# Patient Record
Sex: FEMALE | Race: WHITE | NOT HISPANIC OR LATINO
[De-identification: names, ages, dates, MRNs, and addresses within clinical notes are randomized per-mention and may not be internally consistent; named-entity substitution may affect disease eponyms.]

---

## 2017-10-19 ENCOUNTER — APPOINTMENT (OUTPATIENT)
Dept: FAMILY MEDICINE | Facility: CLINIC | Age: 82
End: 2017-10-19
Payer: MEDICARE

## 2017-10-19 VITALS
WEIGHT: 140 LBS | DIASTOLIC BLOOD PRESSURE: 60 MMHG | HEART RATE: 55 BPM | SYSTOLIC BLOOD PRESSURE: 120 MMHG | HEIGHT: 59 IN | BODY MASS INDEX: 28.22 KG/M2 | RESPIRATION RATE: 16 BRPM

## 2017-10-19 DIAGNOSIS — Z80.9 FAMILY HISTORY OF MALIGNANT NEOPLASM, UNSPECIFIED: ICD-10-CM

## 2017-10-19 DIAGNOSIS — E78.6 LIPOPROTEIN DEFICIENCY: ICD-10-CM

## 2017-10-19 DIAGNOSIS — D50.9 IRON DEFICIENCY ANEMIA, UNSPECIFIED: ICD-10-CM

## 2017-10-19 DIAGNOSIS — I25.10 ATHEROSCLEROTIC HEART DISEASE OF NATIVE CORONARY ARTERY W/OUT ANGINA PECTORIS: ICD-10-CM

## 2017-10-19 DIAGNOSIS — Z82.61 FAMILY HISTORY OF ARTHRITIS: ICD-10-CM

## 2017-10-19 DIAGNOSIS — Z00.00 ENCOUNTER FOR GENERAL ADULT MEDICAL EXAMINATION W/OUT ABNORMAL FINDINGS: ICD-10-CM

## 2017-10-19 DIAGNOSIS — Z87.2 PERSONAL HISTORY OF DISEASES OF THE SKIN AND SUBCUTANEOUS TISSUE: ICD-10-CM

## 2017-10-19 DIAGNOSIS — I10 ESSENTIAL (PRIMARY) HYPERTENSION: ICD-10-CM

## 2017-10-19 PROCEDURE — G0008: CPT

## 2017-10-19 PROCEDURE — 90686 IIV4 VACC NO PRSV 0.5 ML IM: CPT

## 2017-10-19 PROCEDURE — G0439: CPT

## 2017-10-19 PROCEDURE — 93000 ELECTROCARDIOGRAM COMPLETE: CPT

## 2017-10-19 PROCEDURE — 36415 COLL VENOUS BLD VENIPUNCTURE: CPT

## 2017-10-20 LAB
HBA1C MFR BLD HPLC: 5.5
LDLC SERPL CALC-MCNC: 93

## 2017-10-28 ENCOUNTER — RX RENEWAL (OUTPATIENT)
Age: 82
End: 2017-10-28

## 2017-11-02 ENCOUNTER — APPOINTMENT (OUTPATIENT)
Dept: ORTHOPEDIC SURGERY | Facility: CLINIC | Age: 82
End: 2017-11-02

## 2019-08-20 ENCOUNTER — INPATIENT (INPATIENT)
Facility: HOSPITAL | Age: 84
LOS: 2 days | Discharge: NURSING, REHAB OR LONG-TERM CARE | DRG: 281 | End: 2019-08-23
Attending: FAMILY MEDICINE | Admitting: INTERNAL MEDICINE
Payer: MEDICARE

## 2019-08-20 ENCOUNTER — TRANSCRIPTION ENCOUNTER (OUTPATIENT)
Age: 84
End: 2019-08-20

## 2019-08-20 VITALS
WEIGHT: 139.99 LBS | DIASTOLIC BLOOD PRESSURE: 71 MMHG | HEIGHT: 65 IN | RESPIRATION RATE: 20 BRPM | TEMPERATURE: 98 F | SYSTOLIC BLOOD PRESSURE: 129 MMHG | HEART RATE: 76 BPM | OXYGEN SATURATION: 97 %

## 2019-08-20 DIAGNOSIS — K26.9 DUODENAL ULCER, UNSPECIFIED AS ACUTE OR CHRONIC, WITHOUT HEMORRHAGE OR PERFORATION: ICD-10-CM

## 2019-08-20 DIAGNOSIS — I10 ESSENTIAL (PRIMARY) HYPERTENSION: ICD-10-CM

## 2019-08-20 DIAGNOSIS — R55 SYNCOPE AND COLLAPSE: ICD-10-CM

## 2019-08-20 DIAGNOSIS — I21.4 NON-ST ELEVATION (NSTEMI) MYOCARDIAL INFARCTION: ICD-10-CM

## 2019-08-20 LAB
ALBUMIN SERPL ELPH-MCNC: 3.7 G/DL — SIGNIFICANT CHANGE UP (ref 3.3–5.2)
ALP SERPL-CCNC: 51 U/L — SIGNIFICANT CHANGE UP (ref 40–120)
ALT FLD-CCNC: 14 U/L — SIGNIFICANT CHANGE UP
ANION GAP SERPL CALC-SCNC: 12 MMOL/L — SIGNIFICANT CHANGE UP (ref 5–17)
AST SERPL-CCNC: 38 U/L — HIGH
BASOPHILS # BLD AUTO: 0.04 K/UL — SIGNIFICANT CHANGE UP (ref 0–0.2)
BASOPHILS NFR BLD AUTO: 0.5 % — SIGNIFICANT CHANGE UP (ref 0–2)
BILIRUB SERPL-MCNC: 0.4 MG/DL — SIGNIFICANT CHANGE UP (ref 0.4–2)
BUN SERPL-MCNC: 28 MG/DL — HIGH (ref 8–20)
CALCIUM SERPL-MCNC: 9.6 MG/DL — SIGNIFICANT CHANGE UP (ref 8.6–10.2)
CHLORIDE SERPL-SCNC: 105 MMOL/L — SIGNIFICANT CHANGE UP (ref 98–107)
CK MB CFR SERPL CALC: 38 NG/ML — HIGH (ref 0–6.7)
CK SERPL-CCNC: 267 U/L — HIGH (ref 25–170)
CK SERPL-CCNC: 360 U/L — HIGH (ref 25–170)
CO2 SERPL-SCNC: 24 MMOL/L — SIGNIFICANT CHANGE UP (ref 22–29)
CREAT SERPL-MCNC: 1 MG/DL — SIGNIFICANT CHANGE UP (ref 0.5–1.3)
EOSINOPHIL # BLD AUTO: 0.09 K/UL — SIGNIFICANT CHANGE UP (ref 0–0.5)
EOSINOPHIL NFR BLD AUTO: 1.2 % — SIGNIFICANT CHANGE UP (ref 0–6)
GLUCOSE SERPL-MCNC: 141 MG/DL — HIGH (ref 70–115)
HCT VFR BLD CALC: 37.7 % — SIGNIFICANT CHANGE UP (ref 34.5–45)
HGB BLD-MCNC: 12.1 G/DL — SIGNIFICANT CHANGE UP (ref 11.5–15.5)
IMM GRANULOCYTES NFR BLD AUTO: 0.7 % — SIGNIFICANT CHANGE UP (ref 0–1.5)
LYMPHOCYTES # BLD AUTO: 1.09 K/UL — SIGNIFICANT CHANGE UP (ref 1–3.3)
LYMPHOCYTES # BLD AUTO: 14.5 % — SIGNIFICANT CHANGE UP (ref 13–44)
MCHC RBC-ENTMCNC: 28.8 PG — SIGNIFICANT CHANGE UP (ref 27–34)
MCHC RBC-ENTMCNC: 32.1 GM/DL — SIGNIFICANT CHANGE UP (ref 32–36)
MCV RBC AUTO: 89.8 FL — SIGNIFICANT CHANGE UP (ref 80–100)
MONOCYTES # BLD AUTO: 0.49 K/UL — SIGNIFICANT CHANGE UP (ref 0–0.9)
MONOCYTES NFR BLD AUTO: 6.5 % — SIGNIFICANT CHANGE UP (ref 2–14)
NEUTROPHILS # BLD AUTO: 5.78 K/UL — SIGNIFICANT CHANGE UP (ref 1.8–7.4)
NEUTROPHILS NFR BLD AUTO: 76.6 % — SIGNIFICANT CHANGE UP (ref 43–77)
PLATELET # BLD AUTO: 228 K/UL — SIGNIFICANT CHANGE UP (ref 150–400)
POTASSIUM SERPL-MCNC: 4.5 MMOL/L — SIGNIFICANT CHANGE UP (ref 3.5–5.3)
POTASSIUM SERPL-SCNC: 4.5 MMOL/L — SIGNIFICANT CHANGE UP (ref 3.5–5.3)
PROT SERPL-MCNC: 6.8 G/DL — SIGNIFICANT CHANGE UP (ref 6.6–8.7)
RBC # BLD: 4.2 M/UL — SIGNIFICANT CHANGE UP (ref 3.8–5.2)
RBC # FLD: 14.3 % — SIGNIFICANT CHANGE UP (ref 10.3–14.5)
SODIUM SERPL-SCNC: 141 MMOL/L — SIGNIFICANT CHANGE UP (ref 135–145)
TROPONIN T SERPL-MCNC: 0.37 NG/ML — HIGH (ref 0–0.06)
TROPONIN T SERPL-MCNC: 0.53 NG/ML — HIGH (ref 0–0.06)
WBC # BLD: 7.54 K/UL — SIGNIFICANT CHANGE UP (ref 3.8–10.5)
WBC # FLD AUTO: 7.54 K/UL — SIGNIFICANT CHANGE UP (ref 3.8–10.5)

## 2019-08-20 PROCEDURE — 73502 X-RAY EXAM HIP UNI 2-3 VIEWS: CPT | Mod: 26,LT

## 2019-08-20 PROCEDURE — 93010 ELECTROCARDIOGRAM REPORT: CPT

## 2019-08-20 PROCEDURE — 99223 1ST HOSP IP/OBS HIGH 75: CPT

## 2019-08-20 PROCEDURE — 73562 X-RAY EXAM OF KNEE 3: CPT | Mod: 26,LT

## 2019-08-20 PROCEDURE — 99285 EMERGENCY DEPT VISIT HI MDM: CPT

## 2019-08-20 PROCEDURE — 71045 X-RAY EXAM CHEST 1 VIEW: CPT | Mod: 26

## 2019-08-20 PROCEDURE — 70450 CT HEAD/BRAIN W/O DYE: CPT | Mod: 26

## 2019-08-20 RX ORDER — ATORVASTATIN CALCIUM 80 MG/1
40 TABLET, FILM COATED ORAL AT BEDTIME
Refills: 0 | Status: DISCONTINUED | OUTPATIENT
Start: 2019-08-21 | End: 2019-08-23

## 2019-08-20 RX ORDER — METOPROLOL TARTRATE 50 MG
50 TABLET ORAL
Refills: 0 | Status: DISCONTINUED | OUTPATIENT
Start: 2019-08-20 | End: 2019-08-23

## 2019-08-20 RX ORDER — FAMOTIDINE 10 MG/ML
20 INJECTION INTRAVENOUS DAILY
Refills: 0 | Status: DISCONTINUED | OUTPATIENT
Start: 2019-08-20 | End: 2019-08-23

## 2019-08-20 RX ORDER — ENOXAPARIN SODIUM 100 MG/ML
60 INJECTION SUBCUTANEOUS ONCE
Refills: 0 | Status: COMPLETED | OUTPATIENT
Start: 2019-08-20 | End: 2019-08-20

## 2019-08-20 RX ORDER — LISINOPRIL 2.5 MG/1
2.5 TABLET ORAL DAILY
Refills: 0 | Status: DISCONTINUED | OUTPATIENT
Start: 2019-08-20 | End: 2019-08-23

## 2019-08-20 RX ORDER — ASPIRIN/CALCIUM CARB/MAGNESIUM 324 MG
162 TABLET ORAL ONCE
Refills: 0 | Status: COMPLETED | OUTPATIENT
Start: 2019-08-20 | End: 2019-08-20

## 2019-08-20 RX ORDER — ENOXAPARIN SODIUM 100 MG/ML
60 INJECTION SUBCUTANEOUS EVERY 12 HOURS
Refills: 0 | Status: DISCONTINUED | OUTPATIENT
Start: 2019-08-20 | End: 2019-08-23

## 2019-08-20 RX ORDER — ASPIRIN/CALCIUM CARB/MAGNESIUM 324 MG
81 TABLET ORAL DAILY
Refills: 0 | Status: DISCONTINUED | OUTPATIENT
Start: 2019-08-21 | End: 2019-08-23

## 2019-08-20 RX ORDER — ATORVASTATIN CALCIUM 80 MG/1
40 TABLET, FILM COATED ORAL ONCE
Refills: 0 | Status: COMPLETED | OUTPATIENT
Start: 2019-08-20 | End: 2019-08-20

## 2019-08-20 RX ADMIN — ATORVASTATIN CALCIUM 40 MILLIGRAM(S): 80 TABLET, FILM COATED ORAL at 23:30

## 2019-08-20 RX ADMIN — ENOXAPARIN SODIUM 60 MILLIGRAM(S): 100 INJECTION SUBCUTANEOUS at 19:48

## 2019-08-20 NOTE — CONSULT NOTE ADULT - SUBJECTIVE AND OBJECTIVE BOX
Formerly [facility], [facility]                                   [address] [place] [address]                                                      PHONE: [phone]                                                         FAX: [phone]  [email or web address]  ---------------------------------------------------------------------------------------------------------------------------------    Reason for Consult: NSTEMI    HPI:  EDOUARD MALAVE is an 89y Female with Hx of CAD s/p MI HTN found by family after she experienced an episode of brief AMS versus LOC while at home , daughter reports founding her in the floor she denies any CP or SOB fully awake at present had been seen at [facility] in the past by Dr Rain . Found to have mildly elevated trops at 0.37    PAST MEDICAL & SURGICAL HISTORY:  MI (myocardial infarction): @ age 89  HTN (hypertension)  No significant past surgical history      No Known Allergies      MEDICATIONS  (STANDING):    MEDICATIONS  (PRN):      Social History:  Cigarettes:                    Alchohol:                 Illicit Drug Abuse:      REVIEW OF SYSTEMS:    Constitutional: No fever, weight loss or fatigue  Eyes: No eye pain, visual disturbances, or discharge  ENMT:  No difficulty hearing, tinnitus, vertigo; No sinus or throat pain  Neck: No pain or stiffness  Respiratory: No cough, wheezing, chills or hemoptysis  Cardiovascular: No chest pain, palpitations, shortness of breath, dizziness or leg swelling  Gastrointestinal: No abdominal or epigastric pain. No nausea, vomiting or hematemesis; No diarrhea or constipation. No melena or hematochezia.  Genitourinary: No dysuria, frequency, hematuria or incontinence  Rectal: No pain, hemorrhoids or incontinence  Neurological: No headaches, memory loss, loss of strength, numbness or tremors  Skin: No itching, burning, rashes or lesions   Lymph Nodes: No enlarged glands  Endocrine: No heat or cold intolerance; No hair loss  Musculoskeletal: No joint pain or swelling; No muscle, back or extremity pain  Psychiatric: No depression, anxiety, mood swings or difficulty sleeping  Heme/Lymph: No easy bruising or bleeding gums  Allergy and Immunologic: No hives or eczema    Vital Signs Last 24 Hrs  T(C): 36.7 (20 Aug 2019 13:16), Max: 36.7 (20 Aug 2019 13:16)  T(F): 98 (20 Aug 2019 13:16), Max: 98 (20 Aug 2019 13:16)  HR: 68 (20 Aug 2019 14:05) (68 - 76)  BP: 102/55 (20 Aug 2019 14:05) (102/55 - 129/71)  BP(mean): --  RR: 18 (20 Aug 2019 14:05) (18 - 20)  SpO2: 98% (20 Aug 2019 14:05) (97% - 98%)  ICU Vital Signs Last 24 Hrs  EDOUARD MALAVE  I&O's Detail    I&O's Summary    Drug Dosing Weight  EDOUARD MALAVE      PHYSICAL EXAM:  General: Appears well developed, well nourished alert and cooperative.  HEENT: Head; normocephalic, atraumatic.  Eyes: Pupils reactive, cornea wnl.  Neck: Supple, no nodes adenopathy, no NVD or carotid bruit or thyromegaly.  CARDIOVASCULAR: Normal S1 and S2, No murmur, rub, gallop or lift.   LUNGS: No rales, rhonchi or wheeze. Normal breath sounds bilaterally.  ABDOMEN: Soft, nontender without mass or organomegaly. bowel sounds normoactive.  EXTREMITIES: No clubbing, cyanosis or edema. Distal pulses wnl.   SKIN: warm and dry with normal turgor.  NEURO: Alert/oriented x 3/normal motor exam. No pathologic reflexes.    PSYCH: normal affect.        LABS:                        12.1   7.54  )-----------( 228      ( 20 Aug 2019 14:25 )             37.7     08-20    141  |  105  |  28.0<H>  ----------------------------<  141<H>  4.5   |  24.0  |  1.00    Ca    9.6      20 Aug 2019 14:25    TPro  6.8  /  Alb  3.7  /  TBili  0.4  /  DBili  x   /  AST  38<H>  /  ALT  14  /  AlkPhos  51  08-20    EDOUARD MALAVE  CARDIAC MARKERS ( 20 Aug 2019 14:25 )  x     / 0.37 ng/mL / 267 U/L / x     / 30.9 ng/mL            RADIOLOGY & ADDITIONAL STUDIES:    INTERPRETATION OF TELEMETRY (personally reviewed):    ECG: 	NSR NSTTT changes inferolaterally    ECHO:P    ACTIVE PROBLEMS:  HEALTH ISSUES - PROBLEM Dx:          Assessment and Plan:    In summary,  EDOUARD MALAEV is an 89y Female with Hx of CAD s/p MI HTN found by family after she experienced an episode of brief AMS versus LOC while at home , daughter reports founding her in the floor she denies any CP or SOB fully awake at present had been seen at [facility] in the past by Dr Rain . Found to have mildly elevated trops at 0.37    Telemetry monitoring  Serial cardiac markers and EKgs  2DEchocardiogram- CT of the brain  IV HYDRATION  Continue present cardiac therapy    ELIZA MERIDA MD, FACC, SYLVIA

## 2019-08-20 NOTE — ED ADULT NURSE NOTE - OBJECTIVE STATEMENT
Pt kamlesh s/p fall at home in bathroom.  Pt states she was in the bathroom when she fell but she doesn't know Pt biba s/p fall at home in bathroom.  Pt A&OX3, states she was in the bathroom when she fell but she doesn't remember exactly what happened.  Pt denies hitting head. I attempted to ambulate pt but she was unable to bear any weight on LLE, pt c/o severe pain to left knee.  Small abrasion noted to lateral side of left knee, no obvious deformity.  Pt undressed, no other signs of injury noted.  Family arrived shortly after pt arrived and states they heard a thump in upstairs bathroom and found pt unresponsive behind bathroom door.  Pt was unresponsive aprox. 2 mins.  CM put in place, NSR noted, pt slightly hypotensive.  Will continue to monitor.

## 2019-08-20 NOTE — H&P ADULT - PROBLEM SELECTOR PLAN 3
had gib 2/2 ulcer 6 years ago. given PPI intolerance will start on famotidine.  continue to monitor cbc

## 2019-08-20 NOTE — H&P ADULT - NSHPSOCIALHISTORY_GEN_ALL_CORE
smoked 1 pack every 2-3 days, quit 53 years ago, no alcohol or illicit drug use    lives with daughter

## 2019-08-20 NOTE — H&P ADULT - ASSESSMENT
89F hx HTN, L hip fx s/p ORIF, MI (medically managed), Moderate MR and TR, GIB 2/2 duodenal ulcer (6 years ago), Hard of hearing presents after syncope at home found to have NSTEMI.

## 2019-08-20 NOTE — ED ADULT NURSE NOTE - NSIMPLEMENTINTERV_GEN_ALL_ED
Implemented All Universal Safety Interventions:  [place] to call system. Call bell, personal items and telephone within reach. Instruct patient to call for assistance. Room bathroom lighting operational. Non-slip footwear when patient is off stretcher. Physically safe environment: no spills, clutter or unnecessary equipment. Stretcher in lowest position, wheels locked, appropriate side rails in place.

## 2019-08-20 NOTE — ED PROVIDER NOTE - CLINICAL SUMMARY MEDICAL DECISION MAKING FREE TEXT BOX
89 year old woman s/p syncope now responsive and oriented x 3 with no neurological deficits.  EKG non-ischemic however patient with elevated troponin.  Aspirin and Lovenox given.  Cardiology paged and patient admitted.

## 2019-08-20 NOTE — ED ADULT NURSE REASSESSMENT NOTE - NS ED NURSE REASSESS COMMENT FT1
Report received from day RN Pt A&Ox4 c/o at this time. Family at bedside. Pt resting comfortably, VSS, no signs of distress at this time, denies chest pain or sob. also denies dizziness or feeling lightheaded CM in place, awaiting bed, safety maintained, call bell in reach. will continue to monitor. Report received from day RN Pt A&Ox4 at this time. Family at bedside. Pt resting comfortably, VSS, no signs of distress at this time, denies chest pain or sob. also denies dizziness or feeling lightheaded CM in place, awaiting bed, safety maintained, call bell in reach. will continue to monitor.

## 2019-08-20 NOTE — H&P ADULT - PROBLEM SELECTOR PLAN 2
No eKG findings, but elevated troponin to .37 with elevated CK and CKMB  will continue patient on aspirin, therapeutic lovenox  restart home beta blocker  start statin  continue to trend cardiac enzymes and serial ekg's  cards consult appreciated  admit to telemetry

## 2019-08-20 NOTE — H&P ADULT - PROBLEM SELECTOR PLAN 1
unclear events surrounding syncope. unlikely seizure, not hypotensive  potentially vasovagal or 2/2 to MI.   will check orthostatics  pt hydrated  fall precautions  PT

## 2019-08-20 NOTE — H&P ADULT - NSHPPHYSICALEXAM_GEN_ALL_CORE
Vital Signs Last 24 Hrs  T(C): 36.6 (20 Aug 2019 18:30), Max: 36.7 (20 Aug 2019 13:16)  T(F): 97.9 (20 Aug 2019 18:30), Max: 98 (20 Aug 2019 13:16)  HR: 66 (20 Aug 2019 18:30) (66 - 76)  BP: 139/87 (20 Aug 2019 18:30) (102/55 - 139/87)  BP(mean): --  RR: 16 (20 Aug 2019 18:30) (16 - 20)  SpO2: 97% (20 Aug 2019 18:30) (97% - 98%)    GENERAL: NAD, appears stated age  HEENT:  Atraumatic, Normocephalic, MMM, no oropharyngeal lesions  EYES: L eye scarred and blind. R eye Darryl  NECK: Supple, extra neck skin limits exam  CHEST/LUNG: crackles at left lung base  HEART: Regular rate and rhythm; Systolic murmurs  ABDOMEN: Soft, Nontender, Nondistended; Bowel sounds present  EXTREMITIES:  2+ Peripheral Pulses, L knee swollen and tender  PSYCH: AAOx3, normal affect  NEUROLOGY: moves all extremities spontaneously. no sensory deficits

## 2019-08-20 NOTE — ED ADULT TRIAGE NOTE - CHIEF COMPLAINT QUOTE
Patient brought in by ambulance A/Ox2, fell in bathroom, denies hitting head, - bloodthinners, -LOC.

## 2019-08-20 NOTE — ED ADULT NURSE NOTE - CHPI ED NUR SYMPTOMS NEG
no chills/no fever/no nausea/no back pain/no chest pain/no diaphoresis/no shortness of breath/no vomiting/no congestion/no dizziness

## 2019-08-20 NOTE — H&P ADULT - NSHPLABSRESULTS_GEN_ALL_CORE
12.1   7.54  )-----------( 228      ( 20 Aug 2019 14:25 )             37.7     08-20    141  |  105  |  28.0<H>  ----------------------------<  141<H>  4.5   |  24.0  |  1.00    Ca    9.6      20 Aug 2019 14:25    TPro  6.8  /  Alb  3.7  /  TBili  0.4  /  DBili  x   /  AST  38<H>  /  ALT  14  /  AlkPhos  51  08-20            Lactate Trend      CARDIAC MARKERS ( 20 Aug 2019 14:25 )  x     / 0.37 ng/mL / 267 U/L / x     / 30.9 ng/mL        CAPILLARY BLOOD GLUCOSE        RADIOLOGY, EKG & ADDITIONAL TESTS: Reviewed.     EKG- NSR with short CA and PAC's no st elevations or twi    CXR- possible small left pleural effusion. otherwise clear    < from: CT Head No Cont (08.20.19 @ 17:48) >    IMPRESSION:   1.  No acute intracranial hemorrhage or mass effect.   2.  Ill-defined soft tissue in the inferior aspect of the left orbit and   in the left preseptal region. This could be compatible with chronic   sequela from provided history of left eye infection. Clinical correlation   is recommended.    < end of copied text >    < from: Xray Knee 3 Views, Left (08.20.19 @ 16:21) >      IMPRESSION: No acute finding. Arterial calcification, advanced knee   degeneration, and lower end of an intramedullary manav noted.    < end of copied text >

## 2019-08-20 NOTE — ED PROVIDER NOTE - MUSCULOSKELETAL, MLM
Spine appears normal, range of motion is not limited, no muscle or joint tenderness.  Unable to bear weight on the left leg.

## 2019-08-20 NOTE — H&P ADULT - HISTORY OF PRESENT ILLNESS
89F hx HTN, L hip fx s/p ORIF, MI (medically managed), Moderate MR and TR, GIB 2/2 duodenal ulcer (6 years ago), Hard of hearing presents after syncope at home. According to daughter who lives with patient they heard her fall in the bathroom. When they found her moments later she was slumped over in bathroom "snoring". Didn't appear to hit head. She had LOC for about a minute and then came to. She did not appear confused afterwards. EMS arrived and vitals were stable. Patient had no complaints earlier in the day. She doesn't remember the events leading up to syncope. She states she currently has no complaints. Denies chest pain, sob, abdominal pain, fever, chill, dysuria.     In ED, pt was VSS. Trop found to be 0.37 with elevated CK and CKMB. Pt given Lovenox 60 mg and aspirin.

## 2019-08-21 ENCOUNTER — TRANSCRIPTION ENCOUNTER (OUTPATIENT)
Age: 84
End: 2019-08-21

## 2019-08-21 DIAGNOSIS — E78.5 HYPERLIPIDEMIA, UNSPECIFIED: ICD-10-CM

## 2019-08-21 LAB
ANION GAP SERPL CALC-SCNC: 12 MMOL/L — SIGNIFICANT CHANGE UP (ref 5–17)
BUN SERPL-MCNC: 29 MG/DL — HIGH (ref 8–20)
CALCIUM SERPL-MCNC: 9.5 MG/DL — SIGNIFICANT CHANGE UP (ref 8.6–10.2)
CHLORIDE SERPL-SCNC: 105 MMOL/L — SIGNIFICANT CHANGE UP (ref 98–107)
CK MB CFR SERPL CALC: 31 NG/ML — HIGH (ref 0–6.7)
CK SERPL-CCNC: 393 U/L — HIGH (ref 25–170)
CO2 SERPL-SCNC: 26 MMOL/L — SIGNIFICANT CHANGE UP (ref 22–29)
CREAT SERPL-MCNC: 0.94 MG/DL — SIGNIFICANT CHANGE UP (ref 0.5–1.3)
GLUCOSE SERPL-MCNC: 102 MG/DL — SIGNIFICANT CHANGE UP (ref 70–115)
HCT VFR BLD CALC: 35.5 % — SIGNIFICANT CHANGE UP (ref 34.5–45)
HGB BLD-MCNC: 11.5 G/DL — SIGNIFICANT CHANGE UP (ref 11.5–15.5)
MAGNESIUM SERPL-MCNC: 1.7 MG/DL — SIGNIFICANT CHANGE UP (ref 1.6–2.6)
MCHC RBC-ENTMCNC: 29.1 PG — SIGNIFICANT CHANGE UP (ref 27–34)
MCHC RBC-ENTMCNC: 32.4 GM/DL — SIGNIFICANT CHANGE UP (ref 32–36)
MCV RBC AUTO: 89.9 FL — SIGNIFICANT CHANGE UP (ref 80–100)
PHOSPHATE SERPL-MCNC: 3.4 MG/DL — SIGNIFICANT CHANGE UP (ref 2.4–4.7)
PLATELET # BLD AUTO: 221 K/UL — SIGNIFICANT CHANGE UP (ref 150–400)
POTASSIUM SERPL-MCNC: 4.1 MMOL/L — SIGNIFICANT CHANGE UP (ref 3.5–5.3)
POTASSIUM SERPL-SCNC: 4.1 MMOL/L — SIGNIFICANT CHANGE UP (ref 3.5–5.3)
RBC # BLD: 3.95 M/UL — SIGNIFICANT CHANGE UP (ref 3.8–5.2)
RBC # FLD: 14.3 % — SIGNIFICANT CHANGE UP (ref 10.3–14.5)
SODIUM SERPL-SCNC: 143 MMOL/L — SIGNIFICANT CHANGE UP (ref 135–145)
TROPONIN T SERPL-MCNC: 0.7 NG/ML — HIGH (ref 0–0.06)
TROPONIN T SERPL-MCNC: 0.9 NG/ML — HIGH (ref 0–0.06)
TSH SERPL-MCNC: 0.78 UIU/ML — SIGNIFICANT CHANGE UP (ref 0.27–4.2)
WBC # BLD: 6 K/UL — SIGNIFICANT CHANGE UP (ref 3.8–10.5)
WBC # FLD AUTO: 6 K/UL — SIGNIFICANT CHANGE UP (ref 3.8–10.5)

## 2019-08-21 PROCEDURE — 93010 ELECTROCARDIOGRAM REPORT: CPT

## 2019-08-21 RX ADMIN — ENOXAPARIN SODIUM 60 MILLIGRAM(S): 100 INJECTION SUBCUTANEOUS at 17:11

## 2019-08-21 RX ADMIN — ATORVASTATIN CALCIUM 40 MILLIGRAM(S): 80 TABLET, FILM COATED ORAL at 22:55

## 2019-08-21 RX ADMIN — Medication 81 MILLIGRAM(S): at 11:10

## 2019-08-21 RX ADMIN — Medication 50 MILLIGRAM(S): at 17:10

## 2019-08-21 RX ADMIN — Medication 50 MILLIGRAM(S): at 06:16

## 2019-08-21 RX ADMIN — FAMOTIDINE 20 MILLIGRAM(S): 10 INJECTION INTRAVENOUS at 11:10

## 2019-08-21 RX ADMIN — LISINOPRIL 2.5 MILLIGRAM(S): 2.5 TABLET ORAL at 06:16

## 2019-08-21 RX ADMIN — ENOXAPARIN SODIUM 60 MILLIGRAM(S): 100 INJECTION SUBCUTANEOUS at 22:55

## 2019-08-21 NOTE — DISCHARGE NOTE PROVIDER - CARE PROVIDER_API CALL
Jayesh Weston)  Cardiovascular Disease; Internal Medicine  [address] [place] [address]  Phone: [phone]  Fax: [phone]  Follow Up Time:     Samantha Tom)  Family Medicine  [address]  [place] [address]  Phone: [phone]  Fax: [phone]  Follow Up Time:

## 2019-08-21 NOTE — PROGRESS NOTE ADULT - SUBJECTIVE AND OBJECTIVE BOX
Patient is a 89y old  Female who presents with a chief complaint of syncope (20 Aug 2019 21:45)    CARDIOVASCULAR:  lisinopril 2.5 milliGRAM(s) Oral daily  metoprolol tartrate 50 milliGRAM(s) Oral two times a day    HEMATOLOGIC:  aspirin enteric coated 81 milliGRAM(s) Oral daily  enoxaparin Injectable 60 milliGRAM(s) SubCutaneous every 12 hours    GATROINTESTINAL:  famotidine    Tablet 20 milliGRAM(s) Oral daily      ENDO/METABOLIC:  atorvastatin 40 milliGRAM(s) Oral at bedtime      Allergies    proton pump inhibitors (Diarrhea)          Vital Signs Last 24 Hrs  T(C): 36.4 (21 Aug 2019 06:14), Max: 36.7 (20 Aug 2019 13:16)  T(F): 97.5 (21 Aug 2019 06:14), Max: 98 (20 Aug 2019 13:16)  HR: 72 (21 Aug 2019 06:14) (64 - 76)  BP: 170/69 (21 Aug 2019 06:14) (102/55 - 170/69)  BP(mean): --  RR: 18 (21 Aug 2019 06:14) (16 - 20)  SpO2: 96% (21 Aug 2019 06:14) (96% - 98%)            REVIEW OF SYSTEMS:    CONSTITUTIONAL: No fever, weight loss, or fatigue  EYES: No eye pain, visual disturbances, or discharge  ENMT:  No difficulty hearing, tinnitus, vertigo; No sinus or throat pain  NECK: No pain or stiffness  RESPIRATORY: No cough, wheezing, chills or hemoptysis; No shortness of breath  CARDIOVASCULAR: No chest pain, palpitations, dizziness, or leg swelling  GASTROINTESTINAL: No abdominal or epigastric pain. No nausea, vomiting, or hematemesis; No diarrhea or constipation. No melena or hematochezia.  GENITOURINARY: No dysuria, frequency, hematuria, or incontinence  NEUROLOGICAL: No headaches, memory loss, loss of strength, numbness, or tremors  [place]  SKIN: No itching, burning, rashes, or lesions   LYMPH NODES: No enlarged glands  ENDOCRINE: No heat or cold intolerance; No hair loss  MUSCULOSKELETAL: No joint pain or swelling; No muscle, back, or extremity pain        PHYSICAL EXAM:    GENERAL: NAD, well-groomed, well-developed  HEAD:  Atraumatic, Normocephalic  EYES: EOMI, PERRLA, conjunctiva and sclera clear  NECK: Supple, No JVD, Normal thyroid  NERVOUS SYSTEM:  Alert & Oriented X3, Good concentration; Motor Strength 5/5 B/L upper and lower extremities; DTRs 2+ intact and symmetric  CHEST/LUNG: Clear to percussion bilaterally; No rales, rhonchi, wheezing, or rubs  HEART: Regular rate and rhythm; No murmurs, rubs, or gallops  ABDOMEN: Soft, Nontender, Nondistended; Bowel sounds present  EXTREMITIES:  2+ Peripheral Pulses, No clubbing, cyanosis, or edema  LYMPH: No lymphadenopathy noted        LABS:                        11.5   6.00  )-----------( 221      ( 21 Aug 2019 05:56 )             35.5     CBC Full  -  ( 21 Aug 2019 05:56 )  WBC Count : 6.00 K/uL  RBC Count : 3.95 M/uL  Hemoglobin : 11.5 g/dL  Hematocrit : 35.5 %  Platelet Count - Automated : 221 K/uL  Mean Cell Volume : 89.9 fl  Mean Cell Hemoglobin : 29.1 pg  Mean Cell Hemoglobin Concentration : 32.4 gm/dL  Auto Neutrophil # : x  Auto Lymphocyte # : x  Auto Monocyte # : x  Auto Eosinophil # : x  Auto Basophil # : x  Auto Neutrophil % : x  Auto Lymphocyte % : x  Auto Monocyte % : x  Auto Eosinophil % : x  Auto Basophil % : x    08-21    143  |  105  |  29.0<H>  ----------------------------<  102  4.1   |  26.0  |  0.94    Ca    9.5      21 Aug 2019 05:56  Phos  3.4     08-21  Mg     1.7     08-21    TPro  6.8  /  Alb  3.7  /  TBili  0.4  /  DBili  x   /  AST  38<H>  /  ALT  14  /  AlkPhos  51  08-20                Albumin, Serum: 3.7 g/dL (08-20 @ 14:25)    LIVER FUNCTIONS - ( 20 Aug 2019 14:25 )  Alb: 3.7 g/dL / Pro: 6.8 g/dL / ALK PHOS: 51 U/L / ALT: 14 U/L / AST: 38 U/L / GGT: x             RADIOLOGY & ADDITIONAL TESTS:

## 2019-08-21 NOTE — PROGRESS NOTE ADULT - SUBJECTIVE AND OBJECTIVE BOX
Patient with underlying dementia. Spoke to daughter by bedside and requesting 1:1 because "mom wonders around at night and I'm afraid she'll fall." Patient pulling on lines and restless.   Will place on CO for safety overnight and follow progress.

## 2019-08-21 NOTE — DISCHARGE NOTE PROVIDER - CARE PROVIDERS DIRECT ADDRESSES
,DirectAddress_Unknown,[email or web address]

## 2019-08-21 NOTE — PROGRESS NOTE ADULT - SUBJECTIVE AND OBJECTIVE BOX
[place] [facility]ARDI[facility]ULAR - [facility], THE [facility]ART [facility]                                   [address] [place] [address]                                                      PHONE: [phone]                                                         FAX: [phone]  [email or web address]  ---------------------------------------------------------------------------------------------------------------------------------    Overnight events/patient complaints: events noted pt unclear if fall or LOC, tele Sr with apcs, no eddie or tachy      proton pump inhibitors (Diarrhea)    MEDICATIONS  (STANDING):  aspirin enteric coated 81 milliGRAM(s) Oral daily  atorvastatin 40 milliGRAM(s) Oral at bedtime  enoxaparin Injectable 60 milliGRAM(s) SubCutaneous every 12 hours  famotidine    Tablet 20 milliGRAM(s) Oral daily  lisinopril 2.5 milliGRAM(s) Oral daily  metoprolol tartrate 50 milliGRAM(s) Oral two times a day    MEDICATIONS  (PRN):      Vital Signs Last 24 Hrs  T(C): 36.3 (21 Aug 2019 09:55), Max: 36.7 (20 Aug 2019 21:45)  T(F): 97.3 (21 Aug 2019 09:55), Max: 98 (20 Aug 2019 21:45)  HR: 63 (21 Aug 2019 09:55) (63 - 76)  BP: 113/65 (21 Aug 2019 09:55) (113/65 - 170/69)  BP(mean): --  RR: 17 (21 Aug 2019 09:55) (16 - 19)  SpO2: 96% (21 Aug 2019 06:14) (96% - 98%)  Daily Height in cm: 160.02 (20 Aug 2019 21:45)    Daily Weight in k.3 (21 Aug 2019 07:12)  ICU Vital Signs Last 24 Hrs  EDOUARD MALAVE  I&O's Detail    21 Aug 2019 07:  -  21 Aug 2019 15:28  --------------------------------------------------------  IN:    Oral Fluid: 240 mL  Total IN: 240 mL    OUT:    Voided: 300 mL  Total OUT: 300 mL    Total NET: -60 mL        I&O's Summary    21 Aug 2019 07:  -  21 Aug 2019 15:28  --------------------------------------------------------  IN: 240 mL / OUT: 300 mL / NET: -60 mL      Drug Dosing Weight  EDOUARD MALAVE      PHYSICAL EXAM:  General: Appears well developed, well nourished alert and cooperative.  HEENT: Head; normocephalic, atraumatic.  Eyes: Pupils reactive, cornea wnl.  Neck: Supple, no nodes adenopathy, no NVD or carotid bruit or thyromegaly.  CARDIOVASCULAR: Normal S1 and S2, No murmur, rub, gallop or lift.   LUNGS: No rales, rhonchi or wheeze. Normal breath sounds bilaterally.  ABDOMEN: Soft, nontender without mass or organomegaly. bowel sounds normoactive.  EXTREMITIES: No clubbing, cyanosis or edema. Distal pulses wnl.   SKIN: warm and dry with normal turgor.  NEURO: Alert/oriented x 3/normal motor exam. No pathologic reflexes.    PSYCH: normal affect.        LABS:                        11.5   6.00  )-----------( 221      ( 21 Aug 2019 05:56 )             35.5     08-    143  |  105  |  29.0<H>  ----------------------------<  102  4.1   |  26.0  |  0.94    Ca    9.5      21 Aug 2019 05:56  Phos  3.4       Mg     1.7         TPro  6.8  /  Alb  3.7  /  TBili  0.4  /  DBili  x   /  AST  38<H>  /  ALT  14  /  AlkPhos  51      EDOUARD MALAVE  CARDIAC MARKERS ( 21 Aug 2019 13:52 )  x     / 0.90 ng/mL / x     / x     / x      CARDIAC MARKERS ( 21 Aug 2019 05:56 )  x     / 0.70 ng/mL / 393 U/L / x     / 31.0 ng/mL  CARDIAC MARKERS ( 20 Aug 2019 21:56 )  x     / 0.53 ng/mL / 360 U/L / x     / 38.0 ng/mL  CARDIAC MARKERS ( 20 Aug 2019 14:25 )  x     / 0.37 ng/mL / 267 U/L / x     / 30.9 ng/mL            RADIOLOGY & ADDITIONAL STUDIES:    INTERPRETATION OF TELEMETRY (personally reviewed):    ECG: NSR  PRWP NSSTT abnl    ECHO:pending

## 2019-08-21 NOTE — PROGRESS NOTE ADULT - ASSESSMENT
Assessment  Fall vs syncope  Elevated trop no acute ECG changes asx  HTN  Remote ?MI previous echo c/w normal EF  dementia      Rec  cont BP control  cont beta blocker asa statin  await echo  if no change in EF likely dc home in am

## 2019-08-21 NOTE — DISCHARGE NOTE PROVIDER - HOSPITAL COURSE
89F hx HTN, L hip fx s/p ORIF, MI (medically managed), Moderate MR and TR, GIB 2/2 duodenal ulcer (6 years ago), Hard of hearing presents after syncope at home. According to daughter who lives with patient they heard her fall in the bathroom. When they found her moments later she was slumped over in bathroom "snoring". Didn't appear to hit head. She had LOC for about a minute and then came to. She did not appear confused afterwards. EMS arrived and vitals were stable. Patient had no complaints earlier in the day. She doesn't remember the events leading up to syncope. She states she currently has no complaints. Denies chest pain, sob, abdominal pain, fever, chill, dysuria.         In ED, pt was VSS. Trop found to be 0.37 with elevated CK and CKMB. Pt given Lovenox 60 mg and aspirin. 89F hx HTN, L hip fx s/p ORIF, MI (medically managed), Moderate MR and TR, GIB 2/2 duodenal ulcer (6 years ago), Hard of hearing presents after syncope at home. According to daughter who lives with patient they heard her fall in the bathroom. When they found her moments later she was slumped over in bathroom "snoring". Didn't appear to hit head. She had LOC for about a minute and then came to. She did not appear confused afterwards. EMS arrived and vitals were stable. Patient had no complaints earlier in the day. She doesn't remember the events leading up to syncope. She states she currently has no complaints. Denies chest pain, sob, abdominal pain, fever, chill, dysuria.         In ED, pt was VSS. Trop found to be 0.37 with elevated CK and CKMB. Pt given Lovenox 60 mg and aspirin.         ECHO on 8/22/19

## 2019-08-22 DIAGNOSIS — R55 SYNCOPE AND COLLAPSE: ICD-10-CM

## 2019-08-22 PROCEDURE — 93306 TTE W/DOPPLER COMPLETE: CPT | Mod: 26

## 2019-08-22 RX ORDER — ASPIRIN/CALCIUM CARB/MAGNESIUM 324 MG
1 TABLET ORAL
Qty: 0 | Refills: 0 | DISCHARGE
Start: 2019-08-22

## 2019-08-22 RX ORDER — HALOPERIDOL DECANOATE 100 MG/ML
0.5 INJECTION INTRAMUSCULAR ONCE
Refills: 0 | Status: COMPLETED | OUTPATIENT
Start: 2019-08-22 | End: 2019-08-22

## 2019-08-22 RX ORDER — ATORVASTATIN CALCIUM 80 MG/1
1 TABLET, FILM COATED ORAL
Qty: 0 | Refills: 0 | DISCHARGE
Start: 2019-08-22

## 2019-08-22 RX ADMIN — Medication 50 MILLIGRAM(S): at 06:40

## 2019-08-22 RX ADMIN — ATORVASTATIN CALCIUM 40 MILLIGRAM(S): 80 TABLET, FILM COATED ORAL at 22:22

## 2019-08-22 RX ADMIN — Medication 81 MILLIGRAM(S): at 12:42

## 2019-08-22 RX ADMIN — FAMOTIDINE 20 MILLIGRAM(S): 10 INJECTION INTRAVENOUS at 12:42

## 2019-08-22 RX ADMIN — ENOXAPARIN SODIUM 60 MILLIGRAM(S): 100 INJECTION SUBCUTANEOUS at 12:42

## 2019-08-22 RX ADMIN — LISINOPRIL 2.5 MILLIGRAM(S): 2.5 TABLET ORAL at 06:40

## 2019-08-22 RX ADMIN — HALOPERIDOL DECANOATE 0.5 MILLIGRAM(S): 100 INJECTION INTRAMUSCULAR at 03:14

## 2019-08-22 RX ADMIN — ENOXAPARIN SODIUM 60 MILLIGRAM(S): 100 INJECTION SUBCUTANEOUS at 22:22

## 2019-08-22 RX ADMIN — Medication 50 MILLIGRAM(S): at 17:25

## 2019-08-22 NOTE — PROGRESS NOTE ADULT - SUBJECTIVE AND OBJECTIVE BOX
Patient is a 89y old  Female who presents with a chief complaint of syncope (21 Aug 2019 21:50)      CARDIOVASCULAR:  lisinopril 2.5 milliGRAM(s) Oral daily  metoprolol tartrate 50 milliGRAM(s) Oral two times a day      HEMATOLOGIC:  aspirin enteric coated 81 milliGRAM(s) Oral daily  enoxaparin Injectable 60 milliGRAM(s) SubCutaneous every 12 hours    GATROINTESTINAL:  famotidine    Tablet 20 milliGRAM(s) Oral daily      ENDO/METABOLIC:  atorvastatin 40 milliGRAM(s) Oral at bedtime      Allergies    proton pump inhibitors (Diarrhea)          Vital Signs Last 24 Hrs  T(C): 36.4 (22 Aug 2019 06:39), Max: 36.8 (21 Aug 2019 22:53)  T(F): 97.5 (22 Aug 2019 06:39), Max: 98.3 (21 Aug 2019 22:53)  HR: 64 (22 Aug 2019 06:39) (63 - 67)  BP: 159/67 (22 Aug 2019 06:39) (105/80 - 159/67)  BP(mean): --  RR: 16 (22 Aug 2019 06:39) (16 - 18)  SpO2: 98% (22 Aug 2019 06:39) (95% - 98%)            REVIEW OF SYSTEMS:  pt sound asleep  non arousable         PHYSICAL EXAM:    GENERAL: NAD, well-groomed, well-developed  HEAD:  Atraumatic, Normocephalic  NECK: Supple, No JVD, Normal thyroid  NERVOUS SYSTEM:  alseep, non arousable   CHEST/LUNG: Clear to percussion bilaterally; No rales, rhonchi, wheezing, or rubs  HEART: Regular rate and rhythm; No murmurs, rubs, or gallops  ABDOMEN: Soft, Nontender, Nondistended; Bowel sounds present  EXTREMITIES:  2+ Peripheral Pulses, No clubbing, cyanosis, or edema  LYMPH: No lymphadenopathy noted        LABS:                        11.5   6.00  )-----------( 221      ( 21 Aug 2019 05:56 )             35.5     CBC Full  -  ( 21 Aug 2019 05:56 )  WBC Count : 6.00 K/uL  RBC Count : 3.95 M/uL  Hemoglobin : 11.5 g/dL  Hematocrit : 35.5 %  Platelet Count - Automated : 221 K/uL  Mean Cell Volume : 89.9 fl  Mean Cell Hemoglobin : 29.1 pg  Mean Cell Hemoglobin Concentration : 32.4 gm/dL  Auto Neutrophil # : x  Auto Lymphocyte # : x  Auto Monocyte # : x  Auto Eosinophil # : x  Auto Basophil # : x  Auto Neutrophil % : x  Auto Lymphocyte % : x  Auto Monocyte % : x  Auto Eosinophil % : x  Auto Basophil % : x    08-21    143  |  105  |  29.0<H>  ----------------------------<  102  4.1   |  26.0  |  0.94    Ca    9.5      21 Aug 2019 05:56  Phos  3.4     08-21  Mg     1.7     08-21    TPro  6.8  /  Alb  3.7  /  TBili  0.4  /  DBili  x   /  AST  38<H>  /  ALT  14  /  AlkPhos  51  08-20                  LIVER FUNCTIONS - ( 20 Aug 2019 14:25 )  Alb: 3.7 g/dL / Pro: 6.8 g/dL / ALK PHOS: 51 U/L / ALT: 14 U/L / AST: 38 U/L / GGT: x             RADIOLOGY & ADDITIONAL TESTS:

## 2019-08-22 NOTE — PHYSICAL THERAPY INITIAL EVALUATION ADULT - RANGE OF MOTION EXAMINATION, REHAB EVAL
Left UE ROM was WFL (within functional limits)/Right UE ROM was WFL (within functional limits)/Right LE ROM was WFL (within functional limits)/Left LE ROM was WFL (within functional limits)/except left knee limited by pain

## 2019-08-22 NOTE — PROGRESS NOTE ADULT - SUBJECTIVE AND OBJECTIVE BOX
[facility] - [facility], THE [facility]ART [facility]                                   [address] [place] [address]                                                      PHONE: [phone]                                                         FAX: [phone]  [email or web address]  ---------------------------------------------------------------------------------------------------------------------------------    Overnight events/patient complaints: events noted pt unclear if fall or LOC, tele Sr with apcs, no eddie or tachy    RECENT EVENTS    confused overnight on  observation  TTE pending  denies any CP or SOB      proton pump inhibitors (Diarrhea)    MEDICATIONS  (STANDING):  aspirin enteric coated 81 milliGRAM(s) Oral daily  atorvastatin 40 milliGRAM(s) Oral at bedtime  enoxaparin Injectable 60 milliGRAM(s) SubCutaneous every 12 hours  famotidine    Tablet 20 milliGRAM(s) Oral daily  lisinopril 2.5 milliGRAM(s) Oral daily  metoprolol tartrate 50 milliGRAM(s) Oral two times a day    MEDICATIONS  (PRN):      Vital Signs Last 24 Hrs  T(C): 36.3 (21 Aug 2019 09:55), Max: 36.7 (20 Aug 2019 21:45)  T(F): 97.3 (21 Aug 2019 09:55), Max: 98 (20 Aug 2019 21:45)  HR: 63 (21 Aug 2019 09:55) (63 - 76)  BP: 113/65 (21 Aug 2019 09:55) (113/65 - 170/69)  BP(mean): --  RR: 17 (21 Aug 2019 09:55) (16 - 19)  SpO2: 96% (21 Aug 2019 06:14) (96% - 98%)  Daily Height in cm: 160.02 (20 Aug 2019 21:45)    Daily Weight in k.3 (21 Aug 2019 07:12)  ICU Vital Signs Last 24 Hrs  EDOUARD MALAVE  I&O's Detail    21 Aug 2019 07:  -  21 Aug 2019 15:28  --------------------------------------------------------  IN:    Oral Fluid: 240 mL  Total IN: 240 mL    OUT:    Voided: 300 mL  Total OUT: 300 mL    Total NET: -60 mL        I&O's Summary    21 Aug 2019 07:  -  21 Aug 2019 15:28  --------------------------------------------------------  IN: 240 mL / OUT: 300 mL / NET: -60 mL      Drug Dosing Weight  EDOUARD MALAVE      PHYSICAL EXAM:  General: Appears well developed, well nourished alert and cooperative.  HEENT: Head; normocephalic, atraumatic.  Eyes: Pupils reactive, cornea wnl.  Neck: Supple, no nodes adenopathy, no NVD or carotid bruit or thyromegaly.  CARDIOVASCULAR: Normal S1 and S2, No murmur, rub, gallop or lift.   LUNGS: No rales, rhonchi or wheeze. Normal breath sounds bilaterally.  ABDOMEN: Soft, nontender without mass or organomegaly. bowel sounds normoactive.  EXTREMITIES: No clubbing, cyanosis or edema. Distal pulses wnl.   SKIN: warm and dry with normal turgor.  NEURO: Alert/oriented x 3/normal motor exam. No pathologic reflexes.    PSYCH: normal affect.        LABS:                        11.5   6.00  )-----------( 221      ( 21 Aug 2019 05:56 )             35.5         143  |  105  |  29.0<H>  ----------------------------<  102  4.1   |  26.0  |  0.94    Ca    9.5      21 Aug 2019 05:56  Phos  3.4     -  Mg     1.7     -    TPro  6.8  /  Alb  3.7  /  TBili  0.4  /  DBili  x   /  AST  38<H>  /  ALT  14  /  AlkPhos  51      EDOUARD MALAVE  CARDIAC MARKERS ( 21 Aug 2019 13:52 )  x     / 0.90 ng/mL / x     / x     / x      CARDIAC MARKERS ( 21 Aug 2019 05:56 )  x     / 0.70 ng/mL / 393 U/L / x     / 31.0 ng/mL  CARDIAC MARKERS ( 20 Aug 2019 21:56 )  x     / 0.53 ng/mL / 360 U/L / x     / 38.0 ng/mL  CARDIAC MARKERS ( 20 Aug 2019 14:25 )  x     / 0.37 ng/mL / 267 U/L / x     / 30.9 ng/mL            RADIOLOGY & ADDITIONAL STUDIES:    INTERPRETATION OF TELEMETRY (personally reviewed):    ECG: NSR  PRWP NSSTT abnl    ECHO: pending

## 2019-08-22 NOTE — PHYSICAL THERAPY INITIAL EVALUATION ADULT - DIAGNOSIS, PT EVAL
impaired functional mobility secondary to syncope, fall, and subsequent deconditioning from prolonged bedrest

## 2019-08-22 NOTE — PHYSICAL THERAPY INITIAL EVALUATION ADULT - LEVEL OF INDEPENDENCE: STAND/SIT, REHAB EVAL
[FreeTextEntry1] : Documented by Pete Sinclair acting as a scribe for Dr. Eleazar Clemons on 06/17/2019.\par \par All medical record entries made by the Scribe were at my, Dr. Eleazar Clemons's, direction and personally dictated by me on 06/17/2019. I have reviewed the chart and agree that the record accurately reflects my personal performance of the history, physical exam, assessment and plan. I have also personally directed, reviewed, and agree with the discharge instructions.  moderate assist (50% patients effort)

## 2019-08-22 NOTE — PHYSICAL THERAPY INITIAL EVALUATION ADULT - ADDITIONAL COMMENTS
Pt lives in a two story house with a stair lift inside and 4 small (2-4 inch per daughter) steps to entry from outside. Pt is poor historian, subjective assessment corroborated with daughter. She was mostly independent prior to admission, requiring help only with certain activities (bathing, cooking). Her daughter is her primary caregiver, though she also cares for her disabled . She ambulates with use of RW, but also has a cane and wheelchair.

## 2019-08-22 NOTE — PROGRESS NOTE ADULT - ASSESSMENT
Assessment  Fall vs syncope  Elevated trop no acute ECG changes asx  HTN  Remote ?MI previous echo c/w normal EF  dementia      Rec    Telemetry  cont BP control  cont beta blocker asa statin  TTE today  Discussed with family at bedside

## 2019-08-22 NOTE — PHYSICAL THERAPY INITIAL EVALUATION ADULT - GENERAL OBSERVATIONS, REHAB EVAL
Consult received, chart reviewed. Patient received supine in bed under 1:1 observation, NAD, +external catheter, +tele, +IV lock. Daughter present at bedside. Pt unable to open left eye.

## 2019-08-23 ENCOUNTER — TRANSCRIPTION ENCOUNTER (OUTPATIENT)
Age: 84
End: 2019-08-23

## 2019-08-23 VITALS
TEMPERATURE: 99 F | RESPIRATION RATE: 18 BRPM | OXYGEN SATURATION: 98 % | HEART RATE: 66 BPM | DIASTOLIC BLOOD PRESSURE: 68 MMHG | SYSTOLIC BLOOD PRESSURE: 100 MMHG

## 2019-08-23 RX ADMIN — ENOXAPARIN SODIUM 60 MILLIGRAM(S): 100 INJECTION SUBCUTANEOUS at 10:36

## 2019-08-23 RX ADMIN — FAMOTIDINE 20 MILLIGRAM(S): 10 INJECTION INTRAVENOUS at 10:36

## 2019-08-23 RX ADMIN — Medication 81 MILLIGRAM(S): at 10:36

## 2019-08-23 RX ADMIN — Medication 50 MILLIGRAM(S): at 06:10

## 2019-08-23 RX ADMIN — LISINOPRIL 2.5 MILLIGRAM(S): 2.5 TABLET ORAL at 06:10

## 2019-08-23 NOTE — PROGRESS NOTE ADULT - SUBJECTIVE AND OBJECTIVE BOX
Patient is a 89y old  Female who presents with a chief complaint of syncope (21 Aug 2019 21:50)      CARDIOVASCULAR:  lisinopril 2.5 milliGRAM(s) Oral daily  metoprolol tartrate 50 milliGRAM(s) Oral two times a day      HEMATOLOGIC:  aspirin enteric coated 81 milliGRAM(s) Oral daily  enoxaparin Injectable 60 milliGRAM(s) SubCutaneous every 12 hours    GATROINTESTINAL:  famotidine    Tablet 20 milliGRAM(s) Oral daily      ENDO/METABOLIC:  atorvastatin 40 milliGRAM(s) Oral at bedtime      Allergies    proton pump inhibitors (Diarrhea)          Vital Signs Last 24 Hrs  T(C): 36.7 (23 Aug 2019 06:05), Max: 36.8 (22 Aug 2019 15:18)  T(F): 98 (23 Aug 2019 06:05), Max: 98.3 (22 Aug 2019 15:18)  HR: 69 (23 Aug 2019 06:05) (64 - 69)  BP: 160/69 (23 Aug 2019 06:05) (133/58 - 160/69)  BP(mean): --  RR: 18 (23 Aug 2019 06:05) (17 - 18)  SpO2: 97% (23 Aug 2019 06:05) (96% - 97%)          REVIEW OF SYSTEMS:  pt sound asleep  non arousable         PHYSICAL EXAM:    GENERAL: NAD, well-groomed, well-developed  HEAD:  Atraumatic, Normocephalic  NECK: Supple, No JVD, Normal thyroid  NERVOUS SYSTEM:  alseep, non arousable   CHEST/LUNG: Clear to percussion bilaterally; No rales, rhonchi, wheezing, or rubs  HEART: Regular rate and rhythm; No murmurs, rubs, or gallops  ABDOMEN: Soft, Nontender, Nondistended; Bowel sounds present  EXTREMITIES:  2+ Peripheral Pulses, No clubbing, cyanosis, or edema  LYMPH: No lymphadenopathy noted        LABS:                        11.5   6.00  )-----------( 221      ( 21 Aug 2019 05:56 )             35.5     CBC Full  -  ( 21 Aug 2019 05:56 )  WBC Count : 6.00 K/uL  RBC Count : 3.95 M/uL  Hemoglobin : 11.5 g/dL  Hematocrit : 35.5 %  Platelet Count - Automated : 221 K/uL  Mean Cell Volume : 89.9 fl  Mean Cell Hemoglobin : 29.1 pg  Mean Cell Hemoglobin Concentration : 32.4 gm/dL  Auto Neutrophil # : x  Auto Lymphocyte # : x  Auto Monocyte # : x  Auto Eosinophil # : x  Auto Basophil # : x  Auto Neutrophil % : x  Auto Lymphocyte % : x  Auto Monocyte % : x  Auto Eosinophil % : x  Auto Basophil % : x    08-21    143  |  105  |  29.0<H>  ----------------------------<  102  4.1   |  26.0  |  0.94    Ca    9.5      21 Aug 2019 05:56  Phos  3.4     08-21  Mg     1.7     08-21    TPro  6.8  /  Alb  3.7  /  TBili  0.4  /  DBili  x   /  AST  38<H>  /  ALT  14  /  AlkPhos  51  08-20                  LIVER FUNCTIONS - ( 20 Aug 2019 14:25 )  Alb: 3.7 g/dL / Pro: 6.8 g/dL / ALK PHOS: 51 U/L / ALT: 14 U/L / AST: 38 U/L / GGT: x             RADIOLOGY & ADDITIONAL TESTS:

## 2019-08-23 NOTE — DIETITIAN INITIAL EVALUATION ADULT. - OTHER INFO
Pt with h/o HTN, L hip fx s/p ORIF, MI (medically managed), moderate MR and TR, GIB 2/2 duodenal ulcer.  Pt is s/p fall.  Attempted to speak with pt- poor historian.  Pt reports good po intake at meals, however nursing assistant reports po intake 25-50% at meals.  Unable to obtain nutrition hx as pt not answering appropriately.

## 2019-08-23 NOTE — DIETITIAN INITIAL EVALUATION ADULT. - ETIOLOGY
related to inability to consume sufficient protein energy intake with decreased appetite in setting of advanced age and confusion at times

## 2019-08-23 NOTE — CHART NOTE - NSCHARTNOTEFT_GEN_A_CORE
Upon Nutritional Assessment by the Registered Dietitian your patient was determined to meet criteria / has evidence of the following diagnosis/diagnoses:          [x ]  Moderate Protein Calorie Malnutrition          Findings as based on:  •  Comprehensive nutrition assessment and consultation  •  Calorie counts (nutrient intake analysis)  •  Food acceptance and intake status from observations by staff  •  Follow up  •  Patient education  •  Intervention secondary to interdisciplinary rounds  •   concerns      Treatment:    The following diet has been recommended:  RX: Ensure TID    PROVIDER Section:     By signing this assessment you are acknowledging and agree with the diagnosis/diagnoses assigned by the Registered Dietitian    Comments:

## 2019-08-23 NOTE — DISCHARGE NOTE NURSING/CASE MANAGEMENT/SOCIAL WORK - NSDCDPATPORTLINK_GEN_ALL_CORE
You can access the [facility] Portal, offered by [facility], by registering with the following website: [email or web address]

## 2019-08-23 NOTE — DIETITIAN INITIAL EVALUATION ADULT. - MALNUTRITION
NFPE performed- pt with moderate muscle wasting at temples, clavicle, shoulder, interosseous.  Moderate fat loss in orbital region. Moderate (chronic)

## 2019-08-23 NOTE — PROGRESS NOTE ADULT - SUBJECTIVE AND OBJECTIVE BOX
[facility] - [place], THE [facility]                                   [address] [place] [address]                                                      PHONE: [phone]                                                         FAX: [phone]  [email or web address]  ---------------------------------------------------------------------------------------------------------------------------------    Overnight events/patient complaints:  NAD feeling well today     proton pump inhibitors (Diarrhea)    MEDICATIONS  (STANDING):  aspirin enteric coated 81 milliGRAM(s) Oral daily  atorvastatin 40 milliGRAM(s) Oral at bedtime  enoxaparin Injectable 60 milliGRAM(s) SubCutaneous every 12 hours  famotidine    Tablet 20 milliGRAM(s) Oral daily  lisinopril 2.5 milliGRAM(s) Oral daily  metoprolol tartrate 50 milliGRAM(s) Oral two times a day    MEDICATIONS  (PRN):      Vital Signs Last 24 Hrs  T(C): 36.7 (23 Aug 2019 06:05), Max: 36.8 (22 Aug 2019 15:18)  T(F): 98 (23 Aug 2019 06:05), Max: 98.3 (22 Aug 2019 15:18)  HR: 69 (23 Aug 2019 06:05) (64 - 69)  BP: 160/69 (23 Aug 2019 06:05) (133/58 - 160/69)  BP(mean): --  RR: 18 (23 Aug 2019 06:05) (17 - 18)  SpO2: 97% (23 Aug 2019 06:05) (96% - 97%)  ICU Vital Signs Last 24 Hrs  EDOUARD MALAVE  I&O's Detail    22 Aug 2019 07:01  -  23 Aug 2019 07:00  --------------------------------------------------------  IN:    Oral Fluid: 420 mL  Total IN: 420 mL    OUT:    Voided: 1310 mL  Total OUT: 1310 mL    Total NET: -890 mL        I&O's Summary    22 Aug 2019 07:01  -  23 Aug 2019 07:00  --------------------------------------------------------  IN: 420 mL / OUT: 1310 mL / NET: -890 mL      Drug Dosing Weight  EDOUARD MALAVE      PHYSICAL EXAM:  General: Appears well developed, well nourished alert and cooperative.  HEENT: Head; normocephalic, atraumatic.  Eyes: Pupils reactive, cornea wnl.  Neck: Supple, no nodes adenopathy, no NVD or carotid bruit or thyromegaly.  CARDIOVASCULAR: Normal S1 and S2, 2/6 murmur, rub, gallop or lift.   LUNGS: No rales, rhonchi or wheeze. Normal breath sounds bilaterally.  ABDOMEN: Soft, nontender without mass or organomegaly. bowel sounds normoactive.  EXTREMITIES: No clubbing, cyanosis or edema. Distal pulses wnl.   SKIN: warm and dry with normal turgor.  NEURO: Alert/oriented x 1  PSYCH: normal affect.        LABS:          EDOUARD MALAVE  CARDIAC MARKERS ( 21 Aug 2019 13:52 )  x     / 0.90 ng/mL / x     / x     / x                RADIOLOGY & ADDITIONAL STUDIES:    INTERPRETATION OF TELEMETRY (personally reviewed): no events         ECHO:  Summary:   1. Left ventricular ejection fraction, by visual estimation, is 65 to   70%.   2. Normal global left ventricular systolic function.   3. Elevated left atrial and left ventricular end-diastolic pressures.   4. Spectral Doppler shows pseudonormal pattern of left ventricular   myocardial filling (Grade II diastolic dysfunction).   5. There is mild concentric left ventricular hypertrophy.   6. Estimated pulmonary artery systolic pressure is 40.9 mmHg assuming a   right atrial pressure of 3 mmHg, which is consistent with mild pulmonary   hypertension.   7. Mitral valve mean gradient is 3.0 mmHg consistent with mild mitral   stenosis.   8. Peak transaortic gradient equals 11.1 mmHg, mean transaortic gradient   equals 5.2 mmHg,the calculated aortic valve area equals 1.31 cm² by the   continuity equation consistent with moderate aortic stenosis.          ASSESSMENT AND PLAN:  In summary, EDOUARD MALAVE is an 89y Female with past medical history significant for HTN LVH AS dementia presents with fall ? syncope poor historian minimal trop leak not C/W with ACS likely demand; TTE normal EF moderate AS; TELE stable     continue current optimal medial therapy     conservative cardiovascular therapy     poor prognosis

## 2019-09-29 PROCEDURE — 73502 X-RAY EXAM HIP UNI 2-3 VIEWS: CPT

## 2019-09-29 PROCEDURE — 82553 CREATINE MB FRACTION: CPT

## 2019-09-29 PROCEDURE — 84443 ASSAY THYROID STIM HORMONE: CPT

## 2019-09-29 PROCEDURE — 99285 EMERGENCY DEPT VISIT HI MDM: CPT | Mod: 25

## 2019-09-29 PROCEDURE — 70450 CT HEAD/BRAIN W/O DYE: CPT

## 2019-09-29 PROCEDURE — 82550 ASSAY OF CK (CPK): CPT

## 2019-09-29 PROCEDURE — 85027 COMPLETE CBC AUTOMATED: CPT

## 2019-09-29 PROCEDURE — 84100 ASSAY OF PHOSPHORUS: CPT

## 2019-09-29 PROCEDURE — 80053 COMPREHEN METABOLIC PANEL: CPT

## 2019-09-29 PROCEDURE — 36415 COLL VENOUS BLD VENIPUNCTURE: CPT

## 2019-09-29 PROCEDURE — 80048 BASIC METABOLIC PNL TOTAL CA: CPT

## 2019-09-29 PROCEDURE — 73562 X-RAY EXAM OF KNEE 3: CPT

## 2019-09-29 PROCEDURE — 97163 PT EVAL HIGH COMPLEX 45 MIN: CPT

## 2019-09-29 PROCEDURE — 93005 ELECTROCARDIOGRAM TRACING: CPT

## 2019-09-29 PROCEDURE — 71045 X-RAY EXAM CHEST 1 VIEW: CPT

## 2019-09-29 PROCEDURE — 84484 ASSAY OF TROPONIN QUANT: CPT

## 2019-09-29 PROCEDURE — 83735 ASSAY OF MAGNESIUM: CPT

## 2019-09-29 PROCEDURE — 93306 TTE W/DOPPLER COMPLETE: CPT

## 2020-01-01 ENCOUNTER — EMERGENCY (EMERGENCY)
Facility: HOSPITAL | Age: 85
LOS: 1 days | Discharge: HOME | End: 2020-01-01
Attending: EMERGENCY MEDICINE
Payer: MEDICARE

## 2020-01-01 VITALS
RESPIRATION RATE: 16 BRPM | HEIGHT: 59 IN | OXYGEN SATURATION: 98 % | TEMPERATURE: 98 F | WEIGHT: 113.1 LBS | SYSTOLIC BLOOD PRESSURE: 118 MMHG | DIASTOLIC BLOOD PRESSURE: 66 MMHG | HEART RATE: 70 BPM

## 2020-01-01 PROCEDURE — 70450 CT HEAD/BRAIN W/O DYE: CPT | Mod: 26

## 2020-01-01 PROCEDURE — 90471 IMMUNIZATION ADMIN: CPT

## 2020-01-01 PROCEDURE — 71045 X-RAY EXAM CHEST 1 VIEW: CPT | Mod: 26

## 2020-01-01 PROCEDURE — 73522 X-RAY EXAM HIPS BI 3-4 VIEWS: CPT | Mod: 26

## 2020-01-01 PROCEDURE — 12002 RPR S/N/AX/GEN/TRNK2.6-7.5CM: CPT

## 2020-01-01 PROCEDURE — 99284 EMERGENCY DEPT VISIT MOD MDM: CPT | Mod: 25

## 2020-01-01 PROCEDURE — 70450 CT HEAD/BRAIN W/O DYE: CPT

## 2020-01-01 PROCEDURE — 73522 X-RAY EXAM HIPS BI 3-4 VIEWS: CPT

## 2020-01-01 PROCEDURE — 90715 TDAP VACCINE 7 YRS/> IM: CPT

## 2020-01-01 PROCEDURE — 71045 X-RAY EXAM CHEST 1 VIEW: CPT

## 2020-01-01 RX ORDER — TETANUS TOXOID, REDUCED DIPHTHERIA TOXOID AND ACELLULAR PERTUSSIS VACCINE, ADSORBED 5; 2.5; 8; 8; 2.5 [IU]/.5ML; [IU]/.5ML; UG/.5ML; UG/.5ML; UG/.5ML
0.5 SUSPENSION INTRAMUSCULAR ONCE
Refills: 0 | Status: COMPLETED | OUTPATIENT
Start: 2020-01-01 | End: 2020-01-01

## 2020-01-01 RX ADMIN — TETANUS TOXOID, REDUCED DIPHTHERIA TOXOID AND ACELLULAR PERTUSSIS VACCINE, ADSORBED 0.5 MILLILITER(S): 5; 2.5; 8; 8; 2.5 SUSPENSION INTRAMUSCULAR at 01:51

## 2020-01-27 ENCOUNTER — EMERGENCY (EMERGENCY)
Facility: HOSPITAL | Age: 85
LOS: 1 days | Discharge: HOME | End: 2020-01-27
Attending: STUDENT IN AN ORGANIZED HEALTH CARE EDUCATION/TRAINING PROGRAM
Payer: MEDICARE

## 2020-01-27 VITALS
HEIGHT: 59 IN | WEIGHT: 119.93 LBS | TEMPERATURE: 98 F | HEART RATE: 87 BPM | RESPIRATION RATE: 18 BRPM | OXYGEN SATURATION: 99 % | SYSTOLIC BLOOD PRESSURE: 127 MMHG | DIASTOLIC BLOOD PRESSURE: 64 MMHG

## 2020-01-27 LAB
ALBUMIN SERPL ELPH-MCNC: 3.8 G/DL — SIGNIFICANT CHANGE UP (ref 3.3–5.2)
ALP SERPL-CCNC: 50 U/L — SIGNIFICANT CHANGE UP (ref 40–120)
ALT FLD-CCNC: 9 U/L — SIGNIFICANT CHANGE UP
ANION GAP SERPL CALC-SCNC: 15 MMOL/L — SIGNIFICANT CHANGE UP (ref 5–17)
APTT BLD: 29.3 SEC — SIGNIFICANT CHANGE UP (ref 27.5–36.3)
AST SERPL-CCNC: 17 U/L — SIGNIFICANT CHANGE UP
BASOPHILS # BLD AUTO: 0.04 K/UL — SIGNIFICANT CHANGE UP (ref 0–0.2)
BASOPHILS NFR BLD AUTO: 0.5 % — SIGNIFICANT CHANGE UP (ref 0–2)
BILIRUB SERPL-MCNC: 0.5 MG/DL — SIGNIFICANT CHANGE UP (ref 0.4–2)
BUN SERPL-MCNC: 30 MG/DL — HIGH (ref 8–20)
CALCIUM SERPL-MCNC: 9.3 MG/DL — SIGNIFICANT CHANGE UP (ref 8.6–10.2)
CHLORIDE SERPL-SCNC: 102 MMOL/L — SIGNIFICANT CHANGE UP (ref 98–107)
CO2 SERPL-SCNC: 26 MMOL/L — SIGNIFICANT CHANGE UP (ref 22–29)
CREAT SERPL-MCNC: 1 MG/DL — SIGNIFICANT CHANGE UP (ref 0.5–1.3)
EOSINOPHIL # BLD AUTO: 0.14 K/UL — SIGNIFICANT CHANGE UP (ref 0–0.5)
EOSINOPHIL NFR BLD AUTO: 1.8 % — SIGNIFICANT CHANGE UP (ref 0–6)
GLUCOSE SERPL-MCNC: 106 MG/DL — HIGH (ref 70–99)
HCT VFR BLD CALC: 39.7 % — SIGNIFICANT CHANGE UP (ref 34.5–45)
HGB BLD-MCNC: 12.5 G/DL — SIGNIFICANT CHANGE UP (ref 11.5–15.5)
IMM GRANULOCYTES NFR BLD AUTO: 0.4 % — SIGNIFICANT CHANGE UP (ref 0–1.5)
INR BLD: 1.05 RATIO — SIGNIFICANT CHANGE UP (ref 0.88–1.16)
LIDOCAIN IGE QN: 16 U/L — LOW (ref 22–51)
LYMPHOCYTES # BLD AUTO: 1.33 K/UL — SIGNIFICANT CHANGE UP (ref 1–3.3)
LYMPHOCYTES # BLD AUTO: 17.4 % — SIGNIFICANT CHANGE UP (ref 13–44)
MAGNESIUM SERPL-MCNC: 1.5 MG/DL — LOW (ref 1.6–2.6)
MCHC RBC-ENTMCNC: 27.5 PG — SIGNIFICANT CHANGE UP (ref 27–34)
MCHC RBC-ENTMCNC: 31.5 GM/DL — LOW (ref 32–36)
MCV RBC AUTO: 87.3 FL — SIGNIFICANT CHANGE UP (ref 80–100)
MONOCYTES # BLD AUTO: 0.78 K/UL — SIGNIFICANT CHANGE UP (ref 0–0.9)
MONOCYTES NFR BLD AUTO: 10.2 % — SIGNIFICANT CHANGE UP (ref 2–14)
NEUTROPHILS # BLD AUTO: 5.31 K/UL — SIGNIFICANT CHANGE UP (ref 1.8–7.4)
NEUTROPHILS NFR BLD AUTO: 69.7 % — SIGNIFICANT CHANGE UP (ref 43–77)
PLATELET # BLD AUTO: 375 K/UL — SIGNIFICANT CHANGE UP (ref 150–400)
POTASSIUM SERPL-MCNC: 4.5 MMOL/L — SIGNIFICANT CHANGE UP (ref 3.5–5.3)
POTASSIUM SERPL-SCNC: 4.5 MMOL/L — SIGNIFICANT CHANGE UP (ref 3.5–5.3)
PROT SERPL-MCNC: 6.7 G/DL — SIGNIFICANT CHANGE UP (ref 6.6–8.7)
PROTHROM AB SERPL-ACNC: 12.1 SEC — SIGNIFICANT CHANGE UP (ref 10–12.9)
RBC # BLD: 4.55 M/UL — SIGNIFICANT CHANGE UP (ref 3.8–5.2)
RBC # FLD: 14.1 % — SIGNIFICANT CHANGE UP (ref 10.3–14.5)
SODIUM SERPL-SCNC: 143 MMOL/L — SIGNIFICANT CHANGE UP (ref 135–145)
TSH SERPL-MCNC: 0.97 UIU/ML — SIGNIFICANT CHANGE UP (ref 0.27–4.2)
WBC # BLD: 7.63 K/UL — SIGNIFICANT CHANGE UP (ref 3.8–10.5)
WBC # FLD AUTO: 7.63 K/UL — SIGNIFICANT CHANGE UP (ref 3.8–10.5)

## 2020-01-27 PROCEDURE — 96374 THER/PROPH/DIAG INJ IV PUSH: CPT

## 2020-01-27 PROCEDURE — 85730 THROMBOPLASTIN TIME PARTIAL: CPT

## 2020-01-27 PROCEDURE — 93010 ELECTROCARDIOGRAM REPORT: CPT

## 2020-01-27 PROCEDURE — 99284 EMERGENCY DEPT VISIT MOD MDM: CPT

## 2020-01-27 PROCEDURE — 80053 COMPREHEN METABOLIC PANEL: CPT

## 2020-01-27 PROCEDURE — 85027 COMPLETE CBC AUTOMATED: CPT

## 2020-01-27 PROCEDURE — 84443 ASSAY THYROID STIM HORMONE: CPT

## 2020-01-27 PROCEDURE — 99285 EMERGENCY DEPT VISIT HI MDM: CPT | Mod: 25

## 2020-01-27 PROCEDURE — 83735 ASSAY OF MAGNESIUM: CPT

## 2020-01-27 PROCEDURE — 36415 COLL VENOUS BLD VENIPUNCTURE: CPT

## 2020-01-27 PROCEDURE — 83690 ASSAY OF LIPASE: CPT

## 2020-01-27 PROCEDURE — 85610 PROTHROMBIN TIME: CPT

## 2020-01-27 PROCEDURE — 93005 ELECTROCARDIOGRAM TRACING: CPT

## 2020-01-27 RX ORDER — SODIUM CHLORIDE 9 MG/ML
1000 INJECTION INTRAMUSCULAR; INTRAVENOUS; SUBCUTANEOUS ONCE
Refills: 0 | Status: COMPLETED | OUTPATIENT
Start: 2020-01-27 | End: 2020-01-27

## 2020-01-27 RX ORDER — MAGNESIUM SULFATE 500 MG/ML
2 VIAL (ML) INJECTION ONCE
Refills: 0 | Status: COMPLETED | OUTPATIENT
Start: 2020-01-27 | End: 2020-01-27

## 2020-01-27 RX ADMIN — SODIUM CHLORIDE 1000 MILLILITER(S): 9 INJECTION INTRAMUSCULAR; INTRAVENOUS; SUBCUTANEOUS at 21:04

## 2020-01-27 RX ADMIN — Medication 50 GRAM(S): at 23:15

## 2020-01-27 NOTE — ED ADULT NURSE NOTE - NSIMPLEMENTINTERV_GEN_ALL_ED
Implemented All Fall Risk Interventions:  [place] to call system. Call bell, personal items and telephone within reach. Instruct patient to call for assistance. Room bathroom lighting operational. Non-slip footwear when patient is off stretcher. Physically safe environment: no spills, clutter or unnecessary equipment. Stretcher in lowest position, wheels locked, appropriate side rails in place. Provide visual cue, wrist band, yellow gown, etc. Monitor gait and stability. Monitor for mental status changes and reorient to person, place, and time. Review medications for side effects contributing to fall risk. Reinforce activity limits and safety measures with patient and family.

## 2020-01-27 NOTE — ED PROVIDER NOTE - NSFOLLOWUPINSTRUCTIONS_ED_ALL_ED_FT
1) Follow up with your doctor in 1-2 days or return to the ED  2) Return to the ER for worsening or concerning symptoms  3) Bring a copy of your results to your visit 1) Follow up with your doctor in 1-2 days or return to the ED  2) Return to the ER for worsening or concerning symptoms  3) Bring a copy of your results to your visit    Acute Diarrhea    WHAT YOU NEED TO KNOW:    Acute diarrhea starts quickly and lasts a short time, usually 1 to 3 days. It can last up to 2 weeks. You may not be able to control your diarrhea. Acute diarrhea usually stops on its own.     DISCHARGE INSTRUCTIONS:    Return to the emergency department if:     You feel confused.       Your heartbeat is faster than usual.       Your eyes look deeply sunken, or you have no tears when you cry.       You urinate less than usual, or your urine is dark yellow.       You have blood or mucus in your bowel movements.      You have severe abdominal pain.       You are unable to drink any liquids.     Contact your healthcare provider if:     Your symptoms do not get better with treatment.       You have a fever higher than 101.3°F (38.5°C).       You have trouble eating and drinking because you are vomiting.       Your diarrhea does not get better in 7 days.       You have questions or concerns about your condition or care.     Follow up with your healthcare provider as directed: Write down your questions so you remember to ask them during your visits.     Medicines:    Diarrhea medicine is an over-the-counter medicine that helps slow or stop your diarrhea. Do not take this medicine unless your healthcare provider says it is okay.       Antibiotics may be given to help treat an infection caused by bacteria.       Antiparasitics may be given to treat an infection caused by parasites.       Take your medicine as directed. Contact your healthcare provider if you think your medicine is not helping or if you have side effects. Tell him of her if you are allergic to any medicine. Keep a list of the medicines, vitamins, and herbs you take. Include the amounts, and when and why you take them. Bring the list or the pill bottles to follow-up visits. Carry your medicine list with you in case of an emergency.    Self-care:     Drink liquids as directed. Liquids will help prevent dehydration caused by diarrhea. Ask your healthcare provider how much liquid to drink each day and which liquids are best for you. You may need to drink an oral rehydration solution (ORS). An ORS has the right amounts of water, salts, and sugar you need to replace body fluids. You can buy an ORS at most grocery stores and pharmacies.       Eat foods that are easy to digest. Examples include rice, lentils, cereal, bananas, potatoes, and bread. It also includes some fruits (bananas, melon), well-cooked vegetables, and lean meats. Do not eat foods high in fiber, fat, and sugar. Do not drink alcohol until your diarrhea is gone.     Prevent acute diarrhea:     Wash your hands often. Use soap and water. Wash your hands before you eat or prepare food. Also wash your hands after you use the bathroom. Use an alcohol-based hand gel when soap and water are not available. Handwashing           Keep bathroom surfaces clean. This helps prevent the spread of germs that cause acute diarrhea.       Wash fruits and vegetables well before you eat them. This can help remove germs that cause diarrhea. If possible, remove the skin from fruits and vegetables, or cook them well before you eat them.       Cook meat and poultry as directed. Meat includes beef and pork. Poultry includes chicken, turkey, and duck.  Cook ground meat to 160°F.       Cook ground poultry, whole poultry, or cuts of poultry to at least 165°F. Remove the poultry from heat. Let it stand for 3 minutes before you eat it.       Cook whole cuts of meat other than poultry to at least 145°F. Remove the meat from heat. Let it stand for 3 minutes before you eat it.       Wash dishes that have touched raw meat or poultry with hot water and soap. This includes cutting boards, utensils, dishes, and serving containers.       Place raw or cooked meat or poultry in the refrigerator as soon as possible. Bacteria can grow in meat or poultry that is left at room temperature too long.       Do not eat raw or undercooked oysters, clams, or mussels. These foods may be contaminated and cause infection.       Drink only filtered or treated water when you travel. Do not put ice in your drinks. Drink bottled water whenever possible.  Food Choices to Help Relieve Diarrhea, Adult  When you have diarrhea, the foods you eat and your eating habits are very important. Choosing the right foods and drinks can help:  Relieve diarrhea.Replace lost fluids and nutrients.Prevent dehydration.What general guidelines should I follow?     Relieving diarrhea     Choose foods with less than 2 g or .07 oz. of fiber per serving.Limit fats to less than 8 tsp (38 g or 1.34 oz.) a day.Avoid the following:  Foods and beverages sweetened with high-fructose corn syrup, honey, or sugar alcohols such as xylitol, sorbitol, and mannitol.Foods that contain a lot of fat or sugar.Fried, greasy, or spicy foods.High-fiber grains, breads, and cereals.Raw fruits and vegetables.Eat foods that are rich in probiotics. These foods include dairy products such as yogurt and fermented milk products. They help increase healthy bacteria in the stomach and intestines (gastrointestinal tract, or GI tract).If you have lactose intolerance, avoid dairy products. These may make your diarrhea worse.Take medicine to help stop diarrhea (antidiarrheal medicine) only as told by your health care provider.Replacing nutrients     Eat small meals or snacks every 3–4 hours.Eat bland foods, such as white rice, toast, or baked potato, until your diarrhea starts to get better. Gradually reintroduce nutrient-rich foods as tolerated or as told by your health care provider. This includes:  Well-cooked protein foods.Peeled, seeded, and soft-cooked fruits and vegetables.Low-fat dairy products.Take vitamin and mineral supplements as told by your health care provider.Preventing dehydration     Start by sipping water or a special solution to prevent dehydration (oral rehydration solution, ORS). Urine that is clear or pale yellow means that you are getting enough fluid.Try to drink at least 8–10 cups of fluid each day to help replace lost fluids.You may add other liquids in addition to water, such as clear juice or decaffeinated sports drinks, as tolerated or as told by your health care provider.Avoid drinks with caffeine, such as coffee, tea, or soft drinks.Avoid alcohol.What foods are recommended?            The items listed may not be a complete list. Talk with your health care provider about what dietary choices are best for you.  Grains     White rice. White, [place], or michelle breads (fresh or toasted), including plain rolls, buns, or bagels. White pasta. Saltine, soda, or dali crackers. Pretzels. Low-fiber cereal. Cooked cereals made with water (such as cornmeal, farina, or cream cereals). Plain muffins. Matzo. [place] toast. Zwieback.  Vegetables     Potatoes (without the skin). Most well-cooked and canned vegetables without skins or seeds. Tender lettuce.  Fruits     Apple sauce. Fruits canned in juice. Cooked apricots, cherries, grapefruit, peaches, pears, or plums. Fresh bananas and cantaloupe.  Meats and other protein foods     Baked or boiled chicken. Eggs. Tofu. Fish. Seafood. Smooth nut butters. Ground or well-cooked tender beef, ham, veal, lamb, pork, or poultry.  Dairy     Plain yogurt, kefir, and unsweetened liquid yogurt. Lactose-free milk, buttermilk, skim milk, or soy milk. Low-fat or nonfat hard cheese.  Beverages     Water. Low-calorie sports drinks. Fruit juices without pulp. Strained tomato and vegetable juices. Decaffeinated teas. Sugar-free beverages not sweetened with sugar alcohols. Oral rehydration solutions, if approved by your health care provider.  Seasoning and other foods     Bouillon, broth, or soups made from recommended foods.  What foods are not recommended?  The items listed may not be a complete list. Talk with your health care provider about what dietary choices are best for you.  Grains     Whole grain, whole wheat, bran, or rye breads, rolls, pastas, and crackers. Wild or brown rice. Whole grain or bran cereals. Barley. Oats and oatmeal. Corn tortillas or taco shells. Granola. Popcorn.  Vegetables     Raw vegetables. Fried vegetables. Cabbage, broccoli, [place] sprouts, artichokes, baked beans, beet greens, corn, kale, legumes, peas, sweet potatoes, and yams. Potato skins. Cooked spinach and cabbage.  Fruits     Dried fruit, including raisins and dates. Raw fruits. Stewed or dried prunes. Canned fruits with syrup.  Meat and other protein foods     Fried or fatty meats. Deli meats. [place] nut butters. Nuts and seeds. Beans and lentils. [place]. Hot dogs. Sausage.  Dairy     High-fat cheeses. Whole milk, chocolate milk, and beverages made with milk, such as milk shakes. Half-and-half. Cream. sour cream. Ice cream.  Beverages     Caffeinated beverages (such as coffee, tea, soda, or energy drinks). Alcoholic beverages. Fruit juices with pulp. Prune juice. Soft drinks sweetened with high-fructose corn syrup or sugar alcohols. High-calorie sports drinks.  Fats and oils     Butter. Cream sauces. Margarine. Salad oils. Plain salad dressings. Olives. Avocados. Mayonnaise.  Sweets and desserts     Sweet rolls, doughnuts, and sweet breads. Sugar-free desserts sweetened with sugar alcohols such as xylitol and sorbitol.  Seasoning and other foods     Honey. Hot sauce. Chili powder. Gravy. Cream-based or milk-based soups. Pancakes and waffles.  Summary  When you have diarrhea, the foods you eat and your eating habits are very important.Make sure you get at least 8–10 cups of fluid each day, or enough to keep your urine clear or pale yellow.Eat bland foods and gradually reintroduce healthy, nutrient-rich foods as tolerated, or as told by your health care provider.Avoid high-fiber, fried, greasy, or spicy foods.This information is not intended to replace advice given to you by your health care provider. Make sure you discuss any questions you have with your health care provider. 1) Follow up with your doctor in 1-2 days or return to the ED  2) Return to the ER for worsening or concerning symptoms  3) Bring a copy of your results to your visit    Acute Diarrhea    WHAT YOU NEED TO KNOW:    Acute diarrhea starts quickly and lasts a short time, usually 1 to 3 days. It can last up to 2 weeks. You may not be able to control your diarrhea. Acute diarrhea usually stops on its own.     DISCHARGE INSTRUCTIONS:    Return to the emergency department if:     You feel confused.       Your heartbeat is faster than usual.       Your eyes look deeply sunken, or you have no tears when you cry.       You urinate less than usual, or your urine is dark yellow.       You have blood or mucus in your bowel movements.      You have severe abdominal pain.       You are unable to drink any liquids.     Contact your healthcare provider if:     Your symptoms do not get better with treatment.       You have a fever higher than 101.3°F (38.5°C).       You have trouble eating and drinking because you are vomiting.       Your diarrhea does not get better in 7 days.       You have questions or concerns about your condition or care.     Follow up with your healthcare provider as directed: Write down your questions so you remember to ask them during your visits.     Medicines:    Diarrhea medicine is an over-the-counter medicine that helps slow or stop your diarrhea. Do not take this medicine unless your healthcare provider says it is okay.       Antibiotics may be given to help treat an infection caused by bacteria.       Antiparasitics may be given to treat an infection caused by parasites.       Take your medicine as directed. Contact your healthcare provider if you think your medicine is not helping or if you have side effects. Tell him of her if you are allergic to any medicine. Keep a list of the medicines, vitamins, and herbs you take. Include the amounts, and when and why you take them. Bring the list or the pill bottles to follow-up visits. Carry your medicine list with you in case of an emergency.    Self-care:     Drink liquids as directed. Liquids will help prevent dehydration caused by diarrhea. Ask your healthcare provider how much liquid to drink each day and which liquids are best for you. You may need to drink an oral rehydration solution (ORS). An ORS has the right amounts of water, salts, and sugar you need to replace body fluids. You can buy an ORS at most grocery stores and pharmacies.       Eat foods that are easy to digest. Examples include rice, lentils, cereal, bananas, potatoes, and bread. It also includes some fruits (bananas, melon), well-cooked vegetables, and lean meats. Do not eat foods high in fiber, fat, and sugar. Do not drink alcohol until your diarrhea is gone.     Prevent acute diarrhea:     Wash your hands often. Use soap and water. Wash your hands before you eat or prepare food. Also wash your hands after you use the bathroom. Use an alcohol-based hand gel when soap and water are not available. Handwashing           Keep bathroom surfaces clean. This helps prevent the spread of germs that cause acute diarrhea.       Wash fruits and vegetables well before you eat them. This can help remove germs that cause diarrhea. If possible, remove the skin from fruits and vegetables, or cook them well before you eat them.       Cook meat and poultry as directed. Meat includes beef and pork. Poultry includes chicken, turkey, and duck.  Cook ground meat to 160°F.       Cook ground poultry, whole poultry, or cuts of poultry to at least 165°F. Remove the poultry from heat. Let it stand for 3 minutes before you eat it.       Cook whole cuts of meat other than poultry to at least 145°F. Remove the meat from heat. Let it stand for 3 minutes before you eat it.       Wash dishes that have touched raw meat or poultry with hot water and soap. This includes cutting boards, utensils, dishes, and serving containers.       Place raw or cooked meat or poultry in the refrigerator as soon as possible. Bacteria can grow in meat or poultry that is left at room temperature too long.       Do not eat raw or undercooked oysters, clams, or mussels. These foods may be contaminated and cause infection.       Drink only filtered or treated water when you travel. Do not put ice in your drinks. Drink bottled water whenever possible.  Food Choices to Help Relieve Diarrhea, Adult  When you have diarrhea, the foods you eat and your eating habits are very important. Choosing the right foods and drinks can help:  Relieve diarrhea.Replace lost fluids and nutrients.Prevent dehydration.What general guidelines should I follow?     Relieving diarrhea     Choose foods with less than 2 g or .07 oz. of fiber per serving.Limit fats to less than 8 tsp (38 g or 1.34 oz.) a day.Avoid the following:  Foods and beverages sweetened with high-fructose corn syrup, honey, or sugar alcohols such as xylitol, sorbitol, and mannitol.Foods that contain a lot of fat or sugar.Fried, greasy, or spicy foods.High-fiber grains, breads, and cereals.Raw fruits and vegetables.Eat foods that are rich in probiotics. These foods include dairy products such as yogurt and fermented milk products. They help increase healthy bacteria in the stomach and intestines (gastrointestinal tract, or GI tract).If you have lactose intolerance, avoid dairy products. These may make your diarrhea worse.Take medicine to help stop diarrhea (antidiarrheal medicine) only as told by your health care provider.Replacing nutrients     Eat small meals or snacks every 3–4 hours.Eat bland foods, such as white rice, toast, or baked potato, until your diarrhea starts to get better. Gradually reintroduce nutrient-rich foods as tolerated or as told by your health care provider. This includes:  Well-cooked protein foods.Peeled, seeded, and soft-cooked fruits and vegetables.Low-fat dairy products.Take vitamin and mineral supplements as told by your health care provider.Preventing dehydration     Start by sipping water or a special solution to prevent dehydration (oral rehydration solution, ORS). Urine that is clear or pale yellow means that you are getting enough fluid.Try to drink at least 8–10 cups of fluid each day to help replace lost fluids.You may add other liquids in addition to water, such as clear juice or decaffeinated sports drinks, as tolerated or as told by your health care provider.Avoid drinks with caffeine, such as coffee, tea, or soft drinks.Avoid alcohol.What foods are recommended?            The items listed may not be a complete list. Talk with your health care provider about what dietary choices are best for you.  Grains     White rice. White, [place], or michelle breads (fresh or toasted), including plain rolls, buns, or bagels. White pasta. Saltine, soda, or dali crackers. Pretzels. Low-fiber cereal. Cooked cereals made with water (such as cornmeal, farina, or cream cereals). Plain muffins. Matzo. [place] toast. Zwieback.  Vegetables     Potatoes (without the skin). Most well-cooked and canned vegetables without skins or seeds. Tender lettuce.  Fruits     Apple sauce. Fruits canned in juice. Cooked apricots, cherries, grapefruit, peaches, pears, or plums. Fresh bananas and cantaloupe.  Meats and other protein foods     Baked or boiled chicken. Eggs. Tofu. Fish. Seafood. Smooth nut butters. Ground or well-cooked tender beef, ham, veal, lamb, pork, or poultry.  Dairy     Plain yogurt, kefir, and unsweetened liquid yogurt. Lactose-free milk, buttermilk, skim milk, or soy milk. Low-fat or nonfat hard cheese.  Beverages     Water. Low-calorie sports drinks. Fruit juices without pulp. Strained tomato and vegetable juices. Decaffeinated teas. Sugar-free beverages not sweetened with sugar alcohols. Oral rehydration solutions, if approved by your health care provider.  Seasoning and other foods     Bouillon, broth, or soups made from recommended foods.  What foods are not recommended?  The items listed may not be a complete list. Talk with your health care provider about what dietary choices are best for you.  Grains     Whole grain, whole wheat, bran, or rye breads, rolls, pastas, and crackers. Wild or brown rice. Whole grain or bran cereals. Barley. Oats and oatmeal. Corn tortillas or taco shells. Granola. Popcorn.  Vegetables     Raw vegetables. Fried vegetables. Cabbage, broccoli, [place] sprouts, artichokes, baked beans, beet greens, corn, kale, legumes, peas, sweet potatoes, and yams. Potato skins. Cooked spinach and cabbage.  Fruits     Dried fruit, including raisins and dates. Raw fruits. Stewed or dried prunes. Canned fruits with syrup.  Meat and other protein foods     Fried or fatty meats. Deli meats. [place] nut butters. Nuts and seeds. Beans and lentils. [place]. Hot dogs. Sausage.  Dairy     High-fat cheeses. Whole milk, chocolate milk, and beverages made with milk, such as milk shakes. Half-and-half. Cream. sour cream. Ice cream.  Beverages     Caffeinated beverages (such as coffee, tea, soda, or energy drinks). Alcoholic beverages. Fruit juices with pulp. Prune juice. Soft drinks sweetened with high-fructose corn syrup or sugar alcohols. High-calorie sports drinks.  Fats and oils     Butter. Cream sauces. Margarine. Salad oils. Plain salad dressings. Olives. Avocados. Mayonnaise.  Sweets and desserts     Sweet rolls, doughnuts, and sweet breads. Sugar-free desserts sweetened with sugar alcohols such as xylitol and sorbitol.  Seasoning and other foods     Honey. Hot sauce. Chili powder. Gravy. Cream-based or milk-based soups. Pancakes and waffles.  Summary  When you have diarrhea, the foods you eat and your eating habits are very important.Make sure you get at least 8–10 cups of fluid each day, or enough to keep your urine clear or pale yellow.Eat bland foods and gradually reintroduce healthy, nutrient-rich foods as tolerated, or as told by your health care provider.Avoid high-fiber, fried, greasy, or spicy foods.This information is not intended to replace advice given to you by your health care provider. Make sure you discuss any questions you have with your health care provider.    Food Choices to Help Relieve Diarrhea, Adult  When you have diarrhea, the foods you eat and your eating habits are very important. Choosing the right foods and drinks can help:  Relieve diarrhea.Replace lost fluids and nutrients.Prevent dehydration.What general guidelines should I follow?     Relieving diarrhea     Choose foods with less than 2 g or .07 oz. of fiber per serving.Limit fats to less than 8 tsp (38 g or 1.34 oz.) a day.Avoid the following:  Foods and beverages sweetened with high-fructose corn syrup, honey, or sugar alcohols such as xylitol, sorbitol, and mannitol.Foods that contain a lot of fat or sugar.Fried, greasy, or spicy foods.High-fiber grains, breads, and cereals.Raw fruits and vegetables.Eat foods that are rich in probiotics. These foods include dairy products such as yogurt and fermented milk products. They help increase healthy bacteria in the stomach and intestines (gastrointestinal tract, or GI tract).If you have lactose intolerance, avoid dairy products. These may make your diarrhea worse.Take medicine to help stop diarrhea (antidiarrheal medicine) only as told by your health care provider.Replacing nutrients     Eat small meals or snacks every 3–4 hours.Eat bland foods, such as white rice, toast, or baked potato, until your diarrhea starts to get better. Gradually reintroduce nutrient-rich foods as tolerated or as told by your health care provider. This includes:  Well-cooked protein foods.Peeled, seeded, and soft-cooked fruits and vegetables.Low-fat dairy products.Take vitamin and mineral supplements as told by your health care provider.Preventing dehydration     Start by sipping water or a special solution to prevent dehydration (oral rehydration solution, ORS). Urine that is clear or pale yellow means that you are getting enough fluid.Try to drink at least 8–10 cups of fluid each day to help replace lost fluids.You may add other liquids in addition to water, such as clear juice or decaffeinated sports drinks, as tolerated or as told by your health care provider.Avoid drinks with caffeine, such as coffee, tea, or soft drinks.Avoid alcohol.What foods are recommended?            The items listed may not be a complete list. Talk with your health care provider about what dietary choices are best for you.  Grains     White rice. White, [place], or michelle breads (fresh or toasted), including plain rolls, buns, or bagels. White pasta. Saltine, soda, or dali crackers. Pretzels. Low-fiber cereal. Cooked cereals made with water (such as cornmeal, farina, or cream cereals). Plain muffins. Matzo. [place] toast. Zwieback.  Vegetables     Potatoes (without the skin). Most well-cooked and canned vegetables without skins or seeds. Tender lettuce.  Fruits     Apple sauce. Fruits canned in juice. Cooked apricots, cherries, grapefruit, peaches, pears, or plums. Fresh bananas and cantaloupe.  Meats and other protein foods     Baked or boiled chicken. Eggs. Tofu. Fish. Seafood. Smooth nut butters. Ground or well-cooked tender beef, ham, veal, lamb, pork, or poultry.  Dairy     Plain yogurt, kefir, and unsweetened liquid yogurt. Lactose-free milk, buttermilk, skim milk, or soy milk. Low-fat or nonfat hard cheese.  Beverages     Water. Low-calorie sports drinks. Fruit juices without pulp. Strained tomato and vegetable juices. Decaffeinated teas. Sugar-free beverages not sweetened with sugar alcohols. Oral rehydration solutions, if approved by your health care provider.  Seasoning and other foods     Bouillon, broth, or soups made from recommended foods.  What foods are not recommended?  The items listed may not be a complete list. Talk with your health care provider about what dietary choices are best for you.  Grains     Whole grain, whole wheat, bran, or rye breads, rolls, pastas, and crackers. Wild or brown rice. Whole grain or bran cereals. Barley. Oats and oatmeal. Corn tortillas or taco shells. Granola. Popcorn.  Vegetables     Raw vegetables. Fried vegetables. Cabbage, broccoli, [place] sprouts, artichokes, baked beans, beet greens, corn, kale, legumes, peas, sweet potatoes, and yams. Potato skins. Cooked spinach and cabbage.  Fruits     Dried fruit, including raisins and dates. Raw fruits. Stewed or dried prunes. Canned fruits with syrup.  Meat and other protein foods     Fried or fatty meats. Deli meats. [place] nut butters. Nuts and seeds. Beans and lentils. [place]. Hot dogs. Sausage.  Dairy     High-fat cheeses. Whole milk, chocolate milk, and beverages made with milk, such as milk shakes. Half-and-half. Cream. sour cream. Ice cream.  Beverages     Caffeinated beverages (such as coffee, tea, soda, or energy drinks). Alcoholic beverages. Fruit juices with pulp. Prune juice. Soft drinks sweetened with high-fructose corn syrup or sugar alcohols. High-calorie sports drinks.  Fats and oils     Butter. Cream sauces. Margarine. Salad oils. Plain salad dressings. Olives. Avocados. Mayonnaise.  Sweets and desserts     Sweet rolls, doughnuts, and sweet breads. Sugar-free desserts sweetened with sugar alcohols such as xylitol and sorbitol.  Seasoning and other foods     Honey. Hot sauce. Chili powder. Gravy. Cream-based or milk-based soups. Pancakes and waffles.  Summary  When you have diarrhea, the foods you eat and your eating habits are very important.Make sure you get at least 8–10 cups of fluid each day, or enough to keep your urine clear or pale yellow.Eat bland foods and gradually reintroduce healthy, nutrient-rich foods as tolerated, or as told by your health care provider.Avoid high-fiber, fried, greasy, or spicy foods.This information is not intended to replace advice given to you by your health care provider. Make sure you discuss any questions you have with your health care provider.

## 2020-01-27 NOTE — ED PROVIDER NOTE - PATIENT PORTAL LINK FT
You can access the FollowMyHealth Patient Portal offered by [facility] by registering at the following website: [email or web address]. By joining [email or web address]’s FollowMyHealth portal, you will also be able to view your health information using other applications (apps) compatible with our system.

## 2020-01-27 NOTE — ED PROVIDER NOTE - CLINICAL SUMMARY MEDICAL DECISION MAKING FREE TEXT BOX
Pt with some mild dehydration secondary to diarrhea and decreased appetite. replenish and discuss management with patient's family.

## 2020-01-27 NOTE — ED PROVIDER NOTE - PROGRESS NOTE DETAILS
Labs were reviewed, there is mild electrolyte secondary abnormality secondary to her diarrhea. discussed management plan with daughter at bedside, offered patient admission vs outpatient management, however patient's daughter states patient seems to get better at home outpatient management would be ideal at this time. She agrees. and will attempt to continue with feeding the patient better. However she was told if there's problem with continuing it or any worsening issues to please bring patient back for reevaluation and possible admission. Patient's daughter states she wants to take patient home and does not want treatement at this stime. Stated several times upset that she wants the patient to be discharged despite me explaining to patient we are in the process of discharging patients in a timely order. Patient's daughter states she wants to take patient home and does not want further treatement at this stime. Stated several times upset that she wants the patient to be discharged despite me explaining to patient we are in the process of discharging all our patients in a timely order. Labs were reviewed, there is mild electrolyte secondary abnormality secondary to her diarrhea. discussed management plan with daughter at bedside, discussed admission vs outpatient management. Patient's daughter states patient seems to get better at home and outpatient management would be a better choice at this time. She agrees. and will attempt to continue with feeding the patient better. However she was told if there's problem with continuing it or any worsening issues to please bring patient back for reevaluation and possible admission.

## 2020-01-27 NOTE — ED ADULT NURSE NOTE - OBJECTIVE STATEMENT
Assumed care at 2100 pt co diarrhea and decrease PO intake since Wednesday, pt lives with her daughter, pt denies any N/V, chest pain, SOB

## 2020-01-27 NOTE — ED STATDOCS - NS_ ATTENDINGSCRIBEDETAILS _ED_A_ED_FT
I, Dana Amado, performed the initial face to face bedside interview with this patient regarding history of present illness, review of symptoms and relevant past medical, social and family history.  I completed an independent physical examination.  The history, relevant review of systems, past medical and surgical history, medical decision making, and physical examination was documented by the scribe in my presence and I attest to the accuracy of the documentation.

## 2020-01-27 NOTE — ED STATDOCS - PROGRESS NOTE DETAILS
89 y/o F with PMHx of HTN, MI presents to the ED with daughter c/o diarrhea x5 days ago. As per daughter, pt has been weaker than normal and has decreased PO intake.   Denies abdominal pain, vomiting, fever  Focused eval, protocol orders entered. Pt to be moved to main ED for further evaluation by another provider. 89 y/o F with PMHx of HTN, MI presents to the ED with daughter c/o diarrhea x5 days ago. As per daughter, pt has been weaker than normal and has decreased PO intake. dementia, ambulates with walker. last hospital admission august. no CP/SOB. at baseline mental status. patient incontinent   Denies abdominal pain, vomiting, fever  Focused eval, protocol orders entered. Pt to be moved to main ED for further evaluation by another provider.

## 2020-01-28 VITALS
DIASTOLIC BLOOD PRESSURE: 72 MMHG | HEART RATE: 83 BPM | TEMPERATURE: 98 F | RESPIRATION RATE: 18 BRPM | SYSTOLIC BLOOD PRESSURE: 173 MMHG | OXYGEN SATURATION: 98 %

## 2020-09-22 ENCOUNTER — INPATIENT (INPATIENT)
Facility: HOSPITAL | Age: 85
LOS: 2 days | Discharge: NURSING, REHAB OR LONG-TERM CARE | DRG: 689 | End: 2020-09-25
Attending: FAMILY MEDICINE | Admitting: HOSPITALIST
Payer: MEDICARE

## 2020-09-22 VITALS
HEIGHT: 59 IN | RESPIRATION RATE: 20 BRPM | SYSTOLIC BLOOD PRESSURE: 95 MMHG | OXYGEN SATURATION: 95 % | HEART RATE: 71 BPM | TEMPERATURE: 98 F | WEIGHT: 98.11 LBS | DIASTOLIC BLOOD PRESSURE: 54 MMHG

## 2020-09-22 DIAGNOSIS — N39.0 URINARY TRACT INFECTION, SITE NOT SPECIFIED: ICD-10-CM

## 2020-09-22 LAB
ALBUMIN SERPL ELPH-MCNC: 3.8 G/DL — SIGNIFICANT CHANGE UP (ref 3.3–5.2)
ALP SERPL-CCNC: 50 U/L — SIGNIFICANT CHANGE UP (ref 40–120)
ALT FLD-CCNC: 15 U/L — SIGNIFICANT CHANGE UP
ANION GAP SERPL CALC-SCNC: 11 MMOL/L — SIGNIFICANT CHANGE UP (ref 5–17)
APPEARANCE UR: ABNORMAL
APTT BLD: 26.7 SEC — LOW (ref 27.5–35.5)
AST SERPL-CCNC: 24 U/L — SIGNIFICANT CHANGE UP
BACTERIA # UR AUTO: ABNORMAL
BASOPHILS # BLD AUTO: 0.05 K/UL — SIGNIFICANT CHANGE UP (ref 0–0.2)
BASOPHILS NFR BLD AUTO: 0.8 % — SIGNIFICANT CHANGE UP (ref 0–2)
BILIRUB SERPL-MCNC: 0.4 MG/DL — SIGNIFICANT CHANGE UP (ref 0.4–2)
BILIRUB UR-MCNC: NEGATIVE — SIGNIFICANT CHANGE UP
BLD GP AB SCN SERPL QL: SIGNIFICANT CHANGE UP
BUN SERPL-MCNC: 33 MG/DL — HIGH (ref 8–20)
CALCIUM SERPL-MCNC: 9.8 MG/DL — SIGNIFICANT CHANGE UP (ref 8.6–10.2)
CHLORIDE SERPL-SCNC: 99 MMOL/L — SIGNIFICANT CHANGE UP (ref 98–107)
CO2 SERPL-SCNC: 27 MMOL/L — SIGNIFICANT CHANGE UP (ref 22–29)
COLOR SPEC: YELLOW — SIGNIFICANT CHANGE UP
CREAT SERPL-MCNC: 0.88 MG/DL — SIGNIFICANT CHANGE UP (ref 0.5–1.3)
DIFF PNL FLD: ABNORMAL
EOSINOPHIL # BLD AUTO: 0.15 K/UL — SIGNIFICANT CHANGE UP (ref 0–0.5)
EOSINOPHIL NFR BLD AUTO: 2.5 % — SIGNIFICANT CHANGE UP (ref 0–6)
EPI CELLS # UR: SIGNIFICANT CHANGE UP
GLUCOSE SERPL-MCNC: 120 MG/DL — HIGH (ref 70–99)
GLUCOSE UR QL: NEGATIVE — SIGNIFICANT CHANGE UP
HCT VFR BLD CALC: 38.1 % — SIGNIFICANT CHANGE UP (ref 34.5–45)
HGB BLD-MCNC: 12.2 G/DL — SIGNIFICANT CHANGE UP (ref 11.5–15.5)
IMM GRANULOCYTES NFR BLD AUTO: 0.2 % — SIGNIFICANT CHANGE UP (ref 0–1.5)
INR BLD: 1 RATIO — SIGNIFICANT CHANGE UP (ref 0.88–1.16)
KETONES UR-MCNC: NEGATIVE — SIGNIFICANT CHANGE UP
LEUKOCYTE ESTERASE UR-ACNC: ABNORMAL
LYMPHOCYTES # BLD AUTO: 1.41 K/UL — SIGNIFICANT CHANGE UP (ref 1–3.3)
LYMPHOCYTES # BLD AUTO: 23.2 % — SIGNIFICANT CHANGE UP (ref 13–44)
MCHC RBC-ENTMCNC: 28.7 PG — SIGNIFICANT CHANGE UP (ref 27–34)
MCHC RBC-ENTMCNC: 32 GM/DL — SIGNIFICANT CHANGE UP (ref 32–36)
MCV RBC AUTO: 89.6 FL — SIGNIFICANT CHANGE UP (ref 80–100)
MONOCYTES # BLD AUTO: 0.64 K/UL — SIGNIFICANT CHANGE UP (ref 0–0.9)
MONOCYTES NFR BLD AUTO: 10.5 % — SIGNIFICANT CHANGE UP (ref 2–14)
NEUTROPHILS # BLD AUTO: 3.81 K/UL — SIGNIFICANT CHANGE UP (ref 1.8–7.4)
NEUTROPHILS NFR BLD AUTO: 62.8 % — SIGNIFICANT CHANGE UP (ref 43–77)
NITRITE UR-MCNC: POSITIVE
PH UR: 7 — SIGNIFICANT CHANGE UP (ref 5–8)
PLATELET # BLD AUTO: 277 K/UL — SIGNIFICANT CHANGE UP (ref 150–400)
POTASSIUM SERPL-MCNC: 4.5 MMOL/L — SIGNIFICANT CHANGE UP (ref 3.5–5.3)
POTASSIUM SERPL-SCNC: 4.5 MMOL/L — SIGNIFICANT CHANGE UP (ref 3.5–5.3)
PROT SERPL-MCNC: 7.3 G/DL — SIGNIFICANT CHANGE UP (ref 6.6–8.7)
PROT UR-MCNC: 100
PROTHROM AB SERPL-ACNC: 11.6 SEC — SIGNIFICANT CHANGE UP (ref 10.6–13.6)
RBC # BLD: 4.25 M/UL — SIGNIFICANT CHANGE UP (ref 3.8–5.2)
RBC # FLD: 14.6 % — HIGH (ref 10.3–14.5)
RBC CASTS # UR COMP ASSIST: SIGNIFICANT CHANGE UP /HPF (ref 0–4)
SODIUM SERPL-SCNC: 137 MMOL/L — SIGNIFICANT CHANGE UP (ref 135–145)
SP GR SPEC: 1 — LOW (ref 1.01–1.02)
TROPONIN T SERPL-MCNC: <0.01 NG/ML — SIGNIFICANT CHANGE UP (ref 0–0.06)
UROBILINOGEN FLD QL: NEGATIVE — SIGNIFICANT CHANGE UP
WBC # BLD: 6.07 K/UL — SIGNIFICANT CHANGE UP (ref 3.8–10.5)
WBC # FLD AUTO: 6.07 K/UL — SIGNIFICANT CHANGE UP (ref 3.8–10.5)
WBC UR QL: >50

## 2020-09-22 PROCEDURE — 70450 CT HEAD/BRAIN W/O DYE: CPT | Mod: 26

## 2020-09-22 PROCEDURE — 74177 CT ABD & PELVIS W/CONTRAST: CPT | Mod: 26

## 2020-09-22 PROCEDURE — 72125 CT NECK SPINE W/O DYE: CPT | Mod: 26

## 2020-09-22 PROCEDURE — 71045 X-RAY EXAM CHEST 1 VIEW: CPT | Mod: 26

## 2020-09-22 PROCEDURE — 99285 EMERGENCY DEPT VISIT HI MDM: CPT | Mod: CS,GC

## 2020-09-22 PROCEDURE — 99223 1ST HOSP IP/OBS HIGH 75: CPT

## 2020-09-22 PROCEDURE — 72192 CT PELVIS W/O DYE: CPT | Mod: 26,59

## 2020-09-22 PROCEDURE — 71260 CT THORAX DX C+: CPT | Mod: 26

## 2020-09-22 PROCEDURE — 73562 X-RAY EXAM OF KNEE 3: CPT | Mod: 26,RT

## 2020-09-22 PROCEDURE — 73502 X-RAY EXAM HIP UNI 2-3 VIEWS: CPT | Mod: 26,LT

## 2020-09-22 PROCEDURE — 93010 ELECTROCARDIOGRAM REPORT: CPT

## 2020-09-22 RX ORDER — ENOXAPARIN SODIUM 100 MG/ML
30 INJECTION SUBCUTANEOUS DAILY
Refills: 0 | Status: DISCONTINUED | OUTPATIENT
Start: 2020-09-22 | End: 2020-09-25

## 2020-09-22 RX ORDER — SACCHAROMYCES BOULARDII 250 MG
250 POWDER IN PACKET (EA) ORAL
Refills: 0 | Status: DISCONTINUED | OUTPATIENT
Start: 2020-09-22 | End: 2020-09-25

## 2020-09-22 RX ORDER — FAMOTIDINE 10 MG/ML
20 INJECTION INTRAVENOUS DAILY
Refills: 0 | Status: DISCONTINUED | OUTPATIENT
Start: 2020-09-22 | End: 2020-09-22

## 2020-09-22 RX ORDER — METOPROLOL TARTRATE 50 MG
50 TABLET ORAL
Refills: 0 | Status: DISCONTINUED | OUTPATIENT
Start: 2020-09-22 | End: 2020-09-22

## 2020-09-22 RX ORDER — ACETAMINOPHEN 500 MG
650 TABLET ORAL EVERY 6 HOURS
Refills: 0 | Status: DISCONTINUED | OUTPATIENT
Start: 2020-09-22 | End: 2020-09-25

## 2020-09-22 RX ORDER — FAMOTIDINE 10 MG/ML
20 INJECTION INTRAVENOUS DAILY
Refills: 0 | Status: DISCONTINUED | OUTPATIENT
Start: 2020-09-22 | End: 2020-09-25

## 2020-09-22 RX ORDER — METOPROLOL TARTRATE 50 MG
50 TABLET ORAL
Refills: 0 | Status: DISCONTINUED | OUTPATIENT
Start: 2020-09-22 | End: 2020-09-25

## 2020-09-22 RX ORDER — FAMOTIDINE 10 MG/ML
1 INJECTION INTRAVENOUS
Qty: 0 | Refills: 0 | DISCHARGE

## 2020-09-22 RX ORDER — CEFTRIAXONE 500 MG/1
1000 INJECTION, POWDER, FOR SOLUTION INTRAMUSCULAR; INTRAVENOUS ONCE
Refills: 0 | Status: COMPLETED | OUTPATIENT
Start: 2020-09-22 | End: 2020-09-22

## 2020-09-22 RX ORDER — CEFTRIAXONE 500 MG/1
1000 INJECTION, POWDER, FOR SOLUTION INTRAMUSCULAR; INTRAVENOUS EVERY 24 HOURS
Refills: 0 | Status: COMPLETED | OUTPATIENT
Start: 2020-09-23 | End: 2020-09-24

## 2020-09-22 RX ORDER — ASPIRIN/CALCIUM CARB/MAGNESIUM 324 MG
81 TABLET ORAL DAILY
Refills: 0 | Status: DISCONTINUED | OUTPATIENT
Start: 2020-09-22 | End: 2020-09-25

## 2020-09-22 RX ADMIN — CEFTRIAXONE 100 MILLIGRAM(S): 500 INJECTION, POWDER, FOR SOLUTION INTRAMUSCULAR; INTRAVENOUS at 18:34

## 2020-09-22 RX ADMIN — CEFTRIAXONE 1000 MILLIGRAM(S): 500 INJECTION, POWDER, FOR SOLUTION INTRAMUSCULAR; INTRAVENOUS at 19:16

## 2020-09-22 RX ADMIN — Medication 50 MILLIGRAM(S): at 21:07

## 2020-09-22 NOTE — ED PROVIDER NOTE - ATTENDING CONTRIBUTION TO CARE
89 yof pmh htn, dementia, ocular carcinoma s/p resection here for fall 2 days ago and trouble ambulating. baseline ambulating with walker. mental status waxes and wanes so patient unable to provide history. unwitnessed fall, unclear if LOC or preceding symptoms. Not on blood thinners. daughter reports even before fall, patient was having more trouble walking especially with right leg.   AP - will get CT imaging r/o traumatic injury. infectious w/up. unclear etiology of fall. will need PT eval. anticipate admission

## 2020-09-22 NOTE — CONSULT NOTE ADULT - SUBJECTIVE AND OBJECTIVE BOX
Pt Name: EDOUARD MALAVE    MRN: [id number]      Patient is a 89y Female presenting to the emergency department with daughter who states that pt has been acting confused and appears to have RLE weakness x 3 days. As per RN, pts daughter told ED staff that her mother fell x 3 days ago and has been favoring her right leg when ambualting with walker, at times appearing to drag her right leg. History otherwise limited secondary to pts confusion. CT scan incidental finding shows left femoral head subchondral lucency's questionable for acute vs chronic fx. Pt denies pain at time of exam.    REVIEW OF SYSTEMS    General: Alert, responsive, in NAD    Skin/Breast: No rashes, no pruritis   	  Ophthalmologic: No visual changes. No redness.   	  ENMT:	No discharge. No swelling.    Respiratory and Thorax: No difficulty breathing. No cough.  	   Cardiovascular:	No chest pain. No palpitations.    Gastrointestinal:	 No abdominal pain. No diarrhea.     Genitourinary: No dysuria. No bleeding.    Musculoskeletal: SEE HPI.    Neurological: No sensory or motor changes.     Psychiatric: No anxiety or depression.    Hematology/Lymphatics: No swelling.    Endocrine: No Hx of diabetes.    ROS is otherwise negative.    PAST MEDICAL & SURGICAL HISTORY:  PAST MEDICAL & SURGICAL HISTORY:  MI (myocardial infarction)  @ age 89    HTN (hypertension)    No significant past surgical history        Allergies: No Known Allergies      Medications: acetaminophen   Tablet .. 650 milliGRAM(s) Oral every 6 hours PRN  aspirin enteric coated 81 milliGRAM(s) Oral daily  enoxaparin Injectable 30 milliGRAM(s) SubCutaneous daily  famotidine    Tablet 20 milliGRAM(s) Oral daily  metoprolol tartrate 50 milliGRAM(s) Oral two times a day  saccharomyces boulardii 250 milliGRAM(s) Oral two times a day      FAMILY HISTORY:  No pertinent family history in first degree relatives    : non-contributory    Social History: Unknown    Ambulation: Walking independently [ ] With Cane [ ] With Walker [ x ]  Bedbound [ ]                           12.2   6.07  )-----------( 277      ( 22 Sep 2020 15:35 )             38.1       09-22    137  |  99  |  33.0<H>  ----------------------------<  120<H>  4.5   |  27.0  |  0.88    Ca    9.8      22 Sep 2020 15:35    TPro  7.3  /  Alb  3.8  /  TBili  0.4  /  DBili  x   /  AST  24  /  ALT  15  /  AlkPhos  50  09-22      Vital Signs Last 24 Hrs  T(C): 36.4 (22 Sep 2020 20:23), Max: 37 (22 Sep 2020 19:40)  T(F): 97.6 (22 Sep 2020 20:23), Max: 98.6 (22 Sep 2020 19:40)  HR: 72 (22 Sep 2020 21:06) (67 - 74)  BP: 129/53 (22 Sep 2020 21:06) (95/54 - 148/67)  BP(mean): --  RR: 18 (22 Sep 2020 20:23) (18 - 20)  SpO2: 95% (22 Sep 2020 20:23) (95% - 98%)    Daily Height in cm: 149.86 (22 Sep 2020 14:25)    Daily       PHYSICAL EXAM:      Appearance: Alert, responsive, in no acute distress.    Neurological: Unable to perform secondary to pts confusion    Skin: no rash on visible skin. Skin is clean, dry and intact. No bleeding. No abrasions. No ulcerations.    Vascular: 2+ distal radial/DP/PT pulses. Cap refill < 2 sec. No signs of venous insufficiency or stasis. No extremity ulcerations. No cyanosis.    Musculoskeletal:         Left Upper Extremity:  + NROM. Non-tender. No signs of trauma.        Right Upper Extremity:  + NROM. Non-tender. No signs of trauma.        Left Lower Extremity: +active/passive FROM of the left hip with no pain elicited, 2+ DP pulse, No TTP of the left hip/knee/ankle, +plantar/dorsiflexion intact. Compartments soft and compressible. No other signs of trauma noted       Right Lower Extremity:  + NROM. Non-tender. No signs of trauma.     Imaging Studies: < from: CT Pelvis Reform No Cont (09.22.20 @ 17:30) >   EXAM:  CT REFORM PELVIS                         EXAM:  CT CHEST IC                         EXAM:  CT ABDOMEN AND PELVIS IC                          PROCEDURE DATE:  09/22/2020          INTERPRETATION:  CLINICAL INFORMATION: Trauma    COMPARISON: None.    PROCEDURE:  CT of the Chest, Abdomen and Pelvis was performed with intravenous contrast.  Imaging was performed through the chest in the arterial phase followed by imaging of the abdomen and pelvis in the portal venous phase.  Intravenous contrast: 90 ml Omnipaque 350. 10 ml discarded.  Oral contrast: None.  Sagittal and coronal reformats were performed. Pelvic reconstructions are also performed.    FINDINGS:  CHEST:  LUNGS AND LARGE AIRWAYS: Patent central airways. No pulmonary nodules.  PLEURA: No pleural effusion.  VESSELS: Atherosclerotic changes of the aorta and coronary arteries.  HEART: Heart size is normal. No pericardial effusion.  MEDIASTINUM AND OWEN: No lymphadenopathy.  CHEST WALL AND LOWER NECK: Within normal limits.    ABDOMEN AND PELVIS:  LIVER: Within normal limits.  BILE DUCTS: Cholelithiasis  GALLBLADDER: Within normal limits.  SPLEEN: Within normal limits.  PANCREAS: Within normal limits.  ADRENALS: Within normal limits.  KIDNEYS/URETERS: Within normal limits.    BLADDER: Within normal limits.  REPRODUCTIVE ORGANS: Uterus and adnexa within normal limits.    BOWEL: No bowel obstruction. Appendix is normal.. There is colonic diverticulosis without diverticulitis  PERITONEUM: No ascites.  VESSELS: Atherosclerotic changes.  RETROPERITONEUM/LYMPH NODES: No lymphadenopathy.  ABDOMINAL WALL: Within normal limits.  BONES: Degenerative changes. There is a left hip compression screw. There is an impacted proximal left humeral fracture appearing old. There is moderate L3 compression fracture and mild L1 compression fracture which are indeterminate in age. The pelvis demonstrates no lytic or blastic lesion. SI joints are intact. There is evidence of prior ORIF of the left hip. There is old left inferior pubic ramus fracture. There is lucency in the subchondral region of the left femoral head which may be degenerative. Cannot rule out acute fracture or avascular necrosis. Please correlate with site of pain    IMPRESSION: No evidence of acute visceral injury  L1 and L3 compression fractures, indeterminate in age  Status post ORIF of the left hip with subchondral lucencies left femoral head question chronic versus acute. Further evaluation may be performed as clinically indicated.              PILAR MARADIAGA,ATTENDING RADIOLOGIST  This document has been electronically signed. Sep 22 2020  6:35PM    < end of copied text >      A/P:  Pt is a  89y Female brought to ED for confusion and questionable RLE weakness x 3 days. Pt found to have UTI in ED. Pt was pan scanned and incidental finding of left femoral head subchondral lucency on CT. Pt complains of NO pain in the LLE and has FROM with no signs of trauma. Pt is s/p L IM Nail in the past. Based on history and physical exam, findings likely chronic in nature.    PLAN d/w Dr. Mosqueda:   * No immediate orthopedic surgical intervention necessary at this time  * Continue care as per primary team  * Ortho stable

## 2020-09-22 NOTE — H&P ADULT - HISTORY OF PRESENT ILLNESS
89F with Hx of MI (8 yrs ago) and HTN (not on meds) presents for RLE weakness and mild confusion. Pt is poor historian, info received from daughter. Per daughter, Trisha (HCP/Primary care giver), pt was at her baseline until 2 days ago. At baseline pt if fully mobile with walker and A/O x3. 2 days ago, daughter noticed mom was dragging her RLE to walk and fell on her left side in the evening. Pt did not trip, hit head, or complain of pain. Not on blood thinners. Since then, daughter also noticed pt waxes and wanes with confusion and sometimes gets agitated which is not her baseline. Last hx of UTI was 8 yrs ago. Daughter knew something was wrong and brought patient in for evaluation. Is tolerating PO, voiding, and having BMs at baseline.     Denies fever or chills. Denies nausea or vomiting. Denies chest pain. Denies shortness of breath. Denies being around sick contacts/Covid exposure. 89F with Hx of MI (8 yrs ago) and HTN (not on meds) presents for RLE weakness and mild confusion. Pt is poor historian, info received from daughter. Per daughter, Trisha (HCP/Primary care giver), pt was at her baseline until 2 days ago. At baseline pt if fully mobile with walker and A/O x3. 2 days ago, daughter noticed mom was dragging her RLE to walk and fell on her R side in the evening. Pt did not trip, hit head, or complain of pain. Not on blood thinners. Since then, daughter also noticed pt waxes and wanes with confusion and sometimes gets agitated which is not her baseline. Last hx of UTI was 8 yrs ago. Daughter knew something was wrong and brought patient in for evaluation. Is tolerating PO, voiding, and having BMs at baseline.     Denies fever or chills. Denies nausea or vomiting. Denies chest pain. Denies shortness of breath. Denies being around sick contacts/Covid exposure.

## 2020-09-22 NOTE — ED ADULT NURSE NOTE - OBJECTIVE STATEMENT
Assumed care at 1511 pt AOX1 as per daughter she is at baseline mentally but has been weaker, fell a couple of day ago, did not hit her head or go to the hospital. pt denies any SOB, chest pain, fevers, chills.

## 2020-09-22 NOTE — ED PROVIDER NOTE - OBJECTIVE STATEMENT
89 y/o F hx of HTN brought in by EMS for generalized weakness for the past 2 days. Patient's daughter states that she fell 1 day ago and has been endorsing R leg pain, hasn't been ambulating with walker like she normally does. No LOC, not on blood thinners, did not hit her head. States that she appears to be how she normally is at baseline, waxes and wanes. Denies fever or chills. Denies nausea or vomiting. Denies chest pain. Denies shortness of breath. Denies being around sick contacts.

## 2020-09-22 NOTE — ED PROVIDER NOTE - NS ED ROS FT
General: Denies fever, chills. + weakness   HEENT: Denies sensory changes, sore throat  Neck: Denies neck pain, neck stiffness  Resp: Denies coughing, SOB  Cardiovascular: Denies CP, palpitations, LE edema  GI: Denies nausea, vomiting, abdominal pain, diarrhea, constipation, blood in stool  : Denies dysuria, hematuria, frequency, incontinence  MSK: Denies back pain  Neuro: Denies HA, dizziness, numbness, weakness  Skin: Denies rashes. General: Denies fever, chills. + weakness   Resp: Denies SOB  GI: Denies diarrhea  Skin: Denies rashes.

## 2020-09-22 NOTE — H&P ADULT - NSHPPHYSICALEXAM_GEN_ALL_CORE
General: NAD, awake alert  HEENT: atraumatic, normocephalic, PERRLA, EOMI in R eye, Mucous membranes are dry. L eye chronically sunken in covered with bandage, no e/o infection.  NECK: Supple  CVR: Regular rate and rhythm, Normal s1, s2 , no murmurs, rubs or gallops.  LUNG: Clear to auscultation bilaterally. No wheezing, crackles, ronchi.  ABDOMEN: + Bowel Sounds x4, soft nondistended, not tender to palpation, no garding or rigidity.  EXT: 2+ Peripheral Pulses, No clubbing, cyanosis, or edema  SKIN: Intact, Warm, Dry General: NAD, awake alert  HEENT: atraumatic, normocephalic, PERRLA, EOMI in R eye, Mucous membranes are dry. L eye chronically sunken in covered with bandage, no e/o infection.  NECK: Supple  CVR: Regular rate and rhythm, Normal s1, s2 , no murmurs, rubs or gallops.  LUNG: Clear to auscultation bilaterally. No wheezing, crackles, ronchi.  ABDOMEN: + Bowel Sounds x4, soft nondistended, not tender to palpation, no garding or rigidity.  EXT: 2+ Peripheral Pulses, No clubbing, cyanosis, or edema  Musculoskeletal : Strength and sensation 5/5 in BL LEs. LLE lateral plantar 1cm x 1cm chronic painful wound w/o pus/swelling/or erythema.  SKIN: Intact, Warm, Dry General: NAD, awake alert, answers some questions appropriately, slightly agitated, sundowning  HEENT: atraumatic, normocephalic, PERRLA, EOMI in R eye, Mucous membranes are dry. L eye chronically sunken in covered with bandage, no e/o infection.  NECK: Supple  CVR: Regular rate and rhythm, Normal s1, s2 , no murmurs, rubs or gallops.  LUNG: Clear to auscultation bilaterally. No wheezing, crackles, ronchi.  ABDOMEN: + Bowel Sounds x4, soft nondistended, not tender to palpation, no garding or rigidity.  EXT: 2+ Peripheral Pulses, No clubbing, cyanosis, or edema  Musculoskeletal : Strength and sensation 5/5 in BL LEs. LLE lateral plantar 1cm x 1cm chronic painful wound w/o pus/swelling/or erythema.  Neuro:AAO to person, likely sundowning, slightly agitated, moving all extremities on own.   SKIN: Intact, Warm, Dry

## 2020-09-22 NOTE — H&P ADULT - NSHPLABSRESULTS_GEN_ALL_CORE
12.2   6.07  )-----------( 277      ( 22 Sep 2020 15:35 )             38.1         137  |  99  |  33.0<H>  ----------------------------<  120<H>  4.5   |  27.0  |  0.88    Ca    9.8      22 Sep 2020 15:35    TPro  7.3  /  Alb  3.8  /  TBili  0.4  /  DBili  x   /  AST  24  /  ALT  15  /  AlkPhos  50      Urinalysis Basic - ( 22 Sep 2020 18:02 )    Color: Yellow / Appearance: Cloudy / S.005 / pH: x  Gluc: x / Ketone: Negative  / Bili: Negative / Urobili: Negative   Blood: x / Protein: 100 / Nitrite: Positive   Leuk Esterase: Moderate / RBC: 0-2 /HPF / WBC >50   Sq Epi: x / Non Sq Epi: Occasional / Bacteria: Few      < from: CT Pelvis Reform No Cont (20 @ 17:30) >    FINDINGS:  CHEST:  LUNGS AND LARGE AIRWAYS: Patent central airways. No pulmonary nodules.  PLEURA: No pleural effusion.  VESSELS: Atherosclerotic changes of the aorta and coronary arteries.  HEART: Heart size is normal. No pericardial effusion.  MEDIASTINUM AND OWEN: No lymphadenopathy.  CHEST WALL AND LOWER NECK: Within normal limits.    ABDOMEN AND PELVIS:  LIVER: Within normal limits.  BILE DUCTS: Cholelithiasis  GALLBLADDER: Within normal limits.  SPLEEN: Within normal limits.  PANCREAS: Within normal limits.  ADRENALS: Within normal limits.  KIDNEYS/URETERS: Within normal limits.    BLADDER: Within normal limits.  REPRODUCTIVE ORGANS: Uterus and adnexa within normal limits.    BOWEL: No bowel obstruction. Appendix is normal.. There is colonic diverticulosis without diverticulitis  PERITONEUM: No ascites.  VESSELS: Atherosclerotic changes.  RETROPERITONEUM/LYMPH NODES: No lymphadenopathy.  ABDOMINAL WALL: Within normal limits.  BONES: Degenerative changes. There is a left hip compression screw. There is an impacted proximal left humeral fracture appearing old. There is moderate L3 compression fracture and mild L1 compression fracture which are indeterminate in age. The pelvis demonstrates no lytic or blastic lesion. SI joints are intact. There is evidence of prior ORIF of the left hip. There is old left inferior pubic ramus fracture. There is lucency in the subchondral region of the left femoral head which may be degenerative. Cannot rule out acute fracture or avascular necrosis. Please correlate with site of pain    IMPRESSION: No evidence of acute visceral injury  L1 and L3 compression fractures, indeterminate in age  Status post ORIF of the left hip with subchondral lucencies left femoral head question chronic versus acute. Further evaluation may be performed as clinically indicated.    < end of copied text >

## 2020-09-22 NOTE — ED PROCEDURE NOTE - NS ED PROCEDURE ASSISTED BY
Assistance was available/Supervision was available Assistance was available/The procedure was performed independently

## 2020-09-22 NOTE — ED ADULT NURSE REASSESSMENT NOTE - NS ED NURSE REASSESS COMMENT FT1
pt in no apparent distress, denies any pain ,plan of care explained to pt and family, both verbalized understanding.

## 2020-09-22 NOTE — ED ADULT NURSE REASSESSMENT NOTE - NS ED NURSE REASSESS COMMENT FT1
Handoff received from offgoing RN. Charting as noted. Pt. received alert and oriented x1, as per baseline, resting in stretcher, in NAD. L. eyepatch in place, CDI. Vital signs stable and as charted. Hospitalist at bedside evaluating pt, daughter remains at bedside.    Breathing spontaneous, unlabored, and symmetrical on room air. Skin warm, dry, normal for race. Pt. IBARRA, ambulatory, skin warm and dry, cap refill < 2 seconds. No noted diaphoresis or pallor. Pt. offers no complaints. Fall Precautions maintained. Call bell within reach. Educated pt. on plan of care, pt verbalized understanding. Pt. ADMITTED at this time, awaiting bed. Pt. safety and comfort measures maintained.     Report given to [facility] RN's Miladys. Awaiting hospitalist to complete assessment before transporting and completing handoff. Handoff received from offgoing RN. Charting as noted. Pt. received alert and oriented x1, as per baseline, resting in stretcher, in NAD. L. eyepatch in place, CDI. Vital signs stable and as charted. Hospitalist at bedside evaluating pt, daughter remains at bedside.    Breathing spontaneous, unlabored, and symmetrical on room air. Skin warm, dry, normal for race. Pt. IBARRA, skin warm and dry, cap refill < 2 seconds. No noted diaphoresis or pallor. Pt. offers no complaints. Fall Precautions maintained. Call bell within reach. Educated pt. on plan of care, pt verbalized understanding. Pt. ADMITTED at this time, awaiting bed. Pt. safety and comfort measures maintained.     Report given to [facility] RN's Miladys. Awaiting hospitalist to complete assessment before transporting and completing handoff.

## 2020-09-22 NOTE — H&P ADULT - NSHPREVIEWOFSYSTEMS_GEN_ALL_CORE
L plantar pain  RLE weakness    Neg except as abovel. L plantar pain  RLE weakness    Neg except as above. L lateral plantar foot pain  RLE weakness    Neg except as above.

## 2020-09-22 NOTE — ED PROCEDURE NOTE - PROCEDURE ADDITIONAL DETAILS
Educational Bedside U/S performed, negative fast, no hydronephrosis, gallbladder with stones, no wall thickening or pericholecystic fluid, negative sonographic lipscomb's sign, confirmatory study to follow/completed. Explained to pt u/s study educational and not diagnostic and verbal consent provided.

## 2020-09-22 NOTE — ED PROVIDER NOTE - CLINICAL SUMMARY MEDICAL DECISION MAKING FREE TEXT BOX
89 y/o F hx of HTN brought in by EMS for generalized weakness for the past 2 days.   CT head non-contrast r/o mass/bleed s/p fall  CT C spine  CT abdomen/pelvis   CBC, CMP, UA, FSG

## 2020-09-22 NOTE — ED PROVIDER NOTE - PHYSICAL EXAMINATION
VSS  General: Well appearing female in no acute distress  HEENT: Normocephalic, atraumatic. Moist mucous membranes. Oropharynx clear. No lymphadenopathy.  Eyes: No scleral icterus. EOMI. [place].  Neck:. Soft and supple. Full ROM without pain. No midline tenderness  Cardiac: Regular rate and regular rhythm. No murmurs, rubs, gallops. Peripheral pulses 2+ and symmetric. No LE edema.  Resp: Lungs CTAB. Speaking in full sentences. No wheezes, rales or rhonchi.  Abd: Soft, non-tender, non-distended. No guarding or rebound. No scars, masses, or lesions.  Back: Spine midline and non-tender. No CVA tenderness.    Skin: No rashes, abrasions, or lacerations.  Neuro: AO x 1. Moves all extremities symmetrically. Motor strength and sensation grossly intact.

## 2020-09-22 NOTE — H&P ADULT - ATTENDING COMMENTS
Patient personally seen and examined by me along with resident. I agree with subjective, objective and plan as above including changes I have made.

## 2020-09-23 ENCOUNTER — TRANSCRIPTION ENCOUNTER (OUTPATIENT)
Age: 85
End: 2020-09-23

## 2020-09-23 DIAGNOSIS — I10 ESSENTIAL (PRIMARY) HYPERTENSION: ICD-10-CM

## 2020-09-23 DIAGNOSIS — R53.81 OTHER MALAISE: ICD-10-CM

## 2020-09-23 LAB — SARS-COV-2 RNA SPEC QL NAA+PROBE: SIGNIFICANT CHANGE UP

## 2020-09-23 RX ORDER — INFLUENZA VIRUS VACCINE 15; 15; 15; 15 UG/.5ML; UG/.5ML; UG/.5ML; UG/.5ML
0.5 SUSPENSION INTRAMUSCULAR ONCE
Refills: 0 | Status: DISCONTINUED | OUTPATIENT
Start: 2020-09-23 | End: 2020-09-25

## 2020-09-23 RX ADMIN — Medication 50 MILLIGRAM(S): at 18:29

## 2020-09-23 RX ADMIN — ENOXAPARIN SODIUM 30 MILLIGRAM(S): 100 INJECTION SUBCUTANEOUS at 13:22

## 2020-09-23 RX ADMIN — Medication 250 MILLIGRAM(S): at 18:29

## 2020-09-23 RX ADMIN — CEFTRIAXONE 100 MILLIGRAM(S): 500 INJECTION, POWDER, FOR SOLUTION INTRAMUSCULAR; INTRAVENOUS at 18:28

## 2020-09-23 RX ADMIN — Medication 50 MILLIGRAM(S): at 05:08

## 2020-09-23 RX ADMIN — Medication 250 MILLIGRAM(S): at 05:07

## 2020-09-23 NOTE — PROGRESS NOTE ADULT - SUBJECTIVE AND OBJECTIVE BOX
Patient is a 89y old  Female who presents with a chief complaint of Weakness and UTI (22 Sep 2020 21:37)      ANTIMICROBIAL:  cefTRIAXone   IVPB 1000 milliGRAM(s) IV Intermittent every 24 hours    CARDIOVASCULAR:  metoprolol tartrate 50 milliGRAM(s) Oral two times a day      NEUROLOGIC:  acetaminophen   Tablet .. 650 milliGRAM(s) Oral every 6 hours PRN      HEMATOLOGIC:  aspirin enteric coated 81 milliGRAM(s) Oral daily  enoxaparin Injectable 30 milliGRAM(s) SubCutaneous daily    GATROINTESTINAL:  famotidine    Tablet 20 milliGRAM(s) Oral daily      IMMUNOLOGIC & OTHER  influenza   Vaccine 0.5 milliLiter(s) IntraMuscular once  saccharomyces boulardii 250 milliGRAM(s) Oral two times a day      Allergies    No Known Allergies            Vital Signs Last 24 Hrs  T(C): 36.7 (23 Sep 2020 07:55), Max: 37 (22 Sep 2020 19:40)  T(F): 98 (23 Sep 2020 07:55), Max: 98.6 (22 Sep 2020 19:40)  HR: 65 (23 Sep 2020 07:55) (65 - 76)  BP: 115/48 (23 Sep 2020 07:55) (95/54 - 156/70)  BP(mean): --  RR: 18 (22 Sep 2020 20:23) (18 - 20)  SpO2: 95% (23 Sep 2020 04:41) (95% - 98%)                    REVIEW OF SYSTEMS:    unable to assess as pt sound asleep       PHYSICAL EXAM:    GENERAL: NAD, well-groomed, well-developed  HEAD:  Atraumatic, Normocephalic  EYES: eye patch over L eye   NECK: Supple, No JVD, Normal thyroid  NERVOUS SYSTEM: sleep non arousable   CHEST/LUNG: Clear   HEART: Regular  ABDOMEN: Soft, Nontender, Nondistended; Bowel sounds present  EXTREMITIES:  2+ Peripheral Pulses, No clubbing, cyanosis, or edema        LABS:                        12.2   6.07  )-----------( 277      ( 22 Sep 2020 15:35 )             38.1         09-    137  |  99  |  33.0<H>  ----------------------------<  120<H>  4.5   |  27.0  |  0.88    Ca    9.8      22 Sep 2020 15:35    TPro  7.3  /  Alb  3.8  /  TBili  0.4  /  DBili  x   /  AST  24  /  ALT  15  /  AlkPhos  50      PT/INR - ( 22 Sep 2020 15:35 )   PT: 11.6 sec;   INR: 1.00 ratio         PTT - ( 22 Sep 2020 15:35 )  PTT:26.7 sec  Urinalysis Basic - ( 22 Sep 2020 18:02 )    Color: Yellow / Appearance: Cloudy / S.005 / pH: x  Gluc: x / Ketone: Negative  / Bili: Negative / Urobili: Negative   Blood: x / Protein: 100 / Nitrite: Positive   Leuk Esterase: Moderate / RBC: 0-2 /HPF / WBC >50   Sq Epi: x / Non Sq Epi: Occasional / Bacteria: Few              Albumin, Serum: 3.8 g/dL ( @ 15:35)    LIVER FUNCTIONS - ( 22 Sep 2020 15:35 )  Alb: 3.8 g/dL / Pro: 7.3 g/dL / ALK PHOS: 50 U/L / ALT: 15 U/L / AST: 24 U/L / GGT: x             RADIOLOGY & ADDITIONAL TESTS:    < from: CT Head No Cont (20 @ 17:13) >  IMPRESSION:    CT Head: No acute intracranial hemorrhage or calvarial fracture.    Confluent abnormal soft tissue occupying the left orbit displacing the globe superolaterally, progressed since 2019. Further ophthalmologic evaluation is recommended as neoplasm is a diagnostic consideration.    CT cervical spine: No acute cervical spine fracture or evidence of traumatic malalignment.    < end of copied text >

## 2020-09-23 NOTE — DISCHARGE NOTE PROVIDER - CARE PROVIDER_API CALL
Samantha Tom  FAMILY MEDICINE  [address]  [place] [address]  Phone: [phone]  Fax: [phone]  Follow Up Time:

## 2020-09-23 NOTE — PROGRESS NOTE ADULT - ASSESSMENT
89F with Hx of MI (8 yrs ago) and HTN (not on meds) presents for RLE weakness and mild confusion. Pt is poor historian, info received from daughter. Per daughter, Trisha (HCP/Primary care giver), pt was at her baseline until 2 days ago. At baseline pt if fully mobile with walker and A/O x3. 2 days ago, daughter noticed mom was dragging her RLE to walk and fell on her R side in the evening. Pt did not trip, hit head, or complain of pain. Not on blood thinners. Since then, daughter also noticed pt waxes and wanes with confusion and sometimes gets agitated which is not her baseline. Last hx of UTI was 8 yrs ago. Daughter knew something was wrong and brought patient in for evaluation. Is tolerating PO, voiding, and having BMs at baseline.     Denies fever or chills. Denies nausea or vomiting. Denies chest pain. Denies shortness of breath. Denies being around sick contacts/Covid exposure.       89 F with pmh of MI (8yrs ago) and HTN presents for mild intermittent confusion and RLE weakness. Admitted for UTI w/o sepsis and r/o LLE injury.        1. Acute met encephalopathy 2/2 UTI  Afebrile,  last UTI 8 yrs ago  likely component of sundowning  Rocephin daily   ck blood cul   ck urine cul         2. Fall 2/2 weakness  Fell onto R side 2 days ago due to dragging RLE     denies tripping, head trauma, or pain. Not on blood thinners.  Hx of L humeral fracture 8 yrs ago. Denies residual deficit.   Does complain of severe L Lateral Plantar 1cm round wound only on ambulation  Panscan CT scan notes L1 and L3 compression fracture, indeterminate age. Also notes L hip subchondral lucencies, left femoral head question acute vs chronic  PT eval   Ortho consulted --- no further treatment at this time       3. Hx of MI   regularly follows up with Dr. Rain, Cardiology  currently on metoprolol tartrate 50mg BID and baby asa    4. HTN.   BP soft on admission, now only mildly elevated  will continue home met 50 BID (first dose now      5. GERD  c/w home dose famotidine 20mg qd       PPx measure: VCD boots.    89F with Hx of MI (8 yrs ago) and HTN (not on meds) presents for RLE weakness and mild confusion. Pt is poor historian, info received from daughter. Per daughter, Trisha (HCP/Primary care giver), pt was at her baseline until 2 days ago. At baseline pt if fully mobile with walker and A/O x3. 2 days ago, daughter noticed mom was dragging her RLE to walk and fell on her R side in the evening. Pt did not trip, hit head, or complain of pain. Not on blood thinners. Since then, daughter also noticed pt waxes and wanes with confusion and sometimes gets agitated which is not her baseline. Last hx of UTI was 8 yrs ago. Daughter knew something was wrong and brought patient in for evaluation. Is tolerating PO, voiding, and having BMs at baseline.     Denies fever or chills. Denies nausea or vomiting. Denies chest pain. Denies shortness of breath. Denies being around sick contacts/Covid exposure.       89 F with pmh of MI (8yrs ago) and HTN presents for mild intermittent confusion and RLE weakness. Admitted for UTI w/o sepsis and r/o LLE injury.        1. Acute met encephalopathy 2/2 UTI  Afebrile,  last UTI 8 yrs ago  likely component of sundowning  Rocephin daily   ck blood cul   ck urine cul         2. Fall 2/2 weakness  Fell onto R side 2 days ago due to dragging RLE     denies tripping, head trauma, or pain. Not on blood thinners.  Hx of L humeral fracture 8 yrs ago. Denies residual deficit.   Does complain of severe L Lateral Plantar 1cm round wound only on ambulation  Panscan CT scan notes L1 and L3 compression fracture, indeterminate age. Also notes L hip subchondral lucencies, left femoral head question acute vs chronic  PT eval   Ortho consulted --- no further treatment at this time       3. Hx of MI   regularly follows up with Dr. Rain, Cardiology  currently on metoprolol tartrate 50mg BID and baby asa    4. HTN.   BP soft on admission, now only mildly elevated  will continue home met 50 BID (first dose now      5. GERD  c/w home dose famotidine 20mg qd       spoke to daughter Trisha [phone] (HCP)  wants [employer]

## 2020-09-23 NOTE — DISCHARGE NOTE PROVIDER - NSDCMRMEDTOKEN_GEN_ALL_CORE_FT
aspirin 81 mg oral delayed release tablet: 1 tab(s) orally once a day  metoprolol: 50 milligram(s) orally 2 times a day.     Pepcid AC 10 mg oral tablet: 2 tab(s) orally once a day (in the morning)   aspirin 81 mg oral delayed release tablet: 1 tab(s) orally once a day  Keflex 500 mg oral capsule: 1 cap(s) orally every 12 hours x 4 more days then d/c   metoprolol: 50 milligram(s) orally 2 times a day.     Pepcid AC 10 mg oral tablet: 2 tab(s) orally once a day (in the morning)

## 2020-09-23 NOTE — DISCHARGE NOTE PROVIDER - NSDCCPCAREPLAN_GEN_ALL_CORE_FT
87 male HTN, HLD with recent admission found to have candidal esophagitis, H.pylori presents after fall/syncope with failure to thrive and continued odynophagia PRINCIPAL DISCHARGE DIAGNOSIS  Diagnosis: Altered mental status  Assessment and Plan of Treatment:       SECONDARY DISCHARGE DIAGNOSES  Diagnosis: Acute UTI  Assessment and Plan of Treatment:     Diagnosis: Fall at home  Assessment and Plan of Treatment:

## 2020-09-23 NOTE — PATIENT PROFILE ADULT - NSASFALLATTEMPTOOB_GEN_A_NUR
Discharge Plan


Disposition: [facility]


Condition: Stable


No Smoking: If you smoke, Please STOP!  Call [phone] for help.


Follow-up with: 


Leo Ray MD [Primary Care Provider] - no

## 2020-09-23 NOTE — DISCHARGE NOTE PROVIDER - HOSPITAL COURSE
89F with Hx of MI (8 yrs ago) and HTN (not on meds) presents for RLE weakness and mild confusion. Pt is poor historian, info received from daughter. Per daughter, Trisha (HCP/Primary care giver), pt was at her baseline until 2 days ago. At baseline pt if fully mobile with walker and A/O x3. 2 days ago, daughter noticed mom was dragging her RLE to walk and fell on her R side in the evening. Pt did not trip, hit head, or complain of pain. Not on blood thinners. Since then, daughter also noticed pt waxes and wanes with confusion and sometimes gets agitated which is not her baseline. Last hx of UTI was 8 yrs ago. Daughter knew something was wrong and brought patient in for evaluation. Is tolerating PO, voiding, and having BMs at baseline.     Denies fever or chills. Denies nausea or vomiting. Denies chest pain. Denies shortness of breath. Denies being around sick contacts/Covid exposure.       89 F with pmh of MI (8yrs ago) and HTN presents for mild intermittent confusion and RLE weakness. Admitted for UTI w/o sepsis and r/o LLE injury.        1. Acute met encephalopathy 2/2 UTI  Afebrile,  last UTI 8 yrs ago  + sundowning  Rocephin daily   blood cul x 2 negative    urine cul + E Coli         2. Fall 2/2 weakness  Fell onto R side 2 days ago due to dragging RLE     denies tripping, head trauma, or pain. Not on blood thinners.  Hx of L humeral fracture 8 yrs ago. Denies residual deficit.   Does complain of severe L Lateral Plantar 1cm round wound only on ambulation  Panscan CT scan notes L1 and L3 compression fracture, indeterminate age. Also notes L hip subchondral lucencies, left femoral head question acute vs chronic  PT eval   Ortho consulted --- no further treatment at this time       3. Hx of MI   regularly follows up with Dr. Rain, Cardiology  currently on metoprolol tartrate 50mg BID and baby asa    4. HTN.   BP soft on admission, now only mildly elevated  will continue home met 50 BID (first dose now      5. GERD  c/w home dose famotidine 20mg qd       spoke to daughter Trisha [phone] (HCP)  wants [employer] [facility]

## 2020-09-24 LAB
-  AMIKACIN: SIGNIFICANT CHANGE UP
-  AMOXICILLIN/CLAVULANIC ACID: SIGNIFICANT CHANGE UP
-  AMPICILLIN/SULBACTAM: SIGNIFICANT CHANGE UP
-  AMPICILLIN: SIGNIFICANT CHANGE UP
-  AZTREONAM: SIGNIFICANT CHANGE UP
-  CEFAZOLIN: SIGNIFICANT CHANGE UP
-  CEFEPIME: SIGNIFICANT CHANGE UP
-  CEFOXITIN: SIGNIFICANT CHANGE UP
-  CEFTRIAXONE: SIGNIFICANT CHANGE UP
-  CIPROFLOXACIN: SIGNIFICANT CHANGE UP
-  ERTAPENEM: SIGNIFICANT CHANGE UP
-  GENTAMICIN: SIGNIFICANT CHANGE UP
-  LEVOFLOXACIN: SIGNIFICANT CHANGE UP
-  MEROPENEM: SIGNIFICANT CHANGE UP
-  NITROFURANTOIN: SIGNIFICANT CHANGE UP
-  PIPERACILLIN/TAZOBACTAM: SIGNIFICANT CHANGE UP
-  TIGECYCLINE: SIGNIFICANT CHANGE UP
-  TOBRAMYCIN: SIGNIFICANT CHANGE UP
-  TRIMETHOPRIM/SULFAMETHOXAZOLE: SIGNIFICANT CHANGE UP
ALBUMIN SERPL ELPH-MCNC: 3.2 G/DL — LOW (ref 3.3–5.2)
ALP SERPL-CCNC: 43 U/L — SIGNIFICANT CHANGE UP (ref 40–120)
ALT FLD-CCNC: 12 U/L — SIGNIFICANT CHANGE UP
ANION GAP SERPL CALC-SCNC: 15 MMOL/L — SIGNIFICANT CHANGE UP (ref 5–17)
AST SERPL-CCNC: 22 U/L — SIGNIFICANT CHANGE UP
BASOPHILS # BLD AUTO: 0.05 K/UL — SIGNIFICANT CHANGE UP (ref 0–0.2)
BASOPHILS NFR BLD AUTO: 1 % — SIGNIFICANT CHANGE UP (ref 0–2)
BILIRUB SERPL-MCNC: 0.3 MG/DL — LOW (ref 0.4–2)
BUN SERPL-MCNC: 28 MG/DL — HIGH (ref 8–20)
CALCIUM SERPL-MCNC: 9.5 MG/DL — SIGNIFICANT CHANGE UP (ref 8.6–10.2)
CHLORIDE SERPL-SCNC: 101 MMOL/L — SIGNIFICANT CHANGE UP (ref 98–107)
CO2 SERPL-SCNC: 25 MMOL/L — SIGNIFICANT CHANGE UP (ref 22–29)
CREAT SERPL-MCNC: 0.85 MG/DL — SIGNIFICANT CHANGE UP (ref 0.5–1.3)
CULTURE RESULTS: SIGNIFICANT CHANGE UP
EOSINOPHIL # BLD AUTO: 0.18 K/UL — SIGNIFICANT CHANGE UP (ref 0–0.5)
EOSINOPHIL NFR BLD AUTO: 3.7 % — SIGNIFICANT CHANGE UP (ref 0–6)
GLUCOSE SERPL-MCNC: 83 MG/DL — SIGNIFICANT CHANGE UP (ref 70–99)
HCT VFR BLD CALC: 37.6 % — SIGNIFICANT CHANGE UP (ref 34.5–45)
HGB BLD-MCNC: 11.9 G/DL — SIGNIFICANT CHANGE UP (ref 11.5–15.5)
IMM GRANULOCYTES NFR BLD AUTO: 0.2 % — SIGNIFICANT CHANGE UP (ref 0–1.5)
LYMPHOCYTES # BLD AUTO: 1.54 K/UL — SIGNIFICANT CHANGE UP (ref 1–3.3)
LYMPHOCYTES # BLD AUTO: 31.3 % — SIGNIFICANT CHANGE UP (ref 13–44)
MCHC RBC-ENTMCNC: 28.3 PG — SIGNIFICANT CHANGE UP (ref 27–34)
MCHC RBC-ENTMCNC: 31.6 GM/DL — LOW (ref 32–36)
MCV RBC AUTO: 89.3 FL — SIGNIFICANT CHANGE UP (ref 80–100)
METHOD TYPE: SIGNIFICANT CHANGE UP
MONOCYTES # BLD AUTO: 0.51 K/UL — SIGNIFICANT CHANGE UP (ref 0–0.9)
MONOCYTES NFR BLD AUTO: 10.4 % — SIGNIFICANT CHANGE UP (ref 2–14)
NEUTROPHILS # BLD AUTO: 2.63 K/UL — SIGNIFICANT CHANGE UP (ref 1.8–7.4)
NEUTROPHILS NFR BLD AUTO: 53.4 % — SIGNIFICANT CHANGE UP (ref 43–77)
ORGANISM # SPEC MICROSCOPIC CNT: SIGNIFICANT CHANGE UP
ORGANISM # SPEC MICROSCOPIC CNT: SIGNIFICANT CHANGE UP
PLATELET # BLD AUTO: 252 K/UL — SIGNIFICANT CHANGE UP (ref 150–400)
POTASSIUM SERPL-MCNC: 4.3 MMOL/L — SIGNIFICANT CHANGE UP (ref 3.5–5.3)
POTASSIUM SERPL-SCNC: 4.3 MMOL/L — SIGNIFICANT CHANGE UP (ref 3.5–5.3)
PROT SERPL-MCNC: 6.8 G/DL — SIGNIFICANT CHANGE UP (ref 6.6–8.7)
RBC # BLD: 4.21 M/UL — SIGNIFICANT CHANGE UP (ref 3.8–5.2)
RBC # FLD: 14.6 % — HIGH (ref 10.3–14.5)
SARS-COV-2 IGG SERPL QL IA: NEGATIVE — SIGNIFICANT CHANGE UP
SARS-COV-2 IGM SERPL IA-ACNC: <3.8 AU/ML — SIGNIFICANT CHANGE UP
SODIUM SERPL-SCNC: 141 MMOL/L — SIGNIFICANT CHANGE UP (ref 135–145)
SPECIMEN SOURCE: SIGNIFICANT CHANGE UP
WBC # BLD: 4.92 K/UL — SIGNIFICANT CHANGE UP (ref 3.8–10.5)
WBC # FLD AUTO: 4.92 K/UL — SIGNIFICANT CHANGE UP (ref 3.8–10.5)

## 2020-09-24 RX ORDER — CEFTRIAXONE 500 MG/1
1000 INJECTION, POWDER, FOR SOLUTION INTRAMUSCULAR; INTRAVENOUS EVERY 24 HOURS
Refills: 0 | Status: DISCONTINUED | OUTPATIENT
Start: 2020-09-25 | End: 2020-09-25

## 2020-09-24 RX ADMIN — Medication 50 MILLIGRAM(S): at 05:55

## 2020-09-24 RX ADMIN — FAMOTIDINE 20 MILLIGRAM(S): 10 INJECTION INTRAVENOUS at 11:58

## 2020-09-24 RX ADMIN — ENOXAPARIN SODIUM 30 MILLIGRAM(S): 100 INJECTION SUBCUTANEOUS at 11:57

## 2020-09-24 RX ADMIN — Medication 50 MILLIGRAM(S): at 17:31

## 2020-09-24 RX ADMIN — CEFTRIAXONE 100 MILLIGRAM(S): 500 INJECTION, POWDER, FOR SOLUTION INTRAMUSCULAR; INTRAVENOUS at 17:30

## 2020-09-24 RX ADMIN — Medication 250 MILLIGRAM(S): at 17:33

## 2020-09-24 RX ADMIN — Medication 250 MILLIGRAM(S): at 05:55

## 2020-09-24 RX ADMIN — Medication 81 MILLIGRAM(S): at 11:57

## 2020-09-24 NOTE — PHYSICAL THERAPY INITIAL EVALUATION ADULT - ADDITIONAL COMMENTS
Pt unable to describe in detail her previous level of function, but appears to have require some assistance, pt states that her daughter was home to assist and that she has a chair lift to get to the bedroom. Pt states shes been having trouble walking on her left LE, refers to it as the bad one, but states that she is without pain.

## 2020-09-24 NOTE — PROGRESS NOTE ADULT - SUBJECTIVE AND OBJECTIVE BOX
Patient is a 89y old  Female who presents with a chief complaint of Weakness and UTI (22 Sep 2020 21:37)      ANTIMICROBIAL:  cefTRIAXone   IVPB 1000 milliGRAM(s) IV Intermittent every 24 hours    CARDIOVASCULAR:  metoprolol tartrate 50 milliGRAM(s) Oral two times a day      NEUROLOGIC:  acetaminophen   Tablet .. 650 milliGRAM(s) Oral every 6 hours PRN      HEMATOLOGIC:  aspirin enteric coated 81 milliGRAM(s) Oral daily  enoxaparin Injectable 30 milliGRAM(s) SubCutaneous daily    GATROINTESTINAL:  famotidine    Tablet 20 milliGRAM(s) Oral daily      IMMUNOLOGIC & OTHER  influenza   Vaccine 0.5 milliLiter(s) IntraMuscular once  saccharomyces boulardii 250 milliGRAM(s) Oral two times a day      Allergies    No Known Allergies            Vital Signs Last 24 Hrs  T(C): 36.6 (24 Sep 2020 08:01), Max: 36.9 (23 Sep 2020 17:32)  T(F): 97.9 (24 Sep 2020 08:01), Max: 98.4 (23 Sep 2020 17:32)  HR: 76 (24 Sep 2020 08:01) (66 - 77)  BP: 146/76 (24 Sep 2020 08:01) (102/57 - 146/76)  BP(mean): --  RR: 16 (24 Sep 2020 08:01) (16 - 20)  SpO2: 97% (23 Sep 2020 17:32) (97% - 98%)                  REVIEW OF SYSTEMS:    no complaints   stts she is enjoying her bkfast           PHYSICAL EXAM:    GENERAL: NAD, well-groomed, well-developed  HEAD:  Atraumatic, Normocephalic  EYES: eye patch over L eye   NECK: Supple, No JVD, Normal thyroid  NERVOUS SYSTEM: AA and O   CHEST/LUNG: Clear   HEART: Regular  ABDOMEN: Soft, Nontender, Nondistended; Bowel sounds present  EXTREMITIES:  2+ Peripheral Pulses, No clubbing, cyanosis, or edema        LABS:                                   11.9   4.92  )-----------( 252      ( 24 Sep 2020 07:28 )             37.6       09-24    141  |  101  |  28.0<H>  ----------------------------<  83  4.3   |  25.0  |  0.85    Ca    9.5      24 Sep 2020 07:28    TPro  6.8  /  Alb  3.2<L>  /  TBili  0.3<L>  /  DBili  x   /  AST  22  /  ALT  12  /  AlkPhos  43             Color: Yellow / Appearance: Cloudy / S.005 / pH: x  Gluc: x / Ketone: Negative  / Bili: Negative / Urobili: Negative   Blood: x / Protein: 100 / Nitrite: Positive   Leuk Esterase: Moderate / RBC: 0-2 /HPF / WBC >50   Sq Epi: x / Non Sq Epi: Occasional / Bacteria: Few              Albumin, Serum: 3.8 g/dL ( @ 15:35)    LIVER FUNCTIONS - ( 22 Sep 2020 15:35 )  Alb: 3.8 g/dL / Pro: 7.3 g/dL / ALK PHOS: 50 U/L / ALT: 15 U/L / AST: 24 U/L / GGT: x             RADIOLOGY & ADDITIONAL TESTS:    < from: CT Head No Cont (20 @ 17:13) >  IMPRESSION:    CT Head: No acute intracranial hemorrhage or calvarial fracture.    Confluent abnormal soft tissue occupying the left orbit displacing the globe superolaterally, progressed since 2019. Further ophthalmologic evaluation is recommended as neoplasm is a diagnostic consideration.    CT cervical spine: No acute cervical spine fracture or evidence of traumatic malalignment.    < end of copied text >

## 2020-09-24 NOTE — PROGRESS NOTE ADULT - ASSESSMENT
89F with Hx of MI (8 yrs ago) and HTN (not on meds) presents for RLE weakness and mild confusion. Pt is poor historian, info received from daughter. Per daughter, Trisha (HCP/Primary care giver), pt was at her baseline until 2 days ago. At baseline pt if fully mobile with walker and A/O x3. 2 days ago, daughter noticed mom was dragging her RLE to walk and fell on her R side in the evening. Pt did not trip, hit head, or complain of pain. Not on blood thinners. Since then, daughter also noticed pt waxes and wanes with confusion and sometimes gets agitated which is not her baseline. Last hx of UTI was 8 yrs ago. Daughter knew something was wrong and brought patient in for evaluation. Is tolerating PO, voiding, and having BMs at baseline.     Denies fever or chills. Denies nausea or vomiting. Denies chest pain. Denies shortness of breath. Denies being around sick contacts/Covid exposure.       89 F with pmh of MI (8yrs ago) and HTN presents for mild intermittent confusion and RLE weakness. Admitted for UTI w/o sepsis and r/o LLE injury.        1. Acute met encephalopathy 2/2 UTI  Afebrile,  last UTI 8 yrs ago  + sundowning  Rocephin daily   ck blood cul    urine cul + E Coli         2. Fall 2/2 weakness  Fell onto R side 2 days ago due to dragging RLE     denies tripping, head trauma, or pain. Not on blood thinners.  Hx of L humeral fracture 8 yrs ago. Denies residual deficit.   Does complain of severe L Lateral Plantar 1cm round wound only on ambulation  Panscan CT scan notes L1 and L3 compression fracture, indeterminate age. Also notes L hip subchondral lucencies, left femoral head question acute vs chronic  PT eval   Ortho consulted --- no further treatment at this time       3. Hx of MI   regularly follows up with Dr. Rain, Cardiology  currently on metoprolol tartrate 50mg BID and baby asa    4. HTN.   BP soft on admission, now only mildly elevated  will continue home met 50 BID (first dose now      5. GERD  c/w home dose famotidine 20mg qd       spoke to daughter Trisha [phone] (HCP)  wants [employer] [facility]

## 2020-09-24 NOTE — PHYSICAL THERAPY INITIAL EVALUATION ADULT - PERTINENT HX OF CURRENT PROBLEM, REHAB EVAL
Pt presents to [facility] with reports of draggin left LE when ambulating. pt previously undergone left LE IMN for femoral fracture.

## 2020-09-25 ENCOUNTER — TRANSCRIPTION ENCOUNTER (OUTPATIENT)
Age: 85
End: 2020-09-25

## 2020-09-25 VITALS
SYSTOLIC BLOOD PRESSURE: 132 MMHG | TEMPERATURE: 98 F | HEART RATE: 72 BPM | RESPIRATION RATE: 18 BRPM | OXYGEN SATURATION: 95 % | DIASTOLIC BLOOD PRESSURE: 68 MMHG

## 2020-09-25 PROCEDURE — 97110 THERAPEUTIC EXERCISES: CPT

## 2020-09-25 PROCEDURE — 86769 SARS-COV-2 COVID-19 ANTIBODY: CPT

## 2020-09-25 PROCEDURE — 70450 CT HEAD/BRAIN W/O DYE: CPT

## 2020-09-25 PROCEDURE — 71260 CT THORAX DX C+: CPT

## 2020-09-25 PROCEDURE — 72125 CT NECK SPINE W/O DYE: CPT

## 2020-09-25 PROCEDURE — 73562 X-RAY EXAM OF KNEE 3: CPT

## 2020-09-25 PROCEDURE — 93005 ELECTROCARDIOGRAM TRACING: CPT

## 2020-09-25 PROCEDURE — 36415 COLL VENOUS BLD VENIPUNCTURE: CPT

## 2020-09-25 PROCEDURE — 86850 RBC ANTIBODY SCREEN: CPT

## 2020-09-25 PROCEDURE — 73502 X-RAY EXAM HIP UNI 2-3 VIEWS: CPT

## 2020-09-25 PROCEDURE — U0003: CPT

## 2020-09-25 PROCEDURE — 71045 X-RAY EXAM CHEST 1 VIEW: CPT

## 2020-09-25 PROCEDURE — 81001 URINALYSIS AUTO W/SCOPE: CPT

## 2020-09-25 PROCEDURE — 74177 CT ABD & PELVIS W/CONTRAST: CPT

## 2020-09-25 PROCEDURE — 86900 BLOOD TYPING SEROLOGIC ABO: CPT

## 2020-09-25 PROCEDURE — 87086 URINE CULTURE/COLONY COUNT: CPT

## 2020-09-25 PROCEDURE — 87186 SC STD MICRODIL/AGAR DIL: CPT

## 2020-09-25 PROCEDURE — 85025 COMPLETE CBC W/AUTO DIFF WBC: CPT

## 2020-09-25 PROCEDURE — 96365 THER/PROPH/DIAG IV INF INIT: CPT | Mod: XU

## 2020-09-25 PROCEDURE — 87040 BLOOD CULTURE FOR BACTERIA: CPT

## 2020-09-25 PROCEDURE — 86901 BLOOD TYPING SEROLOGIC RH(D): CPT

## 2020-09-25 PROCEDURE — 99285 EMERGENCY DEPT VISIT HI MDM: CPT | Mod: 25

## 2020-09-25 PROCEDURE — 85730 THROMBOPLASTIN TIME PARTIAL: CPT

## 2020-09-25 PROCEDURE — 85610 PROTHROMBIN TIME: CPT

## 2020-09-25 PROCEDURE — 84484 ASSAY OF TROPONIN QUANT: CPT

## 2020-09-25 PROCEDURE — 80053 COMPREHEN METABOLIC PANEL: CPT

## 2020-09-25 PROCEDURE — 97116 GAIT TRAINING THERAPY: CPT

## 2020-09-25 RX ORDER — CEPHALEXIN 500 MG
1 CAPSULE ORAL
Qty: 0 | Refills: 0 | DISCHARGE

## 2020-09-25 RX ADMIN — Medication 81 MILLIGRAM(S): at 12:52

## 2020-09-25 RX ADMIN — ENOXAPARIN SODIUM 30 MILLIGRAM(S): 100 INJECTION SUBCUTANEOUS at 12:52

## 2020-09-25 RX ADMIN — FAMOTIDINE 20 MILLIGRAM(S): 10 INJECTION INTRAVENOUS at 12:53

## 2020-09-25 RX ADMIN — Medication 250 MILLIGRAM(S): at 05:27

## 2020-09-25 RX ADMIN — Medication 50 MILLIGRAM(S): at 05:28

## 2020-09-25 NOTE — DISCHARGE NOTE NURSING/CASE MANAGEMENT/SOCIAL WORK - PATIENT PORTAL LINK FT
You can access the FollowMyHealth Patient Portal offered by [facility] by registering at the following website: [email or web address]. By joining [facility]’s FollowMyHealth portal, you will also be able to view your health information using other applications (apps) compatible with our system.

## 2020-09-25 NOTE — PROGRESS NOTE ADULT - ASSESSMENT
89F with Hx of MI (8 yrs ago) and HTN (not on meds) presents for RLE weakness and mild confusion. Pt is poor historian, info received from daughter. Per daughter, Trisha (HCP/Primary care giver), pt was at her baseline until 2 days ago. At baseline pt if fully mobile with walker and A/O x3. 2 days ago, daughter noticed mom was dragging her RLE to walk and fell on her R side in the evening. Pt did not trip, hit head, or complain of pain. Not on blood thinners. Since then, daughter also noticed pt waxes and wanes with confusion and sometimes gets agitated which is not her baseline. Last hx of UTI was 8 yrs ago. Daughter knew something was wrong and brought patient in for evaluation. Is tolerating PO, voiding, and having BMs at baseline.     Denies fever or chills. Denies nausea or vomiting. Denies chest pain. Denies shortness of breath. Denies being around sick contacts/Covid exposure.       89 F with pmh of MI (8yrs ago) and HTN presents for mild intermittent confusion and RLE weakness. Admitted for UTI w/o sepsis and r/o LLE injury.        1. Acute met encephalopathy 2/2 UTI  Afebrile,  last UTI 8 yrs ago  + sundowning  Rocephin daily and Keflex 500mg bid x 4 more days in [facility]   blood cul negative x 48 hrs    urine cul + E Coli         2. Fall 2/2 weakness  Fell onto R side 2 days ago due to dragging RLE     denies tripping, head trauma, or pain. Not on blood thinners.  Hx of L humeral fracture 8 yrs ago. Denies residual deficit.   Does complain of severe L Lateral Plantar 1cm round wound only on ambulation  Panscan CT scan notes L1 and L3 compression fracture, indeterminate age. Also notes L hip subchondral lucencies, left femoral head question acute vs chronic  PT eval   Ortho consulted --- no further treatment at this time       3. Hx of MI   regularly follows up with Dr. Rain, Cardiology  currently on metoprolol tartrate 50mg BID and baby asa    4. HTN.   BP soft on admission, now only mildly elevated  will continue home met 50 BID (first dose now      5. GERD  c/w home dose famotidine 20mg qd       spoke to daughter Trisha [phone] (HCP)  wants [employer]

## 2020-09-25 NOTE — PROGRESS NOTE ADULT - SUBJECTIVE AND OBJECTIVE BOX
Patient is a 89y old  Female who presents with a chief complaint of Weakness and UTI (22 Sep 2020 21:37)      ANTIMICROBIAL:  cefTRIAXone   IVPB 1000 milliGRAM(s) IV Intermittent every 24 hours    CARDIOVASCULAR:  metoprolol tartrate 50 milliGRAM(s) Oral two times a day      NEUROLOGIC:  acetaminophen   Tablet .. 650 milliGRAM(s) Oral every 6 hours PRN      HEMATOLOGIC:  aspirin enteric coated 81 milliGRAM(s) Oral daily  enoxaparin Injectable 30 milliGRAM(s) SubCutaneous daily    GATROINTESTINAL:  famotidine    Tablet 20 milliGRAM(s) Oral daily      IMMUNOLOGIC & OTHER  influenza   Vaccine 0.5 milliLiter(s) IntraMuscular once  saccharomyces boulardii 250 milliGRAM(s) Oral two times a day      Allergies    No Known Allergies        Vital Signs Last 24 Hrs  T(C): 36.2 (25 Sep 2020 08:00), Max: 36.7 (25 Sep 2020 00:09)  T(F): 97.1 (25 Sep 2020 08:00), Max: 98.1 (25 Sep 2020 00:09)  HR: 76 (25 Sep 2020 08:00) (76 - 84)  BP: 105/63 (25 Sep 2020 08:00) (100/63 - 111/98)  BP(mean): --  RR: 18 (25 Sep 2020 08:00) (16 - 18)  SpO2: 96% (25 Sep 2020 08:00) (92% - 96%)                REVIEW OF SYSTEMS:    no complaints   stts she is enjoying her bkfast           PHYSICAL EXAM:    GENERAL: NAD, well-groomed, well-developed  HEAD:  Atraumatic, Normocephalic  EYES: eye patch over L eye   NECK: Supple, No JVD, Normal thyroid  NERVOUS SYSTEM: AA and O   CHEST/LUNG: Clear   HEART: Regular  ABDOMEN: Soft, Nontender, Nondistended; Bowel sounds present  EXTREMITIES:  2+ Peripheral Pulses, No clubbing, cyanosis, or edema        LABS:                                   11.9   4.92  )-----------( 252      ( 24 Sep 2020 07:28 )             37.6       09-24    141  |  101  |  28.0<H>  ----------------------------<  83  4.3   |  25.0  |  0.85    Ca    9.5      24 Sep 2020 07:28    TPro  6.8  /  Alb  3.2<L>  /  TBili  0.3<L>  /  DBili  x   /  AST  22  /  ALT  12  /  AlkPhos  43             Color: Yellow / Appearance: Cloudy / S.005 / pH: x  Gluc: x / Ketone: Negative  / Bili: Negative / Urobili: Negative   Blood: x / Protein: 100 / Nitrite: Positive   Leuk Esterase: Moderate / RBC: 0-2 /HPF / WBC >50   Sq Epi: x / Non Sq Epi: Occasional / Bacteria: Few              Albumin, Serum: 3.8 g/dL ( @ 15:35)    LIVER FUNCTIONS - ( 22 Sep 2020 15:35 )  Alb: 3.8 g/dL / Pro: 7.3 g/dL / ALK PHOS: 50 U/L / ALT: 15 U/L / AST: 24 U/L / GGT: x             RADIOLOGY & ADDITIONAL TESTS:    < from: CT Head No Cont (20 @ 17:13) >  IMPRESSION:    CT Head: No acute intracranial hemorrhage or calvarial fracture.    Confluent abnormal soft tissue occupying the left orbit displacing the globe superolaterally, progressed since 2019. Further ophthalmologic evaluation is recommended as neoplasm is a diagnostic consideration.    CT cervical spine: No acute cervical spine fracture or evidence of traumatic malalignment.    < end of copied text >

## 2020-09-28 LAB
CULTURE RESULTS: SIGNIFICANT CHANGE UP
SPECIMEN SOURCE: SIGNIFICANT CHANGE UP

## 2020-12-04 NOTE — ED ADULT NURSE NOTE - NSIMPLEMENTINTERV_GEN_ALL_ED
Implemented All Fall with Harm Risk Interventions:  [place] to call system. Call bell, personal items and telephone within reach. Instruct patient to call for assistance. Room bathroom lighting operational. Non-slip footwear when patient is off stretcher. Physically safe environment: no spills, clutter or unnecessary equipment. Stretcher in lowest position, wheels locked, appropriate side rails in place. Provide visual cue, wrist band, yellow gown, etc. Monitor gait and stability. Monitor for mental status changes and reorient to person, place, and time. Review medications for side effects contributing to fall risk. Reinforce activity limits and safety measures with patient and family. Provide visual clues: red socks.

## 2020-12-04 NOTE — ED ADULT NURSE NOTE - OBJECTIVE STATEMENT
Pt is awake, dementia at baseline.  RR even and unlabored. Dried blood noted to head with bandage on it.  Bleeding is controlled at this time.  as per daughter patient was found down in the bathroom at [facility] nursing facility.  Denies use of blood thinners.  no other deformities noted.

## 2020-12-04 NOTE — ED PROVIDER NOTE - CLINICAL SUMMARY MEDICAL DECISION MAKING FREE TEXT BOX
Unwitnessed fall with head trauma, scalp laceration. CTH with no acute injuries,  no other external injuries noted. Laceration repaired with 7 staples. Discussed care with [facility], will send back, needs staple removal in 10 days.

## 2020-12-04 NOTE — ED PROVIDER NOTE - OBJECTIVE STATEMENT
89yofw/dementia sent in from [facility] SNF after an unwitnessed fall. Was found on the floor bleeding from the head. On aspirin no other AC. Pt unable to provide any history due to dementia. Daughter at bedside states baseline MS.

## 2020-12-04 NOTE — ED ADULT NURSE REASSESSMENT NOTE - NS ED NURSE REASSESS COMMENT FT1
Laceration repair preformed by Dr. Douglas completed.  Daughter at bedside made aware that patient will be discharged back to nursing facility.  Transportation being set up now.

## 2020-12-04 NOTE — ED PROVIDER NOTE - NSFOLLOWUPINSTRUCTIONS_ED_ALL_ED_FT
Return to the ER in 10 days for staple removal (12/14/2020).  Keep the wound clean and dry for at least 24-48 hours.   After that, you may gently cleanse with regular soap and water but do not vigorously scrub or remove the staples.   Return to the ER for any redness, warmth, swelling, pain, discharge or any other new symptoms.

## 2020-12-04 NOTE — ED PROVIDER NOTE - PATIENT PORTAL LINK FT
You can access the FollowMyHealth Patient Portal offered by [facility] by registering at the following website: [email or web address]. By joining [facility]’s FollowMyHealth portal, you will also be able to view your health information using other applications (apps) compatible with our system.

## 2020-12-04 NOTE — ED ADULT TRIAGE NOTE - CHIEF COMPLAINT QUOTE
Pt Awake and alert, BIBA c/o unwitnessed fall. Patient was found in puddle of blood on floor at [facility], pt not on blood thinners, unknown if LOC. history of dementia.

## 2020-12-04 NOTE — ED ADULT NURSE NOTE - NS ED NURSE DISCH DISPOSITION
Patient arrived from home via family with complaint of abd and generalized body pain with nausea, vomiting, and diarrhea since taking amastrozde 1mg for breast cancer for the past 2 months. Patient stopped taking medication on Friday. Patient A&OX3. Mask applied.
Discharged

## 2020-12-05 NOTE — POST DISCHARGE NOTE - DETAILS:
Spoke to daughter Sylvie Dennis ([phone]) regarding finding of 1.8CM RLL nodular density. Recommended patient follow up with PCP for chest CT scan per radiology recommendations. Daughter verbalized understanding.

## 2021-01-01 ENCOUNTER — TRANSCRIPTION ENCOUNTER (OUTPATIENT)
Age: 86
End: 2021-01-01

## 2021-01-01 ENCOUNTER — INPATIENT (INPATIENT)
Facility: HOSPITAL | Age: 86
LOS: 4 days | Discharge: HOME | DRG: 689 | End: 2021-06-24
Attending: HOSPITALIST | Admitting: FAMILY MEDICINE
Payer: MEDICARE

## 2021-01-01 ENCOUNTER — INPATIENT (INPATIENT)
Facility: HOSPITAL | Age: 86
LOS: 5 days | Discharge: HOME | DRG: 371 | End: 2021-04-21
Attending: INTERNAL MEDICINE | Admitting: INTERNAL MEDICINE
Payer: MEDICARE

## 2021-01-01 ENCOUNTER — INPATIENT (INPATIENT)
Facility: HOSPITAL | Age: 86
LOS: 4 days | DRG: 682 | End: 2021-11-23
Attending: HOSPITALIST | Admitting: STUDENT IN AN ORGANIZED HEALTH CARE EDUCATION/TRAINING PROGRAM
Payer: MEDICARE

## 2021-01-01 ENCOUNTER — INPATIENT (INPATIENT)
Facility: HOSPITAL | Age: 86
LOS: 8 days | Discharge: HOME | DRG: 871 | End: 2021-11-11
Attending: STUDENT IN AN ORGANIZED HEALTH CARE EDUCATION/TRAINING PROGRAM | Admitting: STUDENT IN AN ORGANIZED HEALTH CARE EDUCATION/TRAINING PROGRAM
Payer: MEDICARE

## 2021-01-01 ENCOUNTER — APPOINTMENT (OUTPATIENT)
Dept: PULMONOLOGY | Facility: CLINIC | Age: 86
End: 2021-01-01

## 2021-01-01 ENCOUNTER — EMERGENCY (EMERGENCY)
Facility: HOSPITAL | Age: 86
LOS: 1 days | Discharge: HOME | End: 2021-01-01
Attending: STUDENT IN AN ORGANIZED HEALTH CARE EDUCATION/TRAINING PROGRAM
Payer: MEDICARE

## 2021-01-01 ENCOUNTER — EMERGENCY (EMERGENCY)
Facility: HOSPITAL | Age: 86
LOS: 1 days | Discharge: HOME | End: 2021-01-01
Attending: EMERGENCY MEDICINE
Payer: MEDICARE

## 2021-01-01 ENCOUNTER — INPATIENT (INPATIENT)
Facility: HOSPITAL | Age: 86
LOS: 16 days | Discharge: HOME | DRG: 555 | End: 2021-03-18
Attending: HOSPITALIST | Admitting: STUDENT IN AN ORGANIZED HEALTH CARE EDUCATION/TRAINING PROGRAM
Payer: MEDICARE

## 2021-01-01 VITALS
HEART RATE: 70 BPM | SYSTOLIC BLOOD PRESSURE: 120 MMHG | RESPIRATION RATE: 18 BRPM | DIASTOLIC BLOOD PRESSURE: 54 MMHG | TEMPERATURE: 98 F | OXYGEN SATURATION: 95 %

## 2021-01-01 VITALS
TEMPERATURE: 97 F | SYSTOLIC BLOOD PRESSURE: 164 MMHG | HEIGHT: 59 IN | DIASTOLIC BLOOD PRESSURE: 83 MMHG | RESPIRATION RATE: 20 BRPM | HEART RATE: 69 BPM | OXYGEN SATURATION: 98 % | WEIGHT: 110.01 LBS

## 2021-01-01 VITALS
OXYGEN SATURATION: 97 % | TEMPERATURE: 98 F | HEART RATE: 60 BPM | RESPIRATION RATE: 20 BRPM | WEIGHT: 89.95 LBS | SYSTOLIC BLOOD PRESSURE: 126 MMHG | HEIGHT: 59 IN | DIASTOLIC BLOOD PRESSURE: 70 MMHG

## 2021-01-01 VITALS — TEMPERATURE: 100 F | HEART RATE: 92 BPM | SYSTOLIC BLOOD PRESSURE: 74 MMHG | DIASTOLIC BLOOD PRESSURE: 34 MMHG

## 2021-01-01 VITALS
SYSTOLIC BLOOD PRESSURE: 99 MMHG | HEIGHT: 59 IN | HEART RATE: 103 BPM | TEMPERATURE: 98 F | WEIGHT: 89.95 LBS | OXYGEN SATURATION: 99 % | DIASTOLIC BLOOD PRESSURE: 55 MMHG | RESPIRATION RATE: 20 BRPM

## 2021-01-01 VITALS
HEIGHT: 59 IN | WEIGHT: 119.93 LBS | SYSTOLIC BLOOD PRESSURE: 106 MMHG | HEART RATE: 88 BPM | DIASTOLIC BLOOD PRESSURE: 67 MMHG | OXYGEN SATURATION: 95 % | TEMPERATURE: 98 F | RESPIRATION RATE: 16 BRPM

## 2021-01-01 VITALS
DIASTOLIC BLOOD PRESSURE: 72 MMHG | HEIGHT: 59 IN | RESPIRATION RATE: 14 BRPM | SYSTOLIC BLOOD PRESSURE: 131 MMHG | OXYGEN SATURATION: 94 % | HEART RATE: 71 BPM

## 2021-01-01 VITALS
TEMPERATURE: 98 F | OXYGEN SATURATION: 98 % | HEIGHT: 59 IN | SYSTOLIC BLOOD PRESSURE: 137 MMHG | DIASTOLIC BLOOD PRESSURE: 64 MMHG | HEART RATE: 65 BPM | RESPIRATION RATE: 20 BRPM

## 2021-01-01 VITALS
OXYGEN SATURATION: 98 % | HEART RATE: 75 BPM | DIASTOLIC BLOOD PRESSURE: 49 MMHG | SYSTOLIC BLOOD PRESSURE: 124 MMHG | RESPIRATION RATE: 16 BRPM | TEMPERATURE: 98 F

## 2021-01-01 VITALS
HEART RATE: 64 BPM | SYSTOLIC BLOOD PRESSURE: 127 MMHG | RESPIRATION RATE: 18 BRPM | TEMPERATURE: 98 F | OXYGEN SATURATION: 97 % | DIASTOLIC BLOOD PRESSURE: 59 MMHG

## 2021-01-01 VITALS
TEMPERATURE: 98 F | HEART RATE: 54 BPM | SYSTOLIC BLOOD PRESSURE: 107 MMHG | DIASTOLIC BLOOD PRESSURE: 61 MMHG | RESPIRATION RATE: 20 BRPM

## 2021-01-01 VITALS
TEMPERATURE: 98 F | OXYGEN SATURATION: 98 % | SYSTOLIC BLOOD PRESSURE: 149 MMHG | HEART RATE: 65 BPM | RESPIRATION RATE: 20 BRPM | DIASTOLIC BLOOD PRESSURE: 59 MMHG

## 2021-01-01 VITALS
TEMPERATURE: 98 F | HEART RATE: 54 BPM | RESPIRATION RATE: 18 BRPM | DIASTOLIC BLOOD PRESSURE: 64 MMHG | OXYGEN SATURATION: 98 % | SYSTOLIC BLOOD PRESSURE: 102 MMHG

## 2021-01-01 VITALS — WEIGHT: 75.18 LBS

## 2021-01-01 DIAGNOSIS — I10 ESSENTIAL (PRIMARY) HYPERTENSION: ICD-10-CM

## 2021-01-01 DIAGNOSIS — M25.551 PAIN IN RIGHT HIP: ICD-10-CM

## 2021-01-01 DIAGNOSIS — A41.9 SEPSIS, UNSPECIFIED ORGANISM: ICD-10-CM

## 2021-01-01 DIAGNOSIS — N39.0 URINARY TRACT INFECTION, SITE NOT SPECIFIED: ICD-10-CM

## 2021-01-01 DIAGNOSIS — K45.8 OTHER SPECIFIED ABDOMINAL HERNIA WITHOUT OBSTRUCTION OR GANGRENE: ICD-10-CM

## 2021-01-01 DIAGNOSIS — K92.2 GASTROINTESTINAL HEMORRHAGE, UNSPECIFIED: ICD-10-CM

## 2021-01-01 DIAGNOSIS — J18.9 PNEUMONIA, UNSPECIFIED ORGANISM: ICD-10-CM

## 2021-01-01 DIAGNOSIS — K52.9 NONINFECTIVE GASTROENTERITIS AND COLITIS, UNSPECIFIED: ICD-10-CM

## 2021-01-01 DIAGNOSIS — N17.9 ACUTE KIDNEY FAILURE, UNSPECIFIED: ICD-10-CM

## 2021-01-01 DIAGNOSIS — R74.01 ELEVATION OF LEVELS OF LIVER TRANSAMINASE LEVELS: ICD-10-CM

## 2021-01-01 DIAGNOSIS — R53.81 OTHER MALAISE: ICD-10-CM

## 2021-01-01 DIAGNOSIS — E43 UNSPECIFIED SEVERE PROTEIN-CALORIE MALNUTRITION: ICD-10-CM

## 2021-01-01 DIAGNOSIS — Z51.5 ENCOUNTER FOR PALLIATIVE CARE: ICD-10-CM
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
## 2021-01-01 DIAGNOSIS — F03.90 UNSPECIFIED DEMENTIA, UNSPECIFIED SEVERITY, WITHOUT BEHAVIORAL DISTURBANCE, PSYCHOTIC DISTURBANCE, MOOD DISTURBANCE, AND ANXIETY: ICD-10-CM

## 2021-01-01 LAB
% ALBUMIN: 45.3 % — SIGNIFICANT CHANGE UP
% ALPHA 1: 7.7 % — SIGNIFICANT CHANGE UP
% ALPHA 2: 12.6 % — SIGNIFICANT CHANGE UP
% BETA: 13.2 % — SIGNIFICANT CHANGE UP
% GAMMA: 21.2 % — SIGNIFICANT CHANGE UP
% M SPIKE: SIGNIFICANT CHANGE UP
-  AMIKACIN: SIGNIFICANT CHANGE UP
-  AMOXICILLIN/CLAVULANIC ACID: SIGNIFICANT CHANGE UP
-  AMPICILLIN/SULBACTAM: SIGNIFICANT CHANGE UP
-  AMPICILLIN: SIGNIFICANT CHANGE UP
-  AZTREONAM: SIGNIFICANT CHANGE UP
-  CANDIDA ALBICANS: SIGNIFICANT CHANGE UP
-  CANDIDA GLABRATA: SIGNIFICANT CHANGE UP
-  CANDIDA KRUSEI: SIGNIFICANT CHANGE UP
-  CANDIDA PARAPSILOSIS: SIGNIFICANT CHANGE UP
-  CANDIDA TROPICALIS: SIGNIFICANT CHANGE UP
-  CEFAZOLIN: SIGNIFICANT CHANGE UP
-  CEFEPIME: SIGNIFICANT CHANGE UP
-  CEFOXITIN: SIGNIFICANT CHANGE UP
-  CEFTAZIDIME: SIGNIFICANT CHANGE UP
-  CEFTRIAXONE: SIGNIFICANT CHANGE UP
-  CIPROFLOXACIN: SIGNIFICANT CHANGE UP
-  COAGULASE NEGATIVE STAPHYLOCOCCUS: SIGNIFICANT CHANGE UP
-  ERTAPENEM: SIGNIFICANT CHANGE UP
-  GENTAMICIN: SIGNIFICANT CHANGE UP
-  IMIPENEM: SIGNIFICANT CHANGE UP
-  K. PNEUMONIAE GROUP: SIGNIFICANT CHANGE UP
-  KPC RESISTANCE GENE: SIGNIFICANT CHANGE UP
-  LEVOFLOXACIN: SIGNIFICANT CHANGE UP
-  MEROPENEM: SIGNIFICANT CHANGE UP
-  NITROFURANTOIN: SIGNIFICANT CHANGE UP
-  PIPERACILLIN/TAZOBACTAM: SIGNIFICANT CHANGE UP
-  STREPTOCOCCUS SP. (NOT GRP A, B OR S PNEUMONIAE): SIGNIFICANT CHANGE UP
-  TIGECYCLINE: SIGNIFICANT CHANGE UP
-  TOBRAMYCIN: SIGNIFICANT CHANGE UP
-  TRIMETHOPRIM/SULFAMETHOXAZOLE: SIGNIFICANT CHANGE UP
24R-OH-CALCIDIOL SERPL-MCNC: 52.6 NG/ML — SIGNIFICANT CHANGE UP (ref 30–80)
A BAUMANNII DNA SPEC QL NAA+PROBE: SIGNIFICANT CHANGE UP
ACANTHOCYTES BLD QL SMEAR: SLIGHT — SIGNIFICANT CHANGE UP
ACANTHOCYTES BLD QL SMEAR: SLIGHT — SIGNIFICANT CHANGE UP
AFP-TM SERPL-MCNC: <1.8 NG/ML — SIGNIFICANT CHANGE UP
ALBUMIN SERPL ELPH-MCNC: 1.7 G/DL — LOW (ref 3.3–5.2)
ALBUMIN SERPL ELPH-MCNC: 1.8 G/DL — LOW (ref 3.3–5.2)
ALBUMIN SERPL ELPH-MCNC: 2 G/DL — LOW (ref 3.3–5.2)
ALBUMIN SERPL ELPH-MCNC: 2 G/DL — LOW (ref 3.3–5.2)
ALBUMIN SERPL ELPH-MCNC: 2.1 G/DL — LOW (ref 3.3–5.2)
ALBUMIN SERPL ELPH-MCNC: 2.1 G/DL — LOW (ref 3.6–5.5)
ALBUMIN SERPL ELPH-MCNC: 2.3 G/DL — LOW (ref 3.3–5.2)
ALBUMIN SERPL ELPH-MCNC: 2.4 G/DL — LOW (ref 3.3–5.2)
ALBUMIN SERPL ELPH-MCNC: 2.5 G/DL — LOW (ref 3.3–5.2)
ALBUMIN SERPL ELPH-MCNC: 2.7 G/DL — LOW (ref 3.3–5.2)
ALBUMIN SERPL ELPH-MCNC: 2.7 G/DL — LOW (ref 3.3–5.2)
ALBUMIN SERPL ELPH-MCNC: 2.8 G/DL — LOW (ref 3.3–5.2)
ALBUMIN SERPL ELPH-MCNC: 2.9 G/DL — LOW (ref 3.3–5.2)
ALBUMIN SERPL ELPH-MCNC: 3.5 G/DL — SIGNIFICANT CHANGE UP (ref 3.3–5.2)
ALBUMIN/GLOB SERPL ELPH: 0.8 RATIO — SIGNIFICANT CHANGE UP
ALP SERPL-CCNC: 102 U/L — SIGNIFICANT CHANGE UP (ref 40–120)
ALP SERPL-CCNC: 108 U/L — SIGNIFICANT CHANGE UP (ref 40–120)
ALP SERPL-CCNC: 114 U/L — SIGNIFICANT CHANGE UP (ref 40–120)
ALP SERPL-CCNC: 130 U/L — HIGH (ref 40–120)
ALP SERPL-CCNC: 140 U/L — HIGH (ref 40–120)
ALP SERPL-CCNC: 158 U/L — HIGH (ref 40–120)
ALP SERPL-CCNC: 63 U/L — SIGNIFICANT CHANGE UP (ref 40–120)
ALP SERPL-CCNC: 67 U/L — SIGNIFICANT CHANGE UP (ref 40–120)
ALP SERPL-CCNC: 76 U/L — SIGNIFICANT CHANGE UP (ref 40–120)
ALP SERPL-CCNC: 76 U/L — SIGNIFICANT CHANGE UP (ref 40–120)
ALP SERPL-CCNC: 82 U/L — SIGNIFICANT CHANGE UP (ref 40–120)
ALP SERPL-CCNC: 88 U/L — SIGNIFICANT CHANGE UP (ref 40–120)
ALP SERPL-CCNC: 89 U/L — SIGNIFICANT CHANGE UP (ref 40–120)
ALP SERPL-CCNC: 91 U/L — SIGNIFICANT CHANGE UP (ref 40–120)
ALP SERPL-CCNC: 92 U/L — SIGNIFICANT CHANGE UP (ref 40–120)
ALPHA1 GLOB SERPL ELPH-MCNC: 0.4 G/DL — SIGNIFICANT CHANGE UP (ref 0.1–0.4)
ALPHA2 GLOB SERPL ELPH-MCNC: 0.6 G/DL — SIGNIFICANT CHANGE UP (ref 0.5–1)
ALT FLD-CCNC: 12 U/L — SIGNIFICANT CHANGE UP
ALT FLD-CCNC: 18 U/L — SIGNIFICANT CHANGE UP
ALT FLD-CCNC: 18 U/L — SIGNIFICANT CHANGE UP
ALT FLD-CCNC: 21 U/L — SIGNIFICANT CHANGE UP
ALT FLD-CCNC: 264 U/L — HIGH
ALT FLD-CCNC: 310 U/L — HIGH
ALT FLD-CCNC: 391 U/L — HIGH
ALT FLD-CCNC: 426 U/L — HIGH
ALT FLD-CCNC: 52 U/L — HIGH
ALT FLD-CCNC: 56 U/L — HIGH
ALT FLD-CCNC: 564 U/L — HIGH
ALT FLD-CCNC: 571 U/L — HIGH
ALT FLD-CCNC: 593 U/L — HIGH
ALT FLD-CCNC: 7 U/L — SIGNIFICANT CHANGE UP
ALT FLD-CCNC: 7 U/L — SIGNIFICANT CHANGE UP
AMMONIA BLD-MCNC: 16 UMOL/L — SIGNIFICANT CHANGE UP (ref 11–55)
ANA TITR SER: NEGATIVE — SIGNIFICANT CHANGE UP
ANION GAP SERPL CALC-SCNC: 10 MMOL/L — SIGNIFICANT CHANGE UP (ref 5–17)
ANION GAP SERPL CALC-SCNC: 11 MMOL/L — SIGNIFICANT CHANGE UP (ref 5–17)
ANION GAP SERPL CALC-SCNC: 12 MMOL/L — SIGNIFICANT CHANGE UP (ref 5–17)
ANION GAP SERPL CALC-SCNC: 13 MMOL/L — SIGNIFICANT CHANGE UP (ref 5–17)
ANION GAP SERPL CALC-SCNC: 14 MMOL/L — SIGNIFICANT CHANGE UP (ref 5–17)
ANION GAP SERPL CALC-SCNC: 15 MMOL/L — SIGNIFICANT CHANGE UP (ref 5–17)
ANION GAP SERPL CALC-SCNC: 15 MMOL/L — SIGNIFICANT CHANGE UP (ref 5–17)
ANION GAP SERPL CALC-SCNC: 16 MMOL/L — SIGNIFICANT CHANGE UP (ref 5–17)
ANION GAP SERPL CALC-SCNC: 17 MMOL/L — SIGNIFICANT CHANGE UP (ref 5–17)
ANION GAP SERPL CALC-SCNC: 19 MMOL/L — HIGH (ref 5–17)
ANION GAP SERPL CALC-SCNC: 21 MMOL/L — HIGH (ref 5–17)
ANION GAP SERPL CALC-SCNC: 21 MMOL/L — HIGH (ref 5–17)
ANION GAP SERPL CALC-SCNC: 7 MMOL/L — SIGNIFICANT CHANGE UP (ref 5–17)
ANION GAP SERPL CALC-SCNC: 8 MMOL/L — SIGNIFICANT CHANGE UP (ref 5–17)
ANION GAP SERPL CALC-SCNC: 8 MMOL/L — SIGNIFICANT CHANGE UP (ref 5–17)
ANION GAP SERPL CALC-SCNC: 9 MMOL/L — SIGNIFICANT CHANGE UP (ref 5–17)
ANISOCYTOSIS BLD QL: SLIGHT — SIGNIFICANT CHANGE UP
APPEARANCE UR: ABNORMAL
APPEARANCE UR: ABNORMAL
APPEARANCE UR: CLEAR — SIGNIFICANT CHANGE UP
APTT BLD: 24.2 SEC — LOW (ref 27.5–35.5)
APTT BLD: 28.6 SEC — SIGNIFICANT CHANGE UP (ref 27.5–35.5)
APTT BLD: 29.5 SEC — SIGNIFICANT CHANGE UP (ref 27.5–35.5)
APTT BLD: 31.1 SEC — SIGNIFICANT CHANGE UP (ref 27.5–35.5)
APTT BLD: 44.7 SEC — HIGH (ref 27.5–35.5)
APTT BLD: 45.7 SEC — HIGH (ref 27.5–35.5)
AST SERPL-CCNC: 1043 U/L — HIGH
AST SERPL-CCNC: 13 U/L — SIGNIFICANT CHANGE UP
AST SERPL-CCNC: 1386 U/L — HIGH
AST SERPL-CCNC: 218 U/L — HIGH
AST SERPL-CCNC: 23 U/L — SIGNIFICANT CHANGE UP
AST SERPL-CCNC: 24 U/L — SIGNIFICANT CHANGE UP
AST SERPL-CCNC: 247 U/L — HIGH
AST SERPL-CCNC: 34 U/L — HIGH
AST SERPL-CCNC: 34 U/L — HIGH
AST SERPL-CCNC: 35 U/L — HIGH
AST SERPL-CCNC: 389 U/L — HIGH
AST SERPL-CCNC: 44 U/L — HIGH
AST SERPL-CCNC: 470 U/L — HIGH
AST SERPL-CCNC: 79 U/L — HIGH
AST SERPL-CCNC: 843 U/L — HIGH
B-GLOBULIN SERPL ELPH-MCNC: 0.6 G/DL — SIGNIFICANT CHANGE UP (ref 0.5–1)
BACTERIA # UR AUTO: ABNORMAL
BASE EXCESS BLDV CALC-SCNC: -4.8 MMOL/L — LOW (ref -2–3)
BASOPHILS # BLD AUTO: 0 K/UL — SIGNIFICANT CHANGE UP (ref 0–0.2)
BASOPHILS # BLD AUTO: 0.01 K/UL — SIGNIFICANT CHANGE UP (ref 0–0.2)
BASOPHILS # BLD AUTO: 0.01 K/UL — SIGNIFICANT CHANGE UP (ref 0–0.2)
BASOPHILS # BLD AUTO: 0.02 K/UL — SIGNIFICANT CHANGE UP (ref 0–0.2)
BASOPHILS # BLD AUTO: 0.02 K/UL — SIGNIFICANT CHANGE UP (ref 0–0.2)
BASOPHILS # BLD AUTO: 0.03 K/UL — SIGNIFICANT CHANGE UP (ref 0–0.2)
BASOPHILS # BLD AUTO: 0.04 K/UL — SIGNIFICANT CHANGE UP (ref 0–0.2)
BASOPHILS # BLD AUTO: 0.06 K/UL — SIGNIFICANT CHANGE UP (ref 0–0.2)
BASOPHILS NFR BLD AUTO: 0 % — SIGNIFICANT CHANGE UP (ref 0–2)
BASOPHILS NFR BLD AUTO: 0.2 % — SIGNIFICANT CHANGE UP (ref 0–2)
BASOPHILS NFR BLD AUTO: 0.2 % — SIGNIFICANT CHANGE UP (ref 0–2)
BASOPHILS NFR BLD AUTO: 0.3 % — SIGNIFICANT CHANGE UP (ref 0–2)
BASOPHILS NFR BLD AUTO: 0.5 % — SIGNIFICANT CHANGE UP (ref 0–2)
BASOPHILS NFR BLD AUTO: 0.6 % — SIGNIFICANT CHANGE UP (ref 0–2)
BASOPHILS NFR BLD AUTO: 0.7 % — SIGNIFICANT CHANGE UP (ref 0–2)
BASOPHILS NFR BLD AUTO: 0.8 % — SIGNIFICANT CHANGE UP (ref 0–2)
BASOPHILS NFR BLD AUTO: 1.1 % — SIGNIFICANT CHANGE UP (ref 0–2)
BILIRUB DIRECT SERPL-MCNC: 0.9 MG/DL — HIGH (ref 0–0.3)
BILIRUB INDIRECT FLD-MCNC: 0 MG/DL — LOW (ref 0.2–1)
BILIRUB SERPL-MCNC: 0.2 MG/DL — LOW (ref 0.4–2)
BILIRUB SERPL-MCNC: 0.2 MG/DL — LOW (ref 0.4–2)
BILIRUB SERPL-MCNC: 0.3 MG/DL — LOW (ref 0.4–2)
BILIRUB SERPL-MCNC: 0.3 MG/DL — LOW (ref 0.4–2)
BILIRUB SERPL-MCNC: 0.4 MG/DL — SIGNIFICANT CHANGE UP (ref 0.4–2)
BILIRUB SERPL-MCNC: 0.6 MG/DL — SIGNIFICANT CHANGE UP (ref 0.4–2)
BILIRUB SERPL-MCNC: 0.7 MG/DL — SIGNIFICANT CHANGE UP (ref 0.4–2)
BILIRUB SERPL-MCNC: 0.7 MG/DL — SIGNIFICANT CHANGE UP (ref 0.4–2)
BILIRUB SERPL-MCNC: 0.8 MG/DL — SIGNIFICANT CHANGE UP (ref 0.4–2)
BILIRUB SERPL-MCNC: 0.9 MG/DL — SIGNIFICANT CHANGE UP (ref 0.4–2)
BILIRUB SERPL-MCNC: <0.2 MG/DL — LOW (ref 0.4–2)
BILIRUB UR-MCNC: ABNORMAL
BILIRUB UR-MCNC: ABNORMAL
BILIRUB UR-MCNC: NEGATIVE — SIGNIFICANT CHANGE UP
BLD GP AB SCN SERPL QL: SIGNIFICANT CHANGE UP
BLD GP AB SCN SERPL QL: SIGNIFICANT CHANGE UP
BUN SERPL-MCNC: 16 MG/DL — SIGNIFICANT CHANGE UP (ref 8–20)
BUN SERPL-MCNC: 17 MG/DL — SIGNIFICANT CHANGE UP (ref 8–20)
BUN SERPL-MCNC: 17 MG/DL — SIGNIFICANT CHANGE UP (ref 8–20)
BUN SERPL-MCNC: 19.3 MG/DL — SIGNIFICANT CHANGE UP (ref 8–20)
BUN SERPL-MCNC: 20.7 MG/DL — HIGH (ref 8–20)
BUN SERPL-MCNC: 21 MG/DL — HIGH (ref 8–20)
BUN SERPL-MCNC: 22 MG/DL — HIGH (ref 8–20)
BUN SERPL-MCNC: 23 MG/DL — HIGH (ref 8–20)
BUN SERPL-MCNC: 24 MG/DL — HIGH (ref 8–20)
BUN SERPL-MCNC: 26 MG/DL — HIGH (ref 8–20)
BUN SERPL-MCNC: 26 MG/DL — HIGH (ref 8–20)
BUN SERPL-MCNC: 29 MG/DL — HIGH (ref 8–20)
BUN SERPL-MCNC: 31 MG/DL — HIGH (ref 8–20)
BUN SERPL-MCNC: 33 MG/DL — HIGH (ref 8–20)
BUN SERPL-MCNC: 33 MG/DL — HIGH (ref 8–20)
BUN SERPL-MCNC: 35 MG/DL — HIGH (ref 8–20)
BUN SERPL-MCNC: 35 MG/DL — HIGH (ref 8–20)
BUN SERPL-MCNC: 37.3 MG/DL — HIGH (ref 8–20)
BUN SERPL-MCNC: 38 MG/DL — HIGH (ref 8–20)
BUN SERPL-MCNC: 39 MG/DL — HIGH (ref 8–20)
BUN SERPL-MCNC: 41.2 MG/DL — HIGH (ref 8–20)
BUN SERPL-MCNC: 42 MG/DL — HIGH (ref 8–20)
BUN SERPL-MCNC: 45 MG/DL — HIGH (ref 8–20)
BUN SERPL-MCNC: 47 MG/DL — HIGH (ref 8–20)
BUN SERPL-MCNC: 49 MG/DL — HIGH (ref 8–20)
BUN SERPL-MCNC: 51 MG/DL — HIGH (ref 8–20)
BUN SERPL-MCNC: 51.1 MG/DL — HIGH (ref 8–20)
BUN SERPL-MCNC: 51.1 MG/DL — HIGH (ref 8–20)
BUN SERPL-MCNC: 51.2 MG/DL — HIGH (ref 8–20)
BUN SERPL-MCNC: 54.2 MG/DL — HIGH (ref 8–20)
BUN SERPL-MCNC: 55 MG/DL — HIGH (ref 8–20)
BUN SERPL-MCNC: 62.3 MG/DL — HIGH (ref 8–20)
BUN SERPL-MCNC: 62.7 MG/DL — HIGH (ref 8–20)
BUN SERPL-MCNC: 67.1 MG/DL — HIGH (ref 8–20)
BUN SERPL-MCNC: 68.4 MG/DL — HIGH (ref 8–20)
BUN SERPL-MCNC: 77.5 MG/DL — HIGH (ref 8–20)
BUN SERPL-MCNC: 80.7 MG/DL — HIGH (ref 8–20)
BUN SERPL-MCNC: 83.8 MG/DL — HIGH (ref 8–20)
BUN SERPL-MCNC: 86.1 MG/DL — HIGH (ref 8–20)
BURR CELLS BLD QL SMEAR: PRESENT — SIGNIFICANT CHANGE UP
BURR CELLS BLD QL SMEAR: SLIGHT — SIGNIFICANT CHANGE UP
C DIFF BY PCR RESULT: DETECTED
C DIFF TOX GENS STL QL NAA+PROBE: SIGNIFICANT CHANGE UP
CA-I SERPL-SCNC: 0.99 MMOL/L — LOW (ref 1.15–1.33)
CALCIUM SERPL-MCNC: 7.3 MG/DL — LOW (ref 8.6–10.2)
CALCIUM SERPL-MCNC: 7.5 MG/DL — LOW (ref 8.6–10.2)
CALCIUM SERPL-MCNC: 7.6 MG/DL — LOW (ref 8.6–10.2)
CALCIUM SERPL-MCNC: 7.7 MG/DL — LOW (ref 8.6–10.2)
CALCIUM SERPL-MCNC: 7.7 MG/DL — LOW (ref 8.6–10.2)
CALCIUM SERPL-MCNC: 7.8 MG/DL — LOW (ref 8.6–10.2)
CALCIUM SERPL-MCNC: 7.9 MG/DL — LOW (ref 8.6–10.2)
CALCIUM SERPL-MCNC: 8 MG/DL — LOW (ref 8.6–10.2)
CALCIUM SERPL-MCNC: 8.1 MG/DL — LOW (ref 8.6–10.2)
CALCIUM SERPL-MCNC: 8.2 MG/DL — LOW (ref 8.6–10.2)
CALCIUM SERPL-MCNC: 8.3 MG/DL — LOW (ref 8.6–10.2)
CALCIUM SERPL-MCNC: 8.3 MG/DL — LOW (ref 8.6–10.2)
CALCIUM SERPL-MCNC: 8.4 MG/DL — LOW (ref 8.6–10.2)
CALCIUM SERPL-MCNC: 8.5 MG/DL — LOW (ref 8.6–10.2)
CALCIUM SERPL-MCNC: 8.5 MG/DL — LOW (ref 8.6–10.2)
CALCIUM SERPL-MCNC: 8.6 MG/DL — SIGNIFICANT CHANGE UP (ref 8.6–10.2)
CALCIUM SERPL-MCNC: 8.7 MG/DL — SIGNIFICANT CHANGE UP (ref 8.6–10.2)
CALCIUM SERPL-MCNC: 8.8 MG/DL — SIGNIFICANT CHANGE UP (ref 8.6–10.2)
CALCIUM SERPL-MCNC: 9 MG/DL — SIGNIFICANT CHANGE UP (ref 8.6–10.2)
CALCIUM SERPL-MCNC: 9 MG/DL — SIGNIFICANT CHANGE UP (ref 8.6–10.2)
CALCIUM SERPL-MCNC: 9.2 MG/DL — SIGNIFICANT CHANGE UP (ref 8.6–10.2)
CALCIUM SERPL-MCNC: 9.4 MG/DL — SIGNIFICANT CHANGE UP (ref 8.6–10.2)
CERULOPLASMIN SERPL-MCNC: 17 MG/DL — SIGNIFICANT CHANGE UP (ref 16–45)
CHLORIDE BLDV-SCNC: 106 MMOL/L — SIGNIFICANT CHANGE UP (ref 98–107)
CHLORIDE SERPL-SCNC: 100 MMOL/L — SIGNIFICANT CHANGE UP (ref 98–107)
CHLORIDE SERPL-SCNC: 101 MMOL/L — SIGNIFICANT CHANGE UP (ref 98–107)
CHLORIDE SERPL-SCNC: 102 MMOL/L — SIGNIFICANT CHANGE UP (ref 98–107)
CHLORIDE SERPL-SCNC: 104 MMOL/L — SIGNIFICANT CHANGE UP (ref 98–107)
CHLORIDE SERPL-SCNC: 105 MMOL/L — SIGNIFICANT CHANGE UP (ref 98–107)
CHLORIDE SERPL-SCNC: 106 MMOL/L — SIGNIFICANT CHANGE UP (ref 98–107)
CHLORIDE SERPL-SCNC: 107 MMOL/L — SIGNIFICANT CHANGE UP (ref 98–107)
CHLORIDE SERPL-SCNC: 107 MMOL/L — SIGNIFICANT CHANGE UP (ref 98–107)
CHLORIDE SERPL-SCNC: 108 MMOL/L — HIGH (ref 98–107)
CHLORIDE SERPL-SCNC: 109 MMOL/L — HIGH (ref 98–107)
CHLORIDE SERPL-SCNC: 110 MMOL/L — HIGH (ref 98–107)
CHLORIDE SERPL-SCNC: 112 MMOL/L — HIGH (ref 98–107)
CHLORIDE SERPL-SCNC: 112 MMOL/L — HIGH (ref 98–107)
CHLORIDE SERPL-SCNC: 116 MMOL/L — HIGH (ref 98–107)
CHLORIDE SERPL-SCNC: 97 MMOL/L — LOW (ref 98–107)
CHLORIDE SERPL-SCNC: 99 MMOL/L — SIGNIFICANT CHANGE UP (ref 98–107)
CO2 SERPL-SCNC: 16 MMOL/L — LOW (ref 22–29)
CO2 SERPL-SCNC: 18 MMOL/L — LOW (ref 22–29)
CO2 SERPL-SCNC: 19 MMOL/L — LOW (ref 22–29)
CO2 SERPL-SCNC: 21 MMOL/L — LOW (ref 22–29)
CO2 SERPL-SCNC: 22 MMOL/L — SIGNIFICANT CHANGE UP (ref 22–29)
CO2 SERPL-SCNC: 23 MMOL/L — SIGNIFICANT CHANGE UP (ref 22–29)
CO2 SERPL-SCNC: 24 MMOL/L — SIGNIFICANT CHANGE UP (ref 22–29)
CO2 SERPL-SCNC: 25 MMOL/L — SIGNIFICANT CHANGE UP (ref 22–29)
CO2 SERPL-SCNC: 26 MMOL/L — SIGNIFICANT CHANGE UP (ref 22–29)
CO2 SERPL-SCNC: 27 MMOL/L — SIGNIFICANT CHANGE UP (ref 22–29)
CO2 SERPL-SCNC: 27 MMOL/L — SIGNIFICANT CHANGE UP (ref 22–29)
CO2 SERPL-SCNC: 28 MMOL/L — SIGNIFICANT CHANGE UP (ref 22–29)
CO2 SERPL-SCNC: 30 MMOL/L — HIGH (ref 22–29)
COLOR SPEC: YELLOW — SIGNIFICANT CHANGE UP
COVID-19 NUCLEOCAPSID GAM AB INTERP: NEGATIVE — SIGNIFICANT CHANGE UP
COVID-19 NUCLEOCAPSID GAM AB INTERP: NEGATIVE — SIGNIFICANT CHANGE UP
COVID-19 NUCLEOCAPSID TOTAL GAM ANTIBODY RESULT: 0.2 INDEX — SIGNIFICANT CHANGE UP
COVID-19 NUCLEOCAPSID TOTAL GAM ANTIBODY RESULT: 0.34 INDEX — SIGNIFICANT CHANGE UP
COVID-19 SPIKE DOMAIN AB INTERP: NEGATIVE — SIGNIFICANT CHANGE UP
COVID-19 SPIKE DOMAIN AB INTERP: POSITIVE
COVID-19 SPIKE DOMAIN ANTIBODY RESULT: 0.4 U/ML — SIGNIFICANT CHANGE UP
COVID-19 SPIKE DOMAIN ANTIBODY RESULT: 54 U/ML — HIGH
CREAT SERPL-MCNC: 0.5 MG/DL — SIGNIFICANT CHANGE UP (ref 0.5–1.3)
CREAT SERPL-MCNC: 0.54 MG/DL — SIGNIFICANT CHANGE UP (ref 0.5–1.3)
CREAT SERPL-MCNC: 0.55 MG/DL — SIGNIFICANT CHANGE UP (ref 0.5–1.3)
CREAT SERPL-MCNC: 0.58 MG/DL — SIGNIFICANT CHANGE UP (ref 0.5–1.3)
CREAT SERPL-MCNC: 0.58 MG/DL — SIGNIFICANT CHANGE UP (ref 0.5–1.3)
CREAT SERPL-MCNC: 0.62 MG/DL — SIGNIFICANT CHANGE UP (ref 0.5–1.3)
CREAT SERPL-MCNC: 0.63 MG/DL — SIGNIFICANT CHANGE UP (ref 0.5–1.3)
CREAT SERPL-MCNC: 0.64 MG/DL — SIGNIFICANT CHANGE UP (ref 0.5–1.3)
CREAT SERPL-MCNC: 0.67 MG/DL — SIGNIFICANT CHANGE UP (ref 0.5–1.3)
CREAT SERPL-MCNC: 0.67 MG/DL — SIGNIFICANT CHANGE UP (ref 0.5–1.3)
CREAT SERPL-MCNC: 0.68 MG/DL — SIGNIFICANT CHANGE UP (ref 0.5–1.3)
CREAT SERPL-MCNC: 0.71 MG/DL — SIGNIFICANT CHANGE UP (ref 0.5–1.3)
CREAT SERPL-MCNC: 0.71 MG/DL — SIGNIFICANT CHANGE UP (ref 0.5–1.3)
CREAT SERPL-MCNC: 0.73 MG/DL — SIGNIFICANT CHANGE UP (ref 0.5–1.3)
CREAT SERPL-MCNC: 0.75 MG/DL — SIGNIFICANT CHANGE UP (ref 0.5–1.3)
CREAT SERPL-MCNC: 0.77 MG/DL — SIGNIFICANT CHANGE UP (ref 0.5–1.3)
CREAT SERPL-MCNC: 0.77 MG/DL — SIGNIFICANT CHANGE UP (ref 0.5–1.3)
CREAT SERPL-MCNC: 0.8 MG/DL — SIGNIFICANT CHANGE UP (ref 0.5–1.3)
CREAT SERPL-MCNC: 0.83 MG/DL — SIGNIFICANT CHANGE UP (ref 0.5–1.3)
CREAT SERPL-MCNC: 0.87 MG/DL — SIGNIFICANT CHANGE UP (ref 0.5–1.3)
CREAT SERPL-MCNC: 0.88 MG/DL — SIGNIFICANT CHANGE UP (ref 0.5–1.3)
CREAT SERPL-MCNC: 0.91 MG/DL — SIGNIFICANT CHANGE UP (ref 0.5–1.3)
CREAT SERPL-MCNC: 0.92 MG/DL — SIGNIFICANT CHANGE UP (ref 0.5–1.3)
CREAT SERPL-MCNC: 0.98 MG/DL — SIGNIFICANT CHANGE UP (ref 0.5–1.3)
CREAT SERPL-MCNC: 1.11 MG/DL — SIGNIFICANT CHANGE UP (ref 0.5–1.3)
CREAT SERPL-MCNC: 1.11 MG/DL — SIGNIFICANT CHANGE UP (ref 0.5–1.3)
CREAT SERPL-MCNC: 1.29 MG/DL — SIGNIFICANT CHANGE UP (ref 0.5–1.3)
CREAT SERPL-MCNC: 1.31 MG/DL — HIGH (ref 0.5–1.3)
CREAT SERPL-MCNC: 1.32 MG/DL — HIGH (ref 0.5–1.3)
CREAT SERPL-MCNC: 1.36 MG/DL — HIGH (ref 0.5–1.3)
CREAT SERPL-MCNC: 1.45 MG/DL — HIGH (ref 0.5–1.3)
CREAT SERPL-MCNC: 1.55 MG/DL — HIGH (ref 0.5–1.3)
CREAT SERPL-MCNC: 1.62 MG/DL — HIGH (ref 0.5–1.3)
CREAT SERPL-MCNC: 1.63 MG/DL — HIGH (ref 0.5–1.3)
CREAT SERPL-MCNC: 1.74 MG/DL — HIGH (ref 0.5–1.3)
CREAT SERPL-MCNC: 1.83 MG/DL — HIGH (ref 0.5–1.3)
CREAT SERPL-MCNC: 2.02 MG/DL — HIGH (ref 0.5–1.3)
CREAT SERPL-MCNC: 2.17 MG/DL — HIGH (ref 0.5–1.3)
CREAT SERPL-MCNC: 2.22 MG/DL — HIGH (ref 0.5–1.3)
CULTURE RESULTS: SIGNIFICANT CHANGE UP
DACRYOCYTES BLD QL SMEAR: SLIGHT — SIGNIFICANT CHANGE UP
DIFF PNL FLD: ABNORMAL
DIFF PNL FLD: NEGATIVE — SIGNIFICANT CHANGE UP
DIFF PNL FLD: NEGATIVE — SIGNIFICANT CHANGE UP
E CLOAC COMP DNA BLD POS QL NAA+PROBE: SIGNIFICANT CHANGE UP
E COLI DNA BLD POS QL NAA+NON-PROBE: SIGNIFICANT CHANGE UP
ELLIPTOCYTES BLD QL SMEAR: SLIGHT — SIGNIFICANT CHANGE UP
ENTEROCOC DNA BLD POS QL NAA+NON-PROBE: SIGNIFICANT CHANGE UP
ENTEROCOC DNA BLD POS QL NAA+NON-PROBE: SIGNIFICANT CHANGE UP
EOSINOPHIL # BLD AUTO: 0 K/UL — SIGNIFICANT CHANGE UP (ref 0–0.5)
EOSINOPHIL # BLD AUTO: 0.01 K/UL — SIGNIFICANT CHANGE UP (ref 0–0.5)
EOSINOPHIL # BLD AUTO: 0.03 K/UL — SIGNIFICANT CHANGE UP (ref 0–0.5)
EOSINOPHIL # BLD AUTO: 0.04 K/UL — SIGNIFICANT CHANGE UP (ref 0–0.5)
EOSINOPHIL # BLD AUTO: 0.05 K/UL — SIGNIFICANT CHANGE UP (ref 0–0.5)
EOSINOPHIL # BLD AUTO: 0.05 K/UL — SIGNIFICANT CHANGE UP (ref 0–0.5)
EOSINOPHIL # BLD AUTO: 0.06 K/UL — SIGNIFICANT CHANGE UP (ref 0–0.5)
EOSINOPHIL # BLD AUTO: 0.07 K/UL — SIGNIFICANT CHANGE UP (ref 0–0.5)
EOSINOPHIL # BLD AUTO: 0.07 K/UL — SIGNIFICANT CHANGE UP (ref 0–0.5)
EOSINOPHIL # BLD AUTO: 0.08 K/UL — SIGNIFICANT CHANGE UP (ref 0–0.5)
EOSINOPHIL # BLD AUTO: 0.1 K/UL — SIGNIFICANT CHANGE UP (ref 0–0.5)
EOSINOPHIL NFR BLD AUTO: 0 % — SIGNIFICANT CHANGE UP (ref 0–6)
EOSINOPHIL NFR BLD AUTO: 0.2 % — SIGNIFICANT CHANGE UP (ref 0–6)
EOSINOPHIL NFR BLD AUTO: 0.5 % — SIGNIFICANT CHANGE UP (ref 0–6)
EOSINOPHIL NFR BLD AUTO: 0.6 % — SIGNIFICANT CHANGE UP (ref 0–6)
EOSINOPHIL NFR BLD AUTO: 0.7 % — SIGNIFICANT CHANGE UP (ref 0–6)
EOSINOPHIL NFR BLD AUTO: 0.9 % — SIGNIFICANT CHANGE UP (ref 0–6)
EOSINOPHIL NFR BLD AUTO: 1.5 % — SIGNIFICANT CHANGE UP (ref 0–6)
EOSINOPHIL NFR BLD AUTO: 1.7 % — SIGNIFICANT CHANGE UP (ref 0–6)
EOSINOPHIL NFR BLD AUTO: 1.7 % — SIGNIFICANT CHANGE UP (ref 0–6)
EOSINOPHIL NFR BLD AUTO: 1.9 % — SIGNIFICANT CHANGE UP (ref 0–6)
EOSINOPHIL NFR BLD AUTO: 3 % — SIGNIFICANT CHANGE UP (ref 0–6)
EPI CELLS # UR: SIGNIFICANT CHANGE UP
FERRITIN SERPL-MCNC: 1233 NG/ML — HIGH (ref 15–150)
FERRITIN SERPL-MCNC: 1239 NG/ML — HIGH (ref 15–150)
FLUAV AG NPH QL: SIGNIFICANT CHANGE UP
FLUBV AG NPH QL: SIGNIFICANT CHANGE UP
FOLATE SERPL-MCNC: >20 NG/ML — SIGNIFICANT CHANGE UP
GAMMA GLOBULIN: 1 G/DL — SIGNIFICANT CHANGE UP (ref 0.6–1.6)
GAS PNL BLDV: 137 MMOL/L — SIGNIFICANT CHANGE UP (ref 136–145)
GAS PNL BLDV: SIGNIFICANT CHANGE UP
GIANT PLATELETS BLD QL SMEAR: PRESENT — SIGNIFICANT CHANGE UP
GLUCOSE BLDC GLUCOMTR-MCNC: 104 MG/DL — HIGH (ref 70–99)
GLUCOSE BLDC GLUCOMTR-MCNC: 113 MG/DL — HIGH (ref 70–99)
GLUCOSE BLDC GLUCOMTR-MCNC: 130 MG/DL — HIGH (ref 70–99)
GLUCOSE BLDC GLUCOMTR-MCNC: 131 MG/DL — HIGH (ref 70–99)
GLUCOSE BLDC GLUCOMTR-MCNC: 144 MG/DL — HIGH (ref 70–99)
GLUCOSE BLDC GLUCOMTR-MCNC: 146 MG/DL — HIGH (ref 70–99)
GLUCOSE BLDC GLUCOMTR-MCNC: 152 MG/DL — HIGH (ref 70–99)
GLUCOSE BLDC GLUCOMTR-MCNC: 153 MG/DL — HIGH (ref 70–99)
GLUCOSE BLDC GLUCOMTR-MCNC: 160 MG/DL — HIGH (ref 70–99)
GLUCOSE BLDC GLUCOMTR-MCNC: 162 MG/DL — HIGH (ref 70–99)
GLUCOSE BLDC GLUCOMTR-MCNC: 169 MG/DL — HIGH (ref 70–99)
GLUCOSE BLDC GLUCOMTR-MCNC: 59 MG/DL — LOW (ref 70–99)
GLUCOSE BLDC GLUCOMTR-MCNC: 66 MG/DL — LOW (ref 70–99)
GLUCOSE BLDC GLUCOMTR-MCNC: 91 MG/DL — SIGNIFICANT CHANGE UP (ref 70–99)
GLUCOSE BLDC GLUCOMTR-MCNC: 92 MG/DL — SIGNIFICANT CHANGE UP (ref 70–99)
GLUCOSE BLDC GLUCOMTR-MCNC: 95 MG/DL — SIGNIFICANT CHANGE UP (ref 70–99)
GLUCOSE BLDV-MCNC: 132 MG/DL — HIGH (ref 70–99)
GLUCOSE SERPL-MCNC: 104 MG/DL — HIGH (ref 70–99)
GLUCOSE SERPL-MCNC: 105 MG/DL — HIGH (ref 70–99)
GLUCOSE SERPL-MCNC: 106 MG/DL — HIGH (ref 70–99)
GLUCOSE SERPL-MCNC: 110 MG/DL — HIGH (ref 70–99)
GLUCOSE SERPL-MCNC: 114 MG/DL — HIGH (ref 70–99)
GLUCOSE SERPL-MCNC: 125 MG/DL — HIGH (ref 70–99)
GLUCOSE SERPL-MCNC: 129 MG/DL — HIGH (ref 70–99)
GLUCOSE SERPL-MCNC: 130 MG/DL — HIGH (ref 70–99)
GLUCOSE SERPL-MCNC: 131 MG/DL — HIGH (ref 70–99)
GLUCOSE SERPL-MCNC: 133 MG/DL — HIGH (ref 70–99)
GLUCOSE SERPL-MCNC: 137 MG/DL — HIGH (ref 70–99)
GLUCOSE SERPL-MCNC: 145 MG/DL — HIGH (ref 70–99)
GLUCOSE SERPL-MCNC: 156 MG/DL — HIGH (ref 70–99)
GLUCOSE SERPL-MCNC: 164 MG/DL — HIGH (ref 70–99)
GLUCOSE SERPL-MCNC: 173 MG/DL — HIGH (ref 70–99)
GLUCOSE SERPL-MCNC: 219 MG/DL — HIGH (ref 70–99)
GLUCOSE SERPL-MCNC: 237 MG/DL — HIGH (ref 70–99)
GLUCOSE SERPL-MCNC: 52 MG/DL — CRITICAL LOW (ref 70–99)
GLUCOSE SERPL-MCNC: 56 MG/DL — LOW (ref 70–99)
GLUCOSE SERPL-MCNC: 60 MG/DL — LOW (ref 70–99)
GLUCOSE SERPL-MCNC: 68 MG/DL — LOW (ref 70–99)
GLUCOSE SERPL-MCNC: 70 MG/DL — SIGNIFICANT CHANGE UP (ref 70–99)
GLUCOSE SERPL-MCNC: 71 MG/DL — SIGNIFICANT CHANGE UP (ref 70–99)
GLUCOSE SERPL-MCNC: 72 MG/DL — SIGNIFICANT CHANGE UP (ref 70–99)
GLUCOSE SERPL-MCNC: 75 MG/DL — SIGNIFICANT CHANGE UP (ref 70–99)
GLUCOSE SERPL-MCNC: 76 MG/DL — SIGNIFICANT CHANGE UP (ref 70–99)
GLUCOSE SERPL-MCNC: 77 MG/DL — SIGNIFICANT CHANGE UP (ref 70–99)
GLUCOSE SERPL-MCNC: 80 MG/DL — SIGNIFICANT CHANGE UP (ref 70–99)
GLUCOSE SERPL-MCNC: 81 MG/DL — SIGNIFICANT CHANGE UP (ref 70–99)
GLUCOSE SERPL-MCNC: 82 MG/DL — SIGNIFICANT CHANGE UP (ref 70–99)
GLUCOSE SERPL-MCNC: 83 MG/DL — SIGNIFICANT CHANGE UP (ref 70–99)
GLUCOSE SERPL-MCNC: 84 MG/DL — SIGNIFICANT CHANGE UP (ref 70–99)
GLUCOSE SERPL-MCNC: 84 MG/DL — SIGNIFICANT CHANGE UP (ref 70–99)
GLUCOSE SERPL-MCNC: 85 MG/DL — SIGNIFICANT CHANGE UP (ref 70–99)
GLUCOSE SERPL-MCNC: 86 MG/DL — SIGNIFICANT CHANGE UP (ref 70–99)
GLUCOSE SERPL-MCNC: 87 MG/DL — SIGNIFICANT CHANGE UP (ref 70–99)
GLUCOSE SERPL-MCNC: 89 MG/DL — SIGNIFICANT CHANGE UP (ref 70–99)
GLUCOSE SERPL-MCNC: 95 MG/DL — SIGNIFICANT CHANGE UP (ref 70–99)
GLUCOSE SERPL-MCNC: 95 MG/DL — SIGNIFICANT CHANGE UP (ref 70–99)
GLUCOSE UR QL: NEGATIVE MG/DL — SIGNIFICANT CHANGE UP
GLUCOSE UR QL: NEGATIVE — SIGNIFICANT CHANGE UP
GP B STREP DNA BLD POS QL NAA+NON-PROBE: SIGNIFICANT CHANGE UP
GRAM STN FLD: SIGNIFICANT CHANGE UP
HAEM INFLU DNA BLD POS QL NAA+NON-PROBE: SIGNIFICANT CHANGE UP
HAV IGG SER QL IA: REACTIVE
HAV IGM SER-ACNC: SIGNIFICANT CHANGE UP
HBV CORE AB SER-ACNC: SIGNIFICANT CHANGE UP
HBV SURFACE AB SER-ACNC: REACTIVE
HBV SURFACE AG SER-ACNC: SIGNIFICANT CHANGE UP
HCO3 BLDV-SCNC: 19 MMOL/L — LOW (ref 22–29)
HCT VFR BLD CALC: 23.2 % — LOW (ref 34.5–45)
HCT VFR BLD CALC: 24.4 % — LOW (ref 34.5–45)
HCT VFR BLD CALC: 26.8 % — LOW (ref 34.5–45)
HCT VFR BLD CALC: 27.2 % — LOW (ref 34.5–45)
HCT VFR BLD CALC: 27.3 % — LOW (ref 34.5–45)
HCT VFR BLD CALC: 27.9 % — LOW (ref 34.5–45)
HCT VFR BLD CALC: 28.6 % — LOW (ref 34.5–45)
HCT VFR BLD CALC: 28.8 % — LOW (ref 34.5–45)
HCT VFR BLD CALC: 29.5 % — LOW (ref 34.5–45)
HCT VFR BLD CALC: 29.5 % — LOW (ref 34.5–45)
HCT VFR BLD CALC: 29.8 % — LOW (ref 34.5–45)
HCT VFR BLD CALC: 29.8 % — LOW (ref 34.5–45)
HCT VFR BLD CALC: 29.9 % — LOW (ref 34.5–45)
HCT VFR BLD CALC: 30.8 % — LOW (ref 34.5–45)
HCT VFR BLD CALC: 31.2 % — LOW (ref 34.5–45)
HCT VFR BLD CALC: 31.8 % — LOW (ref 34.5–45)
HCT VFR BLD CALC: 33 % — LOW (ref 34.5–45)
HCT VFR BLD CALC: 33.1 % — LOW (ref 34.5–45)
HCT VFR BLD CALC: 33.6 % — LOW (ref 34.5–45)
HCT VFR BLD CALC: 33.7 % — LOW (ref 34.5–45)
HCT VFR BLD CALC: 33.9 % — LOW (ref 34.5–45)
HCT VFR BLD CALC: 34.5 % — SIGNIFICANT CHANGE UP (ref 34.5–45)
HCT VFR BLD CALC: 35.5 % — SIGNIFICANT CHANGE UP (ref 34.5–45)
HCT VFR BLD CALC: 35.6 % — SIGNIFICANT CHANGE UP (ref 34.5–45)
HCT VFR BLD CALC: 36 % — SIGNIFICANT CHANGE UP (ref 34.5–45)
HCT VFR BLD CALC: 36.2 % — SIGNIFICANT CHANGE UP (ref 34.5–45)
HCT VFR BLD CALC: 36.3 % — SIGNIFICANT CHANGE UP (ref 34.5–45)
HCT VFR BLD CALC: 37.7 % — SIGNIFICANT CHANGE UP (ref 34.5–45)
HCT VFR BLDA CALC: 34 % — SIGNIFICANT CHANGE UP (ref 34–46)
HCV AB S/CO SERPL IA: 0.14 S/CO — SIGNIFICANT CHANGE UP (ref 0–0.99)
HCV AB SERPL-IMP: SIGNIFICANT CHANGE UP
HGB BLD CALC-MCNC: 11.4 G/DL — LOW (ref 11.7–16.1)
HGB BLD-MCNC: 10 G/DL — LOW (ref 11.5–15.5)
HGB BLD-MCNC: 10 G/DL — LOW (ref 11.5–15.5)
HGB BLD-MCNC: 10.3 G/DL — LOW (ref 11.5–15.5)
HGB BLD-MCNC: 10.3 G/DL — LOW (ref 11.5–15.5)
HGB BLD-MCNC: 10.4 G/DL — LOW (ref 11.5–15.5)
HGB BLD-MCNC: 10.5 G/DL — LOW (ref 11.5–15.5)
HGB BLD-MCNC: 10.7 G/DL — LOW (ref 11.5–15.5)
HGB BLD-MCNC: 10.9 G/DL — LOW (ref 11.5–15.5)
HGB BLD-MCNC: 11 G/DL — LOW (ref 11.5–15.5)
HGB BLD-MCNC: 11.6 G/DL — SIGNIFICANT CHANGE UP (ref 11.5–15.5)
HGB BLD-MCNC: 11.6 G/DL — SIGNIFICANT CHANGE UP (ref 11.5–15.5)
HGB BLD-MCNC: 7.6 G/DL — LOW (ref 11.5–15.5)
HGB BLD-MCNC: 7.7 G/DL — LOW (ref 11.5–15.5)
HGB BLD-MCNC: 8.2 G/DL — LOW (ref 11.5–15.5)
HGB BLD-MCNC: 8.6 G/DL — LOW (ref 11.5–15.5)
HGB BLD-MCNC: 8.9 G/DL — LOW (ref 11.5–15.5)
HGB BLD-MCNC: 9 G/DL — LOW (ref 11.5–15.5)
HGB BLD-MCNC: 9 G/DL — LOW (ref 11.5–15.5)
HGB BLD-MCNC: 9.1 G/DL — LOW (ref 11.5–15.5)
HGB BLD-MCNC: 9.3 G/DL — LOW (ref 11.5–15.5)
HGB BLD-MCNC: 9.6 G/DL — LOW (ref 11.5–15.5)
HGB BLD-MCNC: 9.7 G/DL — LOW (ref 11.5–15.5)
HGB BLD-MCNC: 9.8 G/DL — LOW (ref 11.5–15.5)
HGB BLD-MCNC: 9.8 G/DL — LOW (ref 11.5–15.5)
HGB BLD-MCNC: 9.9 G/DL — LOW (ref 11.5–15.5)
HGB BLD-MCNC: 9.9 G/DL — LOW (ref 11.5–15.5)
HYPOCHROMIA BLD QL: SLIGHT — SIGNIFICANT CHANGE UP
HYPOCHROMIA BLD QL: SLIGHT — SIGNIFICANT CHANGE UP
IMM GRANULOCYTES NFR BLD AUTO: 0.2 % — SIGNIFICANT CHANGE UP (ref 0–1.5)
IMM GRANULOCYTES NFR BLD AUTO: 0.3 % — SIGNIFICANT CHANGE UP (ref 0–1.5)
IMM GRANULOCYTES NFR BLD AUTO: 0.4 % — SIGNIFICANT CHANGE UP (ref 0–1.5)
IMM GRANULOCYTES NFR BLD AUTO: 0.8 % — SIGNIFICANT CHANGE UP (ref 0–1.5)
IMM GRANULOCYTES NFR BLD AUTO: 0.8 % — SIGNIFICANT CHANGE UP (ref 0–1.5)
IMM GRANULOCYTES NFR BLD AUTO: 2.7 % — HIGH (ref 0–1.5)
INR BLD: 1.03 RATIO — SIGNIFICANT CHANGE UP (ref 0.88–1.16)
INR BLD: 1.12 RATIO — SIGNIFICANT CHANGE UP (ref 0.88–1.16)
INR BLD: 1.15 RATIO — SIGNIFICANT CHANGE UP (ref 0.88–1.16)
INR BLD: 1.31 RATIO — HIGH (ref 0.88–1.16)
INR BLD: 1.37 RATIO — HIGH (ref 0.88–1.16)
INR BLD: 1.59 RATIO — HIGH (ref 0.88–1.16)
INTERPRETATION SERPL IFE-IMP: SIGNIFICANT CHANGE UP
IRON SATN MFR SERPL: 20 UG/DL — LOW (ref 37–145)
IRON SATN MFR SERPL: 25 UG/DL — LOW (ref 37–145)
IRON SATN MFR SERPL: 27 UG/DL — LOW (ref 37–145)
IRON SATN MFR SERPL: 9 % — LOW (ref 14–50)
IRON SATN MFR SERPL: SIGNIFICANT CHANGE UP % (ref 14–50)
IRON SATN MFR SERPL: SIGNIFICANT CHANGE UP % (ref 14–50)
K OXYTOCA DNA BLD POS QL NAA+NON-PROBE: SIGNIFICANT CHANGE UP
KETONES UR-MCNC: ABNORMAL
KETONES UR-MCNC: NEGATIVE — SIGNIFICANT CHANGE UP
L MONOCYTOG DNA BLD POS QL NAA+NON-PROBE: SIGNIFICANT CHANGE UP
LACTATE BLDV-MCNC: 1 MMOL/L — SIGNIFICANT CHANGE UP (ref 0.5–2)
LACTATE BLDV-MCNC: 5 MMOL/L — CRITICAL HIGH (ref 0.5–2)
LACTATE BLDV-MCNC: 5.9 MMOL/L — CRITICAL HIGH (ref 0.5–2)
LACTATE SERPL-SCNC: 2 MMOL/L — SIGNIFICANT CHANGE UP (ref 0.5–2)
LEUKOCYTE ESTERASE UR-ACNC: ABNORMAL
LEUKOCYTE ESTERASE UR-ACNC: NEGATIVE — SIGNIFICANT CHANGE UP
LEUKOCYTE ESTERASE UR-ACNC: NEGATIVE — SIGNIFICANT CHANGE UP
LIDOCAIN IGE QN: 10 U/L — LOW (ref 22–51)
LYMPHOCYTES # BLD AUTO: 0.17 K/UL — LOW (ref 1–3.3)
LYMPHOCYTES # BLD AUTO: 0.25 K/UL — LOW (ref 1–3.3)
LYMPHOCYTES # BLD AUTO: 0.28 K/UL — LOW (ref 1–3.3)
LYMPHOCYTES # BLD AUTO: 0.45 K/UL — LOW (ref 1–3.3)
LYMPHOCYTES # BLD AUTO: 0.45 K/UL — LOW (ref 1–3.3)
LYMPHOCYTES # BLD AUTO: 0.64 K/UL — LOW (ref 1–3.3)
LYMPHOCYTES # BLD AUTO: 0.66 K/UL — LOW (ref 1–3.3)
LYMPHOCYTES # BLD AUTO: 0.68 K/UL — LOW (ref 1–3.3)
LYMPHOCYTES # BLD AUTO: 0.83 K/UL — LOW (ref 1–3.3)
LYMPHOCYTES # BLD AUTO: 0.93 K/UL — LOW (ref 1–3.3)
LYMPHOCYTES # BLD AUTO: 1.08 K/UL — SIGNIFICANT CHANGE UP (ref 1–3.3)
LYMPHOCYTES # BLD AUTO: 1.13 K/UL — SIGNIFICANT CHANGE UP (ref 1–3.3)
LYMPHOCYTES # BLD AUTO: 1.28 K/UL — SIGNIFICANT CHANGE UP (ref 1–3.3)
LYMPHOCYTES # BLD AUTO: 1.29 K/UL — SIGNIFICANT CHANGE UP (ref 1–3.3)
LYMPHOCYTES # BLD AUTO: 1.47 K/UL — SIGNIFICANT CHANGE UP (ref 1–3.3)
LYMPHOCYTES # BLD AUTO: 1.53 K/UL — SIGNIFICANT CHANGE UP (ref 1–3.3)
LYMPHOCYTES # BLD AUTO: 10.7 % — LOW (ref 13–44)
LYMPHOCYTES # BLD AUTO: 11.4 % — LOW (ref 13–44)
LYMPHOCYTES # BLD AUTO: 12.8 % — LOW (ref 13–44)
LYMPHOCYTES # BLD AUTO: 14 % — SIGNIFICANT CHANGE UP (ref 13–44)
LYMPHOCYTES # BLD AUTO: 17 % — SIGNIFICANT CHANGE UP (ref 13–44)
LYMPHOCYTES # BLD AUTO: 2.6 % — LOW (ref 13–44)
LYMPHOCYTES # BLD AUTO: 2.7 % — LOW (ref 13–44)
LYMPHOCYTES # BLD AUTO: 24.2 % — SIGNIFICANT CHANGE UP (ref 13–44)
LYMPHOCYTES # BLD AUTO: 27 % — SIGNIFICANT CHANGE UP (ref 13–44)
LYMPHOCYTES # BLD AUTO: 27.5 % — SIGNIFICANT CHANGE UP (ref 13–44)
LYMPHOCYTES # BLD AUTO: 36.7 % — SIGNIFICANT CHANGE UP (ref 13–44)
LYMPHOCYTES # BLD AUTO: 38.1 % — SIGNIFICANT CHANGE UP (ref 13–44)
LYMPHOCYTES # BLD AUTO: 4.5 % — LOW (ref 13–44)
LYMPHOCYTES # BLD AUTO: 40 % — SIGNIFICANT CHANGE UP (ref 13–44)
LYMPHOCYTES # BLD AUTO: 5.2 % — LOW (ref 13–44)
LYMPHOCYTES # BLD AUTO: 6.1 % — LOW (ref 13–44)
M-SPIKE: SIGNIFICANT CHANGE UP (ref 0–0)
MACROCYTES BLD QL: SLIGHT — SIGNIFICANT CHANGE UP
MAGNESIUM SERPL-MCNC: 1.4 MG/DL — LOW (ref 1.6–2.6)
MAGNESIUM SERPL-MCNC: 1.7 MG/DL — LOW (ref 1.8–2.6)
MAGNESIUM SERPL-MCNC: 1.7 MG/DL — SIGNIFICANT CHANGE UP (ref 1.6–2.6)
MAGNESIUM SERPL-MCNC: 1.8 MG/DL — SIGNIFICANT CHANGE UP (ref 1.6–2.6)
MAGNESIUM SERPL-MCNC: 1.8 MG/DL — SIGNIFICANT CHANGE UP (ref 1.6–2.6)
MAGNESIUM SERPL-MCNC: 1.8 MG/DL — SIGNIFICANT CHANGE UP (ref 1.8–2.6)
MAGNESIUM SERPL-MCNC: 1.9 MG/DL — SIGNIFICANT CHANGE UP (ref 1.6–2.6)
MAGNESIUM SERPL-MCNC: 1.9 MG/DL — SIGNIFICANT CHANGE UP (ref 1.6–2.6)
MAGNESIUM SERPL-MCNC: 2 MG/DL — SIGNIFICANT CHANGE UP (ref 1.6–2.6)
MAGNESIUM SERPL-MCNC: 2 MG/DL — SIGNIFICANT CHANGE UP (ref 1.6–2.6)
MAGNESIUM SERPL-MCNC: 2 MG/DL — SIGNIFICANT CHANGE UP (ref 1.8–2.6)
MAGNESIUM SERPL-MCNC: 2.1 MG/DL — SIGNIFICANT CHANGE UP (ref 1.6–2.6)
MAGNESIUM SERPL-MCNC: 2.1 MG/DL — SIGNIFICANT CHANGE UP (ref 1.8–2.6)
MAGNESIUM SERPL-MCNC: 2.2 MG/DL — SIGNIFICANT CHANGE UP (ref 1.6–2.6)
MAGNESIUM SERPL-MCNC: 2.3 MG/DL — SIGNIFICANT CHANGE UP (ref 1.6–2.6)
MANUAL SMEAR VERIFICATION: SIGNIFICANT CHANGE UP
MCHC RBC-ENTMCNC: 23.3 PG — LOW (ref 27–34)
MCHC RBC-ENTMCNC: 23.5 PG — LOW (ref 27–34)
MCHC RBC-ENTMCNC: 23.8 PG — LOW (ref 27–34)
MCHC RBC-ENTMCNC: 24 PG — LOW (ref 27–34)
MCHC RBC-ENTMCNC: 24 PG — LOW (ref 27–34)
MCHC RBC-ENTMCNC: 24.7 PG — LOW (ref 27–34)
MCHC RBC-ENTMCNC: 24.7 PG — LOW (ref 27–34)
MCHC RBC-ENTMCNC: 24.8 PG — LOW (ref 27–34)
MCHC RBC-ENTMCNC: 24.9 PG — LOW (ref 27–34)
MCHC RBC-ENTMCNC: 24.9 PG — LOW (ref 27–34)
MCHC RBC-ENTMCNC: 25.1 PG — LOW (ref 27–34)
MCHC RBC-ENTMCNC: 25.2 PG — LOW (ref 27–34)
MCHC RBC-ENTMCNC: 25.3 PG — LOW (ref 27–34)
MCHC RBC-ENTMCNC: 25.4 PG — LOW (ref 27–34)
MCHC RBC-ENTMCNC: 25.5 PG — LOW (ref 27–34)
MCHC RBC-ENTMCNC: 27.9 PG — SIGNIFICANT CHANGE UP (ref 27–34)
MCHC RBC-ENTMCNC: 27.9 PG — SIGNIFICANT CHANGE UP (ref 27–34)
MCHC RBC-ENTMCNC: 28 PG — SIGNIFICANT CHANGE UP (ref 27–34)
MCHC RBC-ENTMCNC: 28 PG — SIGNIFICANT CHANGE UP (ref 27–34)
MCHC RBC-ENTMCNC: 28.1 PG — SIGNIFICANT CHANGE UP (ref 27–34)
MCHC RBC-ENTMCNC: 28.2 PG — SIGNIFICANT CHANGE UP (ref 27–34)
MCHC RBC-ENTMCNC: 28.3 PG — SIGNIFICANT CHANGE UP (ref 27–34)
MCHC RBC-ENTMCNC: 28.4 PG — SIGNIFICANT CHANGE UP (ref 27–34)
MCHC RBC-ENTMCNC: 28.9 PG — SIGNIFICANT CHANGE UP (ref 27–34)
MCHC RBC-ENTMCNC: 29.2 GM/DL — LOW (ref 32–36)
MCHC RBC-ENTMCNC: 29.7 GM/DL — LOW (ref 32–36)
MCHC RBC-ENTMCNC: 29.7 GM/DL — LOW (ref 32–36)
MCHC RBC-ENTMCNC: 29.9 GM/DL — LOW (ref 32–36)
MCHC RBC-ENTMCNC: 30.1 GM/DL — LOW (ref 32–36)
MCHC RBC-ENTMCNC: 30.2 GM/DL — LOW (ref 32–36)
MCHC RBC-ENTMCNC: 30.3 GM/DL — LOW (ref 32–36)
MCHC RBC-ENTMCNC: 30.3 GM/DL — LOW (ref 32–36)
MCHC RBC-ENTMCNC: 30.5 GM/DL — LOW (ref 32–36)
MCHC RBC-ENTMCNC: 30.6 GM/DL — LOW (ref 32–36)
MCHC RBC-ENTMCNC: 30.7 GM/DL — LOW (ref 32–36)
MCHC RBC-ENTMCNC: 30.8 GM/DL — LOW (ref 32–36)
MCHC RBC-ENTMCNC: 30.8 GM/DL — LOW (ref 32–36)
MCHC RBC-ENTMCNC: 30.9 GM/DL — LOW (ref 32–36)
MCHC RBC-ENTMCNC: 31 GM/DL — LOW (ref 32–36)
MCHC RBC-ENTMCNC: 31.1 GM/DL — LOW (ref 32–36)
MCHC RBC-ENTMCNC: 31.1 GM/DL — LOW (ref 32–36)
MCHC RBC-ENTMCNC: 31.2 GM/DL — LOW (ref 32–36)
MCHC RBC-ENTMCNC: 31.4 GM/DL — LOW (ref 32–36)
MCHC RBC-ENTMCNC: 31.6 GM/DL — LOW (ref 32–36)
MCHC RBC-ENTMCNC: 32 GM/DL — SIGNIFICANT CHANGE UP (ref 32–36)
MCHC RBC-ENTMCNC: 32.1 GM/DL — SIGNIFICANT CHANGE UP (ref 32–36)
MCHC RBC-ENTMCNC: 32.3 GM/DL — SIGNIFICANT CHANGE UP (ref 32–36)
MCHC RBC-ENTMCNC: 32.8 GM/DL — SIGNIFICANT CHANGE UP (ref 32–36)
MCHC RBC-ENTMCNC: 32.9 GM/DL — SIGNIFICANT CHANGE UP (ref 32–36)
MCHC RBC-ENTMCNC: 32.9 GM/DL — SIGNIFICANT CHANGE UP (ref 32–36)
MCHC RBC-ENTMCNC: 33.3 GM/DL — SIGNIFICANT CHANGE UP (ref 32–36)
MCHC RBC-ENTMCNC: 33.3 GM/DL — SIGNIFICANT CHANGE UP (ref 32–36)
MCV RBC AUTO: 78 FL — LOW (ref 80–100)
MCV RBC AUTO: 78.4 FL — LOW (ref 80–100)
MCV RBC AUTO: 78.4 FL — LOW (ref 80–100)
MCV RBC AUTO: 78.6 FL — LOW (ref 80–100)
MCV RBC AUTO: 79.4 FL — LOW (ref 80–100)
MCV RBC AUTO: 79.8 FL — LOW (ref 80–100)
MCV RBC AUTO: 80.9 FL — SIGNIFICANT CHANGE UP (ref 80–100)
MCV RBC AUTO: 81 FL — SIGNIFICANT CHANGE UP (ref 80–100)
MCV RBC AUTO: 81 FL — SIGNIFICANT CHANGE UP (ref 80–100)
MCV RBC AUTO: 81.5 FL — SIGNIFICANT CHANGE UP (ref 80–100)
MCV RBC AUTO: 81.6 FL — SIGNIFICANT CHANGE UP (ref 80–100)
MCV RBC AUTO: 81.7 FL — SIGNIFICANT CHANGE UP (ref 80–100)
MCV RBC AUTO: 81.7 FL — SIGNIFICANT CHANGE UP (ref 80–100)
MCV RBC AUTO: 81.8 FL — SIGNIFICANT CHANGE UP (ref 80–100)
MCV RBC AUTO: 82 FL — SIGNIFICANT CHANGE UP (ref 80–100)
MCV RBC AUTO: 82 FL — SIGNIFICANT CHANGE UP (ref 80–100)
MCV RBC AUTO: 82.1 FL — SIGNIFICANT CHANGE UP (ref 80–100)
MCV RBC AUTO: 82.2 FL — SIGNIFICANT CHANGE UP (ref 80–100)
MCV RBC AUTO: 82.2 FL — SIGNIFICANT CHANGE UP (ref 80–100)
MCV RBC AUTO: 83.4 FL — SIGNIFICANT CHANGE UP (ref 80–100)
MCV RBC AUTO: 83.8 FL — SIGNIFICANT CHANGE UP (ref 80–100)
MCV RBC AUTO: 84.3 FL — SIGNIFICANT CHANGE UP (ref 80–100)
MCV RBC AUTO: 85.1 FL — SIGNIFICANT CHANGE UP (ref 80–100)
MCV RBC AUTO: 85.3 FL — SIGNIFICANT CHANGE UP (ref 80–100)
MCV RBC AUTO: 85.9 FL — SIGNIFICANT CHANGE UP (ref 80–100)
MCV RBC AUTO: 86.2 FL — SIGNIFICANT CHANGE UP (ref 80–100)
MCV RBC AUTO: 86.5 FL — SIGNIFICANT CHANGE UP (ref 80–100)
MCV RBC AUTO: 87.7 FL — SIGNIFICANT CHANGE UP (ref 80–100)
MCV RBC AUTO: 89.8 FL — SIGNIFICANT CHANGE UP (ref 80–100)
MCV RBC AUTO: 90 FL — SIGNIFICANT CHANGE UP (ref 80–100)
METAMYELOCYTES # FLD: 1.8 % — HIGH (ref 0–0)
METHOD TYPE: SIGNIFICANT CHANGE UP
MICROCYTES BLD QL: SLIGHT — SIGNIFICANT CHANGE UP
MITOCHONDRIA AB SER-ACNC: SIGNIFICANT CHANGE UP
MONOCYTES # BLD AUTO: 0.14 K/UL — SIGNIFICANT CHANGE UP (ref 0–0.9)
MONOCYTES # BLD AUTO: 0.24 K/UL — SIGNIFICANT CHANGE UP (ref 0–0.9)
MONOCYTES # BLD AUTO: 0.25 K/UL — SIGNIFICANT CHANGE UP (ref 0–0.9)
MONOCYTES # BLD AUTO: 0.27 K/UL — SIGNIFICANT CHANGE UP (ref 0–0.9)
MONOCYTES # BLD AUTO: 0.29 K/UL — SIGNIFICANT CHANGE UP (ref 0–0.9)
MONOCYTES # BLD AUTO: 0.29 K/UL — SIGNIFICANT CHANGE UP (ref 0–0.9)
MONOCYTES # BLD AUTO: 0.3 K/UL — SIGNIFICANT CHANGE UP (ref 0–0.9)
MONOCYTES # BLD AUTO: 0.32 K/UL — SIGNIFICANT CHANGE UP (ref 0–0.9)
MONOCYTES # BLD AUTO: 0.34 K/UL — SIGNIFICANT CHANGE UP (ref 0–0.9)
MONOCYTES # BLD AUTO: 0.34 K/UL — SIGNIFICANT CHANGE UP (ref 0–0.9)
MONOCYTES # BLD AUTO: 0.42 K/UL — SIGNIFICANT CHANGE UP (ref 0–0.9)
MONOCYTES # BLD AUTO: 0.44 K/UL — SIGNIFICANT CHANGE UP (ref 0–0.9)
MONOCYTES # BLD AUTO: 0.45 K/UL — SIGNIFICANT CHANGE UP (ref 0–0.9)
MONOCYTES # BLD AUTO: 0.58 K/UL — SIGNIFICANT CHANGE UP (ref 0–0.9)
MONOCYTES NFR BLD AUTO: 2.6 % — SIGNIFICANT CHANGE UP (ref 2–14)
MONOCYTES NFR BLD AUTO: 2.7 % — SIGNIFICANT CHANGE UP (ref 2–14)
MONOCYTES NFR BLD AUTO: 3.3 % — SIGNIFICANT CHANGE UP (ref 2–14)
MONOCYTES NFR BLD AUTO: 4.5 % — SIGNIFICANT CHANGE UP (ref 2–14)
MONOCYTES NFR BLD AUTO: 5 % — SIGNIFICANT CHANGE UP (ref 2–14)
MONOCYTES NFR BLD AUTO: 5.3 % — SIGNIFICANT CHANGE UP (ref 2–14)
MONOCYTES NFR BLD AUTO: 5.3 % — SIGNIFICANT CHANGE UP (ref 2–14)
MONOCYTES NFR BLD AUTO: 5.7 % — SIGNIFICANT CHANGE UP (ref 2–14)
MONOCYTES NFR BLD AUTO: 5.9 % — SIGNIFICANT CHANGE UP (ref 2–14)
MONOCYTES NFR BLD AUTO: 6.1 % — SIGNIFICANT CHANGE UP (ref 2–14)
MONOCYTES NFR BLD AUTO: 7.1 % — SIGNIFICANT CHANGE UP (ref 2–14)
MONOCYTES NFR BLD AUTO: 7.9 % — SIGNIFICANT CHANGE UP (ref 2–14)
MONOCYTES NFR BLD AUTO: 8.5 % — SIGNIFICANT CHANGE UP (ref 2–14)
MONOCYTES NFR BLD AUTO: 8.5 % — SIGNIFICANT CHANGE UP (ref 2–14)
MONOCYTES NFR BLD AUTO: 8.8 % — SIGNIFICANT CHANGE UP (ref 2–14)
MONOCYTES NFR BLD AUTO: 8.9 % — SIGNIFICANT CHANGE UP (ref 2–14)
MRSA SPEC QL CULT: SIGNIFICANT CHANGE UP
MSSA DNA SPEC QL NAA+PROBE: SIGNIFICANT CHANGE UP
MYELOCYTES NFR BLD: 0.9 % — HIGH (ref 0–0)
N MEN ISLT CULT: SIGNIFICANT CHANGE UP
NEUTROPHILS # BLD AUTO: 1.26 K/UL — LOW (ref 1.8–7.4)
NEUTROPHILS # BLD AUTO: 15.63 K/UL — HIGH (ref 1.8–7.4)
NEUTROPHILS # BLD AUTO: 2.04 K/UL — SIGNIFICANT CHANGE UP (ref 1.8–7.4)
NEUTROPHILS # BLD AUTO: 2.1 K/UL — SIGNIFICANT CHANGE UP (ref 1.8–7.4)
NEUTROPHILS # BLD AUTO: 2.59 K/UL — SIGNIFICANT CHANGE UP (ref 1.8–7.4)
NEUTROPHILS # BLD AUTO: 2.94 K/UL — SIGNIFICANT CHANGE UP (ref 1.8–7.4)
NEUTROPHILS # BLD AUTO: 3.44 K/UL — SIGNIFICANT CHANGE UP (ref 1.8–7.4)
NEUTROPHILS # BLD AUTO: 3.65 K/UL — SIGNIFICANT CHANGE UP (ref 1.8–7.4)
NEUTROPHILS # BLD AUTO: 4.12 K/UL — SIGNIFICANT CHANGE UP (ref 1.8–7.4)
NEUTROPHILS # BLD AUTO: 4.15 K/UL — SIGNIFICANT CHANGE UP (ref 1.8–7.4)
NEUTROPHILS # BLD AUTO: 4.85 K/UL — SIGNIFICANT CHANGE UP (ref 1.8–7.4)
NEUTROPHILS # BLD AUTO: 4.87 K/UL — SIGNIFICANT CHANGE UP (ref 1.8–7.4)
NEUTROPHILS # BLD AUTO: 4.93 K/UL — SIGNIFICANT CHANGE UP (ref 1.8–7.4)
NEUTROPHILS # BLD AUTO: 5.64 K/UL — SIGNIFICANT CHANGE UP (ref 1.8–7.4)
NEUTROPHILS # BLD AUTO: 6.39 K/UL — SIGNIFICANT CHANGE UP (ref 1.8–7.4)
NEUTROPHILS # BLD AUTO: 6.4 K/UL — SIGNIFICANT CHANGE UP (ref 1.8–7.4)
NEUTROPHILS NFR BLD AUTO: 46.6 % — SIGNIFICANT CHANGE UP (ref 43–77)
NEUTROPHILS NFR BLD AUTO: 50.8 % — SIGNIFICANT CHANGE UP (ref 43–77)
NEUTROPHILS NFR BLD AUTO: 52.4 % — SIGNIFICANT CHANGE UP (ref 43–77)
NEUTROPHILS NFR BLD AUTO: 61.7 % — SIGNIFICANT CHANGE UP (ref 43–77)
NEUTROPHILS NFR BLD AUTO: 63 % — SIGNIFICANT CHANGE UP (ref 43–77)
NEUTROPHILS NFR BLD AUTO: 65 % — SIGNIFICANT CHANGE UP (ref 43–77)
NEUTROPHILS NFR BLD AUTO: 75.5 % — SIGNIFICANT CHANGE UP (ref 43–77)
NEUTROPHILS NFR BLD AUTO: 78.1 % — HIGH (ref 43–77)
NEUTROPHILS NFR BLD AUTO: 80.7 % — HIGH (ref 43–77)
NEUTROPHILS NFR BLD AUTO: 81.9 % — HIGH (ref 43–77)
NEUTROPHILS NFR BLD AUTO: 82.4 % — HIGH (ref 43–77)
NEUTROPHILS NFR BLD AUTO: 85 % — HIGH (ref 43–77)
NEUTROPHILS NFR BLD AUTO: 87.1 % — HIGH (ref 43–77)
NEUTROPHILS NFR BLD AUTO: 88.3 % — HIGH (ref 43–77)
NEUTROPHILS NFR BLD AUTO: 91.1 % — HIGH (ref 43–77)
NEUTROPHILS NFR BLD AUTO: 92.2 % — HIGH (ref 43–77)
NEUTS BAND # BLD: 1.7 % — SIGNIFICANT CHANGE UP (ref 0–8)
NEUTS BAND # BLD: 3.5 % — SIGNIFICANT CHANGE UP (ref 0–8)
NITRITE UR-MCNC: NEGATIVE — SIGNIFICANT CHANGE UP
NITRITE UR-MCNC: POSITIVE
NRBC # BLD: 1 /100 — HIGH (ref 0–0)
OB PNL STL: POSITIVE
ORGANISM # SPEC MICROSCOPIC CNT: SIGNIFICANT CHANGE UP
OVALOCYTES BLD QL SMEAR: SLIGHT — SIGNIFICANT CHANGE UP
PCO2 BLDV: 26 MMHG — LOW (ref 39–42)
PH BLDV: 7.47 — HIGH (ref 7.32–7.43)
PH UR: 5 — SIGNIFICANT CHANGE UP (ref 5–8)
PH UR: 5 — SIGNIFICANT CHANGE UP (ref 5–8)
PH UR: 6 — SIGNIFICANT CHANGE UP (ref 5–8)
PH UR: 6.5 — SIGNIFICANT CHANGE UP (ref 5–8)
PH UR: 7 — SIGNIFICANT CHANGE UP (ref 5–8)
PH UR: 7 — SIGNIFICANT CHANGE UP (ref 5–8)
PHOSPHATE SERPL-MCNC: 2.4 MG/DL — SIGNIFICANT CHANGE UP (ref 2.4–4.7)
PHOSPHATE SERPL-MCNC: 2.5 MG/DL — SIGNIFICANT CHANGE UP (ref 2.4–4.7)
PHOSPHATE SERPL-MCNC: 4 MG/DL — SIGNIFICANT CHANGE UP (ref 2.4–4.7)
PHOSPHATE SERPL-MCNC: 4.2 MG/DL — SIGNIFICANT CHANGE UP (ref 2.4–4.7)
PHOSPHATE SERPL-MCNC: 4.3 MG/DL — SIGNIFICANT CHANGE UP (ref 2.4–4.7)
PLAT MORPH BLD: NORMAL — SIGNIFICANT CHANGE UP
PLATELET # BLD AUTO: 164 K/UL — SIGNIFICANT CHANGE UP (ref 150–400)
PLATELET # BLD AUTO: 168 K/UL — SIGNIFICANT CHANGE UP (ref 150–400)
PLATELET # BLD AUTO: 185 K/UL — SIGNIFICANT CHANGE UP (ref 150–400)
PLATELET # BLD AUTO: 191 K/UL — SIGNIFICANT CHANGE UP (ref 150–400)
PLATELET # BLD AUTO: 194 K/UL — SIGNIFICANT CHANGE UP (ref 150–400)
PLATELET # BLD AUTO: 238 K/UL — SIGNIFICANT CHANGE UP (ref 150–400)
PLATELET # BLD AUTO: 247 K/UL — SIGNIFICANT CHANGE UP (ref 150–400)
PLATELET # BLD AUTO: 249 K/UL — SIGNIFICANT CHANGE UP (ref 150–400)
PLATELET # BLD AUTO: 249 K/UL — SIGNIFICANT CHANGE UP (ref 150–400)
PLATELET # BLD AUTO: 257 K/UL — SIGNIFICANT CHANGE UP (ref 150–400)
PLATELET # BLD AUTO: 259 K/UL — SIGNIFICANT CHANGE UP (ref 150–400)
PLATELET # BLD AUTO: 262 K/UL — SIGNIFICANT CHANGE UP (ref 150–400)
PLATELET # BLD AUTO: 267 K/UL — SIGNIFICANT CHANGE UP (ref 150–400)
PLATELET # BLD AUTO: 275 K/UL — SIGNIFICANT CHANGE UP (ref 150–400)
PLATELET # BLD AUTO: 285 K/UL — SIGNIFICANT CHANGE UP (ref 150–400)
PLATELET # BLD AUTO: 297 K/UL — SIGNIFICANT CHANGE UP (ref 150–400)
PLATELET # BLD AUTO: 311 K/UL — SIGNIFICANT CHANGE UP (ref 150–400)
PLATELET # BLD AUTO: 317 K/UL — SIGNIFICANT CHANGE UP (ref 150–400)
PLATELET # BLD AUTO: 343 K/UL — SIGNIFICANT CHANGE UP (ref 150–400)
PLATELET # BLD AUTO: 351 K/UL — SIGNIFICANT CHANGE UP (ref 150–400)
PLATELET # BLD AUTO: 356 K/UL — SIGNIFICANT CHANGE UP (ref 150–400)
PLATELET # BLD AUTO: 357 K/UL — SIGNIFICANT CHANGE UP (ref 150–400)
PLATELET # BLD AUTO: 357 K/UL — SIGNIFICANT CHANGE UP (ref 150–400)
PLATELET # BLD AUTO: 375 K/UL — SIGNIFICANT CHANGE UP (ref 150–400)
PLATELET # BLD AUTO: 380 K/UL — SIGNIFICANT CHANGE UP (ref 150–400)
PLATELET # BLD AUTO: 457 K/UL — HIGH (ref 150–400)
PLATELET # BLD AUTO: 468 K/UL — HIGH (ref 150–400)
PLATELET # BLD AUTO: 505 K/UL — HIGH (ref 150–400)
PLATELET # BLD AUTO: 513 K/UL — HIGH (ref 150–400)
PLATELET # BLD AUTO: 536 K/UL — HIGH (ref 150–400)
PLATELET COUNT - ESTIMATE: NORMAL — SIGNIFICANT CHANGE UP
PO2 BLDV: 151 MMHG — HIGH (ref 25–45)
POIKILOCYTOSIS BLD QL AUTO: SIGNIFICANT CHANGE UP
POIKILOCYTOSIS BLD QL AUTO: SIGNIFICANT CHANGE UP
POIKILOCYTOSIS BLD QL AUTO: SLIGHT — SIGNIFICANT CHANGE UP
POLYCHROMASIA BLD QL SMEAR: SLIGHT — SIGNIFICANT CHANGE UP
POTASSIUM BLDV-SCNC: 5.3 MMOL/L — HIGH (ref 3.5–5.1)
POTASSIUM SERPL-MCNC: 2.4 MMOL/L — CRITICAL LOW (ref 3.5–5.3)
POTASSIUM SERPL-MCNC: 2.6 MMOL/L — CRITICAL LOW (ref 3.5–5.3)
POTASSIUM SERPL-MCNC: 2.9 MMOL/L — CRITICAL LOW (ref 3.5–5.3)
POTASSIUM SERPL-MCNC: 3 MMOL/L — LOW (ref 3.5–5.3)
POTASSIUM SERPL-MCNC: 3 MMOL/L — LOW (ref 3.5–5.3)
POTASSIUM SERPL-MCNC: 3.2 MMOL/L — LOW (ref 3.5–5.3)
POTASSIUM SERPL-MCNC: 3.4 MMOL/L — LOW (ref 3.5–5.3)
POTASSIUM SERPL-MCNC: 3.5 MMOL/L — SIGNIFICANT CHANGE UP (ref 3.5–5.3)
POTASSIUM SERPL-MCNC: 3.6 MMOL/L — SIGNIFICANT CHANGE UP (ref 3.5–5.3)
POTASSIUM SERPL-MCNC: 3.7 MMOL/L — SIGNIFICANT CHANGE UP (ref 3.5–5.3)
POTASSIUM SERPL-MCNC: 3.7 MMOL/L — SIGNIFICANT CHANGE UP (ref 3.5–5.3)
POTASSIUM SERPL-MCNC: 3.8 MMOL/L — SIGNIFICANT CHANGE UP (ref 3.5–5.3)
POTASSIUM SERPL-MCNC: 3.9 MMOL/L — SIGNIFICANT CHANGE UP (ref 3.5–5.3)
POTASSIUM SERPL-MCNC: 4.1 MMOL/L — SIGNIFICANT CHANGE UP (ref 3.5–5.3)
POTASSIUM SERPL-MCNC: 4.2 MMOL/L — SIGNIFICANT CHANGE UP (ref 3.5–5.3)
POTASSIUM SERPL-MCNC: 4.2 MMOL/L — SIGNIFICANT CHANGE UP (ref 3.5–5.3)
POTASSIUM SERPL-MCNC: 4.3 MMOL/L — SIGNIFICANT CHANGE UP (ref 3.5–5.3)
POTASSIUM SERPL-MCNC: 4.4 MMOL/L — SIGNIFICANT CHANGE UP (ref 3.5–5.3)
POTASSIUM SERPL-MCNC: 4.5 MMOL/L — SIGNIFICANT CHANGE UP (ref 3.5–5.3)
POTASSIUM SERPL-MCNC: 4.5 MMOL/L — SIGNIFICANT CHANGE UP (ref 3.5–5.3)
POTASSIUM SERPL-MCNC: 4.6 MMOL/L — SIGNIFICANT CHANGE UP (ref 3.5–5.3)
POTASSIUM SERPL-MCNC: 4.7 MMOL/L — SIGNIFICANT CHANGE UP (ref 3.5–5.3)
POTASSIUM SERPL-MCNC: 4.7 MMOL/L — SIGNIFICANT CHANGE UP (ref 3.5–5.3)
POTASSIUM SERPL-MCNC: 4.9 MMOL/L — SIGNIFICANT CHANGE UP (ref 3.5–5.3)
POTASSIUM SERPL-MCNC: 5.1 MMOL/L — SIGNIFICANT CHANGE UP (ref 3.5–5.3)
POTASSIUM SERPL-MCNC: 5.2 MMOL/L — SIGNIFICANT CHANGE UP (ref 3.5–5.3)
POTASSIUM SERPL-MCNC: 5.8 MMOL/L — HIGH (ref 3.5–5.3)
POTASSIUM SERPL-MCNC: 6.1 MMOL/L — CRITICAL HIGH (ref 3.5–5.3)
POTASSIUM SERPL-SCNC: 2.4 MMOL/L — CRITICAL LOW (ref 3.5–5.3)
POTASSIUM SERPL-SCNC: 2.6 MMOL/L — CRITICAL LOW (ref 3.5–5.3)
POTASSIUM SERPL-SCNC: 2.9 MMOL/L — CRITICAL LOW (ref 3.5–5.3)
POTASSIUM SERPL-SCNC: 3 MMOL/L — LOW (ref 3.5–5.3)
POTASSIUM SERPL-SCNC: 3 MMOL/L — LOW (ref 3.5–5.3)
POTASSIUM SERPL-SCNC: 3.2 MMOL/L — LOW (ref 3.5–5.3)
POTASSIUM SERPL-SCNC: 3.4 MMOL/L — LOW (ref 3.5–5.3)
POTASSIUM SERPL-SCNC: 3.5 MMOL/L — SIGNIFICANT CHANGE UP (ref 3.5–5.3)
POTASSIUM SERPL-SCNC: 3.6 MMOL/L — SIGNIFICANT CHANGE UP (ref 3.5–5.3)
POTASSIUM SERPL-SCNC: 3.7 MMOL/L — SIGNIFICANT CHANGE UP (ref 3.5–5.3)
POTASSIUM SERPL-SCNC: 3.7 MMOL/L — SIGNIFICANT CHANGE UP (ref 3.5–5.3)
POTASSIUM SERPL-SCNC: 3.8 MMOL/L — SIGNIFICANT CHANGE UP (ref 3.5–5.3)
POTASSIUM SERPL-SCNC: 3.9 MMOL/L — SIGNIFICANT CHANGE UP (ref 3.5–5.3)
POTASSIUM SERPL-SCNC: 4.1 MMOL/L — SIGNIFICANT CHANGE UP (ref 3.5–5.3)
POTASSIUM SERPL-SCNC: 4.2 MMOL/L — SIGNIFICANT CHANGE UP (ref 3.5–5.3)
POTASSIUM SERPL-SCNC: 4.2 MMOL/L — SIGNIFICANT CHANGE UP (ref 3.5–5.3)
POTASSIUM SERPL-SCNC: 4.3 MMOL/L — SIGNIFICANT CHANGE UP (ref 3.5–5.3)
POTASSIUM SERPL-SCNC: 4.4 MMOL/L — SIGNIFICANT CHANGE UP (ref 3.5–5.3)
POTASSIUM SERPL-SCNC: 4.5 MMOL/L — SIGNIFICANT CHANGE UP (ref 3.5–5.3)
POTASSIUM SERPL-SCNC: 4.5 MMOL/L — SIGNIFICANT CHANGE UP (ref 3.5–5.3)
POTASSIUM SERPL-SCNC: 4.6 MMOL/L — SIGNIFICANT CHANGE UP (ref 3.5–5.3)
POTASSIUM SERPL-SCNC: 4.7 MMOL/L — SIGNIFICANT CHANGE UP (ref 3.5–5.3)
POTASSIUM SERPL-SCNC: 4.7 MMOL/L — SIGNIFICANT CHANGE UP (ref 3.5–5.3)
POTASSIUM SERPL-SCNC: 4.9 MMOL/L — SIGNIFICANT CHANGE UP (ref 3.5–5.3)
POTASSIUM SERPL-SCNC: 5.1 MMOL/L — SIGNIFICANT CHANGE UP (ref 3.5–5.3)
POTASSIUM SERPL-SCNC: 5.2 MMOL/L — SIGNIFICANT CHANGE UP (ref 3.5–5.3)
POTASSIUM SERPL-SCNC: 5.8 MMOL/L — HIGH (ref 3.5–5.3)
POTASSIUM SERPL-SCNC: 6.1 MMOL/L — CRITICAL HIGH (ref 3.5–5.3)
PREALB SERPL-MCNC: 7 MG/DL — LOW (ref 18–38)
PROCALCITONIN SERPL-MCNC: 0.09 NG/ML — SIGNIFICANT CHANGE UP (ref 0.02–0.1)
PROMYELOCYTES # FLD: 1.8 % — HIGH (ref 0–0)
PROT PATTERN SERPL ELPH-IMP: SIGNIFICANT CHANGE UP
PROT SERPL-MCNC: 4.2 G/DL — LOW (ref 6.6–8.7)
PROT SERPL-MCNC: 4.2 G/DL — LOW (ref 6.6–8.7)
PROT SERPL-MCNC: 4.5 G/DL — LOW (ref 6.6–8.7)
PROT SERPL-MCNC: 4.6 G/DL — LOW (ref 6.6–8.7)
PROT SERPL-MCNC: 4.7 G/DL — LOW (ref 6.6–8.7)
PROT SERPL-MCNC: 4.7 G/DL — LOW (ref 6–8.3)
PROT SERPL-MCNC: 4.7 G/DL — LOW (ref 6–8.3)
PROT SERPL-MCNC: 4.8 G/DL — LOW (ref 6.6–8.7)
PROT SERPL-MCNC: 5.3 G/DL — LOW (ref 6.6–8.7)
PROT SERPL-MCNC: 5.5 G/DL — LOW (ref 6.6–8.7)
PROT SERPL-MCNC: 5.6 G/DL — LOW (ref 6.6–8.7)
PROT SERPL-MCNC: 5.8 G/DL — LOW (ref 6.6–8.7)
PROT SERPL-MCNC: 6.1 G/DL — LOW (ref 6.6–8.7)
PROT SERPL-MCNC: 6.2 G/DL — LOW (ref 6.6–8.7)
PROT SERPL-MCNC: 6.7 G/DL — SIGNIFICANT CHANGE UP (ref 6.6–8.7)
PROT UR-MCNC: 30 MG/DL
PROT UR-MCNC: NEGATIVE MG/DL — SIGNIFICANT CHANGE UP
PROT UR-MCNC: NEGATIVE — SIGNIFICANT CHANGE UP
PROT UR-MCNC: NEGATIVE — SIGNIFICANT CHANGE UP
PROTEUS SP DNA BLD POS QL NAA+NON-PROBE: SIGNIFICANT CHANGE UP
PROTHROM AB SERPL-ACNC: 11.9 SEC — SIGNIFICANT CHANGE UP (ref 10.6–13.6)
PROTHROM AB SERPL-ACNC: 12.9 SEC — SIGNIFICANT CHANGE UP (ref 10.6–13.6)
PROTHROM AB SERPL-ACNC: 13.2 SEC — SIGNIFICANT CHANGE UP (ref 10.6–13.6)
PROTHROM AB SERPL-ACNC: 15 SEC — HIGH (ref 10.6–13.6)
PROTHROM AB SERPL-ACNC: 15.7 SEC — HIGH (ref 10.6–13.6)
PROTHROM AB SERPL-ACNC: 18 SEC — HIGH (ref 10.6–13.6)
RAPID RVP RESULT: SIGNIFICANT CHANGE UP
RBC # BLD: 2.69 M/UL — LOW (ref 3.8–5.2)
RBC # BLD: 2.69 M/UL — LOW (ref 3.8–5.2)
RBC # BLD: 3.03 M/UL — LOW (ref 3.8–5.2)
RBC # BLD: 3.24 M/UL — LOW (ref 3.8–5.2)
RBC # BLD: 3.27 M/UL — LOW (ref 3.8–5.2)
RBC # BLD: 3.33 M/UL — LOW (ref 3.8–5.2)
RBC # BLD: 3.33 M/UL — LOW (ref 3.8–5.2)
RBC # BLD: 3.46 M/UL — LOW (ref 3.8–5.2)
RBC # BLD: 3.5 M/UL — LOW (ref 3.8–5.2)
RBC # BLD: 3.51 M/UL — LOW (ref 3.8–5.2)
RBC # BLD: 3.6 M/UL — LOW (ref 3.8–5.2)
RBC # BLD: 3.62 M/UL — LOW (ref 3.8–5.2)
RBC # BLD: 3.64 M/UL — LOW (ref 3.8–5.2)
RBC # BLD: 3.68 M/UL — LOW (ref 3.8–5.2)
RBC # BLD: 3.84 M/UL — SIGNIFICANT CHANGE UP (ref 3.8–5.2)
RBC # BLD: 3.87 M/UL — SIGNIFICANT CHANGE UP (ref 3.8–5.2)
RBC # BLD: 3.97 M/UL — SIGNIFICANT CHANGE UP (ref 3.8–5.2)
RBC # BLD: 4 M/UL — SIGNIFICANT CHANGE UP (ref 3.8–5.2)
RBC # BLD: 4.02 M/UL — SIGNIFICANT CHANGE UP (ref 3.8–5.2)
RBC # BLD: 4.09 M/UL — SIGNIFICANT CHANGE UP (ref 3.8–5.2)
RBC # BLD: 4.11 M/UL — SIGNIFICANT CHANGE UP (ref 3.8–5.2)
RBC # BLD: 4.15 M/UL — SIGNIFICANT CHANGE UP (ref 3.8–5.2)
RBC # BLD: 4.15 M/UL — SIGNIFICANT CHANGE UP (ref 3.8–5.2)
RBC # BLD: 4.21 M/UL — SIGNIFICANT CHANGE UP (ref 3.8–5.2)
RBC # BLD: 4.26 M/UL — SIGNIFICANT CHANGE UP (ref 3.8–5.2)
RBC # BLD: 4.3 M/UL — SIGNIFICANT CHANGE UP (ref 3.8–5.2)
RBC # BLD: 4.35 M/UL — SIGNIFICANT CHANGE UP (ref 3.8–5.2)
RBC # BLD: 4.36 M/UL — SIGNIFICANT CHANGE UP (ref 3.8–5.2)
RBC # BLD: 4.38 M/UL — SIGNIFICANT CHANGE UP (ref 3.8–5.2)
RBC # BLD: 4.6 M/UL — SIGNIFICANT CHANGE UP (ref 3.8–5.2)
RBC # BLD: 4.63 M/UL — SIGNIFICANT CHANGE UP (ref 3.8–5.2)
RBC # FLD: 15.9 % — HIGH (ref 10.3–14.5)
RBC # FLD: 15.9 % — HIGH (ref 10.3–14.5)
RBC # FLD: 16 % — HIGH (ref 10.3–14.5)
RBC # FLD: 16.1 % — HIGH (ref 10.3–14.5)
RBC # FLD: 16.2 % — HIGH (ref 10.3–14.5)
RBC # FLD: 16.3 % — HIGH (ref 10.3–14.5)
RBC # FLD: 16.3 % — HIGH (ref 10.3–14.5)
RBC # FLD: 16.4 % — HIGH (ref 10.3–14.5)
RBC # FLD: 16.6 % — HIGH (ref 10.3–14.5)
RBC # FLD: 16.6 % — HIGH (ref 10.3–14.5)
RBC # FLD: 17 % — HIGH (ref 10.3–14.5)
RBC # FLD: 17.1 % — HIGH (ref 10.3–14.5)
RBC # FLD: 17.2 % — HIGH (ref 10.3–14.5)
RBC # FLD: 17.3 % — HIGH (ref 10.3–14.5)
RBC # FLD: 17.4 % — HIGH (ref 10.3–14.5)
RBC # FLD: 17.5 % — HIGH (ref 10.3–14.5)
RBC # FLD: 17.6 % — HIGH (ref 10.3–14.5)
RBC # FLD: 17.7 % — HIGH (ref 10.3–14.5)
RBC # FLD: 17.8 % — HIGH (ref 10.3–14.5)
RBC # FLD: 17.8 % — HIGH (ref 10.3–14.5)
RBC # FLD: 18.3 % — HIGH (ref 10.3–14.5)
RBC # FLD: 20.1 % — HIGH (ref 10.3–14.5)
RBC # FLD: 20.2 % — HIGH (ref 10.3–14.5)
RBC # FLD: 20.8 % — HIGH (ref 10.3–14.5)
RBC # FLD: 20.8 % — HIGH (ref 10.3–14.5)
RBC BLD AUTO: ABNORMAL
RBC BLD AUTO: NORMAL — SIGNIFICANT CHANGE UP
RBC CASTS # UR COMP ASSIST: ABNORMAL /HPF (ref 0–4)
RBC CASTS # UR COMP ASSIST: NEGATIVE /HPF — SIGNIFICANT CHANGE UP (ref 0–4)
RBC CASTS # UR COMP ASSIST: NEGATIVE /HPF — SIGNIFICANT CHANGE UP (ref 0–4)
RETICS #: 75.6 K/UL — SIGNIFICANT CHANGE UP (ref 25–125)
RETICS/RBC NFR: 2.8 % — HIGH (ref 0.5–2.5)
RSV RNA NPH QL NAA+NON-PROBE: SIGNIFICANT CHANGE UP
S MARCESCENS DNA BLD POS NAA+NON-PROBE: SIGNIFICANT CHANGE UP
S PNEUM DNA BLD POS QL NAA+NON-PROBE: SIGNIFICANT CHANGE UP
S PYO DNA BLD POS QL NAA+NON-PROBE: SIGNIFICANT CHANGE UP
SAO2 % BLDV: 97.6 % — SIGNIFICANT CHANGE UP
SARS-COV-2 IGG SERPL QL IA: NEGATIVE — SIGNIFICANT CHANGE UP
SARS-COV-2 IGG+IGM SERPL QL IA: 0.2 INDEX — SIGNIFICANT CHANGE UP
SARS-COV-2 IGG+IGM SERPL QL IA: 0.34 INDEX — SIGNIFICANT CHANGE UP
SARS-COV-2 IGG+IGM SERPL QL IA: 0.4 U/ML — SIGNIFICANT CHANGE UP
SARS-COV-2 IGG+IGM SERPL QL IA: 54 U/ML — HIGH
SARS-COV-2 IGG+IGM SERPL QL IA: NEGATIVE — SIGNIFICANT CHANGE UP
SARS-COV-2 IGG+IGM SERPL QL IA: POSITIVE
SARS-COV-2 IGM SERPL IA-ACNC: 0.23 INDEX — SIGNIFICANT CHANGE UP
SARS-COV-2 RNA SPEC QL NAA+PROBE: SIGNIFICANT CHANGE UP
SCHISTOCYTES BLD QL AUTO: SLIGHT — SIGNIFICANT CHANGE UP
SMUDGE CELLS # BLD: PRESENT — SIGNIFICANT CHANGE UP
SODIUM SERPL-SCNC: 135 MMOL/L — SIGNIFICANT CHANGE UP (ref 135–145)
SODIUM SERPL-SCNC: 136 MMOL/L — SIGNIFICANT CHANGE UP (ref 135–145)
SODIUM SERPL-SCNC: 137 MMOL/L — SIGNIFICANT CHANGE UP (ref 135–145)
SODIUM SERPL-SCNC: 137 MMOL/L — SIGNIFICANT CHANGE UP (ref 135–145)
SODIUM SERPL-SCNC: 138 MMOL/L — SIGNIFICANT CHANGE UP (ref 135–145)
SODIUM SERPL-SCNC: 139 MMOL/L — SIGNIFICANT CHANGE UP (ref 135–145)
SODIUM SERPL-SCNC: 139 MMOL/L — SIGNIFICANT CHANGE UP (ref 135–145)
SODIUM SERPL-SCNC: 140 MMOL/L — SIGNIFICANT CHANGE UP (ref 135–145)
SODIUM SERPL-SCNC: 140 MMOL/L — SIGNIFICANT CHANGE UP (ref 135–145)
SODIUM SERPL-SCNC: 141 MMOL/L — SIGNIFICANT CHANGE UP (ref 135–145)
SODIUM SERPL-SCNC: 142 MMOL/L — SIGNIFICANT CHANGE UP (ref 135–145)
SODIUM SERPL-SCNC: 143 MMOL/L — SIGNIFICANT CHANGE UP (ref 135–145)
SODIUM SERPL-SCNC: 145 MMOL/L — SIGNIFICANT CHANGE UP (ref 135–145)
SODIUM SERPL-SCNC: 146 MMOL/L — HIGH (ref 135–145)
SODIUM SERPL-SCNC: 147 MMOL/L — HIGH (ref 135–145)
SODIUM SERPL-SCNC: 149 MMOL/L — HIGH (ref 135–145)
SODIUM SERPL-SCNC: 151 MMOL/L — HIGH (ref 135–145)
SP GR SPEC: 1.01 — SIGNIFICANT CHANGE UP (ref 1.01–1.02)
SP GR SPEC: 1.02 — SIGNIFICANT CHANGE UP (ref 1.01–1.02)
SP GR SPEC: 1.02 — SIGNIFICANT CHANGE UP (ref 1.01–1.02)
SPECIMEN SOURCE: SIGNIFICANT CHANGE UP
T PALLIDUM AB TITR SER: NEGATIVE — SIGNIFICANT CHANGE UP
TIBC SERPL-MCNC: 222 UG/DL — SIGNIFICANT CHANGE UP (ref 220–430)
TIBC SERPL-MCNC: SIGNIFICANT CHANGE UP UG/DL (ref 220–430)
TIBC SERPL-MCNC: SIGNIFICANT CHANGE UP UG/DL (ref 220–430)
TRANSFERRIN SERPL-MCNC: 155 MG/DL — LOW (ref 192–382)
TRANSFERRIN SERPL-MCNC: <80 MG/DL — LOW (ref 192–382)
TROPONIN T SERPL-MCNC: 0.05 NG/ML — SIGNIFICANT CHANGE UP (ref 0–0.06)
TROPONIN T SERPL-MCNC: 0.05 NG/ML — SIGNIFICANT CHANGE UP (ref 0–0.06)
TROPONIN T SERPL-MCNC: <0.01 NG/ML — SIGNIFICANT CHANGE UP (ref 0–0.06)
TSH SERPL-MCNC: 0.95 UIU/ML — SIGNIFICANT CHANGE UP (ref 0.27–4.2)
TSH SERPL-MCNC: 1.39 UIU/ML — SIGNIFICANT CHANGE UP (ref 0.27–4.2)
UROBILINOGEN FLD QL: 1
UROBILINOGEN FLD QL: NEGATIVE MG/DL — SIGNIFICANT CHANGE UP
UROBILINOGEN FLD QL: NEGATIVE — SIGNIFICANT CHANGE UP
UROBILINOGEN FLD QL: NEGATIVE — SIGNIFICANT CHANGE UP
VARIANT LYMPHS # BLD: 0.9 % — SIGNIFICANT CHANGE UP (ref 0–6)
VARIANT LYMPHS # BLD: 4.3 % — SIGNIFICANT CHANGE UP (ref 0–6)
VIT B12 SERPL-MCNC: 1106 PG/ML — SIGNIFICANT CHANGE UP (ref 232–1245)
VIT B12 SERPL-MCNC: 856 PG/ML — SIGNIFICANT CHANGE UP (ref 232–1245)
WBC # BLD: 10.97 K/UL — HIGH (ref 3.8–10.5)
WBC # BLD: 16.52 K/UL — HIGH (ref 3.8–10.5)
WBC # BLD: 17.31 K/UL — HIGH (ref 3.8–10.5)
WBC # BLD: 2.7 K/UL — LOW (ref 3.8–10.5)
WBC # BLD: 3.51 K/UL — LOW (ref 3.8–10.5)
WBC # BLD: 3.91 K/UL — SIGNIFICANT CHANGE UP (ref 3.8–10.5)
WBC # BLD: 4.01 K/UL — SIGNIFICANT CHANGE UP (ref 3.8–10.5)
WBC # BLD: 4.02 K/UL — SIGNIFICANT CHANGE UP (ref 3.8–10.5)
WBC # BLD: 4.11 K/UL — SIGNIFICANT CHANGE UP (ref 3.8–10.5)
WBC # BLD: 4.53 K/UL — SIGNIFICANT CHANGE UP (ref 3.8–10.5)
WBC # BLD: 4.58 K/UL — SIGNIFICANT CHANGE UP (ref 3.8–10.5)
WBC # BLD: 4.76 K/UL — SIGNIFICANT CHANGE UP (ref 3.8–10.5)
WBC # BLD: 4.77 K/UL — SIGNIFICANT CHANGE UP (ref 3.8–10.5)
WBC # BLD: 4.94 K/UL — SIGNIFICANT CHANGE UP (ref 3.8–10.5)
WBC # BLD: 5.14 K/UL — SIGNIFICANT CHANGE UP (ref 3.8–10.5)
WBC # BLD: 5.29 K/UL — SIGNIFICANT CHANGE UP (ref 3.8–10.5)
WBC # BLD: 5.35 K/UL — SIGNIFICANT CHANGE UP (ref 3.8–10.5)
WBC # BLD: 5.44 K/UL — SIGNIFICANT CHANGE UP (ref 3.8–10.5)
WBC # BLD: 5.46 K/UL — SIGNIFICANT CHANGE UP (ref 3.8–10.5)
WBC # BLD: 5.49 K/UL — SIGNIFICANT CHANGE UP (ref 3.8–10.5)
WBC # BLD: 5.49 K/UL — SIGNIFICANT CHANGE UP (ref 3.8–10.5)
WBC # BLD: 5.52 K/UL — SIGNIFICANT CHANGE UP (ref 3.8–10.5)
WBC # BLD: 5.59 K/UL — SIGNIFICANT CHANGE UP (ref 3.8–10.5)
WBC # BLD: 5.95 K/UL — SIGNIFICANT CHANGE UP (ref 3.8–10.5)
WBC # BLD: 6.19 K/UL — SIGNIFICANT CHANGE UP (ref 3.8–10.5)
WBC # BLD: 6.64 K/UL — SIGNIFICANT CHANGE UP (ref 3.8–10.5)
WBC # BLD: 6.66 K/UL — SIGNIFICANT CHANGE UP (ref 3.8–10.5)
WBC # BLD: 7.34 K/UL — SIGNIFICANT CHANGE UP (ref 3.8–10.5)
WBC # BLD: 7.76 K/UL — SIGNIFICANT CHANGE UP (ref 3.8–10.5)
WBC # BLD: 9.81 K/UL — SIGNIFICANT CHANGE UP (ref 3.8–10.5)
WBC # FLD AUTO: 10.97 K/UL — HIGH (ref 3.8–10.5)
WBC # FLD AUTO: 16.52 K/UL — HIGH (ref 3.8–10.5)
WBC # FLD AUTO: 17.31 K/UL — HIGH (ref 3.8–10.5)
WBC # FLD AUTO: 2.7 K/UL — LOW (ref 3.8–10.5)
WBC # FLD AUTO: 3.51 K/UL — LOW (ref 3.8–10.5)
WBC # FLD AUTO: 3.91 K/UL — SIGNIFICANT CHANGE UP (ref 3.8–10.5)
WBC # FLD AUTO: 4.01 K/UL — SIGNIFICANT CHANGE UP (ref 3.8–10.5)
WBC # FLD AUTO: 4.02 K/UL — SIGNIFICANT CHANGE UP (ref 3.8–10.5)
WBC # FLD AUTO: 4.11 K/UL — SIGNIFICANT CHANGE UP (ref 3.8–10.5)
WBC # FLD AUTO: 4.53 K/UL — SIGNIFICANT CHANGE UP (ref 3.8–10.5)
WBC # FLD AUTO: 4.58 K/UL — SIGNIFICANT CHANGE UP (ref 3.8–10.5)
WBC # FLD AUTO: 4.76 K/UL — SIGNIFICANT CHANGE UP (ref 3.8–10.5)
WBC # FLD AUTO: 4.77 K/UL — SIGNIFICANT CHANGE UP (ref 3.8–10.5)
WBC # FLD AUTO: 4.94 K/UL — SIGNIFICANT CHANGE UP (ref 3.8–10.5)
WBC # FLD AUTO: 5.14 K/UL — SIGNIFICANT CHANGE UP (ref 3.8–10.5)
WBC # FLD AUTO: 5.29 K/UL — SIGNIFICANT CHANGE UP (ref 3.8–10.5)
WBC # FLD AUTO: 5.35 K/UL — SIGNIFICANT CHANGE UP (ref 3.8–10.5)
WBC # FLD AUTO: 5.44 K/UL — SIGNIFICANT CHANGE UP (ref 3.8–10.5)
WBC # FLD AUTO: 5.46 K/UL — SIGNIFICANT CHANGE UP (ref 3.8–10.5)
WBC # FLD AUTO: 5.49 K/UL — SIGNIFICANT CHANGE UP (ref 3.8–10.5)
WBC # FLD AUTO: 5.49 K/UL — SIGNIFICANT CHANGE UP (ref 3.8–10.5)
WBC # FLD AUTO: 5.52 K/UL — SIGNIFICANT CHANGE UP (ref 3.8–10.5)
WBC # FLD AUTO: 5.59 K/UL — SIGNIFICANT CHANGE UP (ref 3.8–10.5)
WBC # FLD AUTO: 5.95 K/UL — SIGNIFICANT CHANGE UP (ref 3.8–10.5)
WBC # FLD AUTO: 6.19 K/UL — SIGNIFICANT CHANGE UP (ref 3.8–10.5)
WBC # FLD AUTO: 6.64 K/UL — SIGNIFICANT CHANGE UP (ref 3.8–10.5)
WBC # FLD AUTO: 6.66 K/UL — SIGNIFICANT CHANGE UP (ref 3.8–10.5)
WBC # FLD AUTO: 7.34 K/UL — SIGNIFICANT CHANGE UP (ref 3.8–10.5)
WBC # FLD AUTO: 7.76 K/UL — SIGNIFICANT CHANGE UP (ref 3.8–10.5)
WBC # FLD AUTO: 9.81 K/UL — SIGNIFICANT CHANGE UP (ref 3.8–10.5)
WBC UR QL: ABNORMAL
WBC UR QL: SIGNIFICANT CHANGE UP

## 2021-01-01 PROCEDURE — 87040 BLOOD CULTURE FOR BACTERIA: CPT

## 2021-01-01 PROCEDURE — 99232 SBSQ HOSP IP/OBS MODERATE 35: CPT

## 2021-01-01 PROCEDURE — 99233 SBSQ HOSP IP/OBS HIGH 50: CPT

## 2021-01-01 PROCEDURE — 82435 ASSAY OF BLOOD CHLORIDE: CPT

## 2021-01-01 PROCEDURE — 99497 ADVNCD CARE PLAN 30 MIN: CPT | Mod: 25

## 2021-01-01 PROCEDURE — 70450 CT HEAD/BRAIN W/O DYE: CPT

## 2021-01-01 PROCEDURE — 86900 BLOOD TYPING SEROLOGIC ABO: CPT

## 2021-01-01 PROCEDURE — 99291 CRITICAL CARE FIRST HOUR: CPT

## 2021-01-01 PROCEDURE — 0225U NFCT DS DNA&RNA 21 SARSCOV2: CPT

## 2021-01-01 PROCEDURE — 70450 CT HEAD/BRAIN W/O DYE: CPT | Mod: 26,MG

## 2021-01-01 PROCEDURE — 93010 ELECTROCARDIOGRAM REPORT: CPT

## 2021-01-01 PROCEDURE — 99285 EMERGENCY DEPT VISIT HI MDM: CPT | Mod: 25

## 2021-01-01 PROCEDURE — 99239 HOSP IP/OBS DSCHRG MGMT >30: CPT

## 2021-01-01 PROCEDURE — 97116 GAIT TRAINING THERAPY: CPT

## 2021-01-01 PROCEDURE — 85014 HEMATOCRIT: CPT

## 2021-01-01 PROCEDURE — 84100 ASSAY OF PHOSPHORUS: CPT

## 2021-01-01 PROCEDURE — 80048 BASIC METABOLIC PNL TOTAL CA: CPT

## 2021-01-01 PROCEDURE — 80053 COMPREHEN METABOLIC PANEL: CPT

## 2021-01-01 PROCEDURE — 96366 THER/PROPH/DIAG IV INF ADDON: CPT

## 2021-01-01 PROCEDURE — U0005: CPT

## 2021-01-01 PROCEDURE — 99223 1ST HOSP IP/OBS HIGH 75: CPT

## 2021-01-01 PROCEDURE — 87086 URINE CULTURE/COLONY COUNT: CPT

## 2021-01-01 PROCEDURE — 83735 ASSAY OF MAGNESIUM: CPT

## 2021-01-01 PROCEDURE — 92610 EVALUATE SWALLOWING FUNCTION: CPT

## 2021-01-01 PROCEDURE — 36000 PLACE NEEDLE IN VEIN: CPT

## 2021-01-01 PROCEDURE — 86769 SARS-COV-2 COVID-19 ANTIBODY: CPT

## 2021-01-01 PROCEDURE — 85045 AUTOMATED RETICULOCYTE COUNT: CPT

## 2021-01-01 PROCEDURE — U0003: CPT

## 2021-01-01 PROCEDURE — 76705 ECHO EXAM OF ABDOMEN: CPT

## 2021-01-01 PROCEDURE — 82962 GLUCOSE BLOOD TEST: CPT

## 2021-01-01 PROCEDURE — 85025 COMPLETE CBC W/AUTO DIFF WBC: CPT

## 2021-01-01 PROCEDURE — 81001 URINALYSIS AUTO W/SCOPE: CPT

## 2021-01-01 PROCEDURE — 73522 X-RAY EXAM HIPS BI 3-4 VIEWS: CPT

## 2021-01-01 PROCEDURE — P9040: CPT

## 2021-01-01 PROCEDURE — 84466 ASSAY OF TRANSFERRIN: CPT

## 2021-01-01 PROCEDURE — 87186 SC STD MICRODIL/AGAR DIL: CPT

## 2021-01-01 PROCEDURE — 96374 THER/PROPH/DIAG INJ IV PUSH: CPT

## 2021-01-01 PROCEDURE — 96365 THER/PROPH/DIAG IV INF INIT: CPT

## 2021-01-01 PROCEDURE — 71045 X-RAY EXAM CHEST 1 VIEW: CPT | Mod: 26

## 2021-01-01 PROCEDURE — 93306 TTE W/DOPPLER COMPLETE: CPT | Mod: 26

## 2021-01-01 PROCEDURE — 86850 RBC ANTIBODY SCREEN: CPT

## 2021-01-01 PROCEDURE — 99285 EMERGENCY DEPT VISIT HI MDM: CPT | Mod: GC

## 2021-01-01 PROCEDURE — 36415 COLL VENOUS BLD VENIPUNCTURE: CPT

## 2021-01-01 PROCEDURE — 83880 ASSAY OF NATRIURETIC PEPTIDE: CPT

## 2021-01-01 PROCEDURE — 99222 1ST HOSP IP/OBS MODERATE 55: CPT

## 2021-01-01 PROCEDURE — 36556 INSERT NON-TUNNEL CV CATH: CPT

## 2021-01-01 PROCEDURE — 85730 THROMBOPLASTIN TIME PARTIAL: CPT

## 2021-01-01 PROCEDURE — 82390 ASSAY OF CERULOPLASMIN: CPT

## 2021-01-01 PROCEDURE — 87340 HEPATITIS B SURFACE AG IA: CPT

## 2021-01-01 PROCEDURE — 87077 CULTURE AEROBIC IDENTIFY: CPT

## 2021-01-01 PROCEDURE — 83540 ASSAY OF IRON: CPT

## 2021-01-01 PROCEDURE — 86704 HEP B CORE ANTIBODY TOTAL: CPT

## 2021-01-01 PROCEDURE — 82330 ASSAY OF CALCIUM: CPT

## 2021-01-01 PROCEDURE — 99497 ADVNCD CARE PLAN 30 MIN: CPT

## 2021-01-01 PROCEDURE — 99223 1ST HOSP IP/OBS HIGH 75: CPT | Mod: AI

## 2021-01-01 PROCEDURE — 84155 ASSAY OF PROTEIN SERUM: CPT

## 2021-01-01 PROCEDURE — 92526 ORAL FUNCTION THERAPY: CPT

## 2021-01-01 PROCEDURE — 76775 US EXAM ABDO BACK WALL LIM: CPT

## 2021-01-01 PROCEDURE — 85027 COMPLETE CBC AUTOMATED: CPT

## 2021-01-01 PROCEDURE — 86334 IMMUNOFIX E-PHORESIS SERUM: CPT

## 2021-01-01 PROCEDURE — 81003 URINALYSIS AUTO W/O SCOPE: CPT

## 2021-01-01 PROCEDURE — 71045 X-RAY EXAM CHEST 1 VIEW: CPT

## 2021-01-01 PROCEDURE — 82306 VITAMIN D 25 HYDROXY: CPT

## 2021-01-01 PROCEDURE — G1004: CPT

## 2021-01-01 PROCEDURE — 85018 HEMOGLOBIN: CPT

## 2021-01-01 PROCEDURE — 96372 THER/PROPH/DIAG INJ SC/IM: CPT

## 2021-01-01 PROCEDURE — 99291 CRITICAL CARE FIRST HOUR: CPT | Mod: 25

## 2021-01-01 PROCEDURE — 82803 BLOOD GASES ANY COMBINATION: CPT

## 2021-01-01 PROCEDURE — 86803 HEPATITIS C AB TEST: CPT

## 2021-01-01 PROCEDURE — 93306 TTE W/DOPPLER COMPLETE: CPT

## 2021-01-01 PROCEDURE — 74177 CT ABD & PELVIS W/CONTRAST: CPT | Mod: 26,ME

## 2021-01-01 PROCEDURE — 85610 PROTHROMBIN TIME: CPT

## 2021-01-01 PROCEDURE — 82140 ASSAY OF AMMONIA: CPT

## 2021-01-01 PROCEDURE — 97530 THERAPEUTIC ACTIVITIES: CPT

## 2021-01-01 PROCEDURE — 84443 ASSAY THYROID STIM HORMONE: CPT

## 2021-01-01 PROCEDURE — G0378: CPT

## 2021-01-01 PROCEDURE — 71045 X-RAY EXAM CHEST 1 VIEW: CPT | Mod: 26,77

## 2021-01-01 PROCEDURE — 96375 TX/PRO/DX INJ NEW DRUG ADDON: CPT

## 2021-01-01 PROCEDURE — 99285 EMERGENCY DEPT VISIT HI MDM: CPT | Mod: CS,GC

## 2021-01-01 PROCEDURE — 97110 THERAPEUTIC EXERCISES: CPT

## 2021-01-01 PROCEDURE — 72125 CT NECK SPINE W/O DYE: CPT

## 2021-01-01 PROCEDURE — 99284 EMERGENCY DEPT VISIT MOD MDM: CPT | Mod: 25

## 2021-01-01 PROCEDURE — 76705 ECHO EXAM OF ABDOMEN: CPT | Mod: 26,RT

## 2021-01-01 PROCEDURE — 93005 ELECTROCARDIOGRAM TRACING: CPT

## 2021-01-01 PROCEDURE — 72192 CT PELVIS W/O DYE: CPT | Mod: 26,MA

## 2021-01-01 PROCEDURE — 99285 EMERGENCY DEPT VISIT HI MDM: CPT | Mod: CS

## 2021-01-01 PROCEDURE — 97163 PT EVAL HIGH COMPLEX 45 MIN: CPT

## 2021-01-01 PROCEDURE — 84134 ASSAY OF PREALBUMIN: CPT

## 2021-01-01 PROCEDURE — 82728 ASSAY OF FERRITIN: CPT

## 2021-01-01 PROCEDURE — 99285 EMERGENCY DEPT VISIT HI MDM: CPT

## 2021-01-01 PROCEDURE — 84132 ASSAY OF SERUM POTASSIUM: CPT

## 2021-01-01 PROCEDURE — 72125 CT NECK SPINE W/O DYE: CPT | Mod: 26,MH

## 2021-01-01 PROCEDURE — 70450 CT HEAD/BRAIN W/O DYE: CPT | Mod: 26,MA

## 2021-01-01 PROCEDURE — 36430 TRANSFUSION BLD/BLD COMPNT: CPT

## 2021-01-01 PROCEDURE — 74176 CT ABD & PELVIS W/O CONTRAST: CPT | Mod: MA

## 2021-01-01 PROCEDURE — 83550 IRON BINDING TEST: CPT

## 2021-01-01 PROCEDURE — 83605 ASSAY OF LACTIC ACID: CPT

## 2021-01-01 PROCEDURE — 74177 CT ABD & PELVIS W/CONTRAST: CPT

## 2021-01-01 PROCEDURE — 84295 ASSAY OF SERUM SODIUM: CPT

## 2021-01-01 PROCEDURE — 86709 HEPATITIS A IGM ANTIBODY: CPT

## 2021-01-01 PROCEDURE — 76942 ECHO GUIDE FOR BIOPSY: CPT | Mod: 26,59

## 2021-01-01 PROCEDURE — 87150 DNA/RNA AMPLIFIED PROBE: CPT

## 2021-01-01 PROCEDURE — 82105 ALPHA-FETOPROTEIN SERUM: CPT

## 2021-01-01 PROCEDURE — 99217: CPT

## 2021-01-01 PROCEDURE — 84484 ASSAY OF TROPONIN QUANT: CPT

## 2021-01-01 PROCEDURE — 86708 HEPATITIS A ANTIBODY: CPT

## 2021-01-01 PROCEDURE — 86923 COMPATIBILITY TEST ELECTRIC: CPT

## 2021-01-01 PROCEDURE — 71250 CT THORAX DX C-: CPT | Mod: MA

## 2021-01-01 PROCEDURE — 76937 US GUIDE VASCULAR ACCESS: CPT | Mod: 26

## 2021-01-01 PROCEDURE — 76937 US GUIDE VASCULAR ACCESS: CPT | Mod: 26,59

## 2021-01-01 PROCEDURE — 99220: CPT

## 2021-01-01 PROCEDURE — 99284 EMERGENCY DEPT VISIT MOD MDM: CPT

## 2021-01-01 PROCEDURE — 80076 HEPATIC FUNCTION PANEL: CPT

## 2021-01-01 PROCEDURE — 82746 ASSAY OF FOLIC ACID SERUM: CPT

## 2021-01-01 PROCEDURE — 86706 HEP B SURFACE ANTIBODY: CPT

## 2021-01-01 PROCEDURE — 71250 CT THORAX DX C-: CPT | Mod: 26,MA

## 2021-01-01 PROCEDURE — 82272 OCCULT BLD FECES 1-3 TESTS: CPT

## 2021-01-01 PROCEDURE — 70450 CT HEAD/BRAIN W/O DYE: CPT | Mod: 26

## 2021-01-01 PROCEDURE — 86901 BLOOD TYPING SEROLOGIC RH(D): CPT

## 2021-01-01 PROCEDURE — 82947 ASSAY GLUCOSE BLOOD QUANT: CPT

## 2021-01-01 PROCEDURE — 82607 VITAMIN B-12: CPT

## 2021-01-01 PROCEDURE — 86038 ANTINUCLEAR ANTIBODIES: CPT

## 2021-01-01 PROCEDURE — 87493 C DIFF AMPLIFIED PROBE: CPT

## 2021-01-01 PROCEDURE — 84145 PROCALCITONIN (PCT): CPT

## 2021-01-01 PROCEDURE — 70450 CT HEAD/BRAIN W/O DYE: CPT | Mod: 26,MH

## 2021-01-01 PROCEDURE — 87637 SARSCOV2&INF A&B&RSV AMP PRB: CPT

## 2021-01-01 PROCEDURE — 74176 CT ABD & PELVIS W/O CONTRAST: CPT | Mod: 26,MA

## 2021-01-01 PROCEDURE — 86381 MITOCHONDRIAL ANTIBODY EACH: CPT

## 2021-01-01 PROCEDURE — 87181 SC STD AGAR DILUTION PER AGT: CPT

## 2021-01-01 PROCEDURE — 83690 ASSAY OF LIPASE: CPT

## 2021-01-01 PROCEDURE — 70450 CT HEAD/BRAIN W/O DYE: CPT | Mod: 26,ME

## 2021-01-01 PROCEDURE — 76775 US EXAM ABDO BACK WALL LIM: CPT | Mod: 26

## 2021-01-01 PROCEDURE — 72192 CT PELVIS W/O DYE: CPT

## 2021-01-01 PROCEDURE — 86780 TREPONEMA PALLIDUM: CPT

## 2021-01-01 PROCEDURE — 73522 X-RAY EXAM HIPS BI 3-4 VIEWS: CPT | Mod: 26

## 2021-01-01 PROCEDURE — 84165 PROTEIN E-PHORESIS SERUM: CPT

## 2021-01-01 RX ORDER — ROBINUL 0.2 MG/ML
1 INJECTION INTRAMUSCULAR; INTRAVENOUS
Refills: 0 | Status: DISCONTINUED | OUTPATIENT
Start: 2021-01-01 | End: 2021-01-01

## 2021-01-01 RX ORDER — MEROPENEM 1 G/30ML
1000 INJECTION INTRAVENOUS ONCE
Refills: 0 | Status: COMPLETED | OUTPATIENT
Start: 2021-01-01 | End: 2021-01-01

## 2021-01-01 RX ORDER — AMIODARONE HYDROCHLORIDE 400 MG/1
400 TABLET ORAL EVERY 8 HOURS
Refills: 0 | Status: DISCONTINUED | OUTPATIENT
Start: 2021-01-01 | End: 2021-01-01

## 2021-01-01 RX ORDER — SODIUM CHLORIDE 9 MG/ML
1000 INJECTION, SOLUTION INTRAVENOUS
Refills: 0 | Status: DISCONTINUED | OUTPATIENT
Start: 2021-01-01 | End: 2021-01-01

## 2021-01-01 RX ORDER — SODIUM CHLORIDE 9 MG/ML
1000 INJECTION INTRAMUSCULAR; INTRAVENOUS; SUBCUTANEOUS
Refills: 0 | Status: DISCONTINUED | OUTPATIENT
Start: 2021-01-01 | End: 2021-01-01

## 2021-01-01 RX ORDER — SODIUM CHLORIDE 9 MG/ML
1000 INJECTION INTRAMUSCULAR; INTRAVENOUS; SUBCUTANEOUS ONCE
Refills: 0 | Status: COMPLETED | OUTPATIENT
Start: 2021-01-01 | End: 2021-01-01

## 2021-01-01 RX ORDER — POTASSIUM CHLORIDE 20 MEQ
40 PACKET (EA) ORAL EVERY 4 HOURS
Refills: 0 | Status: COMPLETED | OUTPATIENT
Start: 2021-01-01 | End: 2021-01-01

## 2021-01-01 RX ORDER — FOLIC ACID 0.8 MG
1 TABLET ORAL DAILY
Refills: 0 | Status: DISCONTINUED | OUTPATIENT
Start: 2021-01-01 | End: 2021-01-01

## 2021-01-01 RX ORDER — AMIODARONE HYDROCHLORIDE 400 MG/1
TABLET ORAL
Refills: 0 | Status: DISCONTINUED | OUTPATIENT
Start: 2021-01-01 | End: 2021-01-01

## 2021-01-01 RX ORDER — SODIUM CHLORIDE 9 MG/ML
1000 INJECTION INTRAMUSCULAR; INTRAVENOUS; SUBCUTANEOUS
Refills: 0 | Status: COMPLETED | OUTPATIENT
Start: 2021-01-01 | End: 2021-01-01

## 2021-01-01 RX ORDER — DILTIAZEM HCL 120 MG
7.5 CAPSULE, EXT RELEASE 24 HR ORAL
Qty: 125 | Refills: 0 | Status: DISCONTINUED | OUTPATIENT
Start: 2021-01-01 | End: 2021-01-01

## 2021-01-01 RX ORDER — ACETAMINOPHEN 500 MG
750 TABLET ORAL ONCE
Refills: 0 | Status: COMPLETED | OUTPATIENT
Start: 2021-01-01 | End: 2021-01-01

## 2021-01-01 RX ORDER — VANCOMYCIN HCL 1 G
125 VIAL (EA) INTRAVENOUS
Qty: 4000 | Refills: 0
Start: 2021-01-01 | End: 2021-01-01

## 2021-01-01 RX ORDER — AMIODARONE HYDROCHLORIDE 400 MG/1
200 TABLET ORAL DAILY
Refills: 0 | Status: DISCONTINUED | OUTPATIENT
Start: 2021-01-01 | End: 2021-01-01

## 2021-01-01 RX ORDER — POTASSIUM CHLORIDE 20 MEQ
20 PACKET (EA) ORAL
Refills: 0 | Status: COMPLETED | OUTPATIENT
Start: 2021-01-01 | End: 2021-01-01

## 2021-01-01 RX ORDER — LANOLIN ALCOHOL/MO/W.PET/CERES
3 CREAM (GRAM) TOPICAL AT BEDTIME
Refills: 0 | Status: DISCONTINUED | OUTPATIENT
Start: 2021-01-01 | End: 2021-01-01

## 2021-01-01 RX ORDER — MORPHINE SULFATE 50 MG/1
0.5 CAPSULE, EXTENDED RELEASE ORAL
Refills: 0 | Status: DISCONTINUED | OUTPATIENT
Start: 2021-01-01 | End: 2021-01-01

## 2021-01-01 RX ORDER — DRONABINOL 2.5 MG
2.5 CAPSULE ORAL DAILY
Refills: 0 | Status: DISCONTINUED | OUTPATIENT
Start: 2021-01-01 | End: 2021-01-01

## 2021-01-01 RX ORDER — MORPHINE SULFATE 50 MG/1
2 CAPSULE, EXTENDED RELEASE ORAL
Refills: 0 | Status: DISCONTINUED | OUTPATIENT
Start: 2021-01-01 | End: 2021-01-01

## 2021-01-01 RX ORDER — MAGNESIUM SULFATE 500 MG/ML
1 VIAL (ML) INJECTION ONCE
Refills: 0 | Status: COMPLETED | OUTPATIENT
Start: 2021-01-01 | End: 2021-01-01

## 2021-01-01 RX ORDER — METOPROLOL TARTRATE 50 MG
50 TABLET ORAL DAILY
Refills: 0 | Status: DISCONTINUED | OUTPATIENT
Start: 2021-01-01 | End: 2021-01-01

## 2021-01-01 RX ORDER — ONDANSETRON 8 MG/1
4 TABLET, FILM COATED ORAL EVERY 6 HOURS
Refills: 0 | Status: DISCONTINUED | OUTPATIENT
Start: 2021-01-01 | End: 2021-01-01

## 2021-01-01 RX ORDER — METOPROLOL TARTRATE 50 MG
100 TABLET ORAL EVERY 12 HOURS
Refills: 0 | Status: DISCONTINUED | OUTPATIENT
Start: 2021-01-01 | End: 2021-01-01

## 2021-01-01 RX ORDER — VANCOMYCIN HCL 1 G
1 VIAL (EA) INTRAVENOUS
Qty: 12 | Refills: 0
Start: 2021-01-01 | End: 2021-01-01

## 2021-01-01 RX ORDER — ROBINUL 0.2 MG/ML
0.2 INJECTION INTRAMUSCULAR; INTRAVENOUS EVERY 8 HOURS
Refills: 0 | Status: DISCONTINUED | OUTPATIENT
Start: 2021-01-01 | End: 2021-01-01

## 2021-01-01 RX ORDER — SENNA PLUS 8.6 MG/1
2 TABLET ORAL
Qty: 60 | Refills: 0
Start: 2021-01-01 | End: 2021-12-11

## 2021-01-01 RX ORDER — VANCOMYCIN HCL 1 G
750 VIAL (EA) INTRAVENOUS ONCE
Refills: 0 | Status: COMPLETED | OUTPATIENT
Start: 2021-01-01 | End: 2021-01-01

## 2021-01-01 RX ORDER — SODIUM CHLORIDE 9 MG/ML
500 INJECTION INTRAMUSCULAR; INTRAVENOUS; SUBCUTANEOUS ONCE
Refills: 0 | Status: COMPLETED | OUTPATIENT
Start: 2021-01-01 | End: 2021-01-01

## 2021-01-01 RX ORDER — VANCOMYCIN HCL 1 G
125 VIAL (EA) INTRAVENOUS EVERY 6 HOURS
Refills: 0 | Status: DISCONTINUED | OUTPATIENT
Start: 2021-01-01 | End: 2021-01-01

## 2021-01-01 RX ORDER — DILTIAZEM HCL 120 MG
5 CAPSULE, EXT RELEASE 24 HR ORAL ONCE
Refills: 0 | Status: COMPLETED | OUTPATIENT
Start: 2021-01-01 | End: 2021-01-01

## 2021-01-01 RX ORDER — MORPHINE SULFATE 50 MG/1
4 CAPSULE, EXTENDED RELEASE ORAL
Qty: 0 | Refills: 0 | DISCHARGE
Start: 2021-01-01

## 2021-01-01 RX ORDER — HEPARIN SODIUM 5000 [USP'U]/ML
5000 INJECTION INTRAVENOUS; SUBCUTANEOUS EVERY 12 HOURS
Refills: 0 | Status: DISCONTINUED | OUTPATIENT
Start: 2021-01-01 | End: 2021-01-01

## 2021-01-01 RX ORDER — ROBINUL 0.2 MG/ML
0.2 INJECTION INTRAMUSCULAR; INTRAVENOUS
Qty: 0 | Refills: 0 | DISCHARGE
Start: 2021-01-01

## 2021-01-01 RX ORDER — ASPIRIN/CALCIUM CARB/MAGNESIUM 324 MG
81 TABLET ORAL DAILY
Refills: 0 | Status: DISCONTINUED | OUTPATIENT
Start: 2021-01-01 | End: 2021-01-01

## 2021-01-01 RX ORDER — VANCOMYCIN HCL 1 G
VIAL (EA) INTRAVENOUS
Refills: 0 | Status: DISCONTINUED | OUTPATIENT
Start: 2021-01-01 | End: 2021-01-01

## 2021-01-01 RX ORDER — FOLIC ACID 0.8 MG
1 TABLET ORAL
Qty: 0 | Refills: 0 | DISCHARGE
Start: 2021-01-01

## 2021-01-01 RX ORDER — DEXTROSE 50 % IN WATER 50 %
50 SYRINGE (ML) INTRAVENOUS ONCE
Refills: 0 | Status: COMPLETED | OUTPATIENT
Start: 2021-01-01 | End: 2021-01-01

## 2021-01-01 RX ORDER — MAGNESIUM SULFATE 500 MG/ML
2 VIAL (ML) INJECTION ONCE
Refills: 0 | Status: COMPLETED | OUTPATIENT
Start: 2021-01-01 | End: 2021-01-01

## 2021-01-01 RX ORDER — VANCOMYCIN HCL 1 G
1000 VIAL (EA) INTRAVENOUS ONCE
Refills: 0 | Status: DISCONTINUED | OUTPATIENT
Start: 2021-01-01 | End: 2021-01-01

## 2021-01-01 RX ORDER — ASPIRIN/CALCIUM CARB/MAGNESIUM 324 MG
0 TABLET ORAL
Qty: 0 | Refills: 0 | DISCHARGE

## 2021-01-01 RX ORDER — ACETAMINOPHEN 500 MG
650 TABLET ORAL EVERY 6 HOURS
Refills: 0 | Status: DISCONTINUED | OUTPATIENT
Start: 2021-01-01 | End: 2021-01-01

## 2021-01-01 RX ORDER — DILTIAZEM HCL 120 MG
5 CAPSULE, EXT RELEASE 24 HR ORAL
Qty: 125 | Refills: 0 | Status: DISCONTINUED | OUTPATIENT
Start: 2021-01-01 | End: 2021-01-01

## 2021-01-01 RX ORDER — FOLIC ACID 0.8 MG
1 TABLET ORAL
Qty: 0 | Refills: 0 | DISCHARGE

## 2021-01-01 RX ORDER — CEFTRIAXONE 500 MG/1
1000 INJECTION, POWDER, FOR SOLUTION INTRAMUSCULAR; INTRAVENOUS EVERY 24 HOURS
Refills: 0 | Status: COMPLETED | OUTPATIENT
Start: 2021-01-01 | End: 2021-01-01

## 2021-01-01 RX ORDER — SODIUM CHLORIDE 9 MG/ML
1000 INJECTION, SOLUTION INTRAVENOUS
Refills: 0 | Status: COMPLETED | OUTPATIENT
Start: 2021-01-01 | End: 2021-01-01

## 2021-01-01 RX ORDER — PHYTONADIONE (VIT K1) 5 MG
5 TABLET ORAL ONCE
Refills: 0 | Status: DISCONTINUED | OUTPATIENT
Start: 2021-01-01 | End: 2021-01-01

## 2021-01-01 RX ORDER — LACTOBACILLUS ACIDOPHILUS 100MM CELL
1 CAPSULE ORAL DAILY
Refills: 0 | Status: DISCONTINUED | OUTPATIENT
Start: 2021-01-01 | End: 2021-01-01

## 2021-01-01 RX ORDER — PANTOPRAZOLE SODIUM 20 MG/1
40 TABLET, DELAYED RELEASE ORAL
Refills: 0 | Status: DISCONTINUED | OUTPATIENT
Start: 2021-01-01 | End: 2021-01-01

## 2021-01-01 RX ORDER — MORPHINE SULFATE 50 MG/1
2 CAPSULE, EXTENDED RELEASE ORAL EVERY 6 HOURS
Refills: 0 | Status: DISCONTINUED | OUTPATIENT
Start: 2021-01-01 | End: 2021-01-01

## 2021-01-01 RX ORDER — AMIODARONE HYDROCHLORIDE 400 MG/1
1 TABLET ORAL
Qty: 30 | Refills: 0
Start: 2021-01-01 | End: 2021-01-01

## 2021-01-01 RX ORDER — PIPERACILLIN AND TAZOBACTAM 4; .5 G/20ML; G/20ML
3.38 INJECTION, POWDER, LYOPHILIZED, FOR SOLUTION INTRAVENOUS EVERY 12 HOURS
Refills: 0 | Status: DISCONTINUED | OUTPATIENT
Start: 2021-01-01 | End: 2021-01-01

## 2021-01-01 RX ORDER — VANCOMYCIN HCL 1 G
5 VIAL (EA) INTRAVENOUS
Qty: 400 | Refills: 0
Start: 2021-01-01 | End: 2021-01-01

## 2021-01-01 RX ORDER — SENNA PLUS 8.6 MG/1
2 TABLET ORAL
Qty: 0 | Refills: 0 | DISCHARGE

## 2021-01-01 RX ORDER — VALPROIC ACID (AS SODIUM SALT) 250 MG/5ML
250 SOLUTION, ORAL ORAL EVERY 6 HOURS
Refills: 0 | Status: DISCONTINUED | OUTPATIENT
Start: 2021-01-01 | End: 2021-01-01

## 2021-01-01 RX ORDER — VANCOMYCIN HCL 1 G
5 VIAL (EA) INTRAVENOUS
Qty: 1200 | Refills: 0
Start: 2021-01-01 | End: 2021-01-01

## 2021-01-01 RX ORDER — CEFTRIAXONE 500 MG/1
1000 INJECTION, POWDER, FOR SOLUTION INTRAMUSCULAR; INTRAVENOUS ONCE
Refills: 0 | Status: COMPLETED | OUTPATIENT
Start: 2021-01-01 | End: 2021-01-01

## 2021-01-01 RX ORDER — DEXTROSE MONOHYDRATE, SODIUM CHLORIDE, AND POTASSIUM CHLORIDE 50; .745; 4.5 G/1000ML; G/1000ML; G/1000ML
1000 INJECTION, SOLUTION INTRAVENOUS
Refills: 0 | Status: DISCONTINUED | OUTPATIENT
Start: 2021-01-01 | End: 2021-01-01

## 2021-01-01 RX ORDER — MORPHINE SULFATE 50 MG/1
4 CAPSULE, EXTENDED RELEASE ORAL EVERY 4 HOURS
Refills: 0 | Status: DISCONTINUED | OUTPATIENT
Start: 2021-01-01 | End: 2021-01-01

## 2021-01-01 RX ORDER — DILTIAZEM HCL 120 MG
10 CAPSULE, EXT RELEASE 24 HR ORAL
Qty: 125 | Refills: 0 | Status: DISCONTINUED | OUTPATIENT
Start: 2021-01-01 | End: 2021-01-01

## 2021-01-01 RX ORDER — COLLAGENASE CLOSTRIDIUM HIST. 250 UNIT/G
1 OINTMENT (GRAM) TOPICAL
Qty: 30 | Refills: 0
Start: 2021-01-01 | End: 2021-12-11

## 2021-01-01 RX ORDER — AMIODARONE HYDROCHLORIDE 400 MG/1
1 TABLET ORAL
Qty: 0 | Refills: 0 | DISCHARGE

## 2021-01-01 RX ORDER — POLYETHYLENE GLYCOL 3350 17 G/17G
17 POWDER, FOR SOLUTION ORAL DAILY
Refills: 0 | Status: DISCONTINUED | OUTPATIENT
Start: 2021-01-01 | End: 2021-01-01

## 2021-01-01 RX ORDER — COLLAGENASE CLOSTRIDIUM HIST. 250 UNIT/G
1 OINTMENT (GRAM) TOPICAL DAILY
Refills: 0 | Status: DISCONTINUED | OUTPATIENT
Start: 2021-01-01 | End: 2021-01-01

## 2021-01-01 RX ORDER — SODIUM CHLORIDE 9 MG/ML
1000 INJECTION INTRAMUSCULAR; INTRAVENOUS; SUBCUTANEOUS ONCE
Refills: 0 | Status: DISCONTINUED | OUTPATIENT
Start: 2021-01-01 | End: 2021-01-01

## 2021-01-01 RX ORDER — DEXTROSE MONOHYDRATE, SODIUM CHLORIDE, AND POTASSIUM CHLORIDE 50; .745; 4.5 G/1000ML; G/1000ML; G/1000ML
1000 INJECTION, SOLUTION INTRAVENOUS
Refills: 0 | Status: COMPLETED | OUTPATIENT
Start: 2021-01-01 | End: 2021-01-01

## 2021-01-01 RX ORDER — MEROPENEM 1 G/30ML
1000 INJECTION INTRAVENOUS EVERY 24 HOURS
Refills: 0 | Status: COMPLETED | OUTPATIENT
Start: 2021-01-01 | End: 2021-01-01

## 2021-01-01 RX ORDER — METOPROLOL TARTRATE 50 MG
5 TABLET ORAL EVERY 6 HOURS
Refills: 0 | Status: DISCONTINUED | OUTPATIENT
Start: 2021-01-01 | End: 2021-01-01

## 2021-01-01 RX ORDER — FAMOTIDINE 10 MG/ML
2 INJECTION INTRAVENOUS
Qty: 0 | Refills: 0 | DISCHARGE

## 2021-01-01 RX ORDER — INFLUENZA VIRUS VACCINE 15; 15; 15; 15 UG/.5ML; UG/.5ML; UG/.5ML; UG/.5ML
0.5 SUSPENSION INTRAMUSCULAR ONCE
Refills: 0 | Status: DISCONTINUED | OUTPATIENT
Start: 2021-01-01 | End: 2021-01-01

## 2021-01-01 RX ORDER — MEROPENEM 1 G/30ML
1000 INJECTION INTRAVENOUS EVERY 24 HOURS
Refills: 0 | Status: DISCONTINUED | OUTPATIENT
Start: 2021-01-01 | End: 2021-01-01

## 2021-01-01 RX ORDER — IRON SUCROSE 20 MG/ML
200 INJECTION, SOLUTION INTRAVENOUS EVERY 24 HOURS
Refills: 0 | Status: COMPLETED | OUTPATIENT
Start: 2021-01-01 | End: 2021-01-01

## 2021-01-01 RX ORDER — METOPROLOL TARTRATE 50 MG
1 TABLET ORAL
Qty: 0 | Refills: 0 | DISCHARGE

## 2021-01-01 RX ORDER — METOPROLOL TARTRATE 50 MG
25 TABLET ORAL EVERY 12 HOURS
Refills: 0 | Status: DISCONTINUED | OUTPATIENT
Start: 2021-01-01 | End: 2021-01-01

## 2021-01-01 RX ORDER — DIGOXIN 250 MCG
250 TABLET ORAL EVERY 6 HOURS
Refills: 0 | Status: COMPLETED | OUTPATIENT
Start: 2021-01-01 | End: 2021-01-01

## 2021-01-01 RX ORDER — POTASSIUM CHLORIDE 20 MEQ
40 PACKET (EA) ORAL ONCE
Refills: 0 | Status: COMPLETED | OUTPATIENT
Start: 2021-01-01 | End: 2021-01-01

## 2021-01-01 RX ORDER — VANCOMYCIN HCL 1 G
500 VIAL (EA) INTRAVENOUS ONCE
Refills: 0 | Status: DISCONTINUED | OUTPATIENT
Start: 2021-01-01 | End: 2021-01-01

## 2021-01-01 RX ORDER — METOPROLOL TARTRATE 50 MG
25 TABLET ORAL
Refills: 0 | Status: DISCONTINUED | OUTPATIENT
Start: 2021-01-01 | End: 2021-01-01

## 2021-01-01 RX ORDER — LANOLIN ALCOHOL/MO/W.PET/CERES
3 CREAM (GRAM) TOPICAL ONCE
Refills: 0 | Status: COMPLETED | OUTPATIENT
Start: 2021-01-01 | End: 2021-01-01

## 2021-01-01 RX ORDER — ACETAMINOPHEN 500 MG
650 TABLET ORAL ONCE
Refills: 0 | Status: COMPLETED | OUTPATIENT
Start: 2021-01-01 | End: 2021-01-01

## 2021-01-01 RX ORDER — PIPERACILLIN AND TAZOBACTAM 4; .5 G/20ML; G/20ML
3.38 INJECTION, POWDER, LYOPHILIZED, FOR SOLUTION INTRAVENOUS ONCE
Refills: 0 | Status: COMPLETED | OUTPATIENT
Start: 2021-01-01 | End: 2021-01-01

## 2021-01-01 RX ORDER — DRONABINOL 2.5 MG
2.5 CAPSULE ORAL DAILY
Refills: 0 | Status: COMPLETED | OUTPATIENT
Start: 2021-01-01 | End: 2021-01-01

## 2021-01-01 RX ORDER — METOPROLOL TARTRATE 50 MG
50 TABLET ORAL
Refills: 0 | Status: DISCONTINUED | OUTPATIENT
Start: 2021-01-01 | End: 2021-01-01

## 2021-01-01 RX ORDER — CEFTRIAXONE 500 MG/1
1 INJECTION, POWDER, FOR SOLUTION INTRAMUSCULAR; INTRAVENOUS
Qty: 8 | Refills: 0
Start: 2021-01-01 | End: 2021-01-01

## 2021-01-01 RX ORDER — AMIODARONE HYDROCHLORIDE 400 MG/1
1 TABLET ORAL
Qty: 0 | Refills: 0 | DISCHARGE
Start: 2021-01-01

## 2021-01-01 RX ORDER — HEPARIN SODIUM 5000 [USP'U]/ML
5000 INJECTION INTRAVENOUS; SUBCUTANEOUS EVERY 8 HOURS
Refills: 0 | Status: DISCONTINUED | OUTPATIENT
Start: 2021-01-01 | End: 2021-01-01

## 2021-01-01 RX ORDER — METOPROLOL TARTRATE 50 MG
5 TABLET ORAL ONCE
Refills: 0 | Status: COMPLETED | OUTPATIENT
Start: 2021-01-01 | End: 2021-01-01

## 2021-01-01 RX ORDER — CEFTRIAXONE 500 MG/1
2000 INJECTION, POWDER, FOR SOLUTION INTRAMUSCULAR; INTRAVENOUS EVERY 24 HOURS
Refills: 0 | Status: COMPLETED | OUTPATIENT
Start: 2021-01-01 | End: 2021-01-01

## 2021-01-01 RX ORDER — DEXTROSE 50 % IN WATER 50 %
1 SYRINGE (ML) INTRAVENOUS ONCE
Refills: 0 | Status: COMPLETED | OUTPATIENT
Start: 2021-01-01 | End: 2021-01-01

## 2021-01-01 RX ORDER — SENNA PLUS 8.6 MG/1
2 TABLET ORAL
Qty: 0 | Refills: 0 | DISCHARGE
Start: 2021-01-01

## 2021-01-01 RX ORDER — CEPHALEXIN 500 MG
1 CAPSULE ORAL
Qty: 0 | Refills: 0 | DISCHARGE

## 2021-01-01 RX ORDER — AZITHROMYCIN 500 MG/1
500 TABLET, FILM COATED ORAL ONCE
Refills: 0 | Status: COMPLETED | OUTPATIENT
Start: 2021-01-01 | End: 2021-01-01

## 2021-01-01 RX ORDER — SENNA PLUS 8.6 MG/1
2 TABLET ORAL AT BEDTIME
Refills: 0 | Status: DISCONTINUED | OUTPATIENT
Start: 2021-01-01 | End: 2021-01-01

## 2021-01-01 RX ORDER — ONDANSETRON 8 MG/1
4 TABLET, FILM COATED ORAL EVERY 8 HOURS
Refills: 0 | Status: DISCONTINUED | OUTPATIENT
Start: 2021-01-01 | End: 2021-01-01

## 2021-01-01 RX ORDER — FUROSEMIDE 40 MG
20 TABLET ORAL ONCE
Refills: 0 | Status: COMPLETED | OUTPATIENT
Start: 2021-01-01 | End: 2021-01-01

## 2021-01-01 RX ORDER — METOPROLOL TARTRATE 50 MG
25 TABLET ORAL DAILY
Refills: 0 | Status: DISCONTINUED | OUTPATIENT
Start: 2021-01-01 | End: 2021-01-01

## 2021-01-01 RX ORDER — METOPROLOL TARTRATE 50 MG
50 TABLET ORAL EVERY 6 HOURS
Refills: 0 | Status: DISCONTINUED | OUTPATIENT
Start: 2021-01-01 | End: 2021-01-01

## 2021-01-01 RX ORDER — VANCOMYCIN HCL 1 G
1 VIAL (EA) INTRAVENOUS
Qty: 80 | Refills: 0
Start: 2021-01-01 | End: 2021-01-01

## 2021-01-01 RX ORDER — DRONABINOL 2.5 MG
1 CAPSULE ORAL
Qty: 30 | Refills: 0
Start: 2021-01-01 | End: 2021-01-01

## 2021-01-01 RX ORDER — SODIUM CHLORIDE 9 MG/ML
1000 INJECTION, SOLUTION INTRAVENOUS ONCE
Refills: 0 | Status: COMPLETED | OUTPATIENT
Start: 2021-01-01 | End: 2021-01-01

## 2021-01-01 RX ORDER — METOPROLOL TARTRATE 50 MG
1 TABLET ORAL
Qty: 60 | Refills: 0
Start: 2021-01-01 | End: 2021-12-08

## 2021-01-01 RX ORDER — DRONABINOL 2.5 MG
1 CAPSULE ORAL
Qty: 0 | Refills: 0 | DISCHARGE

## 2021-01-01 RX ORDER — SODIUM CHLORIDE 9 MG/ML
500 INJECTION INTRAMUSCULAR; INTRAVENOUS; SUBCUTANEOUS
Refills: 0 | Status: COMPLETED | OUTPATIENT
Start: 2021-01-01 | End: 2021-01-01

## 2021-01-01 RX ORDER — POLYETHYLENE GLYCOL 3350 17 G/17G
17 POWDER, FOR SOLUTION ORAL
Qty: 0 | Refills: 0 | DISCHARGE
Start: 2021-01-01

## 2021-01-01 RX ORDER — CEFTRIAXONE 500 MG/1
1000 INJECTION, POWDER, FOR SOLUTION INTRAMUSCULAR; INTRAVENOUS EVERY 24 HOURS
Refills: 0 | Status: DISCONTINUED | OUTPATIENT
Start: 2021-01-01 | End: 2021-01-01

## 2021-01-01 RX ORDER — SACCHAROMYCES BOULARDII 250 MG
250 POWDER IN PACKET (EA) ORAL
Refills: 0 | Status: DISCONTINUED | OUTPATIENT
Start: 2021-01-01 | End: 2021-01-01

## 2021-01-01 RX ORDER — CEPHALEXIN 500 MG
1 CAPSULE ORAL
Qty: 20 | Refills: 0
Start: 2021-01-01 | End: 2021-01-01

## 2021-01-01 RX ORDER — DIVALPROEX SODIUM 500 MG/1
250 TABLET, DELAYED RELEASE ORAL ONCE
Refills: 0 | Status: COMPLETED | OUTPATIENT
Start: 2021-01-01 | End: 2021-01-01

## 2021-01-01 RX ORDER — DRONABINOL 2.5 MG
1 CAPSULE ORAL
Qty: 5 | Refills: 0
Start: 2021-01-01 | End: 2021-01-01

## 2021-01-01 RX ORDER — ASCORBIC ACID 60 MG
1 TABLET,CHEWABLE ORAL
Qty: 0 | Refills: 0 | DISCHARGE
Start: 2021-01-01

## 2021-01-01 RX ORDER — COLLAGENASE CLOSTRIDIUM HIST. 250 UNIT/G
1 OINTMENT (GRAM) TOPICAL
Qty: 0 | Refills: 0 | DISCHARGE
Start: 2021-01-01

## 2021-01-01 RX ORDER — MORPHINE SULFATE 50 MG/1
2 CAPSULE, EXTENDED RELEASE ORAL
Qty: 0 | Refills: 0 | DISCHARGE
Start: 2021-01-01

## 2021-01-01 RX ORDER — PIPERACILLIN AND TAZOBACTAM 4; .5 G/20ML; G/20ML
3.38 INJECTION, POWDER, LYOPHILIZED, FOR SOLUTION INTRAVENOUS EVERY 6 HOURS
Refills: 0 | Status: DISCONTINUED | OUTPATIENT
Start: 2021-01-01 | End: 2021-01-01

## 2021-01-01 RX ORDER — DEXTROSE 50 % IN WATER 50 %
25 SYRINGE (ML) INTRAVENOUS ONCE
Refills: 0 | Status: COMPLETED | OUTPATIENT
Start: 2021-01-01 | End: 2021-01-01

## 2021-01-01 RX ORDER — FUROSEMIDE 40 MG
40 TABLET ORAL
Refills: 0 | Status: DISCONTINUED | OUTPATIENT
Start: 2021-01-01 | End: 2021-01-01

## 2021-01-01 RX ORDER — ROBINUL 0.2 MG/ML
0.1 INJECTION INTRAMUSCULAR; INTRAVENOUS
Refills: 0 | Status: DISCONTINUED | OUTPATIENT
Start: 2021-01-01 | End: 2021-01-01

## 2021-01-01 RX ORDER — FOLIC ACID 0.8 MG
1 TABLET ORAL
Qty: 30 | Refills: 0
Start: 2021-01-01 | End: 2021-12-11

## 2021-01-01 RX ORDER — METOPROLOL TARTRATE 50 MG
1 TABLET ORAL
Qty: 60 | Refills: 0
Start: 2021-01-01 | End: 2021-12-11

## 2021-01-01 RX ORDER — ASCORBIC ACID 60 MG
500 TABLET,CHEWABLE ORAL DAILY
Refills: 0 | Status: DISCONTINUED | OUTPATIENT
Start: 2021-01-01 | End: 2021-01-01

## 2021-01-01 RX ADMIN — Medication 100 MILLIGRAM(S): at 12:51

## 2021-01-01 RX ADMIN — MEROPENEM 100 MILLIGRAM(S): 1 INJECTION INTRAVENOUS at 22:17

## 2021-01-01 RX ADMIN — Medication 250 MILLIGRAM(S): at 13:04

## 2021-01-01 RX ADMIN — Medication 125 MILLIGRAM(S): at 17:47

## 2021-01-01 RX ADMIN — HEPARIN SODIUM 5000 UNIT(S): 5000 INJECTION INTRAVENOUS; SUBCUTANEOUS at 22:57

## 2021-01-01 RX ADMIN — Medication 125 MILLIGRAM(S): at 22:22

## 2021-01-01 RX ADMIN — MORPHINE SULFATE 0.5 MILLIGRAM(S): 50 CAPSULE, EXTENDED RELEASE ORAL at 08:52

## 2021-01-01 RX ADMIN — Medication 125 MILLIGRAM(S): at 22:55

## 2021-01-01 RX ADMIN — ROBINUL 0.2 MILLIGRAM(S): 0.2 INJECTION INTRAMUSCULAR; INTRAVENOUS at 05:27

## 2021-01-01 RX ADMIN — Medication 125 MILLIGRAM(S): at 22:57

## 2021-01-01 RX ADMIN — HEPARIN SODIUM 5000 UNIT(S): 5000 INJECTION INTRAVENOUS; SUBCUTANEOUS at 06:32

## 2021-01-01 RX ADMIN — Medication 1 MILLIGRAM(S): at 08:38

## 2021-01-01 RX ADMIN — HEPARIN SODIUM 5000 UNIT(S): 5000 INJECTION INTRAVENOUS; SUBCUTANEOUS at 05:39

## 2021-01-01 RX ADMIN — HEPARIN SODIUM 5000 UNIT(S): 5000 INJECTION INTRAVENOUS; SUBCUTANEOUS at 06:29

## 2021-01-01 RX ADMIN — Medication 81 MILLIGRAM(S): at 13:02

## 2021-01-01 RX ADMIN — AMIODARONE HYDROCHLORIDE 200 MILLIGRAM(S): 400 TABLET ORAL at 06:14

## 2021-01-01 RX ADMIN — HEPARIN SODIUM 5000 UNIT(S): 5000 INJECTION INTRAVENOUS; SUBCUTANEOUS at 17:07

## 2021-01-01 RX ADMIN — Medication 250 MILLIGRAM(S): at 16:51

## 2021-01-01 RX ADMIN — Medication 125 MILLIGRAM(S): at 05:57

## 2021-01-01 RX ADMIN — Medication 5 MILLIGRAM(S): at 07:37

## 2021-01-01 RX ADMIN — HEPARIN SODIUM 5000 UNIT(S): 5000 INJECTION INTRAVENOUS; SUBCUTANEOUS at 21:59

## 2021-01-01 RX ADMIN — SODIUM CHLORIDE 100 MILLILITER(S): 9 INJECTION, SOLUTION INTRAVENOUS at 13:20

## 2021-01-01 RX ADMIN — DEXTROSE MONOHYDRATE, SODIUM CHLORIDE, AND POTASSIUM CHLORIDE 80 MILLILITER(S): 50; .745; 4.5 INJECTION, SOLUTION INTRAVENOUS at 05:44

## 2021-01-01 RX ADMIN — Medication 100 MILLIGRAM(S): at 05:02

## 2021-01-01 RX ADMIN — HEPARIN SODIUM 5000 UNIT(S): 5000 INJECTION INTRAVENOUS; SUBCUTANEOUS at 18:20

## 2021-01-01 RX ADMIN — HEPARIN SODIUM 5000 UNIT(S): 5000 INJECTION INTRAVENOUS; SUBCUTANEOUS at 21:43

## 2021-01-01 RX ADMIN — PANTOPRAZOLE SODIUM 40 MILLIGRAM(S): 20 TABLET, DELAYED RELEASE ORAL at 05:12

## 2021-01-01 RX ADMIN — HEPARIN SODIUM 5000 UNIT(S): 5000 INJECTION INTRAVENOUS; SUBCUTANEOUS at 03:50

## 2021-01-01 RX ADMIN — CEFTRIAXONE 100 MILLIGRAM(S): 500 INJECTION, POWDER, FOR SOLUTION INTRAMUSCULAR; INTRAVENOUS at 22:01

## 2021-01-01 RX ADMIN — HEPARIN SODIUM 5000 UNIT(S): 5000 INJECTION INTRAVENOUS; SUBCUTANEOUS at 05:44

## 2021-01-01 RX ADMIN — SODIUM CHLORIDE 500 MILLILITER(S): 9 INJECTION INTRAMUSCULAR; INTRAVENOUS; SUBCUTANEOUS at 12:50

## 2021-01-01 RX ADMIN — Medication 125 MILLIGRAM(S): at 06:22

## 2021-01-01 RX ADMIN — Medication 500 MILLIGRAM(S): at 16:51

## 2021-01-01 RX ADMIN — MORPHINE SULFATE 0.5 MILLIGRAM(S): 50 CAPSULE, EXTENDED RELEASE ORAL at 01:00

## 2021-01-01 RX ADMIN — HEPARIN SODIUM 5000 UNIT(S): 5000 INJECTION INTRAVENOUS; SUBCUTANEOUS at 11:53

## 2021-01-01 RX ADMIN — AMIODARONE HYDROCHLORIDE 200 MILLIGRAM(S): 400 TABLET ORAL at 07:06

## 2021-01-01 RX ADMIN — Medication 250 MILLIGRAM(S): at 17:45

## 2021-01-01 RX ADMIN — MEROPENEM 100 MILLIGRAM(S): 1 INJECTION INTRAVENOUS at 22:04

## 2021-01-01 RX ADMIN — Medication 1 MILLIGRAM(S): at 12:46

## 2021-01-01 RX ADMIN — SODIUM CHLORIDE 1000 MILLILITER(S): 9 INJECTION INTRAMUSCULAR; INTRAVENOUS; SUBCUTANEOUS at 22:09

## 2021-01-01 RX ADMIN — Medication 1 TABLET(S): at 13:57

## 2021-01-01 RX ADMIN — AMIODARONE HYDROCHLORIDE 400 MILLIGRAM(S): 400 TABLET ORAL at 21:19

## 2021-01-01 RX ADMIN — HEPARIN SODIUM 5000 UNIT(S): 5000 INJECTION INTRAVENOUS; SUBCUTANEOUS at 06:36

## 2021-01-01 RX ADMIN — Medication 40 MILLIEQUIVALENT(S): at 17:08

## 2021-01-01 RX ADMIN — Medication 250 MILLIGRAM(S): at 17:13

## 2021-01-01 RX ADMIN — HEPARIN SODIUM 5000 UNIT(S): 5000 INJECTION INTRAVENOUS; SUBCUTANEOUS at 18:40

## 2021-01-01 RX ADMIN — ROBINUL 0.2 MILLIGRAM(S): 0.2 INJECTION INTRAMUSCULAR; INTRAVENOUS at 13:23

## 2021-01-01 RX ADMIN — HEPARIN SODIUM 5000 UNIT(S): 5000 INJECTION INTRAVENOUS; SUBCUTANEOUS at 17:48

## 2021-01-01 RX ADMIN — Medication 125 MILLIGRAM(S): at 13:21

## 2021-01-01 RX ADMIN — Medication 125 MILLIGRAM(S): at 05:30

## 2021-01-01 RX ADMIN — Medication 125 MILLIGRAM(S): at 23:01

## 2021-01-01 RX ADMIN — Medication 25 MILLIGRAM(S): at 17:17

## 2021-01-01 RX ADMIN — Medication 125 MILLIGRAM(S): at 11:29

## 2021-01-01 RX ADMIN — AMIODARONE HYDROCHLORIDE 200 MILLIGRAM(S): 400 TABLET ORAL at 17:46

## 2021-01-01 RX ADMIN — SODIUM CHLORIDE 100 MILLILITER(S): 9 INJECTION, SOLUTION INTRAVENOUS at 17:24

## 2021-01-01 RX ADMIN — DEXTROSE MONOHYDRATE, SODIUM CHLORIDE, AND POTASSIUM CHLORIDE 80 MILLILITER(S): 50; .745; 4.5 INJECTION, SOLUTION INTRAVENOUS at 15:11

## 2021-01-01 RX ADMIN — Medication 50 MILLIGRAM(S): at 13:23

## 2021-01-01 RX ADMIN — Medication 81 MILLIGRAM(S): at 12:39

## 2021-01-01 RX ADMIN — Medication 40 MILLIEQUIVALENT(S): at 17:04

## 2021-01-01 RX ADMIN — SODIUM CHLORIDE 500 MILLILITER(S): 9 INJECTION INTRAMUSCULAR; INTRAVENOUS; SUBCUTANEOUS at 09:02

## 2021-01-01 RX ADMIN — Medication 7.5 MG/HR: at 18:29

## 2021-01-01 RX ADMIN — MORPHINE SULFATE 2 MILLIGRAM(S): 50 CAPSULE, EXTENDED RELEASE ORAL at 12:15

## 2021-01-01 RX ADMIN — CEFTRIAXONE 100 MILLIGRAM(S): 500 INJECTION, POWDER, FOR SOLUTION INTRAMUSCULAR; INTRAVENOUS at 14:12

## 2021-01-01 RX ADMIN — Medication 1 TABLET(S): at 13:38

## 2021-01-01 RX ADMIN — HEPARIN SODIUM 5000 UNIT(S): 5000 INJECTION INTRAVENOUS; SUBCUTANEOUS at 16:49

## 2021-01-01 RX ADMIN — Medication 125 MILLIGRAM(S): at 00:01

## 2021-01-01 RX ADMIN — Medication 125 MILLIGRAM(S): at 23:56

## 2021-01-01 RX ADMIN — CEFTRIAXONE 100 MILLIGRAM(S): 500 INJECTION, POWDER, FOR SOLUTION INTRAMUSCULAR; INTRAVENOUS at 11:56

## 2021-01-01 RX ADMIN — HEPARIN SODIUM 5000 UNIT(S): 5000 INJECTION INTRAVENOUS; SUBCUTANEOUS at 16:51

## 2021-01-01 RX ADMIN — SODIUM CHLORIDE 1000 MILLILITER(S): 9 INJECTION INTRAMUSCULAR; INTRAVENOUS; SUBCUTANEOUS at 11:10

## 2021-01-01 RX ADMIN — Medication 125 MILLIGRAM(S): at 12:51

## 2021-01-01 RX ADMIN — Medication 125 MILLIGRAM(S): at 06:02

## 2021-01-01 RX ADMIN — PANTOPRAZOLE SODIUM 40 MILLIGRAM(S): 20 TABLET, DELAYED RELEASE ORAL at 06:05

## 2021-01-01 RX ADMIN — AMIODARONE HYDROCHLORIDE 200 MILLIGRAM(S): 400 TABLET ORAL at 08:37

## 2021-01-01 RX ADMIN — HEPARIN SODIUM 5000 UNIT(S): 5000 INJECTION INTRAVENOUS; SUBCUTANEOUS at 06:12

## 2021-01-01 RX ADMIN — ROBINUL 0.1 MILLIGRAM(S): 0.2 INJECTION INTRAMUSCULAR; INTRAVENOUS at 06:28

## 2021-01-01 RX ADMIN — CEFTRIAXONE 100 MILLIGRAM(S): 500 INJECTION, POWDER, FOR SOLUTION INTRAMUSCULAR; INTRAVENOUS at 18:02

## 2021-01-01 RX ADMIN — AMIODARONE HYDROCHLORIDE 200 MILLIGRAM(S): 400 TABLET ORAL at 05:55

## 2021-01-01 RX ADMIN — MORPHINE SULFATE 0.5 MILLIGRAM(S): 50 CAPSULE, EXTENDED RELEASE ORAL at 23:14

## 2021-01-01 RX ADMIN — Medication 100 MILLIGRAM(S): at 11:48

## 2021-01-01 RX ADMIN — Medication 50 MILLIGRAM(S): at 13:21

## 2021-01-01 RX ADMIN — PIPERACILLIN AND TAZOBACTAM 25 GRAM(S): 4; .5 INJECTION, POWDER, LYOPHILIZED, FOR SOLUTION INTRAVENOUS at 12:53

## 2021-01-01 RX ADMIN — IRON SUCROSE 110 MILLIGRAM(S): 20 INJECTION, SOLUTION INTRAVENOUS at 14:17

## 2021-01-01 RX ADMIN — Medication 81 MILLIGRAM(S): at 12:14

## 2021-01-01 RX ADMIN — Medication 100 GRAM(S): at 15:05

## 2021-01-01 RX ADMIN — Medication 25 MILLIGRAM(S): at 06:12

## 2021-01-01 RX ADMIN — Medication 300 MILLIGRAM(S): at 16:48

## 2021-01-01 RX ADMIN — Medication 2.5 MILLIGRAM(S): at 12:20

## 2021-01-01 RX ADMIN — Medication 125 MILLIGRAM(S): at 16:50

## 2021-01-01 RX ADMIN — Medication 100 MILLIGRAM(S): at 05:28

## 2021-01-01 RX ADMIN — Medication 125 MILLIGRAM(S): at 05:29

## 2021-01-01 RX ADMIN — SODIUM CHLORIDE 500 MILLILITER(S): 9 INJECTION INTRAMUSCULAR; INTRAVENOUS; SUBCUTANEOUS at 14:05

## 2021-01-01 RX ADMIN — Medication 0.5 MILLIGRAM(S): at 10:58

## 2021-01-01 RX ADMIN — PIPERACILLIN AND TAZOBACTAM 3.38 GRAM(S): 4; .5 INJECTION, POWDER, LYOPHILIZED, FOR SOLUTION INTRAVENOUS at 11:46

## 2021-01-01 RX ADMIN — HEPARIN SODIUM 5000 UNIT(S): 5000 INJECTION INTRAVENOUS; SUBCUTANEOUS at 05:30

## 2021-01-01 RX ADMIN — MORPHINE SULFATE 0.5 MILLIGRAM(S): 50 CAPSULE, EXTENDED RELEASE ORAL at 18:07

## 2021-01-01 RX ADMIN — AMIODARONE HYDROCHLORIDE 200 MILLIGRAM(S): 400 TABLET ORAL at 06:35

## 2021-01-01 RX ADMIN — Medication 10 MG/HR: at 20:42

## 2021-01-01 RX ADMIN — Medication 125 MILLIGRAM(S): at 11:36

## 2021-01-01 RX ADMIN — Medication 125 MILLIGRAM(S): at 11:20

## 2021-01-01 RX ADMIN — Medication 25 MILLIGRAM(S): at 05:12

## 2021-01-01 RX ADMIN — Medication 1 TABLET(S): at 12:00

## 2021-01-01 RX ADMIN — Medication 1 MILLIGRAM(S): at 11:19

## 2021-01-01 RX ADMIN — Medication 81 MILLIGRAM(S): at 11:42

## 2021-01-01 RX ADMIN — Medication 81 MILLIGRAM(S): at 13:23

## 2021-01-01 RX ADMIN — HEPARIN SODIUM 5000 UNIT(S): 5000 INJECTION INTRAVENOUS; SUBCUTANEOUS at 21:33

## 2021-01-01 RX ADMIN — AMIODARONE HYDROCHLORIDE 200 MILLIGRAM(S): 400 TABLET ORAL at 06:10

## 2021-01-01 RX ADMIN — AMIODARONE HYDROCHLORIDE 200 MILLIGRAM(S): 400 TABLET ORAL at 05:56

## 2021-01-01 RX ADMIN — Medication 125 MILLIGRAM(S): at 11:50

## 2021-01-01 RX ADMIN — SODIUM CHLORIDE 500 MILLILITER(S): 9 INJECTION INTRAMUSCULAR; INTRAVENOUS; SUBCUTANEOUS at 05:56

## 2021-01-01 RX ADMIN — POLYETHYLENE GLYCOL 3350 17 GRAM(S): 17 POWDER, FOR SOLUTION ORAL at 11:29

## 2021-01-01 RX ADMIN — HEPARIN SODIUM 5000 UNIT(S): 5000 INJECTION INTRAVENOUS; SUBCUTANEOUS at 05:45

## 2021-01-01 RX ADMIN — Medication 125 MILLIGRAM(S): at 11:47

## 2021-01-01 RX ADMIN — HEPARIN SODIUM 5000 UNIT(S): 5000 INJECTION INTRAVENOUS; SUBCUTANEOUS at 18:10

## 2021-01-01 RX ADMIN — HEPARIN SODIUM 5000 UNIT(S): 5000 INJECTION INTRAVENOUS; SUBCUTANEOUS at 17:21

## 2021-01-01 RX ADMIN — AMIODARONE HYDROCHLORIDE 200 MILLIGRAM(S): 400 TABLET ORAL at 17:06

## 2021-01-01 RX ADMIN — SODIUM CHLORIDE 60 MILLILITER(S): 9 INJECTION INTRAMUSCULAR; INTRAVENOUS; SUBCUTANEOUS at 18:41

## 2021-01-01 RX ADMIN — MORPHINE SULFATE 4 MILLIGRAM(S): 50 CAPSULE, EXTENDED RELEASE ORAL at 15:04

## 2021-01-01 RX ADMIN — MORPHINE SULFATE 0.5 MILLIGRAM(S): 50 CAPSULE, EXTENDED RELEASE ORAL at 01:18

## 2021-01-01 RX ADMIN — Medication 250 MILLIGRAM(S): at 05:26

## 2021-01-01 RX ADMIN — Medication 81 MILLIGRAM(S): at 11:49

## 2021-01-01 RX ADMIN — ROBINUL 0.2 MILLIGRAM(S): 0.2 INJECTION INTRAMUSCULAR; INTRAVENOUS at 23:18

## 2021-01-01 RX ADMIN — CEFTRIAXONE 100 MILLIGRAM(S): 500 INJECTION, POWDER, FOR SOLUTION INTRAMUSCULAR; INTRAVENOUS at 11:42

## 2021-01-01 RX ADMIN — ROBINUL 0.1 MILLIGRAM(S): 0.2 INJECTION INTRAMUSCULAR; INTRAVENOUS at 06:21

## 2021-01-01 RX ADMIN — DEXTROSE MONOHYDRATE, SODIUM CHLORIDE, AND POTASSIUM CHLORIDE 55 MILLILITER(S): 50; .745; 4.5 INJECTION, SOLUTION INTRAVENOUS at 13:04

## 2021-01-01 RX ADMIN — MORPHINE SULFATE 0.5 MILLIGRAM(S): 50 CAPSULE, EXTENDED RELEASE ORAL at 20:09

## 2021-01-01 RX ADMIN — HEPARIN SODIUM 5000 UNIT(S): 5000 INJECTION INTRAVENOUS; SUBCUTANEOUS at 17:03

## 2021-01-01 RX ADMIN — HEPARIN SODIUM 5000 UNIT(S): 5000 INJECTION INTRAVENOUS; SUBCUTANEOUS at 05:26

## 2021-01-01 RX ADMIN — Medication 25 MILLIGRAM(S): at 05:27

## 2021-01-01 RX ADMIN — HEPARIN SODIUM 5000 UNIT(S): 5000 INJECTION INTRAVENOUS; SUBCUTANEOUS at 06:10

## 2021-01-01 RX ADMIN — Medication 100 MILLIGRAM(S): at 09:39

## 2021-01-01 RX ADMIN — Medication 125 MILLIGRAM(S): at 17:34

## 2021-01-01 RX ADMIN — HEPARIN SODIUM 5000 UNIT(S): 5000 INJECTION INTRAVENOUS; SUBCUTANEOUS at 05:56

## 2021-01-01 RX ADMIN — Medication 81 MILLIGRAM(S): at 11:19

## 2021-01-01 RX ADMIN — Medication 25 MILLIGRAM(S): at 06:29

## 2021-01-01 RX ADMIN — PIPERACILLIN AND TAZOBACTAM 25 GRAM(S): 4; .5 INJECTION, POWDER, LYOPHILIZED, FOR SOLUTION INTRAVENOUS at 11:06

## 2021-01-01 RX ADMIN — MORPHINE SULFATE 0.5 MILLIGRAM(S): 50 CAPSULE, EXTENDED RELEASE ORAL at 03:23

## 2021-01-01 RX ADMIN — Medication 81 MILLIGRAM(S): at 13:21

## 2021-01-01 RX ADMIN — HEPARIN SODIUM 5000 UNIT(S): 5000 INJECTION INTRAVENOUS; SUBCUTANEOUS at 17:08

## 2021-01-01 RX ADMIN — Medication 50 MILLIEQUIVALENT(S): at 15:12

## 2021-01-01 RX ADMIN — Medication 81 MILLIGRAM(S): at 11:29

## 2021-01-01 RX ADMIN — HEPARIN SODIUM 5000 UNIT(S): 5000 INJECTION INTRAVENOUS; SUBCUTANEOUS at 17:47

## 2021-01-01 RX ADMIN — Medication 250 MILLIGRAM(S): at 05:45

## 2021-01-01 RX ADMIN — Medication 5 MG/HR: at 11:39

## 2021-01-01 RX ADMIN — HEPARIN SODIUM 5000 UNIT(S): 5000 INJECTION INTRAVENOUS; SUBCUTANEOUS at 04:32

## 2021-01-01 RX ADMIN — HEPARIN SODIUM 5000 UNIT(S): 5000 INJECTION INTRAVENOUS; SUBCUTANEOUS at 06:21

## 2021-01-01 RX ADMIN — Medication 125 MILLIGRAM(S): at 06:24

## 2021-01-01 RX ADMIN — MORPHINE SULFATE 0.5 MILLIGRAM(S): 50 CAPSULE, EXTENDED RELEASE ORAL at 06:20

## 2021-01-01 RX ADMIN — HEPARIN SODIUM 5000 UNIT(S): 5000 INJECTION INTRAVENOUS; SUBCUTANEOUS at 05:13

## 2021-01-01 RX ADMIN — SODIUM CHLORIDE 100 MILLILITER(S): 9 INJECTION, SOLUTION INTRAVENOUS at 14:53

## 2021-01-01 RX ADMIN — Medication 125 MILLIGRAM(S): at 12:20

## 2021-01-01 RX ADMIN — Medication 125 MILLIGRAM(S): at 23:04

## 2021-01-01 RX ADMIN — Medication 1 MILLIGRAM(S): at 11:29

## 2021-01-01 RX ADMIN — MORPHINE SULFATE 2 MILLIGRAM(S): 50 CAPSULE, EXTENDED RELEASE ORAL at 02:10

## 2021-01-01 RX ADMIN — Medication 0.5 MILLIGRAM(S): at 16:49

## 2021-01-01 RX ADMIN — HEPARIN SODIUM 5000 UNIT(S): 5000 INJECTION INTRAVENOUS; SUBCUTANEOUS at 11:55

## 2021-01-01 RX ADMIN — Medication 7.5 MG/HR: at 02:30

## 2021-01-01 RX ADMIN — Medication 50 MILLIGRAM(S): at 11:50

## 2021-01-01 RX ADMIN — Medication 25 GRAM(S): at 07:29

## 2021-01-01 RX ADMIN — Medication 10 MG/HR: at 22:35

## 2021-01-01 RX ADMIN — Medication 125 MILLIGRAM(S): at 17:16

## 2021-01-01 RX ADMIN — Medication 1 GRAM(S): at 05:56

## 2021-01-01 RX ADMIN — Medication 100 MILLIGRAM(S): at 17:11

## 2021-01-01 RX ADMIN — HEPARIN SODIUM 5000 UNIT(S): 5000 INJECTION INTRAVENOUS; SUBCUTANEOUS at 17:13

## 2021-01-01 RX ADMIN — AMIODARONE HYDROCHLORIDE 200 MILLIGRAM(S): 400 TABLET ORAL at 05:13

## 2021-01-01 RX ADMIN — HEPARIN SODIUM 5000 UNIT(S): 5000 INJECTION INTRAVENOUS; SUBCUTANEOUS at 17:15

## 2021-01-01 RX ADMIN — SODIUM CHLORIDE 75 MILLILITER(S): 9 INJECTION INTRAMUSCULAR; INTRAVENOUS; SUBCUTANEOUS at 21:21

## 2021-01-01 RX ADMIN — PANTOPRAZOLE SODIUM 40 MILLIGRAM(S): 20 TABLET, DELAYED RELEASE ORAL at 06:24

## 2021-01-01 RX ADMIN — Medication 250 MILLIGRAM(S): at 21:38

## 2021-01-01 RX ADMIN — MORPHINE SULFATE 0.5 MILLIGRAM(S): 50 CAPSULE, EXTENDED RELEASE ORAL at 16:52

## 2021-01-01 RX ADMIN — Medication 81 MILLIGRAM(S): at 19:40

## 2021-01-01 RX ADMIN — CEFTRIAXONE 1000 MILLIGRAM(S): 500 INJECTION, POWDER, FOR SOLUTION INTRAMUSCULAR; INTRAVENOUS at 15:00

## 2021-01-01 RX ADMIN — HEPARIN SODIUM 5000 UNIT(S): 5000 INJECTION INTRAVENOUS; SUBCUTANEOUS at 17:40

## 2021-01-01 RX ADMIN — HEPARIN SODIUM 5000 UNIT(S): 5000 INJECTION INTRAVENOUS; SUBCUTANEOUS at 17:34

## 2021-01-01 RX ADMIN — MORPHINE SULFATE 2 MILLIGRAM(S): 50 CAPSULE, EXTENDED RELEASE ORAL at 11:14

## 2021-01-01 RX ADMIN — PIPERACILLIN AND TAZOBACTAM 25 GRAM(S): 4; .5 INJECTION, POWDER, LYOPHILIZED, FOR SOLUTION INTRAVENOUS at 22:13

## 2021-01-01 RX ADMIN — Medication 250 MICROGRAM(S): at 11:47

## 2021-01-01 RX ADMIN — HEPARIN SODIUM 5000 UNIT(S): 5000 INJECTION INTRAVENOUS; SUBCUTANEOUS at 06:05

## 2021-01-01 RX ADMIN — HEPARIN SODIUM 5000 UNIT(S): 5000 INJECTION INTRAVENOUS; SUBCUTANEOUS at 19:40

## 2021-01-01 RX ADMIN — MORPHINE SULFATE 2 MILLIGRAM(S): 50 CAPSULE, EXTENDED RELEASE ORAL at 17:30

## 2021-01-01 RX ADMIN — MORPHINE SULFATE 2 MILLIGRAM(S): 50 CAPSULE, EXTENDED RELEASE ORAL at 05:27

## 2021-01-01 RX ADMIN — Medication 25 MILLIGRAM(S): at 12:20

## 2021-01-01 RX ADMIN — Medication 125 MILLIGRAM(S): at 17:03

## 2021-01-01 RX ADMIN — Medication 1 APPLICATION(S): at 19:20

## 2021-01-01 RX ADMIN — MORPHINE SULFATE 2 MILLIGRAM(S): 50 CAPSULE, EXTENDED RELEASE ORAL at 23:18

## 2021-01-01 RX ADMIN — HEPARIN SODIUM 5000 UNIT(S): 5000 INJECTION INTRAVENOUS; SUBCUTANEOUS at 22:20

## 2021-01-01 RX ADMIN — PIPERACILLIN AND TAZOBACTAM 200 GRAM(S): 4; .5 INJECTION, POWDER, LYOPHILIZED, FOR SOLUTION INTRAVENOUS at 11:16

## 2021-01-01 RX ADMIN — MORPHINE SULFATE 0.5 MILLIGRAM(S): 50 CAPSULE, EXTENDED RELEASE ORAL at 22:08

## 2021-01-01 RX ADMIN — Medication 750 MILLIGRAM(S): at 16:52

## 2021-01-01 RX ADMIN — Medication 25 MILLIGRAM(S): at 17:31

## 2021-01-01 RX ADMIN — HEPARIN SODIUM 5000 UNIT(S): 5000 INJECTION INTRAVENOUS; SUBCUTANEOUS at 05:27

## 2021-01-01 RX ADMIN — MORPHINE SULFATE 0.5 MILLIGRAM(S): 50 CAPSULE, EXTENDED RELEASE ORAL at 14:05

## 2021-01-01 RX ADMIN — IRON SUCROSE 110 MILLIGRAM(S): 20 INJECTION, SOLUTION INTRAVENOUS at 17:45

## 2021-01-01 RX ADMIN — HEPARIN SODIUM 5000 UNIT(S): 5000 INJECTION INTRAVENOUS; SUBCUTANEOUS at 16:35

## 2021-01-01 RX ADMIN — Medication 25 MILLIGRAM(S): at 05:18

## 2021-01-01 RX ADMIN — MORPHINE SULFATE 0.5 MILLIGRAM(S): 50 CAPSULE, EXTENDED RELEASE ORAL at 09:59

## 2021-01-01 RX ADMIN — AMIODARONE HYDROCHLORIDE 200 MILLIGRAM(S): 400 TABLET ORAL at 05:45

## 2021-01-01 RX ADMIN — Medication 500 MILLIGRAM(S): at 11:19

## 2021-01-01 RX ADMIN — Medication 1 APPLICATION(S): at 13:46

## 2021-01-01 RX ADMIN — MORPHINE SULFATE 0.5 MILLIGRAM(S): 50 CAPSULE, EXTENDED RELEASE ORAL at 10:58

## 2021-01-01 RX ADMIN — MORPHINE SULFATE 0.5 MILLIGRAM(S): 50 CAPSULE, EXTENDED RELEASE ORAL at 22:54

## 2021-01-01 RX ADMIN — MORPHINE SULFATE 2 MILLIGRAM(S): 50 CAPSULE, EXTENDED RELEASE ORAL at 05:40

## 2021-01-01 RX ADMIN — Medication 125 MILLIGRAM(S): at 05:27

## 2021-01-01 RX ADMIN — CEFTRIAXONE 100 MILLIGRAM(S): 500 INJECTION, POWDER, FOR SOLUTION INTRAMUSCULAR; INTRAVENOUS at 16:47

## 2021-01-01 RX ADMIN — HEPARIN SODIUM 5000 UNIT(S): 5000 INJECTION INTRAVENOUS; SUBCUTANEOUS at 05:58

## 2021-01-01 RX ADMIN — Medication 25 MILLIGRAM(S): at 17:33

## 2021-01-01 RX ADMIN — ROBINUL 0.1 MILLIGRAM(S): 0.2 INJECTION INTRAMUSCULAR; INTRAVENOUS at 16:49

## 2021-01-01 RX ADMIN — Medication 250 MILLIGRAM(S): at 17:34

## 2021-01-01 RX ADMIN — SODIUM CHLORIDE 1000 MILLILITER(S): 9 INJECTION, SOLUTION INTRAVENOUS at 22:01

## 2021-01-01 RX ADMIN — MORPHINE SULFATE 2 MILLIGRAM(S): 50 CAPSULE, EXTENDED RELEASE ORAL at 23:30

## 2021-01-01 RX ADMIN — SODIUM CHLORIDE 75 MILLILITER(S): 9 INJECTION INTRAMUSCULAR; INTRAVENOUS; SUBCUTANEOUS at 22:04

## 2021-01-01 RX ADMIN — Medication 250 MILLIGRAM(S): at 08:37

## 2021-01-01 RX ADMIN — SODIUM CHLORIDE 75 MILLILITER(S): 9 INJECTION, SOLUTION INTRAVENOUS at 08:19

## 2021-01-01 RX ADMIN — Medication 25 MILLIGRAM(S): at 19:40

## 2021-01-01 RX ADMIN — CEFTRIAXONE 100 MILLIGRAM(S): 500 INJECTION, POWDER, FOR SOLUTION INTRAMUSCULAR; INTRAVENOUS at 23:27

## 2021-01-01 RX ADMIN — AZITHROMYCIN 255 MILLIGRAM(S): 500 TABLET, FILM COATED ORAL at 14:55

## 2021-01-01 RX ADMIN — Medication 500 MILLIGRAM(S): at 12:46

## 2021-01-01 RX ADMIN — Medication 125 MILLIGRAM(S): at 13:23

## 2021-01-01 RX ADMIN — Medication 25 MILLIGRAM(S): at 06:03

## 2021-01-01 RX ADMIN — Medication 250 MILLIGRAM(S): at 17:08

## 2021-01-01 RX ADMIN — Medication 100 GRAM(S): at 17:36

## 2021-01-01 RX ADMIN — HEPARIN SODIUM 5000 UNIT(S): 5000 INJECTION INTRAVENOUS; SUBCUTANEOUS at 06:04

## 2021-01-01 RX ADMIN — PANTOPRAZOLE SODIUM 40 MILLIGRAM(S): 20 TABLET, DELAYED RELEASE ORAL at 06:02

## 2021-01-01 RX ADMIN — Medication 125 MILLIGRAM(S): at 05:02

## 2021-01-01 RX ADMIN — Medication 1 APPLICATION(S): at 11:41

## 2021-01-01 RX ADMIN — HEPARIN SODIUM 5000 UNIT(S): 5000 INJECTION INTRAVENOUS; SUBCUTANEOUS at 06:25

## 2021-01-01 RX ADMIN — Medication 81 MILLIGRAM(S): at 00:28

## 2021-01-01 RX ADMIN — HEPARIN SODIUM 5000 UNIT(S): 5000 INJECTION INTRAVENOUS; SUBCUTANEOUS at 05:07

## 2021-01-01 RX ADMIN — Medication 81 MILLIGRAM(S): at 11:35

## 2021-01-01 RX ADMIN — Medication 500 MILLIGRAM(S): at 08:38

## 2021-01-01 RX ADMIN — HEPARIN SODIUM 5000 UNIT(S): 5000 INJECTION INTRAVENOUS; SUBCUTANEOUS at 05:53

## 2021-01-01 RX ADMIN — Medication 50 MILLIEQUIVALENT(S): at 09:52

## 2021-01-01 RX ADMIN — MORPHINE SULFATE 0.5 MILLIGRAM(S): 50 CAPSULE, EXTENDED RELEASE ORAL at 06:55

## 2021-01-01 RX ADMIN — HEPARIN SODIUM 5000 UNIT(S): 5000 INJECTION INTRAVENOUS; SUBCUTANEOUS at 09:51

## 2021-01-01 RX ADMIN — Medication 81 MILLIGRAM(S): at 17:40

## 2021-01-01 RX ADMIN — HEPARIN SODIUM 5000 UNIT(S): 5000 INJECTION INTRAVENOUS; SUBCUTANEOUS at 13:01

## 2021-01-01 RX ADMIN — Medication 650 MILLIGRAM(S): at 01:39

## 2021-01-01 RX ADMIN — Medication 40 MILLIGRAM(S): at 12:08

## 2021-01-01 RX ADMIN — SODIUM CHLORIDE 500 MILLILITER(S): 9 INJECTION INTRAMUSCULAR; INTRAVENOUS; SUBCUTANEOUS at 12:35

## 2021-01-01 RX ADMIN — CEFTRIAXONE 100 MILLIGRAM(S): 500 INJECTION, POWDER, FOR SOLUTION INTRAMUSCULAR; INTRAVENOUS at 12:15

## 2021-01-01 RX ADMIN — Medication 40 MILLIEQUIVALENT(S): at 13:41

## 2021-01-01 RX ADMIN — HEPARIN SODIUM 5000 UNIT(S): 5000 INJECTION INTRAVENOUS; SUBCUTANEOUS at 05:10

## 2021-01-01 RX ADMIN — HEPARIN SODIUM 5000 UNIT(S): 5000 INJECTION INTRAVENOUS; SUBCUTANEOUS at 21:52

## 2021-01-01 RX ADMIN — MORPHINE SULFATE 0.5 MILLIGRAM(S): 50 CAPSULE, EXTENDED RELEASE ORAL at 11:14

## 2021-01-01 RX ADMIN — MEROPENEM 100 MILLIGRAM(S): 1 INJECTION INTRAVENOUS at 22:39

## 2021-01-01 RX ADMIN — HEPARIN SODIUM 5000 UNIT(S): 5000 INJECTION INTRAVENOUS; SUBCUTANEOUS at 15:12

## 2021-01-01 RX ADMIN — Medication 250 MICROGRAM(S): at 17:11

## 2021-01-01 RX ADMIN — Medication 81 MILLIGRAM(S): at 12:37

## 2021-01-01 RX ADMIN — HEPARIN SODIUM 5000 UNIT(S): 5000 INJECTION INTRAVENOUS; SUBCUTANEOUS at 05:12

## 2021-01-01 RX ADMIN — MORPHINE SULFATE 0.5 MILLIGRAM(S): 50 CAPSULE, EXTENDED RELEASE ORAL at 11:17

## 2021-01-01 RX ADMIN — MORPHINE SULFATE 0.5 MILLIGRAM(S): 50 CAPSULE, EXTENDED RELEASE ORAL at 14:27

## 2021-01-01 RX ADMIN — Medication 50 MILLIGRAM(S): at 17:47

## 2021-01-01 RX ADMIN — ROBINUL 0.1 MILLIGRAM(S): 0.2 INJECTION INTRAMUSCULAR; INTRAVENOUS at 16:51

## 2021-01-01 RX ADMIN — Medication 10 MG/HR: at 06:07

## 2021-01-01 RX ADMIN — CEFTRIAXONE 100 MILLIGRAM(S): 500 INJECTION, POWDER, FOR SOLUTION INTRAMUSCULAR; INTRAVENOUS at 17:33

## 2021-01-01 RX ADMIN — Medication 250 MILLIGRAM(S): at 06:10

## 2021-01-01 RX ADMIN — AMIODARONE HYDROCHLORIDE 200 MILLIGRAM(S): 400 TABLET ORAL at 05:39

## 2021-01-01 RX ADMIN — Medication 25 MILLIGRAM(S): at 17:40

## 2021-01-01 RX ADMIN — Medication 250 MILLIGRAM(S): at 15:31

## 2021-01-01 RX ADMIN — Medication 125 MILLIGRAM(S): at 05:15

## 2021-01-01 RX ADMIN — SODIUM CHLORIDE 1000 MILLILITER(S): 9 INJECTION INTRAMUSCULAR; INTRAVENOUS; SUBCUTANEOUS at 12:10

## 2021-01-01 RX ADMIN — SODIUM CHLORIDE 70 MILLILITER(S): 9 INJECTION INTRAMUSCULAR; INTRAVENOUS; SUBCUTANEOUS at 03:00

## 2021-01-01 RX ADMIN — HEPARIN SODIUM 5000 UNIT(S): 5000 INJECTION INTRAVENOUS; SUBCUTANEOUS at 18:27

## 2021-01-01 RX ADMIN — Medication 50 MILLIEQUIVALENT(S): at 11:21

## 2021-01-01 RX ADMIN — PANTOPRAZOLE SODIUM 40 MILLIGRAM(S): 20 TABLET, DELAYED RELEASE ORAL at 05:02

## 2021-01-01 RX ADMIN — Medication 40 MILLIEQUIVALENT(S): at 11:34

## 2021-01-01 RX ADMIN — HEPARIN SODIUM 5000 UNIT(S): 5000 INJECTION INTRAVENOUS; SUBCUTANEOUS at 07:06

## 2021-01-01 RX ADMIN — Medication 81 MILLIGRAM(S): at 11:20

## 2021-01-01 RX ADMIN — Medication 125 MILLIGRAM(S): at 17:07

## 2021-01-01 RX ADMIN — HEPARIN SODIUM 5000 UNIT(S): 5000 INJECTION INTRAVENOUS; SUBCUTANEOUS at 05:28

## 2021-01-01 RX ADMIN — Medication 125 MILLIGRAM(S): at 17:11

## 2021-01-01 RX ADMIN — Medication 50 GRAM(S): at 18:41

## 2021-01-01 RX ADMIN — Medication 10 MG/HR: at 14:46

## 2021-01-01 RX ADMIN — CEFTRIAXONE 100 MILLIGRAM(S): 500 INJECTION, POWDER, FOR SOLUTION INTRAMUSCULAR; INTRAVENOUS at 13:37

## 2021-01-01 RX ADMIN — Medication 2.5 MILLIGRAM(S): at 11:49

## 2021-01-01 RX ADMIN — HEPARIN SODIUM 5000 UNIT(S): 5000 INJECTION INTRAVENOUS; SUBCUTANEOUS at 22:13

## 2021-01-01 RX ADMIN — Medication 125 MILLIGRAM(S): at 05:58

## 2021-01-01 RX ADMIN — HEPARIN SODIUM 5000 UNIT(S): 5000 INJECTION INTRAVENOUS; SUBCUTANEOUS at 19:00

## 2021-01-01 RX ADMIN — DIVALPROEX SODIUM 250 MILLIGRAM(S): 500 TABLET, DELAYED RELEASE ORAL at 12:34

## 2021-01-01 RX ADMIN — SODIUM CHLORIDE 65 MILLILITER(S): 9 INJECTION, SOLUTION INTRAVENOUS at 17:14

## 2021-01-01 RX ADMIN — HEPARIN SODIUM 5000 UNIT(S): 5000 INJECTION INTRAVENOUS; SUBCUTANEOUS at 17:11

## 2021-01-01 RX ADMIN — Medication 125 MILLIGRAM(S): at 00:24

## 2021-01-01 RX ADMIN — Medication 81 MILLIGRAM(S): at 11:48

## 2021-01-01 RX ADMIN — SODIUM CHLORIDE 85 MILLILITER(S): 9 INJECTION, SOLUTION INTRAVENOUS at 09:09

## 2021-01-01 RX ADMIN — SODIUM CHLORIDE 85 MILLILITER(S): 9 INJECTION, SOLUTION INTRAVENOUS at 22:44

## 2021-01-01 RX ADMIN — MORPHINE SULFATE 2 MILLIGRAM(S): 50 CAPSULE, EXTENDED RELEASE ORAL at 13:25

## 2021-01-01 RX ADMIN — Medication 125 MILLIGRAM(S): at 23:13

## 2021-01-01 RX ADMIN — Medication 25 MILLIGRAM(S): at 06:21

## 2021-01-01 RX ADMIN — SODIUM CHLORIDE 75 MILLILITER(S): 9 INJECTION INTRAMUSCULAR; INTRAVENOUS; SUBCUTANEOUS at 13:24

## 2021-01-01 RX ADMIN — MORPHINE SULFATE 0.5 MILLIGRAM(S): 50 CAPSULE, EXTENDED RELEASE ORAL at 14:22

## 2021-01-01 RX ADMIN — PANTOPRAZOLE SODIUM 40 MILLIGRAM(S): 20 TABLET, DELAYED RELEASE ORAL at 05:30

## 2021-01-01 RX ADMIN — HEPARIN SODIUM 5000 UNIT(S): 5000 INJECTION INTRAVENOUS; SUBCUTANEOUS at 15:13

## 2021-01-01 RX ADMIN — PANTOPRAZOLE SODIUM 40 MILLIGRAM(S): 20 TABLET, DELAYED RELEASE ORAL at 06:03

## 2021-01-01 RX ADMIN — HEPARIN SODIUM 5000 UNIT(S): 5000 INJECTION INTRAVENOUS; SUBCUTANEOUS at 14:15

## 2021-01-01 RX ADMIN — Medication 250 MILLIGRAM(S): at 17:06

## 2021-01-01 RX ADMIN — Medication 125 MILLIGRAM(S): at 00:09

## 2021-01-01 RX ADMIN — Medication 50 MILLIGRAM(S): at 22:22

## 2021-01-01 RX ADMIN — SODIUM CHLORIDE 75 MILLILITER(S): 9 INJECTION INTRAMUSCULAR; INTRAVENOUS; SUBCUTANEOUS at 17:51

## 2021-01-01 RX ADMIN — Medication 2.5 MILLIGRAM(S): at 17:15

## 2021-01-01 RX ADMIN — Medication 125 MILLIGRAM(S): at 11:51

## 2021-01-01 RX ADMIN — PANTOPRAZOLE SODIUM 40 MILLIGRAM(S): 20 TABLET, DELAYED RELEASE ORAL at 10:35

## 2021-01-01 RX ADMIN — Medication 250 MICROGRAM(S): at 23:04

## 2021-01-01 RX ADMIN — Medication 81 MILLIGRAM(S): at 12:46

## 2021-01-01 RX ADMIN — AMIODARONE HYDROCHLORIDE 200 MILLIGRAM(S): 400 TABLET ORAL at 11:29

## 2021-01-01 RX ADMIN — Medication 125 MILLIGRAM(S): at 06:38

## 2021-01-01 RX ADMIN — HEPARIN SODIUM 5000 UNIT(S): 5000 INJECTION INTRAVENOUS; SUBCUTANEOUS at 12:25

## 2021-01-01 RX ADMIN — CEFTRIAXONE 100 MILLIGRAM(S): 500 INJECTION, POWDER, FOR SOLUTION INTRAMUSCULAR; INTRAVENOUS at 12:36

## 2021-01-01 RX ADMIN — Medication 125 MILLIGRAM(S): at 06:05

## 2021-01-01 RX ADMIN — Medication 81 MILLIGRAM(S): at 08:37

## 2021-01-01 RX ADMIN — Medication 125 MILLIGRAM(S): at 06:04

## 2021-01-01 RX ADMIN — MORPHINE SULFATE 2 MILLIGRAM(S): 50 CAPSULE, EXTENDED RELEASE ORAL at 01:55

## 2021-01-01 RX ADMIN — Medication 81 MILLIGRAM(S): at 12:20

## 2021-01-01 RX ADMIN — Medication 25 MILLIGRAM(S): at 18:02

## 2021-01-01 RX ADMIN — HEPARIN SODIUM 5000 UNIT(S): 5000 INJECTION INTRAVENOUS; SUBCUTANEOUS at 05:46

## 2021-01-01 RX ADMIN — SODIUM CHLORIDE 100 MILLILITER(S): 9 INJECTION, SOLUTION INTRAVENOUS at 17:20

## 2021-01-01 RX ADMIN — MORPHINE SULFATE 0.5 MILLIGRAM(S): 50 CAPSULE, EXTENDED RELEASE ORAL at 03:27

## 2021-01-01 RX ADMIN — Medication 25 MILLIGRAM(S): at 17:51

## 2021-01-01 RX ADMIN — Medication 25 MILLIGRAM(S): at 18:28

## 2021-01-01 RX ADMIN — HEPARIN SODIUM 5000 UNIT(S): 5000 INJECTION INTRAVENOUS; SUBCUTANEOUS at 17:06

## 2021-01-01 RX ADMIN — MORPHINE SULFATE 0.5 MILLIGRAM(S): 50 CAPSULE, EXTENDED RELEASE ORAL at 22:48

## 2021-01-01 RX ADMIN — HEPARIN SODIUM 5000 UNIT(S): 5000 INJECTION INTRAVENOUS; SUBCUTANEOUS at 17:54

## 2021-01-01 RX ADMIN — SODIUM CHLORIDE 1000 MILLILITER(S): 9 INJECTION INTRAMUSCULAR; INTRAVENOUS; SUBCUTANEOUS at 18:34

## 2021-01-01 RX ADMIN — MORPHINE SULFATE 0.5 MILLIGRAM(S): 50 CAPSULE, EXTENDED RELEASE ORAL at 17:05

## 2021-01-01 RX ADMIN — SODIUM CHLORIDE 75 MILLILITER(S): 9 INJECTION, SOLUTION INTRAVENOUS at 11:55

## 2021-01-01 RX ADMIN — HEPARIN SODIUM 5000 UNIT(S): 5000 INJECTION INTRAVENOUS; SUBCUTANEOUS at 06:22

## 2021-01-01 RX ADMIN — Medication 50 MILLIGRAM(S): at 06:24

## 2021-01-01 RX ADMIN — HEPARIN SODIUM 5000 UNIT(S): 5000 INJECTION INTRAVENOUS; SUBCUTANEOUS at 06:24

## 2021-01-01 RX ADMIN — SODIUM CHLORIDE 75 MILLILITER(S): 9 INJECTION INTRAMUSCULAR; INTRAVENOUS; SUBCUTANEOUS at 05:28

## 2021-01-01 RX ADMIN — HEPARIN SODIUM 5000 UNIT(S): 5000 INJECTION INTRAVENOUS; SUBCUTANEOUS at 05:18

## 2021-01-01 RX ADMIN — MORPHINE SULFATE 2 MILLIGRAM(S): 50 CAPSULE, EXTENDED RELEASE ORAL at 15:00

## 2021-01-01 RX ADMIN — HEPARIN SODIUM 5000 UNIT(S): 5000 INJECTION INTRAVENOUS; SUBCUTANEOUS at 13:38

## 2021-01-01 RX ADMIN — SODIUM CHLORIDE 500 MILLILITER(S): 9 INJECTION INTRAMUSCULAR; INTRAVENOUS; SUBCUTANEOUS at 12:39

## 2021-01-01 RX ADMIN — PANTOPRAZOLE SODIUM 40 MILLIGRAM(S): 20 TABLET, DELAYED RELEASE ORAL at 05:27

## 2021-01-01 RX ADMIN — Medication 125 MILLIGRAM(S): at 07:06

## 2021-01-01 RX ADMIN — SODIUM CHLORIDE 100 MILLILITER(S): 9 INJECTION INTRAMUSCULAR; INTRAVENOUS; SUBCUTANEOUS at 21:58

## 2021-01-01 RX ADMIN — Medication 100 MILLIGRAM(S): at 21:42

## 2021-01-01 RX ADMIN — Medication 81 MILLIGRAM(S): at 17:06

## 2021-01-01 RX ADMIN — Medication 125 MILLIGRAM(S): at 18:10

## 2021-01-01 RX ADMIN — Medication 125 MILLIGRAM(S): at 00:23

## 2021-01-01 RX ADMIN — SODIUM CHLORIDE 100 MILLILITER(S): 9 INJECTION, SOLUTION INTRAVENOUS at 11:50

## 2021-01-01 RX ADMIN — Medication 125 MILLIGRAM(S): at 17:21

## 2021-01-01 RX ADMIN — Medication 25 MILLIGRAM(S): at 04:32

## 2021-01-01 RX ADMIN — Medication 1 MILLIGRAM(S): at 17:06

## 2021-01-01 RX ADMIN — Medication 1 MILLIGRAM(S): at 11:35

## 2021-01-01 RX ADMIN — Medication 25 MILLIGRAM(S): at 17:55

## 2021-01-01 RX ADMIN — CEFTRIAXONE 100 MILLIGRAM(S): 500 INJECTION, POWDER, FOR SOLUTION INTRAMUSCULAR; INTRAVENOUS at 14:35

## 2021-01-01 RX ADMIN — HEPARIN SODIUM 5000 UNIT(S): 5000 INJECTION INTRAVENOUS; SUBCUTANEOUS at 17:45

## 2021-01-01 RX ADMIN — Medication 100 MILLIGRAM(S): at 18:09

## 2021-01-01 RX ADMIN — SODIUM CHLORIDE 75 MILLILITER(S): 9 INJECTION INTRAMUSCULAR; INTRAVENOUS; SUBCUTANEOUS at 21:15

## 2021-01-01 RX ADMIN — CEFTRIAXONE 100 MILLIGRAM(S): 500 INJECTION, POWDER, FOR SOLUTION INTRAMUSCULAR; INTRAVENOUS at 17:19

## 2021-01-01 RX ADMIN — HEPARIN SODIUM 5000 UNIT(S): 5000 INJECTION INTRAVENOUS; SUBCUTANEOUS at 05:01

## 2021-01-01 RX ADMIN — HEPARIN SODIUM 5000 UNIT(S): 5000 INJECTION INTRAVENOUS; SUBCUTANEOUS at 17:19

## 2021-01-01 RX ADMIN — Medication 50 GRAM(S): at 16:15

## 2021-01-01 RX ADMIN — Medication 5 MILLIGRAM(S): at 11:34

## 2021-01-01 RX ADMIN — Medication 250 MILLIGRAM(S): at 13:01

## 2021-01-01 RX ADMIN — CEFTRIAXONE 100 MILLIGRAM(S): 500 INJECTION, POWDER, FOR SOLUTION INTRAMUSCULAR; INTRAVENOUS at 16:36

## 2021-01-01 RX ADMIN — Medication 25 MILLIGRAM(S): at 19:01

## 2021-01-01 RX ADMIN — Medication 650 MILLIGRAM(S): at 22:37

## 2021-01-01 RX ADMIN — Medication 0.5 MILLIGRAM(S): at 23:40

## 2021-01-01 RX ADMIN — HEPARIN SODIUM 5000 UNIT(S): 5000 INJECTION INTRAVENOUS; SUBCUTANEOUS at 06:39

## 2021-01-01 RX ADMIN — Medication 125 MILLIGRAM(S): at 17:40

## 2021-01-01 RX ADMIN — HEPARIN SODIUM 5000 UNIT(S): 5000 INJECTION INTRAVENOUS; SUBCUTANEOUS at 22:14

## 2021-01-01 RX ADMIN — MEROPENEM 100 MILLIGRAM(S): 1 INJECTION INTRAVENOUS at 14:15

## 2021-01-01 RX ADMIN — Medication 25 MILLIGRAM(S): at 03:50

## 2021-01-01 RX ADMIN — MORPHINE SULFATE 0.5 MILLIGRAM(S): 50 CAPSULE, EXTENDED RELEASE ORAL at 06:39

## 2021-01-01 RX ADMIN — MORPHINE SULFATE 4 MILLIGRAM(S): 50 CAPSULE, EXTENDED RELEASE ORAL at 16:07

## 2021-01-01 RX ADMIN — AMIODARONE HYDROCHLORIDE 200 MILLIGRAM(S): 400 TABLET ORAL at 05:26

## 2021-01-01 RX ADMIN — Medication 20 MILLIGRAM(S): at 17:34

## 2021-01-01 RX ADMIN — Medication 50 MILLIGRAM(S): at 23:56

## 2021-01-01 RX ADMIN — MORPHINE SULFATE 0.5 MILLIGRAM(S): 50 CAPSULE, EXTENDED RELEASE ORAL at 06:29

## 2021-01-01 RX ADMIN — Medication 1 TABLET(S): at 12:39

## 2021-01-01 RX ADMIN — HEPARIN SODIUM 5000 UNIT(S): 5000 INJECTION INTRAVENOUS; SUBCUTANEOUS at 22:42

## 2021-01-01 RX ADMIN — SODIUM CHLORIDE 75 MILLILITER(S): 9 INJECTION INTRAMUSCULAR; INTRAVENOUS; SUBCUTANEOUS at 03:36

## 2021-01-01 RX ADMIN — MORPHINE SULFATE 0.5 MILLIGRAM(S): 50 CAPSULE, EXTENDED RELEASE ORAL at 11:24

## 2021-01-01 RX ADMIN — Medication 250 MILLIGRAM(S): at 05:39

## 2021-01-01 RX ADMIN — HEPARIN SODIUM 5000 UNIT(S): 5000 INJECTION INTRAVENOUS; SUBCUTANEOUS at 06:01

## 2021-01-01 RX ADMIN — Medication 125 MILLIGRAM(S): at 12:39

## 2021-01-01 RX ADMIN — CEFTRIAXONE 100 MILLIGRAM(S): 500 INJECTION, POWDER, FOR SOLUTION INTRAMUSCULAR; INTRAVENOUS at 17:24

## 2021-01-01 RX ADMIN — Medication 50 MILLIGRAM(S): at 18:54

## 2021-01-01 RX ADMIN — Medication 81 MILLIGRAM(S): at 12:51

## 2021-01-01 RX ADMIN — MORPHINE SULFATE 0.5 MILLIGRAM(S): 50 CAPSULE, EXTENDED RELEASE ORAL at 16:53

## 2021-01-01 RX ADMIN — Medication 500 MILLIGRAM(S): at 11:35

## 2021-01-01 RX ADMIN — ROBINUL 0.2 MILLIGRAM(S): 0.2 INJECTION INTRAMUSCULAR; INTRAVENOUS at 15:04

## 2021-01-01 RX ADMIN — AMIODARONE HYDROCHLORIDE 200 MILLIGRAM(S): 400 TABLET ORAL at 06:03

## 2021-01-01 RX ADMIN — Medication 50 MILLILITER(S): at 06:15

## 2021-01-01 RX ADMIN — MEROPENEM 100 MILLIGRAM(S): 1 INJECTION INTRAVENOUS at 15:30

## 2021-01-01 RX ADMIN — CEFTRIAXONE 100 MILLIGRAM(S): 500 INJECTION, POWDER, FOR SOLUTION INTRAMUSCULAR; INTRAVENOUS at 13:01

## 2021-01-01 RX ADMIN — PANTOPRAZOLE SODIUM 40 MILLIGRAM(S): 20 TABLET, DELAYED RELEASE ORAL at 03:50

## 2021-01-01 RX ADMIN — Medication 1 TABLET(S): at 11:53

## 2021-01-01 RX ADMIN — Medication 2.5 MILLIGRAM(S): at 11:48

## 2021-01-01 RX ADMIN — Medication 125 MILLIGRAM(S): at 23:11

## 2021-01-01 RX ADMIN — SODIUM CHLORIDE 75 MILLILITER(S): 9 INJECTION, SOLUTION INTRAVENOUS at 11:41

## 2021-01-01 RX ADMIN — Medication 81 MILLIGRAM(S): at 11:36

## 2021-01-01 RX ADMIN — MORPHINE SULFATE 0.5 MILLIGRAM(S): 50 CAPSULE, EXTENDED RELEASE ORAL at 01:04

## 2021-01-01 RX ADMIN — HEPARIN SODIUM 5000 UNIT(S): 5000 INJECTION INTRAVENOUS; SUBCUTANEOUS at 05:57

## 2021-01-01 RX ADMIN — SODIUM CHLORIDE 100 MILLILITER(S): 9 INJECTION, SOLUTION INTRAVENOUS at 12:51

## 2021-01-01 RX ADMIN — CEFTRIAXONE 100 MILLIGRAM(S): 500 INJECTION, POWDER, FOR SOLUTION INTRAMUSCULAR; INTRAVENOUS at 17:17

## 2021-01-01 RX ADMIN — Medication 25 MILLIGRAM(S): at 05:58

## 2021-01-01 RX ADMIN — Medication 250 MILLIGRAM(S): at 03:06

## 2021-01-01 RX ADMIN — PANTOPRAZOLE SODIUM 40 MILLIGRAM(S): 20 TABLET, DELAYED RELEASE ORAL at 04:32

## 2021-03-01 NOTE — ED CDU PROVIDER INITIAL DAY NOTE - PHYSICAL EXAMINATION
General: NAD, elderly appearing  HEENT: Normocephalic, right eye patch from chronic condition   Neck: No apparent stiffness or JVD  Pulm: Chest wall symmetric and nontender, lungs clear to ascultation   Cardiac: Regular rate and regular rhythm  Abdomen: Nontender and nondistended  Skin: Skin in warm, dry and intact without rashes or lesions.  Neuro: No apparent motor or sensory deficits  Psych: Alert, oriented, and cooperative  MSK: right hip is mildly tender.  Patient cannot walk with assistance

## 2021-03-01 NOTE — ED PROVIDER NOTE - CLINICAL SUMMARY MEDICAL DECISION MAKING FREE TEXT BOX
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 Xray and CT of hip are negative but patient still not able to ambulate and complaining of right hip pain.  Admit to obs with PT to eval. 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 Xray and CT of hip are negative but patient still not able to ambulate and complaining of right hip pain.  Admit to med with PT to eval.

## 2021-03-01 NOTE — ED ADULT TRIAGE NOTE - CHIEF COMPLAINT QUOTE
Patient brought in by ambulance A/Ox3, s/p slip and fall in shower, -LOC, -blood thinners, c/o bilateral hip pain.

## 2021-03-01 NOTE — ED PROVIDER NOTE - PHYSICAL EXAMINATION
General: NAD, elderly appearing  HEENT: Normocephalic, right eye patch from chronic condition   Neck: No apparent stiffness or JVD  Pulm: Chest wall symmetric and nontender, lungs clear to ascultation   Cardiac: Regular rate and regular rhythm  Abdomen: Nontender and nondistended  Skin: Skin in warm, dry and intact without rashes or lesions.  Neuro: No apparent motor or sensory deficits  Psych: Alert, oriented, and cooperative General: NAD, elderly appearing  HEENT: Normocephalic, right eye patch from chronic condition   Neck: No apparent stiffness or JVD  Pulm: Chest wall symmetric and nontender, lungs clear to ascultation   Cardiac: Regular rate and regular rhythm  Abdomen: Nontender and nondistended  Skin: Skin in warm, dry and intact without rashes or lesions.  Neuro: No apparent motor or sensory deficits  Psych: Alert, oriented, and cooperative  MSK: right hip is mildly tender.  Patient cannot walk with assistance

## 2021-03-01 NOTE — ED PROVIDER NOTE - NSCAREINITIATED _GEN_ER
Enzo Vazquez(Attending)
Alert-The patient is alert, awake and responds to voice. The patient is oriented to time, place, and person. The triage nurse is able to obtain subjective information.

## 2021-03-01 NOTE — ED ADULT NURSE REASSESSMENT NOTE - NS ED NURSE REASSESS COMMENT FT1
Pt cleaned and given new diaper. Pt placed on female external purewick catheter. Awaiting urine specimen @ this time.

## 2021-03-01 NOTE — ED PROVIDER NOTE - ATTENDING CONTRIBUTION TO CARE
The patient seen and examined    Fall    HERLINDA, Enzo Vazquez, performed the initial face to face bedside interview with this patient regarding history of present illness, review of symptoms and relevant past medical, social and family history.  I completed an independent physical examination.  I was the initial provider who evaluated this patient. I have signed out the follow up of any pending tests (i.e. labs, radiological studies) to the resident  I have communicated the patient’s plan of care and disposition with the resident

## 2021-03-01 NOTE — ED ADULT NURSE NOTE - ED STAT RN HANDOFF DETAILS
Report given to LATA Pereira. Pt A&Ox1, to self @ this time, resps even/unlabored, NAD present @ this time. Plan of care discussed with pt, pt verbalized understanding.

## 2021-03-01 NOTE — ED ADULT NURSE REASSESSMENT NOTE - NS ED NURSE REASSESS COMMENT FT1
Assumed pt care @1950. Report received from day RENE Vaca. Pt A&Ox0 @ this time. Pt resting comfortably in stretcher @ this time. Respirations even & unlabored. NAD present. Pt to be admitted.

## 2021-03-01 NOTE — ED PROVIDER NOTE - OBJECTIVE STATEMENT
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  Patient is ambulatory with walker. Daughter who is primary caregiver and good historian states she slipped a little in the bathtub today but landed on her left side and was fine.  denies head trauma/LOC.  Later she would not walk due to right hip pain.  Patient is deaf now and not good historian, but daughter otherwise denies  bleeding or fevers.

## 2021-03-02 NOTE — PHYSICAL THERAPY INITIAL EVALUATION ADULT - GENERAL OBSERVATIONS, REHAB EVAL
Pt received lying on stretcher in CDU, (+) IV right UE, (+) primafit, (+) bandage covering left eye. NAD. Agreeable to PT evaluation.

## 2021-03-02 NOTE — H&P ADULT - PROBLEM SELECTOR PLAN 2
Ct pelvis showed right obturator hernia containing bowel, cause of pain or difficulty ambulation ?  surgery consult requested, spoke to on call resident for surgery around 12:50 am after seeing pt. gentle iv fluids.

## 2021-03-02 NOTE — PROGRESS NOTE ADULT - ASSESSMENT
89 y/o female with h/o htn, MI was brought in after she had a fall. Pt admitted with hip pain and AMS.      AMS  - Per daughter this is new   - CT head: No acute intracranial hemorrhage, mass effect, or acute osseous fracture. Extensive chronic small vessel ischemic changes in the frontoparietal white matter. Infiltrative left intraorbital soft tissue mass unchanged compared to prior exam from 9/22/2020.  - Will order UA, CXR     Hip pain, acute, right    - CT pelvis did not show any acute fracture   - Tylenol PRN  - PT/OT     Obturator hernia   - CT pelvis showed right obturator hernia containing bowel  - Surgery consult appreciated: no acute surgical intervention   - Conservative management     Fall  - Fall precautions     Essential hypertension   - Continue lopressor  - Monitor BP     HCP is daughter, spoke to her today   Family requesting pt be discharged home with PT/OT, they do not want GABI  89 y/o female with h/o htn, MI was brought in after she had a fall. Pt admitted with hip pain and AMS.      AMS  - Per daughter this is new   - CT head: No acute intracranial hemorrhage, mass effect, or acute osseous fracture. Extensive chronic small vessel ischemic changes in the frontoparietal white matter. Infiltrative left intraorbital soft tissue mass unchanged compared to prior exam from 9/22/2020.  - Will order UA, CXR, RPR, TSH, B12, Folate     Hip pain, acute, right    - CT pelvis did not show any acute fracture   - Tylenol PRN  - PT/OT     Obturator hernia   - CT pelvis showed right obturator hernia containing bowel  - Surgery consult appreciated: no acute surgical intervention   - Conservative management     Fall  - Fall precautions     Essential hypertension   - Continue lopressor  - Monitor BP     HCP is daughter, spoke to her today   Family requesting pt be discharged home with PT/OT, they do not want GABI

## 2021-03-02 NOTE — PHYSICAL THERAPY INITIAL EVALUATION ADULT - PERTINENT HX OF CURRENT PROBLEM, REHAB EVAL
89 y/o female s/p mechanical fall.  No acute changes on imaging. Incidental finding of right obturator hernia. Surgery consult-no surgical intervention indicated at this time.

## 2021-03-02 NOTE — H&P ADULT - ASSESSMENT
In summary 89 y/o female with h/o htn, MI was brought in after trip and fall in the bathroom, no LOc, no cp, no sob, no abd. pain, no n/v/d as per history. spoke to ER physician and pt's daughter has helped with history. pt. likely has dementia and does not give any history. pt. reports no pain and sitting up in the bed . 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 pt. could not ambulate in the ER so admission requested for PT eval and then plan discharge as per recommendations. pt. will be admitted for hip area pain, right obturator hernia.

## 2021-03-02 NOTE — H&P ADULT - NSHPPHYSICALEXAM_GEN_ALL_CORE
General: An elderly  female sitting up in bed not in distress.   HEENT: AT, NC. PERRL on right. left eye blindness, intact EOM on rt. no throat erythema or exudate.   Neck: supple. no JVD.   Chest: CTA bilaterally.  Heart: S1,S2. RRR. no heart murmur. no edema.   Abdomen: soft. non-tender on palpation, non-distention noted , right upper medial thigh  a small area , soft like bag of worm palpated, does not appear in pain when palpating that area, + BS.   Ext: no calf tenderness. negative SLR b/L. pain does not appear in pain on hip area palpation.  Back : pt. not in pain on upper and lower spine area palpation.   Neuro: Alert, awake, has fair eye contact but does not give any history , just stating " ok ". non focal.   Skin: no obvious rash noted, warm and dry.   psych : no agitation, resting in bed quietly.

## 2021-03-02 NOTE — PROGRESS NOTE ADULT - SUBJECTIVE AND OBJECTIVE BOX
[facility] Division of Hospital Medicine    Chief Complaint:   Fall    SUBJECTIVE / OVERNIGHT EVENTS:  Pt says she is tired  Confused, does not where she is or how she got here     Tells me her lower abdomen hurts a little   Patient denies chest pain, SOB, N/V, fever, chills or any other complaints. All remainder ROS negative.     MEDICATIONS  (STANDING):  aspirin  chewable 81 milliGRAM(s) Oral daily  heparin   Injectable 5000 Unit(s) SubCutaneous every 12 hours  influenza   Vaccine 0.5 milliLiter(s) IntraMuscular once  metoprolol tartrate 25 milliGRAM(s) Oral two times a day  pantoprazole    Tablet 40 milliGRAM(s) Oral before breakfast    MEDICATIONS  (PRN):  acetaminophen   Tablet .. 650 milliGRAM(s) Oral every 6 hours PRN Mild Pain (1 - 3), Moderate Pain (4 - 6)      PHYSICAL EXAM:  Vital Signs Last 24 Hrs  T(C): 36.3 (02 Mar 2021 07:40), Max: 36.4 (02 Mar 2021 00:55)  T(F): 97.4 (02 Mar 2021 07:40), Max: 97.6 (02 Mar 2021 00:55)  HR: 84 (02 Mar 2021 07:40) (58 - 84)  BP: 121/43 (02 Mar 2021 07:40) (111/56 - 121/43)  BP(mean): --  RR: 18 (02 Mar 2021 07:40) (18 - 18)  SpO2: 100% (02 Mar 2021 07:40) (98% - 100%)      CONSTITUTIONAL: NAD, elderly lady lying in bed   ENMT: Moist oral mucosa, left eye with patch   RESPIRATORY: Normal respiratory effort; lungs are clear to auscultation bilaterally  CARDIOVASCULAR: Regular rate and rhythm, normal S1 and S2, No lower extremity edema  ABDOMEN: Nontender to palpation, normoactive bowel sounds  MUSCLOSKELETAL: no clubbing or cyanosis of digits   PSYCH: A+O to person only; affect appropriate  NEUROLOGY: no gross sensory or motor deficits;   SKIN: No rashes    LABS:                        11.0   4.01  )-----------( 247      ( 02 Mar 2021 04:27 )             35.5     03-02    139  |  104  |  31.0<H>  ----------------------------<  85  4.1   |  25.0  |  0.68    Ca    9.0      02 Mar 2021 04:27    TPro  6.7  /  Alb  3.5  /  TBili  0.3<L>  /  DBili  x   /  AST  24  /  ALT  12  /  AlkPhos  102  03-01    PT/INR - ( 01 Mar 2021 15:27 )   PT: 11.9 sec;   INR: 1.03 ratio         PTT - ( 01 Mar 2021 15:27 )  PTT:31.1 sec

## 2021-03-02 NOTE — CONSULT NOTE ADULT - ASSESSMENT
ASSESSMENT: 89 year old female with the PMH as listed below presented to [facility] after sustaining a mechanical ground level fall in the bathroom with no LOC. Trauma workup was unrevealing, Surgery consulted for incidental finding of right-sided obturator hernia with concerns that this may cause some difficulty ambulating.       PLAN:    - Patient with asymptomatic obturator hernia, bowel not obstructed, patient having daily BMs and without abdominal tenderness  - No indication for acute surgical intervention in setting that surgery poses greater risks than benefits given large obturator defect  - Etiology of mechanical fall unlikely attributable to obturator hernia, consider other risk factors   - Management per primary team   - Will see patient in AM if continues in house

## 2021-03-02 NOTE — H&P ADULT - PROBLEM SELECTOR PLAN 1
pt's ct pelvis did not show any acute fracture, pt. does not give history or complaints pain in right or left hip area. prn tylenol, PT eval.

## 2021-03-02 NOTE — PHYSICAL THERAPY INITIAL EVALUATION ADULT - ORIENTATION, REHAB EVAL
Pt states she is at lunch, does not know current date.  Reports her age is "almost 80."  Pt re-oriented as needed./person

## 2021-03-03 NOTE — PROGRESS NOTE ADULT - SUBJECTIVE AND OBJECTIVE BOX
[facility] Division of Hospital Medicine    SUBJECTIVE / OVERNIGHT EVENTS:  Overnight pt was agitated, pulled out the IV this morning  Refusing labs and meds this morning     Pt is confused, tells me she wants to be left alone     Limited ROS.    MEDICATIONS  (STANDING):  aspirin  chewable 81 milliGRAM(s) Oral daily  cefTRIAXone   IVPB 1000 milliGRAM(s) IV Intermittent every 24 hours  heparin   Injectable 5000 Unit(s) SubCutaneous every 12 hours  influenza   Vaccine 0.5 milliLiter(s) IntraMuscular once  metoprolol tartrate 25 milliGRAM(s) Oral two times a day  pantoprazole    Tablet 40 milliGRAM(s) Oral before breakfast  sodium chloride 0.9%. 1000 milliLiter(s) (75 mL/Hr) IV Continuous <Continuous>    MEDICATIONS  (PRN):  acetaminophen   Tablet .. 650 milliGRAM(s) Oral every 6 hours PRN Mild Pain (1 - 3), Moderate Pain (4 - 6)        I&O's Summary    03 Mar 2021 07:01  -  03 Mar 2021 16:04  --------------------------------------------------------  IN: 275 mL / OUT: 0 mL / NET: 275 mL        PHYSICAL EXAM:  Vital Signs Last 24 Hrs  T(C): 36.3 (03 Mar 2021 15:12), Max: 36.4 (03 Mar 2021 13:42)  T(F): 97.4 (03 Mar 2021 15:12), Max: 97.6 (03 Mar 2021 13:42)  HR: 68 (03 Mar 2021 15:12) (61 - 68)  BP: 136/74 (03 Mar 2021 15:12) (121/57 - 136/74)  BP(mean): 78 (03 Mar 2021 00:00) (78 - 78)  RR: 18 (03 Mar 2021 15:12) (18 - 19)  SpO2: 100% (03 Mar 2021 15:12) (96% - 100%)      CONSTITUTIONAL: Elderly lady lying in bed, appears upset   ENMT: Moist oral mucosa, left eye with patch   RESPIRATORY: Normal respiratory effort; lungs are clear to auscultation bilaterally  CARDIOVASCULAR: Regular rate and rhythm, normal S1 and S2, No lower extremity edema  ABDOMEN: Refused   MUSCLOSKELETAL: no clubbing or cyanosis of digits   PSYCH: Awake, confused   NEUROLOGY: no gross sensory or motor deficits;   SKIN: No rashes    LABS:                        11.0   4.01  )-----------( 247      ( 02 Mar 2021 04:27 )             35.5     03-    139  |  104  |  31.0<H>  ----------------------------<  85  4.1   |  25.0  |  0.68    Ca    9.0      02 Mar 2021 04:27            Urinalysis Basic - ( 03 Mar 2021 05:55 )    Color: Yellow / Appearance: Slightly Turbid / S.025 / pH: x  Gluc: x / Ketone: Negative  / Bili: Negative / Urobili: Negative   Blood: x / Protein: Negative / Nitrite: Positive   Leuk Esterase: Moderate / RBC: Negative /HPF / WBC 11-25   Sq Epi: x / Non Sq Epi: Occasional / Bacteria: Few

## 2021-03-03 NOTE — PROGRESS NOTE ADULT - ASSESSMENT
89 y/o female with h/o htn, MI was brought in after she had a fall. Pt admitted with hip pain and AMS.      Encephalopathy 2/2 UTI   - Per daughter this is new, and pt does not have diagnosis of dementia    - Now is confused, combative at times   - CT head: No acute intracranial hemorrhage, mass effect, or acute osseous fracture. Extensive chronic small vessel ischemic changes in the frontoparietal white matter. Infiltrative left intraorbital soft tissue mass unchanged compared to prior exam from 9/22/2020.  - UA +ve  - Awaiting RPR, TSH, B12, Folate   - Avoid medications, use 1:1 and behavioural redirection therapy   - If needed, will use Depakote 250 mg as needed     UTI  - UA +Ve  - UCx pending  - BCx pending   - IV Ceftriaxone started     Hip pain, acute, right    - CT pelvis did not show any acute fracture   - Tylenol PRN  - PT/OT     Obturator hernia   - CT pelvis showed right obturator hernia containing bowel   - Surgery consult appreciated: no acute surgical intervention   - Conservative management     Fall  - Fall precautions     Essential hypertension   - Continue lopressor  - Monitor BP     HCP is daughter, spoke to her today   Dispo: Pending clinical improvement, plan for discharge home with PT/OT, family do not want GABI

## 2021-03-04 NOTE — PROGRESS NOTE ADULT - SUBJECTIVE AND OBJECTIVE BOX
CC:   INTERVAL HPI/OVERNIGHT EVENTS: Pt seen and examined at bedside this AM by Hospitalist and PA. Pt in much better mood today, reports feeling well today, no complaints, in cheerful mood.  No acute overnight events or issues.  No fevers/chills, N/V, SOB, palpitations or chest pain.    REVIEW OF SYSTEMS:  CONSTITUTIONAL: No fever, weight loss, or fatigue  EYES: No eye pain, visual disturbances, or discharge  ENT:  No difficulty hearing, tinnitus, vertigo; No sinus or throat pain  NECK: No pain or stiffness  RESPIRATORY: No cough, wheezing, chills or hemoptysis; No shortness of breath  CARDIOVASCULAR: No chest pain, palpitations, dizziness, or leg swelling  GASTROINTESTINAL: No abdominal or epigastric pain. No nausea, vomiting, or hematemesis; No diarrhea or constipation.  GENITOURINARY: No dysuria, frequency, hematuria, or incontinence  NEUROLOGICAL: No headaches, memory loss, loss of strength, numbness, or tremors  SKIN: No itching, burning, rashes, or lesions   MUSCULOSKELETAL: No joint pain or swelling; No muscle, back, or extremity pain    Allergies    No Known Allergies    Intolerances      HEALTH ISSUES - PROBLEM Dx:  Essential hypertension  Essential hypertension    Fall, initial encounter  Fall, initial encounter    Obturator hernia  Obturator hernia    Hip pain, acute, right  Hip pain, acute, right        PAST MEDICAL & SURGICAL HISTORY:  MI (myocardial infarction)  @ age 89    HTN (hypertension)    No significant past surgical history      VITAL SIGNS:  T(C): 36.7 (21 @ 07:30), Max: 36.7 (21 @ 07:30)  HR: 64 (21 @ 07:30) (64 - 72)  BP: 159/55 (21 @ 07:30) (136/74 - 159/55)  RR: 18 (21 @ 07:30) (18 - 18)  SpO2: 100% (21 @ 07:30) (99% - 100%)  Wt(kg): --Vital Signs Last 24 Hrs  T(C): 36.7 (04 Mar 2021 07:30), Max: 36.7 (04 Mar 2021 07:30)  T(F): 98 (04 Mar 2021 07:30), Max: 98 (04 Mar 2021 07:30)  HR: 64 (04 Mar 2021 07:30) (64 - 72)  BP: 159/55 (04 Mar 2021 07:30) (136/74 - 159/55)  BP(mean): --  RR: 18 (04 Mar 2021 07:30) (18 - 18)  SpO2: 100% (04 Mar 2021 07:30) (99% - 100%)    PHYSICAL EXAM:  GENERAL: Pt lying in bed comfortably in NAD  HEAD:  Atraumatic  EYES: Left eye without patch, no drainage, right eye sclera clear.  ENT: Moist mucous membranes  NECK: Supple, No JVD  CHEST/LUNG: Clear to auscultation bilaterally; No rales, rhonchi, wheezing, or rubs. Unlabored respirations  HEART: Regular rate and rhythm; No murmurs, rubs, or gallops  ABDOMEN: Bowel sounds present; Soft, Nontender, Nondistended. No guarding or rigidity   EXTREMITIES:  2+ Peripheral Pulses, brisk capillary refill. No clubbing, cyanosis, or edema  NERVOUS SYSTEM:  Alert & Oriented X3, speech clear, FROM x 4 extremities. No acute deficits   SKIN: No rashes or lesions    LABS:                        8.2    2.70  )-----------( 191      ( 04 Mar 2021 07:49 )             26.8     03-04    141  |  106  |  17.0  ----------------------------<  68<L>  3.7   |  24.0  |  0.55    Ca    7.6<L>      04 Mar 2021 07:49        CAPILLARY BLOOD GLUCOSE          Urinalysis Basic - ( 03 Mar 2021 05:55 )    Color: Yellow / Appearance: Slightly Turbid / S.025 / pH: x  Gluc: x / Ketone: Negative  / Bili: Negative / Urobili: Negative   Blood: x / Protein: Negative / Nitrite: Positive   Leuk Esterase: Moderate / RBC: Negative /HPF / WBC 11-25   Sq Epi: x / Non Sq Epi: Occasional / Bacteria: Few

## 2021-03-04 NOTE — PROGRESS NOTE ADULT - ATTENDING COMMENTS
Pt seen and examined at bedside  Much more awake and alert today     Agree with A/P above    Spoke to daughter Sylvie today

## 2021-03-04 NOTE — PROGRESS NOTE ADULT - ASSESSMENT
89 y/o female with h/o htn, MI was brought in after she had a fall. Pt admitted with hip pain and AMS.      Encephalopathy 2/2 UTI   - Per daughter this is new, and pt does not have diagnosis of dementia    - Mentation improved no longer combative or confused  - CT head: No acute intracranial hemorrhage, mass effect, or acute osseous fracture. Extensive chronic small vessel ischemic changes in the frontoparietal white matter. Infiltrative left intraorbital soft tissue mass unchanged compared to prior exam from 9/22/2020.  - UA +ve  - RPR negative, TSH, B12, Folate within normal limits  - Avoid medications, use 1:1 and behavioural redirection therapy if becomes combative/confused  - If needed, will use Depakote 250 mg as needed     UTI  - UA +Ve  - UCx pending  - BCx pending   - IV Ceftriaxone started     Hip pain, acute, right    - CT pelvis did not show any acute fracture   - Tylenol PRN  - PT/OT     Obturator hernia   - CT pelvis showed right obturator hernia containing bowel   - Surgery consult appreciated: no acute surgical intervention   - Conservative management     Fall  - Fall precautions     Essential hypertension   - Continue lopressor  - Monitor BP     HCP is daughter, spoke to her today   Dispo: Pending clinical improvement, plan for discharge home with PT/OT, family does not want GABI      89 y/o female with h/o htn, MI was brought in after she had a fall. Pt admitted with hip pain and AMS.      Encephalopathy 2/2 UTI   - Per daughter this is new, and pt does not have diagnosis of dementia    - Mentation improved no longer combative or confused  - CT head: No acute intracranial hemorrhage, mass effect, or acute osseous fracture. Extensive chronic small vessel ischemic changes in the frontoparietal white matter. Infiltrative left intraorbital soft tissue mass unchanged compared to prior exam from 9/22/2020.  - UA +ve  - RPR negative, TSH, B12, Folate within normal limits  - Avoid medications, use 1:1 and behavioural redirection therapy if becomes combative/confused  - If needed, will use Depakote 250 mg as needed     UTI  - UA +Ve  - UCx pending  - BCx pending   - IV Ceftriaxone started     Hip pain, acute, right    - CT pelvis did not show any acute fracture   - Tylenol PRN  - PT/OT     Obturator hernia   - CT pelvis showed right obturator hernia containing bowel   - Surgery consult appreciated: no acute surgical intervention   - Conservative management     Fall  - Fall precautions     Essential hypertension   - Continue lopressor   - Monitor BP     HCP is daughter, spoke to her today   Dispo: Pending clinical improvement, plan for discharge home with PT/OT, family does not want GABI

## 2021-03-05 NOTE — PROGRESS NOTE ADULT - ASSESSMENT
Hospital course:  89 y/o female with h/o htn, MI was brought in after she had a fall. Pt admitted with hip pain and AMS. Pt had workup for AMS including RPR negative, TSH, B12, Folate within normal limits and CT head showing No acute intracranial hemorrhage, mass effect, or acute osseous fracture, Extensive chronic small vessel ischemic changes in the frontoparietal white matter. Infectious work up done showed UTI likely causing metabolic encephalopathy. She was started on IVF and IV antibiotics with improvement in pt's mental status. PT evaluated pt and recommended GABI, but family want home with PT.     A/P:  89 y/o female with h/o htn, MI was brought in after she had a fall. Pt admitted with hip pain and AMS.      Encephalopathy 2/2 UTI   - Per daughter this is new, and pt does not have diagnosis of dementia    - Mentation improving, but has episodes fo agitation   - CT head: No acute intracranial hemorrhage, mass effect, or acute osseous fracture. Extensive chronic small vessel ischemic changes in the frontoparietal white matter. Infiltrative left intraorbital soft tissue mass unchanged compared to prior exam from 9/22/2020.  - UA +ve  - RPR negative, TSH, B12, Folate within normal limits  - Avoid medications, use 1:1 and behavioural redirection therapy if becomes combative/confused  - If needed, will use Depakote 250 mg as needed     UTI  - UA +Ve  - UCx GNR   - BCx pending   - IV Ceftriaxone     Hip pain, acute, right    - CT pelvis did not show any acute fracture   - Tylenol PRN  - PT/OT, requested follow up today     Obturator hernia   - CT pelvis showed right obturator hernia containing bowel   - Surgery consult appreciated: no acute surgical intervention   - Conservative management     Fall  - Fall precautions     Essential hypertension   - Continue lopressor   - Monitor BP     HCP is daughter, spoke to her today   Dispo: Home with PT/OT in the next 24-48 hrs    Hospital course:  89 y/o female with h/o htn, MI was brought in after she had a fall. Pt admitted with hip pain and AMS. Pt had workup for AMS including RPR negative, TSH, B12, Folate within normal limits and CT head showing No acute intracranial hemorrhage, mass effect, or acute osseous fracture, Extensive chronic small vessel ischemic changes in the frontoparietal white matter. Infectious work up done showed UTI likely causing metabolic encephalopathy. She was started on IVF and IV antibiotics with improvement in pt's mental status. Pt also had hip pain after her fall, CT pelvis did not show any acute fracture. PT evaluated pt and recommended GABI, but family want home with PT.     A/P:  89 y/o female with h/o htn, MI was brought in after she had a fall. Pt admitted with hip pain and AMS.      Encephalopathy 2/2 UTI   - Per daughter this is new, and pt does not have diagnosis of dementia    - Mentation improving, but has episodes fo agitation   - CT head: No acute intracranial hemorrhage, mass effect, or acute osseous fracture. Extensive chronic small vessel ischemic changes in the frontoparietal white matter. Infiltrative left intraorbital soft tissue mass unchanged compared to prior exam from 9/22/2020.  - UA +ve  - RPR negative, TSH, B12, Folate within normal limits  - Avoid medications, use 1:1 and behavioural redirection therapy if becomes combative/confused  - If needed, will use Depakote 250 mg as needed     UTI  - UA +Ve  - UCx GNR   - BCx pending   - IV Ceftriaxone     Hip pain, acute, right    - CT pelvis did not show any acute fracture   - Tylenol PRN  - PT/OT, requested follow up today     Obturator hernia   - CT pelvis showed right obturator hernia containing bowel   - Surgery consult appreciated: no acute surgical intervention   - Conservative management     Fall  - Fall precautions     Essential hypertension   - Continue lopressor   - Monitor BP     HCP is daughter, spoke to her today   Dispo: Home with PT/OT in the next 24-48 hrs

## 2021-03-05 NOTE — PROVIDER CONTACT NOTE (OTHER) - BACKGROUND
MD spoke to AMN Kinsey and got back to this writer that she did not want to proceed with additional physical restraints.

## 2021-03-05 NOTE — DISCHARGE NOTE PROVIDER - PROVIDER TOKENS
PROVIDER:[TOKEN:[[id number]],FOLLOWUP:[1 week]],FREE:[LAST:[PCP],PHONE:[(   )    -],FAX:[(   )    -],FOLLOWUP:[1 week]]

## 2021-03-05 NOTE — PROVIDER CONTACT NOTE (OTHER) - SITUATION
Patient increasingly agitated, removing mittens and attempting to get out of bed, and in need of chemical restraint is possible.

## 2021-03-05 NOTE — DISCHARGE NOTE PROVIDER - NSDCMRMEDTOKEN_GEN_ALL_CORE_FT
aspirin 81 mg oral delayed release tablet: 1 tab(s) orally once a day  Keflex 500 mg oral capsule: 1 cap(s) orally every 12 hours x 4 more days then d/c   metoprolol: 50 milligram(s) orally 2 times a day.     Pepcid AC 10 mg oral tablet: 2 tab(s) orally once a day (in the morning)   aspirin 81 mg oral delayed release tablet: 1 tab(s) orally once a day  metoprolol: 50 milligram(s) orally 2 times a day.      amiodarone 200 mg oral tablet: 1 tab(s) orally once a day  aspirin 81 mg oral delayed release tablet: 1 tab(s) orally once a day  dronabinol 2.5 mg oral capsule: 1 cap(s) orally once a day MDD:MDD 1 tab

## 2021-03-05 NOTE — PROGRESS NOTE ADULT - SUBJECTIVE AND OBJECTIVE BOX
[facility] Division of Hospital Medicine    Chief Complaint:  Confusion     SUBJECTIVE / OVERNIGHT EVENTS:  Pt says she feels fine   Agitated this morning, removed IV     Pt is confused     Limited ROS.    MEDICATIONS  (STANDING):  aspirin  chewable 81 milliGRAM(s) Oral daily  heparin   Injectable 5000 Unit(s) SubCutaneous every 12 hours  influenza   Vaccine 0.5 milliLiter(s) IntraMuscular once  metoprolol tartrate 25 milliGRAM(s) Oral two times a day  pantoprazole    Tablet 40 milliGRAM(s) Oral before breakfast    MEDICATIONS  (PRN):  acetaminophen   Tablet .. 650 milliGRAM(s) Oral every 6 hours PRN Mild Pain (1 - 3), Moderate Pain (4 - 6)  valproic acid 250 milliGRAM(s) Oral every 6 hours PRN agitation      PHYSICAL EXAM:  Vital Signs Last 24 Hrs  T(C): 36.4 (05 Mar 2021 05:54), Max: 36.6 (04 Mar 2021 21:09)  T(F): 97.5 (05 Mar 2021 05:54), Max: 97.8 (04 Mar 2021 21:09)  HR: 65 (05 Mar 2021 05:54) (64 - 65)  BP: 146/73 (05 Mar 2021 05:54) (143/64 - 146/73)  BP(mean): --  RR: 18 (05 Mar 2021 05:54) (18 - 18)  SpO2: 100% (05 Mar 2021 05:54) (99% - 100%)      CONSTITUTIONAL: Elderly lady lying in bed, calm    ENMT: Moist oral mucosa, left eye with patch   RESPIRATORY: Normal respiratory effort; lungs are clear to auscultation bilaterally  CARDIOVASCULAR: Regular rate and rhythm, normal S1 and S2, No lower extremity edema  ABDOMEN: Soft, non tender   MUSCLOSKELETAL: no clubbing or cyanosis of digits   PSYCH: Awake, confused   NEUROLOGY: no gross sensory or motor deficits;   SKIN: No rashes    LABS:                        10.9   4.11  )-----------( 262      ( 05 Mar 2021 10:30 )             36.0     03-05    138  |  99  |  16.0  ----------------------------<  95  3.5   |  26.0  |  0.58    Ca    8.8      05 Mar 2021 10:30                Culture - Urine (collected 03 Mar 2021 12:24)  Source: .Urine Catheterized  Final Report (05 Mar 2021 13:43):    >100,000 CFU/ml Escherichia coli  Organism: Escherichia coli (05 Mar 2021 13:43)  Organism: Escherichia coli (05 Mar 2021 13:43)

## 2021-03-05 NOTE — DISCHARGE NOTE PROVIDER - CARE PROVIDER_API CALL
Aris Hogue (DO)  Cardiovascular Disease; Internal Medicine  [address] [place] [address]  Phone: [phone]  Fax: [phone]  Follow Up Time: 1 week    PCP,   Phone: (   )    -  Fax: (   )    -  Follow Up Time: 1 week

## 2021-03-05 NOTE — DISCHARGE NOTE PROVIDER - NSDCCPCAREPLAN_GEN_ALL_CORE_FT
PRINCIPAL DISCHARGE DIAGNOSIS  Diagnosis: Encephalopathy  Assessment and Plan of Treatment:       SECONDARY DISCHARGE DIAGNOSES  Diagnosis: Acute UTI  Assessment and Plan of Treatment:     Diagnosis: Essential hypertension  Assessment and Plan of Treatment: Essential hypertension    Diagnosis: Hip pain, acute, right  Assessment and Plan of Treatment: Hip pain, acute, right    Diagnosis: Obturator hernia  Assessment and Plan of Treatment: Obturator hernia     PRINCIPAL DISCHARGE DIAGNOSIS  Diagnosis: Encephalopathy  Assessment and Plan of Treatment: Secondary to UTI   Now resolved      SECONDARY DISCHARGE DIAGNOSES  Diagnosis: ENIO (acute kidney injury)  Assessment and Plan of Treatment: Developed secondary to poor oral intake  Appetitie improvement with marinol, to continue PO daily  Encourage oral fluid intake at home  Follow up lab work in 1 week with PMD    Diagnosis: Afib  Assessment and Plan of Treatment: Now converted to normal sinus rhythm  TO continue PO metoprolol and amiodarone as prescribed  Follow up with your cardiologist in 1 week     PRINCIPAL DISCHARGE DIAGNOSIS  Diagnosis: Encephalopathy  Assessment and Plan of Treatment: Secondary to UTI   Now resolved      SECONDARY DISCHARGE DIAGNOSES  Diagnosis: Afib  Assessment and Plan of Treatment: - take medications as rx  - follow up with cardiology

## 2021-03-05 NOTE — DISCHARGE NOTE PROVIDER - DETAILS OF MALNUTRITION DIAGNOSIS/DIAGNOSES
This patient has been assessed with a concern for Malnutrition and was treated during this hospitalization for the following Nutrition diagnosis/diagnoses:     -  03/06/2021: Severe protein-calorie malnutrition   This patient has been assessed with a concern for Malnutrition and was treated during this hospitalization for the following Nutrition diagnosis/diagnoses:     -  03/06/2021: Severe protein-calorie malnutrition    This patient has been assessed with a concern for Malnutrition and was treated during this hospitalization for the following Nutrition diagnosis/diagnoses:     -  03/06/2021: Severe protein-calorie malnutrition   This patient has been assessed with a concern for Malnutrition and was treated during this hospitalization for the following Nutrition diagnosis/diagnoses:     -  03/06/2021: Severe protein-calorie malnutrition    This patient has been assessed with a concern for Malnutrition and was treated during this hospitalization for the following Nutrition diagnosis/diagnoses:     -  03/06/2021: Severe protein-calorie malnutrition    This patient has been assessed with a concern for Malnutrition and was treated during this hospitalization for the following Nutrition diagnosis/diagnoses:     -  03/06/2021: Severe protein-calorie malnutrition   This patient has been assessed with a concern for Malnutrition and was treated during this hospitalization for the following Nutrition diagnosis/diagnoses:     -  03/06/2021: Severe protein-calorie malnutrition    This patient has been assessed with a concern for Malnutrition and was treated during this hospitalization for the following Nutrition diagnosis/diagnoses:     -  03/06/2021: Severe protein-calorie malnutrition    This patient has been assessed with a concern for Malnutrition and was treated during this hospitalization for the following Nutrition diagnosis/diagnoses:     -  03/06/2021: Severe protein-calorie malnutrition    This patient has been assessed with a concern for Malnutrition and was treated during this hospitalization for the following Nutrition diagnosis/diagnoses:     -  03/06/2021: Severe protein-calorie malnutrition

## 2021-03-05 NOTE — DISCHARGE NOTE PROVIDER - HOSPITAL COURSE
Hospital course:  89 y/o female with h/o htn, MI was brought in after she had a fall. Pt admitted with hip pain and AMS. Pt had workup for AMS including RPR negative, TSH, B12, Folate within normal limits and CT head showing No acute intracranial hemorrhage, mass effect, or acute osseous fracture, Extensive chronic small vessel ischemic changes in the frontoparietal white matter. Infectious work up done showed UTI likely causing metabolic encephalopathy. She was started on IVF and IV antibiotics with improvement in pt's mental status. Pt also had hip pain after her fall, CT pelvis did not show any acute fracture. PT evaluated pt and recommended GABI, but family want home with PT.     Discharge diagnosis   Encephalopathy 2/2 UTI   UTI  Hip pain, acute, right    Obturator hernia   Fall  Essential hypertension     Vitals      Physical Exam        Time for discharge Hospital course:  89 y/o female with h/o htn, MI was brought in after she had a fall. Pt admitted with hip pain and AMS. Pt had workup for AMS including RPR negative, TSH, B12, Folate within normal limits and CT head showing No acute intracranial hemorrhage, mass effect, or acute osseous fracture, Extensive chronic small vessel ischemic changes in the frontoparietal white matter. Infectious work up done showed UTI likely causing metabolic encephalopathy. She was started on IVF and IV antibiotics with improvement in pt's mental status. Pt also had hip pain after her fall, CT pelvis did not show any acute fracture. Following completion of IV abx she was found to be in atrial fibrillation with RVR and had multiple malodorous watery diarrhea, She was empirically started on PO Vancomycin given her history of prior C DIff infections with improvement in diarrhea. Her HR was initially controlled with IV diltiazem drip which was transitioned ot PO Metoprolol. At this time she remains hemodynamically stable, and is planned for dsc home.       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   RESPIRATORY: Normal respiratory effort; lungs are clear to auscultation bilaterally  CARDIOVASCULAR: Irregularly irregular, lower extremity edema  ABDOMEN: Soft, non tender, mildly hyperactive BS   MUSCLOSKELETAL: no clubbing or cyanosis of digits   PSYCH: Awake oriented to name only   NEUROLOGY: no gross sensory or motor deficits;   SKIN: No rashes      Discharge diagnosis   Encephalopathy 2/2 UTI   UTI  Atrial fibrillation with RVR   Suspected C DIff diarrhea   Hip pain, acute, right    Obturator hernia   Fall with weakness   Essential hypertension       Time for discharge   48 minutes The patient is a 89 year old female with a past medical history of hypertension and CAD brought to the ER after sustaining a fall at home.  Admitted for hip pain and acute encephalopathy. CT head was negative. CT of the hip were negative for fracture. PT evaluated patient and recommended GABI however family declined. Acute metabolic encephalopathy attributed to UTI which was treated. Course was complicated by new onset afib with RVR requiring IV Cardizem Cardiology was consulted, given age and fall risk not a candidate for anticoagulation. Was started on metoprolol for rate control and titrated off IV Cardizem Echocardiogram was done. HR was diiffcult to control and complicated by volume depletion and ENIO. Responded well to IV hydration. Was intially loaded with digoxin. Became bradycardic and converted to NSR. Started on PO amiodarone  and metoprolol reduced to 25mg OD. ENIO resolved. Appetite improved with marinol. TO be discharged home with home care. Patient's daughter refusing GABI placement.      The patient is a 89 year old female with a past medical history of hypertension and CAD brought to the ER after sustaining a fall at home.  Admitted for hip pain and acute encephalopathy. CT head was negative. CT of the hip were negative for fracture. PT evaluated patient and recommended GABI however family declined. Acute metabolic encephalopathy attributed to UTI which was treated. Course was complicated by new onset afib with RVR requiring IV Cardizem Cardiology was consulted, given age and fall risk not a candidate for anticoagulation. Was started on metoprolol for rate control and titrated off IV Cardizem Echocardiogram was done. HR was diiffcult to control and complicated by volume depletion and ENIO. Responded well to IV hydration. Was intially loaded with digoxin. Became bradycardic and converted to NSR. Started on PO amiodarone  and metoprolol reduced to 25mg OD. ENIO resolved. Appetite improved with marinol. TO be discharged home with home care. Patient's daughter refusing GABI placement.     Pt examined this a.m. by Hospitalist and PA. No acute overnight events to report. Pt currently has no complaints. Denies SOB, cough, CP, palpitations, HA, dizziness, NVD, changes in urinary or bowel habits, fevers, chills. All other remaining ROS negative.     Pt is medically stable for D/C home today w/home care.    ICU Vital Signs Last 24 Hrs  T(C): 36.4 (18 Mar 2021 05:21), Max: 36.4 (17 Mar 2021 17:13)  T(F): 97.6 (18 Mar 2021 05:21), Max: 97.6 (18 Mar 2021 05:21)  HR: 65 (18 Mar 2021 05:21) (65 - 69)  BP: 131/60 (18 Mar 2021 05:21) (131/60 - 133/67)  BP(mean): --  ABP: --  ABP(mean): --  RR: 18 (18 Mar 2021 05:21) (18 - 18)  SpO2: 93% (18 Mar 2021 05:21) (93% - 93%)    PHYSICAL EXAM:  GENERAL: NAD, lying in bed comfortably  HEAD:  Atraumatic, Normocephalic  EENT: conjunctiva and sclera clear, MMM, trachea midline, nares patent  CHEST/LUNG: CTABL; No rales, rhonchi, wheezing, or rubs. Unlabored respirations  HEART: RRR; No murmurs, rubs, or gallops  ABDOMEN: +BS present in all 4 quadrants; Soft, NTTP, ND   EXTREMITIES:  2+ Peripheral Pulses, brisk capillary refill. No clubbing, cyanosis, or edema  NERVOUS SYSTEM:  A&Ox2. No facial droop, tongue protrusion midline. No deficits   SKIN: No rashes or lesions    Time Spent on Discharge: 35 minutes     89 year old female with a pmh of hypertension and CAD brought to the ER after sustaining a fall at home.  Admitted for hip pain and acute encephalopathy. CT head was negative. CT of the hip were negative for fracture. PT evaluated patient and recommended GABI however family declined. Acute metabolic encephalopathy attributed to UTI which was treated. Course was complicated by new onset afib with RVR requiring IV Cardizem Cardiology was consulted, given age and fall risk not a candidate for anticoagulation. Was started on metoprolol for rate control and titrated off IV Cardizem as patient had bradycardia metoprolol changed to amiodarone. now being d/c in stable condition.    Time spent on patients discharge 32 minutes

## 2021-03-06 NOTE — DIETITIAN INITIAL EVALUATION ADULT. - DIET TYPE
(will downgrade diet to mechanical soft- consider formal SLP swallow evaluation if concerns for chewing/swallowing difficulty)/dysphagia 2, mechanical soft, thin liquids

## 2021-03-06 NOTE — PROGRESS NOTE ADULT - SUBJECTIVE AND OBJECTIVE BOX
[facility] Division of Hospital Medicine    Chief Complaint:  Confusion     SUBJECTIVE / OVERNIGHT EVENTS:  Pt not communicative, however after discussing with pts daughter she reports that the pt has apparently been awake and alert prior to nursing facility admission (recent) however after clinical evaluation suspect pt may have advanced dementia with superimposed delirium from UTI    Limited ROS.    MEDICATIONS  (STANDING):  aspirin  chewable 81 milliGRAM(s) Oral daily  heparin   Injectable 5000 Unit(s) SubCutaneous every 12 hours  influenza   Vaccine 0.5 milliLiter(s) IntraMuscular once  metoprolol tartrate 25 milliGRAM(s) Oral two times a day  pantoprazole    Tablet 40 milliGRAM(s) Oral before breakfast    MEDICATIONS  (PRN):  acetaminophen   Tablet .. 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   RESPIRATORY: Normal respiratory effort; lungs are clear to auscultation bilaterally  CARDIOVASCULAR: Regular rate and rhythm, normal S1 and S2, No lower extremity edema  ABDOMEN: Soft, non tender   MUSCLOSKELETAL: no clubbing or cyanosis of digits   PSYCH: Awake, confused   NEUROLOGY: no gross sensory or motor deficits;   SKIN: No rashes    LABS:                                   11.0   4.02  )-----------( 238      ( 06 Mar 2021 11:01 )             35.6   03-06    135  |  102  |  21.0<H>  ----------------------------<  80  4.2   |  19.0<L>  |  0.67    Ca    8.8      06 Mar 2021 11:01                  Culture - Urine (collected 03 Mar 2021 12:24)  Source: .Urine Catheterized  Final Report (05 Mar 2021 13:43):    >100,000 CFU/ml Escherichia coli  Organism: Escherichia coli (05 Mar 2021 13:43)  Organism: Escherichia coli (05 Mar 2021 13:43)

## 2021-03-06 NOTE — DIETITIAN NUTRITION RISK NOTIFICATION - ADDITIONAL COMMENTS/DIETITIAN RECOMMENDATIONS
Change diet to dysphagia 2, mechanical soft, thin liquids; (will downgrade diet to mechanical soft- consider formal SLP swallow evaluation if concerns for chewing/swallowing difficulty)  Add Ensure Enlive TID to optimize po intake and provide an additional 350 kcal, 20g protein per serving.  Rx: MVI and vitamin C 500mg daily.   Encourage po intake, monitor diet tolerance, and provide assistance at meals as needed.   Obtain daily weights to monitor trends.

## 2021-03-06 NOTE — DIETITIAN INITIAL EVALUATION ADULT. - ORAL NUTRITION SUPPLEMENTS
Add Ensure Enlive TID to optimize po intake and provide an additional 350 kcal, 20g protein per serving.

## 2021-03-06 NOTE — DIETITIAN NUTRITION RISK NOTIFICATION - TREATMENT: THE FOLLOWING DIET HAS BEEN RECOMMENDED
Diet, Dysphagia 2 Mechanical Soft-Thin Liquids:   Supplement Feeding Modality:  Oral  Ensure Enlive Servings Per Day:  1       Frequency:  Three Times a day (03-06-21 @ 09:37) [Pending Verification By Attending]  Diet, Soft:   DASH/TLC {Sodium & Cholesterol Restricted} (DASH) (03-02-21 @ 00:28) [Active]

## 2021-03-06 NOTE — DIETITIAN INITIAL EVALUATION ADULT. - OTHER INFO
89 year old female with h/o HTN, MI admitted with hip pain and AMS. Infectious work up done showed UTI likely causing metabolic encephalopathy. Pt confused and lethargic, unable to provide nutrition hx. Breakfast tray observed untouched at bedside. 1:1 present, suspects pt will have a difficult time with food ordered for breakfast- pt would likely do better with mechanical soft diet. Per EMR review, pts weight in 8/2019 was 124 lbs; current admission weight 110 lbs. Limited NFPE conducted, noted with severe muscle and fat loss. RD to follow up.

## 2021-03-06 NOTE — PROGRESS NOTE ADULT - ASSESSMENT
Hospital course:  89 y/o female with h/o htn, MI was brought in after she had a fall. Pt admitted with hip pain and AMS. Pt had workup for AMS including RPR negative, TSH, B12, Folate within normal limits and CT head showing No acute intracranial hemorrhage, mass effect, or acute osseous fracture, Extensive chronic small vessel ischemic changes in the frontoparietal white matter. Infectious work up done showed UTI likely causing metabolic encephalopathy. She was started on IVF and IV antibiotics with improvement in pt's mental status. Pt also had hip pain after her fall, CT pelvis did not show any acute fracture. PT evaluated pt and recommended GABI, but family want home with PT.     A/P:  89 y/o female with h/o htn, MI was brought in after she had a fall. Pt admitted with hip pain and AMS.      Encephalopathy 2/2 UTI   - Per daughter this is new, and pt does not have diagnosis of dementia    - Mentation improving, but has episodes of agitation, suspect advanced dementia with superimposed delirium from UTI   - CT head: No acute intracranial hemorrhage, mass effect, or acute osseous fracture. Extensive chronic small vessel ischemic changes in the frontoparietal white matter. Infiltrative left intraorbital soft tissue mass unchanged compared to prior exam from 9/22/2020.  - UA +ve  - RPR negative, TSH, B12, Folate within normal limits  - Avoid medications, use 1:1 and behavioural redirection therapy if becomes combative/confused  - If needed, will use Depakote 250 mg as needed     UTI  - UA +Ve  - UCx GNR   - BCx pending   - IV Ceftriaxone     Hip pain, acute, right    - CT pelvis did not show any acute fracture   - Tylenol PRN  - PT/OT, requested follow up today     Obturator hernia   - CT pelvis showed right obturator hernia containing bowel   - Surgery consult appreciated: no acute surgical intervention   - Conservative management     Fall  - Fall precautions     Essential hypertension   - Continue lopressor   - Monitor BP     HCP is daughter, spoke to her today   Dispo: Home with PT/OT in the next 24-48 hrs as daughter is refusing GABI

## 2021-03-06 NOTE — DIETITIAN INITIAL EVALUATION ADULT. - PERTINENT LABORATORY DATA
B12 can be added to specimen from today if you would like.    03-05 Na138 mmol/L Glu 95 mg/dL K+ 3.5 mmol/L Cr  0.58 mg/dL BUN 16.0 mg/dL Phos n/a   Alb n/a   PAB n/a

## 2021-03-06 NOTE — DIETITIAN INITIAL EVALUATION ADULT. - PERTINENT MEDS FT
MEDICATIONS  (STANDING):  aspirin  chewable 81 milliGRAM(s) Oral daily  cefTRIAXone   IVPB 1000 milliGRAM(s) IV Intermittent every 24 hours  heparin   Injectable 5000 Unit(s) SubCutaneous every 12 hours  influenza   Vaccine 0.5 milliLiter(s) IntraMuscular once  metoprolol tartrate 25 milliGRAM(s) Oral two times a day  pantoprazole    Tablet 40 milliGRAM(s) Oral before breakfast    MEDICATIONS  (PRN):  acetaminophen   Tablet .. 650 milliGRAM(s) Oral every 6 hours PRN Mild Pain (1 - 3), Moderate Pain (4 - 6)  valproic acid 250 milliGRAM(s) Oral every 6 hours PRN agitation

## 2021-03-07 NOTE — CHART NOTE - NSCHARTNOTEFT_GEN_A_CORE
Rn called for Pt with tachycardia to 130s     Patient seen and examined at bedside. Pt not communicative pleasantly confused, however after discussing with aid that is has been in room over night she states that the patient has not been in any distress or discomfort throughout the night. Patient in laying in bed in NAD, smiling, and appears very comfortable. ROS unable to be performed due to patients confused state.    Vital Signs Last 24 Hrs  T(C): 36.4 (07 Mar 2021 04:47), Max: 36.5 (06 Mar 2021 23:23)  T(F): 97.5 (07 Mar 2021 04:47), Max: 97.7 (06 Mar 2021 23:23)  HR: 141 (07 Mar 2021 06:21) (88 - 141)  BP: 98/66 (07 Mar 2021 06:21) (92/54 - 103/67)  BP(mean): --  RR: 18 (07 Mar 2021 06:21) (16 - 20)  SpO2: 93% (07 Mar 2021 06:21) (93% - 100%)    PHYSICAL EXAM:  GENERAL: Pt lying in bed comfortably in NAD  HEENT: MMM, Left eye without patch, empty orbital socket   CHEST/LUNG: Clear to auscultation bilaterally, non-labored respirations  HEART: Irregular rate and rhythm, S1S2  ABDOMEN: Bowel sounds present; Soft, Nontender, Nondistended. No guarding or rigidity    EXTREMITIES:  2+ Peripheral Pulses, No LE edema  NERVOUS SYSTEM:  Alert & Oriented X3, speech clear. Answers questions appropriately. Full and equal strength B/L upper and lower extremities.   MSK: FROM x 4 extremities   SKIN: Warm and dry     PLAN:   - Stat EKG showing new onset A Fib RVR  - 5 mg Cardizem given x 1   - Cardiology consulted   - will need to access need for anticoagulation given age and risk factors with cardiology team  - transfer level of care to telemetry     RN instructed to notify provider if any changes occur in pt status. Will continue to monitor Rn called for Pt with tachycardia to 130s     Patient seen and examined at bedside. Pt not communicative pleasantly confused, however after discussing with aid that is has been in room over night she states that the patient has not been in any distress or discomfort throughout the night. Patient in laying in bed in NAD, smiling, and appears very comfortable. ROS unable to be performed due to patients confused state.    Vital Signs Last 24 Hrs  T(C): 36.4 (07 Mar 2021 04:47), Max: 36.5 (06 Mar 2021 23:23)  T(F): 97.5 (07 Mar 2021 04:47), Max: 97.7 (06 Mar 2021 23:23)  HR: 141 (07 Mar 2021 06:21) (88 - 141)  BP: 98/66 (07 Mar 2021 06:21) (92/54 - 103/67)  BP(mean): --  RR: 18 (07 Mar 2021 06:21) (16 - 20)  SpO2: 93% (07 Mar 2021 06:21) (93% - 100%)    PHYSICAL EXAM:  GENERAL: Pt lying in bed comfortably in NAD  HEENT: MMM, Left eye without patch, empty orbital socket   CHEST/LUNG: Clear to auscultation bilaterally, non-labored respirations  HEART: Irregular rate and rhythm, S1S2  ABDOMEN: Bowel sounds present; Soft, Nontender, Nondistended. No guarding or rigidity    EXTREMITIES:  2+ Peripheral Pulses, No LE edema  NERVOUS SYSTEM:   Does not answers questions   SKIN: Warm and dry     PLAN:   - Stat EKG showing new onset A Fib RVR  - 5 mg Cardizem given x 1   - Cardiology consulted   - will need to access need for anticoagulation given age and risk factors with cardiology team  - transfer level of care to telemetry     RN instructed to notify provider if any changes occur in pt status. Will continue to monitor

## 2021-03-07 NOTE — CONSULT NOTE ADULT - SUBJECTIVE AND OBJECTIVE BOX
Acute Care Surgery Consult Note    Patient is a 89y old  Female who presents with a chief complaint of Right hip pain (02 Mar 2021 00:16)      HPI: 89 year old female with the PMH as listed below presented to [facility] after sustaining a mechanical ground level fall in the bathroom with no LOC. Trauma workup was unrevealing, Surgery consulted for incidental finding of right-sided obturator hernia with concerns that this may cause some difficulty ambulating.     Patient seen and examined at bedside with no major complaints. She currently denies any changes to her appetite nor BMs. States that she has a BM on a daily bases. Denies fevers/chills, denies lightheadedness/dizziness, denies SOB/chest pain, denies nausea/vomiting, denies constipation/diarrhea.     ROS: 10-system review is otherwise negative except HPI above.      PAST MEDICAL & SURGICAL HISTORY:  MI (myocardial infarction)  @ age 89    HTN (hypertension)    No significant past surgical history      FAMILY HISTORY:  No pertinent family history in first degree relatives      SOCIAL HISTORY:  Denies smoking, EtOH and illicit drug use    ALLERGIES: No Known Allergies      CURRENT MEDICATIONS  MEDICATIONS (STANDING): aspirin  chewable 81 milliGRAM(s) Oral daily  heparin   Injectable 5000 Unit(s) SubCutaneous every 12 hours  metoprolol tartrate 25 milliGRAM(s) Oral two times a day  pantoprazole    Tablet 40 milliGRAM(s) Oral before breakfast  sodium chloride 0.9%. 500 milliLiter(s) IV Continuous <Continuous>    MEDICATIONS (PRN):acetaminophen   Tablet .. 650 milliGRAM(s) Oral every 6 hours PRN Mild Pain (1 - 3), Moderate Pain (4 - 6)    --------------------------------------------------------------------------------------------    Vitals:   T(C): 36.4 (03-02-21 @ 00:55), Max: 36.4 (03-02-21 @ 00:55)  HR: 59 (03-02-21 @ 00:55) (59 - 69)  BP: 111/56 (03-02-21 @ 00:55) (111/56 - 164/83)  RR: 18 (03-02-21 @ 00:55) (18 - 20)  SpO2: 100% (03-02-21 @ 00:55) (98% - 100%)  CAPILLARY BLOOD GLUCOSE        CAPILLARY BLOOD GLUCOSE          Height (cm): 149.9 (03-01 @ 13:13)  Weight (kg): 49.9 (03-01 @ 13:13)  BMI (kg/m2): 22.2 (03-01 @ 13:13)  BSA (m2): 1.43 (03-01 @ 13:13)    PHYSICAL EXAM:   General: NAD, Lying in bed comfortably  Neuro: A+Ox3  HEENT: NC/AT, EOMI  Neck: Soft, supple  Cardio: RRR, nml S1/S2  Resp: Good effort, CTA b/l  Thorax: No chest wall tenderness  GI/Abd: Soft, NT/ND, no rebound/guarding, no masses palpated, No inguinal hernias appreciated on valsalva, no point tenderness nor obvious protrusions.   Vascular: All 4 extremities warm.  Skin: Intact, no breakdown  Lymphatic/Nodes: No palpable lymphadenopathy  Musculoskeletal: All 4 extremities moving spontaneously, no limitations  --------------------------------------------------------------------------------------------    LABS  CBC (03-01 @ 15:27)                              10.5<L>                         5.46    )----------------(  259        75.5  % Neutrophils, 17.0  % Lymphocytes, ANC: 4.12                                33.9<L>    BMP (03-01 @ 15:27)             140     |  101     |  35.0<H>		Ca++ --      Ca 9.4                ---------------------------------( 133<H>		Mg --                 4.2     |  27.0    |  0.77  			Ph --        LFTs (03-01 @ 15:27)      TPro 6.7 / Alb 3.5 / TBili 0.3<L> / DBili -- / AST 24 / ALT 12 / AlkPhos 102    Coags (03-01 @ 15:27)  aPTT 31.1 / INR 1.03 / PT 11.9          --------------------------------------------------------------------------------------------    MICROBIOLOGY      --------------------------------------------------------------------------------------------    IMAGING  < from: CT Pelvis Bony Only No Cont (03.01.21 @ 18:33) >  IMPRESSION:    No acute fracture.  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  Right obturator hernia indicating bowel.    < end of copied text >      
                [facility]                                   [address] [place] [address]                                                      PHONE: [phone]                                                         FAX: [phone]  [email or web address]  ---------------------------------------------------------------------------------------------------------------------------------    Reason for Consult: AF     HPI:  EDOUARD MALAVE is an 89y Female with history of dementia moderate AS LBBB lasted TTE 2019 normal EF moderate AS was brought in after trip and fall in the bathroom without any LOC.  As per chart, ER physician obtained history from family daughter. Patient with dementia and does not give any history. Patient noted to have AF 's with stable BP.        PAST MEDICAL & SURGICAL HISTORY:  MI (myocardial infarction)  @ age 89    HTN (hypertension)    No significant past surgical history        No Known Allergies      MEDICATIONS  (STANDING):  aspirin  chewable 81 milliGRAM(s) Oral daily  cefTRIAXone   IVPB 1000 milliGRAM(s) IV Intermittent every 24 hours  diltiazem Infusion 5 mG/Hr (5 mL/Hr) IV Continuous <Continuous>  heparin   Injectable 5000 Unit(s) SubCutaneous every 12 hours  influenza   Vaccine 0.5 milliLiter(s) IntraMuscular once  metoprolol tartrate 25 milliGRAM(s) Oral two times a day  pantoprazole    Tablet 40 milliGRAM(s) Oral before breakfast    MEDICATIONS  (PRN):  acetaminophen   Tablet .. 650 milliGRAM(s) Oral every 6 hours PRN Mild Pain (1 - 3), Moderate Pain (4 - 6)  valproic acid 250 milliGRAM(s) Oral every 6 hours PRN agitation      Social History:  Cigarettes:        none             Alchohol:       none           Illicit Drug Abuse:  none    FH negative for CAD     ROS: Negative other than as mentioned in HPI.    Vital Signs Last 24 Hrs  T(C): 36.6 (07 Mar 2021 06:57), Max: 36.6 (07 Mar 2021 06:57)  T(F): 97.9 (07 Mar 2021 06:57), Max: 97.9 (07 Mar 2021 06:57)  HR: 140 (07 Mar 2021 06:57) (103 - 141)  BP: 103/66 (07 Mar 2021 06:57) (98/66 - 103/67)  BP(mean): --  RR: 16 (07 Mar 2021 06:57) (16 - 20)  SpO2: 98% (07 Mar 2021 06:57) (93% - 100%)  ICU Vital Signs Last 24 Hrs  EDOUARD [facility]  I&O's Detail    I&O's Summary    Drug Dosing Weight  EDOUARD [facility]      PHYSICAL EXAM:  General: Appears well developed, well nourished alert and cooperative.  HEENT: Head; normocephalic, atraumatic.  Eyes: Pupils reactive, cornea wnl.  Neck: Supple, no nodes adenopathy, no NVD or carotid bruit or thyromegaly.  CARDIOVASCULAR: Normal S1 and S2, 2/6 murmur, rub, gallop or lift.   LUNGS: No rales, rhonchi or wheeze. Normal breath sounds bilaterally.  ABDOMEN: Soft, nontender without mass or organomegaly. bowel sounds normoactive.  EXTREMITIES: No clubbing, cyanosis or edema. Distal pulses wnl.   SKIN: warm and dry with normal turgor.  NEURO: Alert/oriented x 1  PSYCH: normal affect.        LABS:                        10.7   4.76  )-----------( 249      ( 07 Mar 2021 08:47 )             34.5     03-07    142  |  104  |  23.0<H>  ----------------------------<  129<H>  4.1   |  19.0<L>  |  0.83    Ca    9.0      07 Mar 2021 08:47      EDOUARD [facility]            RADIOLOGY & ADDITIONAL STUDIES:    INTERPRETATION OF TELEMETRY (personally reviewed): AF RVR     ECG: NSR LBBB     ECHO:    Summary:   1. Left ventricular ejection fraction, by visual estimation, is 65 to   70%.   2. Normal global left ventricular systolic function.   3. Elevated left atrial and left ventricular end-diastolic pressures.   4. Spectral Doppler shows pseudonormal pattern of left ventricular   myocardial filling (Grade II diastolic dysfunction).   5. There is mild concentric left ventricular hypertrophy.   6. Estimated pulmonary artery systolic pressure is 40.9 mmHg assuming a   right atrial pressure of 3 mmHg, which is consistent with mild pulmonary   hypertension.   7. Mitral valve mean gradient is 3.0 mmHg consistent with mild mitral   stenosis.   8. Peak transaortic gradient equals 11.1 mmHg, mean transaortic gradient   equals 5.2 mmHg,the calculated aortic valve area equals 1.31 cm² by the   continuity equation consistent with moderate aortic stenosis.    MD Yong Electronically signed on 8/22/2019 at 12:29:28 PM      Assessment and Plan:  In summary, EDOUARD MALAVE is an 89y Female with past medical history significant for history of dementia moderate AS LBBB lasted TTE 2019 normal EF moderate AS was brought in after trip and fall in the bathroom without any LOC.  As per chart, ER physician obtained history from family daughter. Patient with dementia and does not give any history. Patient noted to have AF 's with stable BP.  patient is begin treated for UTI; advanced dementia with possible superimposed delirium; not a AC candiated for long term AC due to fall risk     Plan  1.  Agree with Cardizem gtt at 5  2.  ASA therapy   3.  Check TTE to re-evaluate LV EF and AS severity   4.  Continue to monitor on TELE   5.  Abx therapy as per primary team

## 2021-03-07 NOTE — PROGRESS NOTE ADULT - ASSESSMENT
Hospital course:  89 y/o female with h/o htn, MI was brought in after she had a fall. Pt admitted with hip pain and AMS. Pt had workup for AMS including RPR negative, TSH, B12, Folate within normal limits and CT head showing No acute intracranial hemorrhage, mass effect, or acute osseous fracture, Extensive chronic small vessel ischemic changes in the frontoparietal white matter. Infectious work up done showed UTI likely causing metabolic encephalopathy. She was started on IVF and IV antibiotics with improvement in pt's mental status. Pt also had hip pain after her fall, CT pelvis did not show any acute fracture. PT evaluated pt and recommended GABI, but family want home with PT.     A/P:  89 y/o female with h/o htn, MI was brought in after she had a fall. Pt admitted with hip pain and AMS.      Atrial fibrillation with RVR  New onset  Discussed with cardiology, given dementia and risk benefit ratio, will target rate control only and defer AC   Diltiazem IV   Telemetry monitoring  TTE     Encephalopathy 2/2 UTI   Per daughter this is new, and pt does not have diagnosis of dementia, however based on pts history likely pt has moderate dementia   Now resolved, pt at suspected baseline (oriented to self only)  RPR negative, TSH, B12, Folate within normal limits  Avoid medications, use 1:1 and behavioural redirection therapy if becomes combative/confused  If needed, will use Depakote 250 mg as needed     UTI  Pan sensitive EColi   Continue IV Ceftriaxone, will transition to oral on dsc     Hip pain, acute, right    CT pelvis did not show any acute fracture   Tylenol PRN  PT/OT, requested follow up today     Obturator hernia   CT pelvis showed right obturator hernia containing bowel   Surgery consult appreciated: no acute surgical intervention   Conservative management     Fall  Fall precautions     Essential hypertension   Continue lopressor   Monitor BP     HCP is daughter, spoke to her today   Dispo: Home with PT/OT once heart rate is controlled (daughter refusing GABI)

## 2021-03-07 NOTE — PROGRESS NOTE ADULT - SUBJECTIVE AND OBJECTIVE BOX
[facility] Division of Hospital Medicine    Chief Complaint:  Confusion     SUBJECTIVE / OVERNIGHT EVENTS:  Pt awake and alert today, able to communicate appropriately  Is only oriented to person  Has been having Afib with RVR, Cardiology consulted   Limited ROS.    MEDICATIONS  (STANDING):  aspirin  chewable 81 milliGRAM(s) Oral daily  heparin   Injectable 5000 Unit(s) SubCutaneous every 12 hours  influenza   Vaccine 0.5 milliLiter(s) IntraMuscular once  metoprolol tartrate 25 milliGRAM(s) Oral two times a day  pantoprazole    Tablet 40 milliGRAM(s) Oral before breakfast    MEDICATIONS  (PRN):  acetaminophen   Tablet .. 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   RESPIRATORY: Normal respiratory effort; lungs are clear to auscultation bilaterally  CARDIOVASCULAR: Irregularly irregular with tachycardia, lower extremity edema  ABDOMEN: Soft, non tender   MUSCLOSKELETAL: no clubbing or cyanosis of digits   PSYCH: Awake oriented to name only   NEUROLOGY: no gross sensory or motor deficits;   SKIN: No rashes    LABS:                                   10.7   4.76  )-----------( 249      ( 07 Mar 2021 08:47 )             34.5   03-07    142  |  104  |  23.0<H>  ----------------------------<  129<H>  4.1   |  19.0<L>  |  0.83    Ca    9.0      07 Mar 2021 08:47        3/3/21 Urine culture   Pan sensitive E.Coli     3/3/21 Blood cultures   NGTD

## 2021-03-08 NOTE — PROGRESS NOTE ADULT - SUBJECTIVE AND OBJECTIVE BOX
[facility] Division of Hospital Medicine    Chief Complaint:  Confusion     SUBJECTIVE / OVERNIGHT EVENTS:  Pt awake and alert today, able to communicate  Is only oriented to person  On Cardizem IV,   Limited ROS.    MEDICATIONS  (STANDING):  aspirin  chewable 81 milliGRAM(s) Oral daily  heparin   Injectable 5000 Unit(s) SubCutaneous every 12 hours  influenza   Vaccine 0.5 milliLiter(s) IntraMuscular once  metoprolol tartrate 25 milliGRAM(s) Oral two times a day  pantoprazole    Tablet 40 milliGRAM(s) Oral before breakfast    MEDICATIONS  (PRN):  acetaminophen   Tablet .. 650 milliGRAM(s) Oral every 6 hours PRN Mild Pain (1 - 3), Moderate Pain (4 - 6)  valproic acid 250 milliGRAM(s) Oral every 6 hours PRN agitation      PHYSICAL EXAM:  Vital Signs Last 24 Hrs  T(C): 36.7 (08 Mar 2021 07:23), Max: 36.9 (07 Mar 2021 16:43)  T(F): 98.1 (08 Mar 2021 07:23), Max: 98.5 (07 Mar 2021 16:43)  HR: 94 (08 Mar 2021 09:37) (81 - 137)  BP: 106/62 (08 Mar 2021 13:33) (89/57 - 120/83)  BP(mean): 68 (07 Mar 2021 23:28) (68 - 68)  RR: 18 (08 Mar 2021 07:23) (18 - 20)  SpO2: 96% (08 Mar 2021 07:23) (94% - 96%)    CONSTITUTIONAL: Elderly lady lying in bed, calm    ENMT: Moist oral mucosa, 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   RESPIRATORY: Normal respiratory effort; lungs are clear to auscultation bilaterally  CARDIOVASCULAR: Irregularly irregular with tachycardia, lower extremity edema  ABDOMEN: Soft, non tender   MUSCLOSKELETAL: no clubbing or cyanosis of digits   PSYCH: Awake oriented to name only   NEUROLOGY: no gross sensory or motor deficits;   SKIN: No rashes    LABS:                                   10.4   4.94  )-----------( 257      ( 08 Mar 2021 08:24 )             33.1   03-08    138  |  100  |  24.0<H>  ----------------------------<  130<H>  3.9   |  26.0  |  0.71    Ca    9.2      08 Mar 2021 08:24        3/3/21 Urine culture   Pan sensitive E.Coli     3/3/21 Blood cultures   NGTD

## 2021-03-08 NOTE — PROGRESS NOTE ADULT - ASSESSMENT
Assessment  s/p fall w/o LOC  Af with CVR NOT AC candidate  baseline LBBB  Asx mild MS mod AS normal EF  dementia      Rec   cont low dose asa and lopressor  please recall as neeeded no further cardiac workup needed

## 2021-03-08 NOTE — PROGRESS NOTE ADULT - SUBJECTIVE AND OBJECTIVE BOX
[facility]                                   [address] [place] [address]                                                      PHONE: [phone]                                                         FAX: [phone]  [email or web address]  ---------------------------------------------------------------------------------------------------------------------------------    Overnight events/patient complaints: pleasantly confused, tele Af with CVR      No Known Allergies    MEDICATIONS  (STANDING):  aspirin  chewable 81 milliGRAM(s) Oral daily  cefTRIAXone   IVPB 1000 milliGRAM(s) IV Intermittent every 24 hours  diltiazem Infusion 7.5 mG/Hr (7.5 mL/Hr) IV Continuous <Continuous>  heparin   Injectable 5000 Unit(s) SubCutaneous every 12 hours  influenza   Vaccine 0.5 milliLiter(s) IntraMuscular once  metoprolol tartrate 25 milliGRAM(s) Oral two times a day  pantoprazole    Tablet 40 milliGRAM(s) Oral before breakfast    MEDICATIONS  (PRN):  acetaminophen   Tablet .. 650 milliGRAM(s) Oral every 6 hours PRN Mild Pain (1 - 3), Moderate Pain (4 - 6)  valproic acid 250 milliGRAM(s) Oral every 6 hours PRN agitation      Vital Signs Last 24 Hrs  T(C): 36.7 (08 Mar 2021 07:23), Max: 36.9 (07 Mar 2021 16:43)  T(F): 98.1 (08 Mar 2021 07:23), Max: 98.5 (07 Mar 2021 16:43)  HR: 97 (08 Mar 2021 07:23) (81 - 147)  BP: 100/62 (08 Mar 2021 07:23) (89/57 - 129/83)  BP(mean): 68 (07 Mar 2021 23:28) (68 - 68)  RR: 18 (08 Mar 2021 07:23) (18 - 20)  SpO2: 96% (08 Mar 2021 07:23) (94% - 96%)  Daily     Daily   ICU Vital Signs Last 24 Hrs  [facility]  I&O's Detail    I&O's Summary    Drug Dosing Weight  EDOUARD [facility]      PHYSICAL EXAM:  General: Appears well developed, well nourished alert and cooperative.  HEENT: Head; normocephalic, atraumatic.  Eyes: Pupils reactive, cornea wnl.  Neck: Supple, no nodes adenopathy, no NVD or carotid bruit or thyromegaly.  CARDIOVASCULAR: Normal S1 and S2, No murmur, rub, gallop or lift.   LUNGS: No rales, rhonchi or wheeze. Normal breath sounds bilaterally.  ABDOMEN: Soft, nontender without mass or organomegaly. bowel sounds normoactive.  EXTREMITIES: No clubbing, cyanosis or edema. Distal pulses wnl.   SKIN: warm and dry with normal turgor.  NEURO: Alert/oriented x 3/normal motor exam. No pathologic reflexes.    PSYCH: normal affect.        LABS:                        10.4   4.94  )-----------( 257      ( 08 Mar 2021 08:24 )             33.1     03-08    138  |  100  |  24.0<H>  ----------------------------<  130<H>  3.9   |  26.0  |  0.71    Ca    9.2      08 Mar 2021 08:24      [facility]            RADIOLOGY & ADDITIONAL STUDIES:    INTERPRETATION OF TELEMETRY (personally reviewed):    ECG:< from: 12 Lead ECG (03.07.21 @ 06:34) >    Diagnosis Line Atrial fibrillation with rapid ventricular response  Left bundle branch block  Abnormal ECG    Confirmed by YANG PURCELL ([id number]) on 3/7/2021 6:28:02 PM    < end of copied text >      ECHO:< from: TTE Echo Complete w/Doppler (08.22.19 @ 09:52) >    Summary:   1. Left ventricular ejection fraction, by visual estimation, is 65 to   70%.   2. Normal global left ventricular systolic function.   3. Elevated left atrial and left ventricular end-diastolic pressures.   4. Spectral Doppler shows pseudonormal pattern of left ventricular   myocardial filling (Grade II diastolic dysfunction).   5. There is mild concentric left ventricular hypertrophy.   6. Estimated pulmonary artery systolic pressure is 40.9 mmHg assuming a   right atrial pressure of 3 mmHg, which is consistent with mild pulmonary   hypertension.   7. Mitral valve mean gradient is 3.0 mmHg consistent with mild mitral   stenosis.   8. Peak transaortic gradient equals 11.1 mmHg, mean transaortic gradient   equals 5.2 mmHg,the calculated aortic valve area equals 1.31 cm² by the   continuity equation consistent with moderate aortic stenosis.    MD Yong Electronically signed on 8/22/2019 at 12:29:28 PM              < end of copied text >

## 2021-03-08 NOTE — PROGRESS NOTE ADULT - ASSESSMENT
Hospital course:  89 y/o female with h/o htn, MI was brought in after she had a fall. Pt admitted with hip pain and AMS. Pt had workup for AMS including RPR negative, TSH, B12, Folate within normal limits and CT head showing No acute intracranial hemorrhage, mass effect, or acute osseous fracture, Extensive chronic small vessel ischemic changes in the frontoparietal white matter. Infectious work up done showed UTI likely causing metabolic encephalopathy. She was started on IVF and IV antibiotics with improvement in pt's mental status. Pt also had hip pain after her fall, CT pelvis did not show any acute fracture. PT evaluated pt and recommended GABI, but family want home with PT.     A/P:  89 y/o female with h/o htn, MI was brought in after she had a fall. Pt admitted with hip pain and AMS.      Atrial fibrillation with RVR  New onset  Cardiology recs apprciated  Will continue rate control at this time with low dose ASA given risks of AC with DOACs    Encephalopathy 2/2 UTI   Per daughter this is new, and pt does not have diagnosis of dementia, however based on pts history likely pt has moderate dementia   Now resolved, pt at suspected baseline (oriented to self only)  RPR negative, TSH, B12, Folate within normal limits  Avoid medications, use 1:1 and behavioural redirection therapy if becomes combative/confused  If needed, will use Depakote 250 mg as needed     UTI  Pan sensitive EColi   Continue IV Ceftriaxone, will transition to oral on dsc     Hip pain, acute, right    CT pelvis did not show any acute fracture   Tylenol PRN  PT/OT, requested follow up today     Obturator hernia   CT pelvis showed right obturator hernia containing bowel   Surgery consult appreciated: no acute surgical intervention   Conservative management     Fall  Fall precautions     Essential hypertension   Continue lopressor   Monitor BP     HCP is daughter, spoke to her today   Dispo: Home with PT/OT once heart rate is controlled (daughter refusing GABI)

## 2021-03-09 NOTE — PROGRESS NOTE ADULT - ASSESSMENT
Hospital course:  89 y/o female with h/o htn, MI was brought in after she had a fall. Pt admitted with hip pain and AMS. Pt had workup for AMS including RPR negative, TSH, B12, Folate within normal limits and CT head showing No acute intracranial hemorrhage, mass effect, or acute osseous fracture, Extensive chronic small vessel ischemic changes in the frontoparietal white matter. Infectious work up done showed UTI likely causing metabolic encephalopathy. She was started on IVF and IV antibiotics with improvement in pt's mental status. Pt also had hip pain after her fall, CT pelvis did not show any acute fracture. PT evaluated pt and recommended GABI, but family want home with PT.     A/P:  89 y/o female with h/o htn, MI was brought in after she had a fall. Pt admitted with hip pain and AMS.      Atrial fibrillation with RVR  New onset  Cardiology recs apprciated  Will continue rate control at this time with low dose ASA given risks of AC with DOACs    Encephalopathy 2/2 UTI   Per daughter this is new, and pt does not have diagnosis of dementia, however based on pts history likely pt has moderate dementia   Now resolved, pt at suspected baseline (oriented to self only)  RPR negative, TSH, B12, Folate within normal limits  Avoid medications, use 1:1 and behavioural redirection therapy if becomes combative/confused  If needed, will use Depakote 250 mg as needed     UTI  Pan sensitive EColi   Continue IV Ceftriaxone, will transition to oral on dsc     Hip pain, acute, right    CT pelvis did not show any acute fracture   Tylenol PRN  PT/OT, requested follow up today     Obturator hernia   CT pelvis showed right obturator hernia containing bowel   Surgery consult appreciated: no acute surgical intervention   Conservative management     Fall  Fall precautions     Essential hypertension   Continue lopressor   Monitor BP     HCP is daughter, spoke to her today   Dispo: Home with PT/OT once heart rate is controlled (daughter refusing GABI)  Hospital course:  89 y/o female with h/o htn, MI was brought in after she had a fall. Pt admitted with hip pain and AMS. Pt had workup for AMS including RPR negative, TSH, B12, Folate within normal limits and CT head showing No acute intracranial hemorrhage, mass effect, or acute osseous fracture, Extensive chronic small vessel ischemic changes in the frontoparietal white matter. Infectious work up done showed UTI likely causing metabolic encephalopathy. She was started on IVF and IV antibiotics with improvement in pt's mental status. Pt also had hip pain after her fall, CT pelvis did not show any acute fracture. PT evaluated pt and recommended GABI, but family want home with PT.     A/P:  89 y/o female with h/o htn, MI was brought in after she had a fall. Pt admitted with hip pain and AMS.      Atrial fibrillation with RVR  New onset  Cardiology recs apprciated  Continue Diltiazem IV  Increase Metoprolol to 50 mg Q6   Monitor BP closely   Will continue rate control at this time with low dose ASA given risks of AC with DOACs      Encephalopathy 2/2 UTI   Per daughter this is new, and pt does not have diagnosis of dementia, however based on pts history likely pt has moderate dementia   Now resolved, pt at suspected baseline (oriented to self only)  RPR negative, TSH, B12, Folate within normal limits  Avoid medications, use 1:1 and behavioural redirection therapy if becomes combative/confused  If needed, will use Depakote 250 mg as needed     UTI  Completed E COli    Diarrhea  Acute onset   Suspicion for C Diff  Will send C Diff PCR and start Vancomycin  Q6 if positive   Hip pain, acute, right    CT pelvis did not show any acute fracture   Tylenol PRN  PT/OT, requested follow up today     Obturator hernia   CT pelvis showed right obturator hernia containing bowel   Surgery consult appreciated: no acute surgical intervention   Conservative management     Fall  Fall precautions     Essential hypertension   Continue lopressor   Monitor BP     HCP is daughter, spoke to her today   Dispo: Home with PT/OT once heart rate is controlled (daughter refusing GABI)

## 2021-03-09 NOTE — PROGRESS NOTE ADULT - SUBJECTIVE AND OBJECTIVE BOX
Asked to FU re HR  Tele reviewed AF with persisitent moderate VR, no sig bradycardia  Echo c/w mod AS normal EF  Rec; cont IV cardizem, increase lopressor 50 Q 6   cont asa pt not candidate for AC  Goal is rate control AF

## 2021-03-09 NOTE — CHART NOTE - NSCHARTNOTEFT_GEN_A_CORE
Source: Patient [ ]  Family [ ]   other [x ]    Current Diet: Diet, Soft:   DASH/TLC {Sodium & Cholesterol Restricted} (DASH) (03-02-21 @ 00:28)    PO intake:  < 50% [x ]   50-75%  [ ]   %  [ ]  other :    Current Weight:   (3/6) 110 lbs  -Obtain daily wts, continue to monitor. No edema noted.     Pertinent Medications: MEDICATIONS  (STANDING):  aspirin  chewable 81 milliGRAM(s) Oral daily  diltiazem Infusion 10 mG/Hr (10 mL/Hr) IV Continuous <Continuous>  heparin   Injectable 5000 Unit(s) SubCutaneous every 12 hours  influenza   Vaccine 0.5 milliLiter(s) IntraMuscular once  metoprolol tartrate 25 milliGRAM(s) Oral two times a day  pantoprazole    Tablet 40 milliGRAM(s) Oral before breakfast    MEDICATIONS  (PRN):  acetaminophen   Tablet .. 650 milliGRAM(s) Oral every 6 hours PRN Mild Pain (1 - 3), Moderate Pain (4 - 6)  valproic acid 250 milliGRAM(s) Oral every 6 hours PRN agitation    Pertinent Labs: CBC Full  -  ( 08 Mar 2021 08:24 )  WBC Count : 4.94 K/uL  RBC Count : 4.09 M/uL  Hemoglobin : 10.4 g/dL  Hematocrit : 33.1 %  Platelet Count - Automated : 257 K/uL  Mean Cell Volume : 80.9 fl  Mean Cell Hemoglobin : 25.4 pg  Mean Cell Hemoglobin Concentration : 31.4 gm/dL  Auto Neutrophil # : x  Auto Lymphocyte # : x  Auto Monocyte # : x  Auto Eosinophil # : x  Auto Basophil # : x  Auto Neutrophil % : x  Auto Lymphocyte % : x  Auto Monocyte % : x  Auto Eosinophil % : x  Auto Basophil % : x    -No recent labs    Skin: Intact    Nutrition focused physical exam previously conducted - found signs of malnutrition [ ]absent [x ]present    Subcutaneous fat loss: [x ] Orbital fat pads region, [ x]Buccal fat region, [ ]Triceps region,  [ ]Ribs region    Muscle wasting: [ x]Temples region, [x ]Clavicle region, [ x]Shoulder region, [ ]Scapula region, [ ]Interosseous region,  [ ]thigh region, [ ]Calf region    Estimated Needs:   [ x] no change since previous assessment  [ ] recalculated:     Current Nutrition Diagnosis: Pt remains at high nutrition risk and meets criteria for severe, chronic malnutrition related to inability to meet sufficient protein-energy in setting of advanced age, AMS, UTI as evidenced by severe muscle/fat loss and likely meeting <75% nutrient needs >1 month. Pt is alert but confused, on 1:1. Pt continues with suboptimal po intake. RN reported pt consumes ~35% of meals with total assistance. Last documented BM 3/6.     Recommendations:   1) Continue diet as tolerated  2) Add Ensure Enlive TID to optimize po intake and provide an additional 350kcal, 20g protein per serving   3) Continue assistance with meals  4) Monitor wts and labs    Monitoring and Evaluation:   [ x] PO intake [x ] Tolerance to diet prescription [X] Weights  [X] Follow up per protocol [X] Labs:

## 2021-03-09 NOTE — PROVIDER CONTACT NOTE (OTHER) - RECOMMENDATIONS
MD stated in AM and afternoon he would consult with cardio before titrating her cardizem drip up because he doesn't feel it may hemodynamically appropriate.
MD Hardy stated she would put orders in for depakote IVP but if that wasn't successful she would prefer to transfer the patient where she can have enhanced supervision or 1:1.

## 2021-03-09 NOTE — PROVIDER CONTACT NOTE (OTHER) - ACTION/TREATMENT ORDERED:
will continue to monitor.
MD Burnham also stated he does not feel comfortable adding any medications for her mood given her age and present affect.

## 2021-03-09 NOTE — PROVIDER CONTACT NOTE (OTHER) - ASSESSMENT
MD also made aware  patient HR is a-fib consistently above 125 BPM. Patient on cardizem drip @10 ml/hr.

## 2021-03-09 NOTE — PROVIDER CONTACT NOTE (OTHER) - SITUATION
MD made aware Patient refusing PO meds , again at noon time (previously tried in the morning). Patient combative when this writer to attempted to administer meds but otherwise sleeping.

## 2021-03-09 NOTE — PROGRESS NOTE ADULT - SUBJECTIVE AND OBJECTIVE BOX
[facility] Division of Hospital Medicine    Chief Complaint:  Confusion     SUBJECTIVE / OVERNIGHT EVENTS:  Pt less communicative today  Now with foul smelling watery diarrhea   On Cardizem IV,   Limited ROS due to pts cognitive status    MEDICATIONS  (STANDING):  aspirin  chewable 81 milliGRAM(s) Oral daily  diltiazem Infusion 10 mG/Hr (10 mL/Hr) IV Continuous <Continuous>  heparin   Injectable 5000 Unit(s) SubCutaneous every 12 hours  influenza   Vaccine 0.5 milliLiter(s) IntraMuscular once  metoprolol tartrate 50 milliGRAM(s) Oral every 6 hours  pantoprazole    Tablet 40 milliGRAM(s) Oral before breakfast    MEDICATIONS  (PRN):  acetaminophen   Tablet .. 650 milliGRAM(s) Oral every 6 hours PRN Mild Pain (1 - 3), Moderate Pain (4 - 6)  valproic acid 250 milliGRAM(s) Oral every 6 hours PRN agitation        PHYSICAL EXAM:  Vital Signs Last 24 Hrs  T(C): 36.7 (08 Mar 2021 07:23), Max: 36.9 (07 Mar 2021 16:43)  T(F): 98.1 (08 Mar 2021 07:23), Max: 98.5 (07 Mar 2021 16:43)  HR: 94 (08 Mar 2021 09:37) (81 - 137)  BP: 106/62 (08 Mar 2021 13:33) (89/57 - 120/83)  BP(mean): 68 (07 Mar 2021 23:28) (68 - 68)  RR: 18 (08 Mar 2021 07:23) (18 - 20)  SpO2: 96% (08 Mar 2021 07:23) (94% - 96%)    CONSTITUTIONAL: Elderly lady lying in bed, calm    ENMT: Moist oral mucosa, 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   RESPIRATORY: Normal respiratory effort; lungs are clear to auscultation bilaterally  CARDIOVASCULAR: Irregularly irregular with tachycardia, lower extremity edema  ABDOMEN: Soft, non tender   MUSCLOSKELETAL: no clubbing or cyanosis of digits   PSYCH: Awake oriented to name only   NEUROLOGY: no gross sensory or motor deficits;   SKIN: No rashes    LABS:                                   10.4   4.94  )-----------( 257      ( 08 Mar 2021 08:24 )             33.1   03-08    138  |  100  |  24.0<H>  ----------------------------<  130<H>  3.9   |  26.0  |  0.71    Ca    9.2      08 Mar 2021 08:24        3/3/21 Urine culture   Pan sensitive E.Coli     3/3/21 Blood cultures   NGTD   [facility] Division of Hospital Medicine    Chief Complaint:  Confusion     SUBJECTIVE / OVERNIGHT EVENTS:  Pt less communicative today  Now with foul smelling watery diarrhea   On Cardizem IV,   Limited ROS due to pts cognitive status    MEDICATIONS  (STANDING):  aspirin  chewable 81 milliGRAM(s) Oral daily  diltiazem Infusion 10 mG/Hr (10 mL/Hr) IV Continuous <Continuous>  heparin   Injectable 5000 Unit(s) SubCutaneous every 12 hours  influenza   Vaccine 0.5 milliLiter(s) IntraMuscular once  metoprolol tartrate 50 milliGRAM(s) Oral every 6 hours  pantoprazole    Tablet 40 milliGRAM(s) Oral before breakfast    MEDICATIONS  (PRN):  acetaminophen   Tablet .. 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   RESPIRATORY: Normal respiratory effort; lungs are clear to auscultation bilaterally  CARDIOVASCULAR: Irregularly irregular with tachycardia, lower extremity edema  ABDOMEN: Soft, non tender   MUSCLOSKELETAL: no clubbing or cyanosis of digits   PSYCH: Awake oriented to name only   NEUROLOGY: no gross sensory or motor deficits;   SKIN: No rashes    LABS:                                              10.4   4.94  )-----------( 257      ( 08 Mar 2021 08:24 )             33.1   03-08    138  |  100  |  24.0<H>  ----------------------------<  130<H>  3.9   |  26.0  |  0.71    Ca    9.2      08 Mar 2021 08:24          3/3/21 Urine culture   Pan sensitive E.Coli     3/3/21 Blood cultures   NGTD

## 2021-03-10 NOTE — PROGRESS NOTE ADULT - SUBJECTIVE AND OBJECTIVE BOX
[facility] Division of Hospital Medicine    Chief Complaint:  Confusion     SUBJECTIVE / OVERNIGHT EVENTS:  Pt examined lying comfortably  Keeps trying to remove IV access  HR better controlled today  Had 5 watery BM since yesterday evening, 2 evening, 3 overnight  No further episodes after starting PO Vancomycin     MEDICATIONS  (STANDING):  aspirin  chewable 81 milliGRAM(s) Oral daily  diltiazem Infusion 10 mG/Hr (10 mL/Hr) IV Continuous <Continuous>  heparin   Injectable 5000 Unit(s) SubCutaneous every 12 hours  influenza   Vaccine 0.5 milliLiter(s) IntraMuscular once  metoprolol tartrate 50 milliGRAM(s) Oral every 6 hours  pantoprazole    Tablet 40 milliGRAM(s) Oral before breakfast    MEDICATIONS  (PRN):  acetaminophen   Tablet .. 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   RESPIRATORY: Normal respiratory effort; lungs are clear to auscultation bilaterally  CARDIOVASCULAR: Irregularly irregular with tachycardia, lower extremity edema  ABDOMEN: Soft, non tender, mildly hyperactive BS   MUSCLOSKELETAL: no clubbing or cyanosis of digits   PSYCH: Awake oriented to name only   NEUROLOGY: no gross sensory or motor deficits;   SKIN: No rashes    LABS:                                              10.4   4.94  )-----------( 257      ( 08 Mar 2021 08:24 )             33.1   03-08    138  |  100  |  24.0<H>  ----------------------------<  130<H>  3.9   |  26.0  |  0.71    Ca    9.2      08 Mar 2021 08:24          3/3/21 Urine culture   Pan sensitive E.Coli     3/3/21 Blood cultures   NGTD

## 2021-03-10 NOTE — PROGRESS NOTE ADULT - ASSESSMENT
Hospital course:  89 y/o female with h/o htn, MI was brought in after she had a fall. Pt admitted with hip pain and AMS. Pt had workup for AMS including RPR negative, TSH, B12, Folate within normal limits and CT head showing No acute intracranial hemorrhage, mass effect, or acute osseous fracture, Extensive chronic small vessel ischemic changes in the frontoparietal white matter. Infectious work up done showed UTI likely causing metabolic encephalopathy. She was started on IVF and IV antibiotics with improvement in pt's mental status. Pt also had hip pain after her fall, CT pelvis did not show any acute fracture. PT evaluated pt and recommended GABI, but family want home with PT.     A/P:  89 y/o female with h/o htn, MI was brought in after she had a fall. Pt admitted with hip pain and AMS.      Atrial fibrillation with RVR  New onset  Cardiology recs apprciated  Continue Diltiazem IV as needed   Continue Metoprolol to 50 mg Q6   Monitor BP closely   Will continue rate control at this time with low dose ASA given risks of AC with DOACs    Encephalopathy 2/2 UTI   Now resolved, pt at suspected baseline (oriented to self only)  RPR negative, TSH, B12, Folate within normal limits  Avoid medications, use 1:1 and behavioural redirection therapy if becomes combative/confused  If needed, will use Depakote 250 mg as needed     UTI  Completed E COli    Diarrhea  Acute onset   Suspicion for C Diff, now improved  Continue PO Vancomycin 125mg Q 6 for now    Hip pain, acute, right    CT pelvis did not show any acute fracture   Tylenol PRN  PT/OT, requested follow up today     Obturator hernia   CT pelvis showed right obturator hernia containing bowel   Surgery consult appreciated: no acute surgical intervention   Conservative management     Fall  Fall precautions     Essential hypertension   Continue lopressor   Monitor BP     HCP is daughter, spoke to her today   Dispo: Home with PT/OT once heart rate is controlled (daughter refusing GABI)

## 2021-03-10 NOTE — PROGRESS NOTE ADULT - SUBJECTIVE AND OBJECTIVE BOX
[place] [facility] - [facility], THE HEART CENTER                                   [address] [place] [address]                                                      PHONE: [phone]                                                         FAX: [phone]  [email or web address]  ---------------------------------------------------------------------------------------------------------------------------------    Overnight events/patient complaints:  Pt is not oriented but pleasant, afib with RVR    No Known Allergies    MEDICATIONS  (STANDING):  aspirin  chewable 81 milliGRAM(s) Oral daily  diltiazem Infusion 10 mG/Hr (10 mL/Hr) IV Continuous <Continuous>  heparin   Injectable 5000 Unit(s) SubCutaneous every 12 hours  influenza   Vaccine 0.5 milliLiter(s) IntraMuscular once  lactated ringers. 1000 milliLiter(s) (100 mL/Hr) IV Continuous <Continuous>  metoprolol tartrate 50 milliGRAM(s) Oral every 6 hours  pantoprazole    Tablet 40 milliGRAM(s) Oral before breakfast  vancomycin    Solution 125 milliGRAM(s) Oral every 6 hours    MEDICATIONS  (PRN):  acetaminophen   Tablet .. 650 milliGRAM(s) Oral every 6 hours PRN Mild Pain (1 - 3), Moderate Pain (4 - 6)  valproic acid 250 milliGRAM(s) Oral every 6 hours PRN agitation      Vital Signs Last 24 Hrs  T(C): 36.7 (10 Mar 2021 04:18), Max: 36.7 (09 Mar 2021 17:26)  T(F): 98 (10 Mar 2021 04:18), Max: 98.1 (09 Mar 2021 17:26)  HR: 110 (10 Mar 2021 04:18) (84 - 121)  BP: 90/51 (10 Mar 2021 04:18) (90/51 - 108/60)  BP(mean): --  RR: 20 (10 Mar 2021 04:18) (18 - 24)  SpO2: 96% (10 Mar 2021 04:18) (95% - 96%)  ICU Vital Signs Last 24 Hrs  EDOUARD MALAVE  I&O's Detail    09 Mar 2021 07:01  -  10 Mar 2021 07:00  --------------------------------------------------------  IN:  Total IN: 0 mL    OUT:    Voided (mL): 200 mL  Total OUT: 200 mL    Total NET: -200 mL        Drug Dosing Weight  EDOUARD MALAVE      PHYSICAL EXAM:  General: Appears well developed, well nourished alert and cooperative.  HEENT: Head; normocephalic, atraumatic.  Eyes: Pupils reactive, cornea wnl.  Neck: Supple, no nodes adenopathy, no NVD or carotid bruit or thyromegaly.  CARDIOVASCULAR: irregular, No murmur, rub, gallop or lift.   LUNGS: No rales, rhonchi or wheeze. Normal breath sounds bilaterally.  ABDOMEN: Soft, nontender without mass or organomegaly. bowel sounds normoactive.  EXTREMITIES: No clubbing, cyanosis or edema. Distal pulses wnl.   SKIN: warm and dry with normal turgor.  NEURO: Alert/oriented x 3/normal motor exam. No pathologic reflexes.    PSYCH: normal affect.        LABS:                        9.3    7.76  )-----------( 249      ( 10 Mar 2021 07:49 )             29.8     03-10    137  |  101  |  29.0<H>  ----------------------------<  173<H>  4.1   |  23.0  |  0.87    Ca    8.4<L>      10 Mar 2021 07:49      EDOUARD MALAVE            RADIOLOGY & ADDITIONAL STUDIES:    INTERPRETATION OF TELEMETRY (personally reviewed): afib with RVR     ECHO: < from: TTE Echo Complete w/o Contrast w/ Doppler (03.09.21 @ 15:03) >  Summary:   1. Technically good study.   2. Patient is tachycardic during the exam.   3. Normal global left ventricular systolic function.   4. Left ventricular ejection fraction, by visual estimation, is 65 to 70%.   5. Severely enlarged left atrium.   6. Severely enlarged right atrium.   7. Elevated mean left atrial pressure.   8. Mildly increased LV wall thickness.   9. Moderately enlarged right ventricle.  10. Moderate thickening and calcification of the anterior and posterior mitral valve leaflets.  11. Moderately decreased mitral leaflet mobility.  12. Severe mitral annular calcification.  13. Severe tricuspid regurgitation.  14. Mal coaptation of TV leaflets. PASP is underestimated based on wide open TR.  15. Estimated pulmonary artery systolic pressure is 59.6 mmHg assuming a right atrial pressure of 15 mmHg, which is consistent with moderate pulmonary hypertension.  16. Moderate aortic valve stenosis.  17. Calcified mobile echo density attached to the aortic valve leaflet tip, possibly Lambl's excrescence. Clinical correlation is advised.  18. Mitral valve mean gradient is 3.6 mmHg consistent with mild mitral stenosis.  19. Peak transaortic gradient equals 4.4 mmHg, mean transaortic gradient equals 3.2 mmHg, the calculated aortic valve area equals 1.19 cm² by the continuity equation consistent with moderate aortic stenosis.  20. Trivial pericardial effusion.    MD Douglas Electronically signed on 3/9/2021 at 6:36:20 PM    < end of copied text >           ASSESSMENT AND PLAN:  In summary, EDOUARD MALAVE is an 89y Female with past medical history significant for HTN, MI, aw fall and AMS.  Course is complicated by diarrhea, rapid afib.    1) Not an AC candiate as fall risk. cw ASA  2) cw metoprolol 50mg q6  3) IV cadizem as needed  4) will follow  5) diarrhea treatment ongoing on [facility]

## 2021-03-11 NOTE — PROGRESS NOTE ADULT - SUBJECTIVE AND OBJECTIVE BOX
[place] [facility] - [facility], THE HEART CENTER                                   [address] [place] [address]                                                      PHONE: [phone]                                                         FAX: [phone]  [email or web address]  ---------------------------------------------------------------------------------------------------------------------------------    Overnight events/patient complaints:  Pt without complaints    No Known Allergies    MEDICATIONS  (STANDING):  aspirin  chewable 81 milliGRAM(s) Oral daily  diltiazem Infusion 10 mG/Hr (10 mL/Hr) IV Continuous <Continuous>  heparin   Injectable 5000 Unit(s) SubCutaneous every 12 hours  influenza   Vaccine 0.5 milliLiter(s) IntraMuscular once  lactated ringers. 1000 milliLiter(s) (100 mL/Hr) IV Continuous <Continuous>  metoprolol tartrate 50 milliGRAM(s) Oral every 6 hours  pantoprazole    Tablet 40 milliGRAM(s) Oral before breakfast  vancomycin    Solution 125 milliGRAM(s) Oral every 6 hours    MEDICATIONS  (PRN):  acetaminophen   Tablet .. 650 milliGRAM(s) Oral every 6 hours PRN Mild Pain (1 - 3), Moderate Pain (4 - 6)  valproic acid 250 milliGRAM(s) Oral every 6 hours PRN agitation      Vital Signs Last 24 Hrs  T(C): 36.7 (11 Mar 2021 10:30), Max: 36.8 (10 Mar 2021 17:00)  T(F): 98 (11 Mar 2021 10:30), Max: 98.3 (10 Mar 2021 17:00)  HR: 93 (11 Mar 2021 10:30) (58 - 120)  BP: 90/57 (11 Mar 2021 10:30) (90/57 - 102/69)  BP(mean): --  RR: 18 (11 Mar 2021 10:30) (16 - 20)  SpO2: 95% (11 Mar 2021 10:30) (93% - 95%)  ICU Vital Signs Last 24 Hrs  EDOUARD GUAJARDOCO  I&O's Detail    Drug Dosing Weight  EDOUARD MALAVE      PHYSICAL EXAM:  General: Appears well developed, well nourished alert and cooperative.  HEENT: Head; normocephalic, atraumatic.  Eyes: Pupils reactive, cornea wnl.  Neck: Supple, no nodes adenopathy, no NVD or carotid bruit or thyromegaly.  CARDIOVASCULAR: irregular, No murmur, rub, gallop or lift.   LUNGS: No rales, rhonchi or wheeze. Normal breath sounds bilaterally.  ABDOMEN: Soft, nontender without mass or organomegaly. bowel sounds normoactive.  EXTREMITIES: No clubbing, cyanosis or edema. Distal pulses wnl.   SKIN: warm and dry with normal turgor.  NEURO: Alert/oriented x 3/normal motor exam. No pathologic reflexes.    PSYCH: normal affect.        LABS:                        9.3    7.76  )-----------( 249      ( 10 Mar 2021 07:49 )             29.8     03-10    137  |  101  |  29.0<H>  ----------------------------<  173<H>  4.1   |  23.0  |  0.87    Ca    8.4<L>      10 Mar 2021 07:49      [facility]            RADIOLOGY & ADDITIONAL STUDIES:    INTERPRETATION OF TELEMETRY (personally reviewed): afib hrs controlled     ECHO: < from: TTE Echo Complete w/o Contrast w/ Doppler (03.09.21 @ 15:03) >  Summary:   1. Technically good study.   2. Patient is tachycardic during the exam.   3. Normal global left ventricular systolic function.   4. Left ventricular ejection fraction, by visual estimation, is 65 to 70%.   5. Severely enlarged left atrium.   6. Severely enlarged right atrium.   7. Elevated mean left atrial pressure.   8. Mildly increased LV wall thickness.   9. Moderately enlarged right ventricle.  10. Moderate thickening and calcification of the anterior and posterior mitral valve leaflets.  11. Moderately decreased mitral leaflet mobility.  12. Severe mitral annular calcification.  13. Severe tricuspid regurgitation.  14. Mal coaptation of TV leaflets. PASP is underestimated based on wide open TR.  15. Estimated pulmonary artery systolic pressure is 59.6 mmHg assuming a right atrial pressure of 15 mmHg, which is consistent with moderate pulmonary hypertension.  16. Moderate aortic valve stenosis.  17. Calcified mobile echo density attached to the aortic valve leaflet tip, possibly Lambl's excrescence. Clinical correlation is advised.  18. Mitral valve mean gradient is 3.6 mmHg consistent with mild mitral stenosis.  19. Peak transaortic gradient equals 4.4 mmHg, mean transaortic gradient equals 3.2 mmHg, the calculated aortic valve area equals 1.19 cm² by the continuity equation consistent with moderate aortic stenosis.  20. Trivial pericardial effusion.    MD Douglas Electronically signed on 3/9/2021 at 6:36:20 PM    < end of copied text >           ASSESSMENT AND PLAN:  In summary, EDOUARD MALVAE is an 89y Female with past medical history significant for HTN, MI, aw fall and AMS.  Course is complicated by diarrhea, rapid afib.    1) Not an AC candiate as fall risk. cw ASA  2) cw metoprolol 50mg q6 as HRs generally controlled and BP borderline  3) IV cadizem as needed  4) will follow  5) diarrhea treatment ongoing on [facility]

## 2021-03-11 NOTE — PROGRESS NOTE ADULT - ASSESSMENT
Hospital course:  89 y/o female with h/o htn, MI was brought in after she had a fall. Pt admitted with hip pain and AMS. Pt had workup for AMS including RPR negative, TSH, B12, Folate within normal limits and CT head showing No acute intracranial hemorrhage, mass effect, or acute osseous fracture, Extensive chronic small vessel ischemic changes in the frontoparietal white matter. Infectious work up done showed UTI likely causing metabolic encephalopathy. She was started on IVF and IV antibiotics with improvement in pt's mental status. Pt also had hip pain after her fall, CT pelvis did not show any acute fracture. PT evaluated pt and recommended GABI, but family want home with PT.     A/P:  89 y/o female with h/o htn, MI was brought in after she had a fall. Pt admitted with hip pain and AMS.      Atrial fibrillation with RVR  New onset  Cardiology recs apprciated  Ableto wean off Cardizem IV  Continue Metoprolol to 50 mg Q6   Monitor BP closely   Will continue rate control at this time with low dose ASA given risks of AC with DOACs    Encephalopathy 2/2 UTI   Now resolved, pt at suspected baseline (oriented to self only)  RPR negative, TSH, B12, Folate within normal limits  Avoid medications, use 1:1 and behavioural redirection therapy if becomes combative/confused  If needed, will use Depakote 250 mg as needed     UTI  Completed E COli    Diarrhea  Acute onset   Suspicion for C Diff, now improved  Continue PO Vancomycin 125mg Q 6 for now tentative date of completion : 3/20/21)    Hip pain, acute, right    CT pelvis did not show any acute fracture   Tylenol PRN  PT/OT, requested follow up today     Obturator hernia   CT pelvis showed right obturator hernia containing bowel   Surgery consult appreciated: no acute surgical intervention   Conservative management     Fall  Fall precautions     Essential hypertension   Continue lopressor   Monitor BP     HCP is daughter, spoke to her today   Dispo: Home with PT/OT once heart rate is controlled (daughter refusing GABI)

## 2021-03-11 NOTE — PROGRESS NOTE ADULT - SUBJECTIVE AND OBJECTIVE BOX
[facility] Division of Hospital Medicine    Chief Complaint:  Confusion     SUBJECTIVE / OVERNIGHT EVENTS:  Pt examined lying comfortably  HR improved, cardizem now discontinued  2 furtehr loose BM, overall improving   ROS not obtained as pt is unable to provide details     MEDICATIONS  (STANDING):  aspirin  chewable 81 milliGRAM(s) Oral daily  diltiazem Infusion 10 mG/Hr (10 mL/Hr) IV Continuous <Continuous>  heparin   Injectable 5000 Unit(s) SubCutaneous every 12 hours  influenza   Vaccine 0.5 milliLiter(s) IntraMuscular once  metoprolol tartrate 50 milliGRAM(s) Oral every 6 hours  pantoprazole    Tablet 40 milliGRAM(s) Oral before breakfast    MEDICATIONS  (PRN):  acetaminophen   Tablet .. 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   RESPIRATORY: Normal respiratory effort; lungs are clear to auscultation bilaterally  CARDIOVASCULAR: Irregularly irregular with tachycardia, lower extremity edema  ABDOMEN: Soft, non tender, mildly hyperactive BS   MUSCLOSKELETAL: no clubbing or cyanosis of digits   PSYCH: Awake oriented to name only   NEUROLOGY: no gross sensory or motor deficits;   SKIN: No rashes    LABS:                                   9.3    7.76  )-----------( 249      ( 10 Mar 2021 07:49 )             29.8   03-10    137  |  101  |  29.0<H>  ----------------------------<  173<H>  4.1   |  23.0  |  0.87    Ca    8.4<L>      10 Mar 2021 07:49            3/3/21 Urine culture   Pan sensitive E.Coli     3/3/21 Blood cultures   NGTD

## 2021-03-12 NOTE — PROGRESS NOTE ADULT - ASSESSMENT
Hospital course:  89 y/o female with h/o htn, MI was brought in after she had a fall. Pt admitted with hip pain and AMS. Pt had workup for AMS including RPR negative, TSH, B12, Folate within normal limits and CT head showing No acute intracranial hemorrhage, mass effect, or acute osseous fracture, Extensive chronic small vessel ischemic changes in the frontoparietal white matter. Infectious work up done showed UTI likely causing metabolic encephalopathy. She was started on IVF and IV antibiotics with improvement in pt's mental status. Pt also had hip pain after her fall, CT pelvis did not show any acute fracture. PT evaluated pt and recommended GABI, but family want home with PT.     A/P:  89 y/o female with h/o htn, MI was brought in after she had a fall. Pt admitted with hip pain and AMS.      Atrial fibrillation with RVR  New onset  Cardiology recs apprciated  Weaned off Cardizem IV  Increase Metoprolol to 100 mg Q12   Monitor BP closely   Will continue rate control at this time with low dose ASA given risks of AC with DOACs    Encephalopathy 2/2 UTI   Now resolved, pt at suspected baseline (oriented to self only)  RPR negative, TSH, B12, Folate within normal limits  Avoid medications, use 1:1 and behavioural redirection therapy if becomes combative/confused  If needed, will use Depakote 250 mg as needed     UTI  Completed E Coli treatment     Diarrhea  Acute onset   Suspicion for C Diff, now improved  Continue PO Vancomycin 125mg Q 6 for now (tentative date of completion : 3/20/21)    Hip pain, acute, right    CT pelvis did not show any acute fracture   Tylenol PRN  PT/OT, requested follow up today     Obturator hernia   CT pelvis showed right obturator hernia containing bowel   Surgery consult appreciated: no acute surgical intervention   Conservative management     Fall  Fall precautions     Essential hypertension   Continue lopressor   Monitor BP     HCP is daughter, spoke to her today   Dispo: Home with PT/OT once heart rate is controlled (daughter refusing GABI)

## 2021-03-12 NOTE — DISCHARGE NOTE NURSING/CASE MANAGEMENT/SOCIAL WORK - PATIENT PORTAL LINK FT
You can access the FollowMyHealth Patient Portal offered by [facility] by registering at the following website: [email or web address]. By joining [employer]’s FollowMyHealth portal, you will also be able to view your health information using other applications (apps) compatible with our system.

## 2021-03-12 NOTE — PROGRESS NOTE ADULT - SUBJECTIVE AND OBJECTIVE BOX
[facility] Division of Hospital Medicine    Chief Complaint:  Confusion     SUBJECTIVE / OVERNIGHT EVENTS:  Pt examined lying comfortably  Was planned for dsc home today, however telemetry noted RVR with concern for possible Vtach. Reviewed with cardiology, likely RVR with underlying conduction abnormality  Will plan on dsc in am as long as pts HR is controlled with current regimen       Hospital course  89 y/o female with HTN, h/o MI admitted with fall and AMS. Workup for AMS negative (RPR negative, TSH, B12, Folate, CT head). She did have a UTI which was likely causing metabolic encephalopathy which has resolved following Abx, however developed atrial fibrillation with RVR and had multiple malodorous watery diarrhea, She was empirically started on PO Vancomycin given her history of prior C DIff infections with improvement in diarrhea. Her HR was initially controlled with IV diltiazem drip which was transitioned ot PO Metoprolol.     MEDICATIONS  (STANDING):  aspirin  chewable 81 milliGRAM(s) Oral daily  heparin   Injectable 5000 Unit(s) SubCutaneous every 12 hours  influenza   Vaccine 0.5 milliLiter(s) IntraMuscular once  lactated ringers. 1000 milliLiter(s) (100 mL/Hr) IV Continuous <Continuous>  metoprolol succinate  milliGRAM(s) Oral every 12 hours  pantoprazole    Tablet 40 milliGRAM(s) Oral before breakfast  vancomycin    Solution 125 milliGRAM(s) Oral every 6 hours    MEDICATIONS  (PRN):  acetaminophen   Tablet .. 650 milliGRAM(s) Oral every 6 hours PRN Mild Pain (1 - 3), Moderate Pain (4 - 6)  metoprolol tartrate Injectable 5 milliGRAM(s) IV Push every 6 hours PRN HR > 120,  valproic acid 250 milliGRAM(s) Oral every 6 hours PRN agitation      PHYSICAL EXAM:  Vital Signs Last 24 Hrs  T(C): 36.6 (12 Mar 2021 10:08), Max: 37 (11 Mar 2021 22:15)  T(F): 97.9 (12 Mar 2021 10:08), Max: 98.6 (11 Mar 2021 22:15)  HR: 99 (12 Mar 2021 10:08) (93 - 102)  BP: 99/69 (12 Mar 2021 10:08) (89/59 - 109/75)  BP(mean): --  RR: 18 (12 Mar 2021 10:08) (18 - 18)  SpO2: 100% (12 Mar 2021 10:08) (93% - 100%)      CONSTITUTIONAL: Elderly lady lying in bed, calm    ENMT: Moist oral mucosa, left orbit empty with evidence of recent picking at orbit   RESPIRATORY: Normal respiratory effort; lungs are clear to auscultation bilaterally  CARDIOVASCULAR: Irregularly irregular with tachycardia, no lower extremity edema  ABDOMEN: Soft, non tender, mildly hyperactive BS   MUSCLOSKELETAL: no clubbing or cyanosis of digits   PSYCH: Awake oriented to name only   NEUROLOGY: no gross sensory or motor deficits;   SKIN: No rashes    LABS:                                   9.3    7.76  )-----------( 249      ( 10 Mar 2021 07:49 )             29.8   03-10    137  |  101  |  29.0<H>  ----------------------------<  173<H>  4.1   |  23.0  |  0.87    Ca    8.4<L>      10 Mar 2021 07:49            3/3/21 Urine culture   Pan sensitive E.Coli     3/3/21 Blood cultures   NGTD

## 2021-03-12 NOTE — PROGRESS NOTE ADULT - SUBJECTIVE AND OBJECTIVE BOX
[place] [facility] - [facility], THE HEART CENTER                                   [address] [place] [address]                                                      PHONE: [phone]                                                         FAX: [phone]  [email or web address]  ---------------------------------------------------------------------------------------------------------------------------------    Overnight events/patient complaints:  NAD feeling well today     No Known Allergies    MEDICATIONS  (STANDING):  aspirin  chewable 81 milliGRAM(s) Oral daily  heparin   Injectable 5000 Unit(s) SubCutaneous every 12 hours  influenza   Vaccine 0.5 milliLiter(s) IntraMuscular once  lactated ringers. 1000 milliLiter(s) (100 mL/Hr) IV Continuous <Continuous>  metoprolol tartrate 50 milliGRAM(s) Oral every 6 hours  pantoprazole    Tablet 40 milliGRAM(s) Oral before breakfast  vancomycin    Solution 125 milliGRAM(s) Oral every 6 hours    MEDICATIONS  (PRN):  acetaminophen   Tablet .. 650 milliGRAM(s) Oral every 6 hours PRN Mild Pain (1 - 3), Moderate Pain (4 - 6)  valproic acid 250 milliGRAM(s) Oral every 6 hours PRN agitation      Vital Signs Last 24 Hrs  T(C): 36.4 (12 Mar 2021 05:44), Max: 37 (11 Mar 2021 22:15)  T(F): 97.6 (12 Mar 2021 05:44), Max: 98.6 (11 Mar 2021 22:15)  HR: 101 (12 Mar 2021 05:44) (87 - 102)  BP: 109/75 (12 Mar 2021 05:44) (89/59 - 109/75)  BP(mean): --  RR: 18 (12 Mar 2021 05:44) (18 - 18)  SpO2: 93% (12 Mar 2021 05:44) (93% - 94%)  ICU Vital Signs Last 24 Hrs  EDOUARD MALAVE  I&O's Detail    11 Mar 2021 07:01  -  12 Mar 2021 07:00  --------------------------------------------------------  IN:    Lactated Ringers: 1200 mL  Total IN: 1200 mL    OUT:  Total OUT: 0 mL    Total NET: 1200 mL        I&O's Summary    11 Mar 2021 07:01  -  12 Mar 2021 07:00  --------------------------------------------------------  IN: 1200 mL / OUT: 0 mL / NET: 1200 mL      Drug Dosing Weight  EDOUARD MALAVE      PHYSICAL EXAM:  General: Appears well developed, well nourished alert and cooperative.  HEENT: Head; normocephalic, atraumatic.  Eyes: Pupils reactive, cornea wnl.  Neck: Supple, no nodes adenopathy, no NVD or carotid bruit or thyromegaly.  CARDIOVASCULAR: Normal S1 and S2, 1/6 murmur, rub, gallop or lift.   LUNGS: No rales, rhonchi or wheeze. Normal breath sounds bilaterally.  ABDOMEN: Soft, nontender without mass or organomegaly. bowel sounds normoactive.  EXTREMITIES: No clubbing, cyanosis or edema. Distal pulses wnl.   SKIN: warm and dry with normal turgor.  NEURO: Alert/oriented x1  PSYCH: normal affect.        LABS:          EDOUARD PEGMARCO            RADIOLOGY & ADDITIONAL STUDIES:    INTERPRETATION OF TELEMETRY (personally reviewed): 100's         Summary:   1. Technically good study.   2. Patient is tachycardic during the exam.   3. Normal global left ventricular systolic function.   4. Left ventricular ejection fraction, by visual estimation, is 65 to 70%.   5. Severely enlarged left atrium.   6. Severely enlarged right atrium.   7. Elevated mean left atrial pressure.   8. Mildly increased LV wall thickness.   9. Moderately enlarged right ventricle.  10. Moderate thickening and calcification of the anterior and posterior mitral valve leaflets.  11. Moderately decreased mitral leaflet mobility.  12. Severe mitral annular calcification.  13. Severe tricuspid regurgitation.  14. Mal coaptation of TV leaflets. PASP is underestimated based on wide open TR.  15. Estimated pulmonary artery systolic pressure is 59.6 mmHg assuming a right atrial pressure of 15 mmHg, which is consistent with moderate pulmonary hypertension.  16. Moderate aortic valve stenosis.  17. Calcified mobile echo density attached to the aortic valve leaflet tip, possibly Lambl's excrescence. Clinical correlation is advised.  18. Mitral valve mean gradient is 3.6 mmHg consistent with mild mitral stenosis.  19. Peak transaortic gradient equals 4.4 mmHg, mean transaortic gradient equals 3.2 mmHg, the calculated aortic valve area equals 1.19 cm² by the continuity equation consistent with moderate aortic stenosis.  20. Trivial pericardial effusion.    MD Douglas Electronically signed on 3/9/2021 at 6:36:20 PM      89y Female with past medical history significant for dementia HTN who presents with fall/AMS.  Course is complicated by AF and repeat TTE normal EF moderate AS       Not an AC candidate as fall risk continue with ASA    Change to B-Blockers to Toprol  mg BID

## 2021-03-13 NOTE — PROGRESS NOTE ADULT - ASSESSMENT
The patient is a 89 year old female with a past medical history of hypertension and CAD brought to the ER after sustaining a fall at home.  Admitted for hip pain and acute encephalopathy. CT head was negative. CT of the hip were negative for fracture. PT evaluated patient and recommended GABI however family declined. Acute metabolic encephalopathy attributed to UTI which was treated. Course was complicated by new onset afib with RVR requiring IV Cardizem Cardiology was consulted, given age and fall risk not a candidate for anticoagulation. Was started on metoprolol for rate control and titrated off IV Cardizem Echocardiogram was done.     Assessment/Plan:    1. Afib with RVR; HR improved this morning  To continue PO metoprolol  Not a candidate for AC given age and fall risk    2. Acute metabolic encephalopathy secondary to E Coli UTI: Treated   Now improve    3. Diarrhea: with suspected C Diff  C diff was ordered on 3/9 but not sent  Now resolved  On Po Vancomycin Q 6 hours until 3/20     4. Right hip pain post fall: CT pelvis negative  Tylenol as needed  PT to continue    5. Obturator hernia on CT; Seen by surgery no indication for surgical intervention    Discharge disposition: Home with home care, family declining GABI placement, once HR controlled.    The patient is a 89 year old female with a past medical history of hypertension and CAD brought to the ER after sustaining a fall at home.  Admitted for hip pain and acute encephalopathy. CT head was negative. CT of the hip were negative for fracture. PT evaluated patient and recommended GABI however family declined. Acute metabolic encephalopathy attributed to UTI which was treated. Course was complicated by new onset afib with RVR requiring IV Cardizem Cardiology was consulted, given age and fall risk not a candidate for anticoagulation. Was started on metoprolol for rate control and titrated off IV Cardizem Echocardiogram was done.     Assessment/Plan:    1. Afib with RVR; HR in the 110s this morning  To continue PO metoprolol  Not a candidate for AC given age and fall risk    2.  Hypomagnesemia Replace MgSo4     3. Acute metabolic encephalopathy secondary to E Coli UTI: Treated   Now improve    4. Diarrhea: with suspected C Diff  C diff was ordered on 3/9 but not sent  Now resolved  On Po Vancomycin Q 6 hours until 3/20     5. Right hip pain post fall: CT pelvis negative  Tylenol as needed  PT to continue    6. Obturator hernia on CT; Seen by surgery no indication for surgical intervention    Discharge disposition: Home with home care, family declining GABI placement, once HR controlled.    The patient is a 89 year old female with a past medical history of hypertension and CAD brought to the ER after sustaining a fall at home.  Admitted for hip pain and acute encephalopathy. CT head was negative. CT of the hip were negative for fracture. PT evaluated patient and recommended GABI however family declined. Acute metabolic encephalopathy attributed to UTI which was treated. Course was complicated by new onset afib with RVR requiring IV Cardizem Cardiology was consulted, given age and fall risk not a candidate for anticoagulation. Was started on metoprolol for rate control and titrated off IV Cardizem Echocardiogram was done.     Assessment/Plan:    1. Afib with RVR; HR in the 110s this morning  To continue PO metoprolol  Not a candidate for AC given age and fall risk    2.  Hypomagnesemia Replace MgSo4     3. Acute metabolic encephalopathy secondary to E Coli UTI: Treated   Now improve    4. Diarrhea: with suspected C Diff  C diff was ordered on 3/9 but not sent  Now resolved  On Po Vancomycin Q 6 hours until 3/20     5. Right hip pain post fall: CT pelvis negative  Tylenol as needed  PT to continue    6. Obturator hernia on CT; Seen by surgery no indication for surgical intervention    Discharge disposition: Home with home care, family declining GABI placement, once HR controlled.     Spoke with patient's daughter Sylvie Baeza regarding plan of care and patient's condition

## 2021-03-13 NOTE — PROGRESS NOTE ADULT - SUBJECTIVE AND OBJECTIVE BOX
[place] [facility] - [facility], THE HEART CENTER                                   [address] [place] [address]                                                      PHONE: [phone]                                                         FAX: [phone]  [email or web address]  ---------------------------------------------------------------------------------------------------------------------------------    Overnight events/patient complaints: Remains on enhanced supervision; no acute events.      No Known Allergies    MEDICATIONS  (STANDING):  aspirin  chewable 81 milliGRAM(s) Oral daily  heparin   Injectable 5000 Unit(s) SubCutaneous every 12 hours  influenza   Vaccine 0.5 milliLiter(s) IntraMuscular once  lactated ringers. 1000 milliLiter(s) (100 mL/Hr) IV Continuous <Continuous>  magnesium sulfate  IVPB 2 Gram(s) IV Intermittent once  metoprolol succinate  milliGRAM(s) Oral every 12 hours  pantoprazole    Tablet 40 milliGRAM(s) Oral before breakfast  vancomycin    Solution 125 milliGRAM(s) Oral every 6 hours    MEDICATIONS  (PRN):  acetaminophen   Tablet .. 650 milliGRAM(s) Oral every 6 hours PRN Mild Pain (1 - 3), Moderate Pain (4 - 6)  metoprolol tartrate Injectable 5 milliGRAM(s) IV Push every 6 hours PRN HR > 120,  valproic acid 250 milliGRAM(s) Oral every 6 hours PRN agitation      Vital Signs Last 24 Hrs  T(C): 36.5 (13 Mar 2021 05:07), Max: 36.6 (12 Mar 2021 21:40)  T(F): 97.7 (13 Mar 2021 05:07), Max: 97.9 (12 Mar 2021 21:40)  HR: 116 (13 Mar 2021 10:41) (79 - 116)  BP: 97/60 (13 Mar 2021 10:41) (89/61 - 98/48)  BP(mean): --  RR: 18 (13 Mar 2021 10:41) (18 - 18)  SpO2: 90% (13 Mar 2021 10:41) (90% - 96%)  ICU Vital Signs Last 24 Hrs  EDOUARD MALAVE  I&O's Detail    I&O's Summary    Drug Dosing Weight  EDOUARD MALAVE      PHYSICAL EXAM:  General: NAD.  HEENT: Head; normocephalic.  Eyes: Pupils reactive.  Neck: Supple.  CARDIOVASCULAR: tachy, irregular.   LUNGS: Normal breath sounds bilaterally.  ABDOMEN: Soft.  EXTREMITIES: No clubbing, cyanosis or edema.   SKIN: warm.  NEURO: Confused.    PSYCH: normal affect.        LABS:                        10.3   5.59  )-----------( 356      ( 13 Mar 2021 10:57 )             33.6     03-13    135  |  97<L>  |  45.0<H>  ----------------------------<  156<H>  5.1   |  28.0  |  0.98    Ca    8.7      13 Mar 2021 10:57  Mg     1.4     03-13    TPro  5.6<L>  /  Alb  2.8<L>  /  TBili  <0.2<L>  /  DBili  x   /  AST  34<H>  /  ALT  21  /  AlkPhos  76  03-13    EDOUARD [facility]            RADIOLOGY & ADDITIONAL STUDIES:    INTERPRETATION OF TELEMETRY (personally reviewed): 's    ECHO:  Summary:   1. Technically good study.   2. Patient is tachycardic during the exam.   3. Normal global left ventricular systolic function.   4. Left ventricular ejection fraction, by visual estimation, is 65 to 70%.   5. Severely enlarged left atrium.   6. Severely enlarged right atrium.   7. Elevated mean left atrial pressure.   8. Mildly increased LV wall thickness.   9. Moderately enlarged right ventricle.  10. Moderate thickening and calcification of the anterior and posterior mitral valve leaflets.  11. Moderately decreased mitral leaflet mobility.  12. Severe mitral annular calcification.  13. Severe tricuspid regurgitation.  14. Mal coaptation of TV leaflets. PASP is underestimated based on wide open TR.  15. Estimated pulmonary artery systolic pressure is 59.6 mmHg assuming a right atrial pressure of 15 mmHg, which is consistent with moderate pulmonary hypertension.  16. Moderate aortic valve stenosis.  17. Calcified mobile echo density attached to the aortic valve leaflet tip, possibly Lambl's excrescence. Clinical correlation is advised.  18. Mitral valve mean gradient is 3.6 mmHg consistent with mild mitral stenosis.  19. Peak transaortic gradient equals 4.4 mmHg, mean transaortic gradient equals 3.2 mmHg, the calculated aortic valve area equals 1.19 cm² by the continuity equation consistent with moderate aortic stenosis.  20. Trivial pericardial effusion.    MD Douglas Electronically signed on 3/9/2021 at 6:36:20 PM      ASSESSMENT AND PLAN:  89y Female with past medical history significant for dementia HTN who presents with fall/AMS and with AF and RVR. Patient is confused and a poor historian.    1. Not an AC candidate due to fall risk. Continue with ASA.  2. AF rates vary with moderate rapid response. No complaints. No bradycardia. Continue BB.   3. Consider adding digoxin if high HR's persist. However, patient is asymptomatic.

## 2021-03-13 NOTE — PROGRESS NOTE ADULT - SUBJECTIVE AND OBJECTIVE BOX
CC: Follow up    INTERVAL HPI/OVERNIGHT EVENTS: Patient seen and examined, HR in the 110s this morning.       Vital Signs Last 24 Hrs  T(C): 36.5 (13 Mar 2021 05:07), Max: 36.6 (12 Mar 2021 21:40)  T(F): 97.7 (13 Mar 2021 05:07), Max: 97.9 (12 Mar 2021 21:40)  HR: 116 (13 Mar 2021 10:41) (79 - 116)  BP: 97/60 (13 Mar 2021 10:41) (89/61 - 98/48)  BP(mean): --  RR: 18 (13 Mar 2021 10:41) (18 - 18)  SpO2: 90% (13 Mar 2021 10:41) (90% - 96%)    PHYSICAL EXAM:    GENERAL: NAD, AOX2  HEAD:  Atraumatic, Normocephalic  ENMT: Moist mucous membranes  CHEST/LUNG: Clear to auscultation bilaterally; No rales, rhonchi, wheezing, or rubs  HEART: IRR; No murmurs, rubs, or gallops  ABDOMEN: Soft, Nontender, Nondistended; Bowel sounds present  EXTREMITIES:  2+ Peripheral Pulses, No clubbing, cyanosis, or edema        MEDICATIONS  (STANDING):  aspirin  chewable 81 milliGRAM(s) Oral daily  heparin   Injectable 5000 Unit(s) SubCutaneous every 12 hours  influenza   Vaccine 0.5 milliLiter(s) IntraMuscular once  lactated ringers. 1000 milliLiter(s) (100 mL/Hr) IV Continuous <Continuous>  magnesium sulfate  IVPB 2 Gram(s) IV Intermittent once  metoprolol succinate  milliGRAM(s) Oral every 12 hours  pantoprazole    Tablet 40 milliGRAM(s) Oral before breakfast  vancomycin    Solution 125 milliGRAM(s) Oral every 6 hours    MEDICATIONS  (PRN):  acetaminophen   Tablet .. 650 milliGRAM(s) Oral every 6 hours PRN Mild Pain (1 - 3), Moderate Pain (4 - 6)  metoprolol tartrate Injectable 5 milliGRAM(s) IV Push every 6 hours PRN HR > 120,  valproic acid 250 milliGRAM(s) Oral every 6 hours PRN agitation      Allergies    No Known Allergies    Intolerances          LABS:                          10.3   5.59  )-----------( 356      ( 13 Mar 2021 10:57 )             33.6     03-13    135  |  97<L>  |  45.0<H>  ----------------------------<  156<H>  5.1   |  28.0  |  0.98    Ca    8.7      13 Mar 2021 10:57  Mg     1.4     03-13    TPro  5.6<L>  /  Alb  2.8<L>  /  TBili  <0.2<L>  /  DBili  x   /  AST  34<H>  /  ALT  21  /  AlkPhos  76  03-13          RADIOLOGY & ADDITIONAL TESTS:

## 2021-03-14 NOTE — PROGRESS NOTE ADULT - SUBJECTIVE AND OBJECTIVE BOX
CC: Follow up    INTERVAL HPI/OVERNIGHT EVENTS: Patient seen and examined, lost IV access overnight. No IV fluids were administered until IV placed this morning. Poor oral intake, even with assistance eating 20% of her meals with minimal fluid intake    Vital Signs Last 24 Hrs  T(C): 36.7 (14 Mar 2021 09:20), Max: 36.7 (14 Mar 2021 05:02)  T(F): 98 (14 Mar 2021 09:20), Max: 98 (14 Mar 2021 05:02)  HR: 68 (14 Mar 2021 09:20) (68 - 108)  BP: 99/66 (14 Mar 2021 09:20) (90/55 - 110/79)  BP(mean): --  RR: 18 (14 Mar 2021 05:02) (18 - 20)  SpO2: 96% (14 Mar 2021 09:20) (92% - 98%)    PHYSICAL EXAM:    GENERAL: NAD, AOX3  HEAD:  Atraumatic, Normocephalic  EYES: EOMI, PERRLA, conjunctiva and sclera clear  ENMT: Moist mucous membranes  NECK: Supple, No JVD  CHEST/LUNG: Clear to auscultation bilaterally; No rales, rhonchi, wheezing, or rubs  HEART: Tachy; No murmurs, rubs, or gallops  ABDOMEN: Soft, Nontender, Nondistended; Bowel sounds present  EXTREMITIES:  2+ Peripheral Pulses, No clubbing, cyanosis, or edema        MEDICATIONS  (STANDING):  aspirin  chewable 81 milliGRAM(s) Oral daily  heparin   Injectable 5000 Unit(s) SubCutaneous every 12 hours  influenza   Vaccine 0.5 milliLiter(s) IntraMuscular once  metoprolol succinate  milliGRAM(s) Oral every 12 hours  pantoprazole    Tablet 40 milliGRAM(s) Oral before breakfast  sodium chloride 0.9%. 1000 milliLiter(s) (60 mL/Hr) IV Continuous <Continuous>  vancomycin    Solution 125 milliGRAM(s) Oral every 6 hours    MEDICATIONS  (PRN):  acetaminophen   Tablet .. 650 milliGRAM(s) Oral every 6 hours PRN Mild Pain (1 - 3), Moderate Pain (4 - 6)  metoprolol tartrate Injectable 5 milliGRAM(s) IV Push every 6 hours PRN HR > 120,  valproic acid 250 milliGRAM(s) Oral every 6 hours PRN agitation      Allergies    No Known Allergies    Intolerances          LABS:                          10.3   5.59  )-----------( 356      ( 13 Mar 2021 10:57 )             33.6     03-14    138  |  99  |  49.0<H>  ----------------------------<  105<H>  4.7   |  25.0  |  1.11    Ca    8.8      14 Mar 2021 07:58  Mg     2.1     03-14    TPro  5.6<L>  /  Alb  2.8<L>  /  TBili  <0.2<L>  /  DBili  x   /  AST  34<H>  /  ALT  21  /  AlkPhos  76  03-13          RADIOLOGY & ADDITIONAL TESTS:

## 2021-03-14 NOTE — PROGRESS NOTE ADULT - ASSESSMENT
The patient is a 89 year old female with a past medical history of hypertension and CAD brought to the ER after sustaining a fall at home.  Admitted for hip pain and acute encephalopathy. CT head was negative. CT of the hip were negative for fracture. PT evaluated patient and recommended GABI however family declined. Acute metabolic encephalopathy attributed to UTI which was treated. Course was complicated by new onset afib with RVR requiring IV Cardizem Cardiology was consulted, given age and fall risk not a candidate for anticoagulation. Was started on metoprolol for rate control and titrated off IV Cardizem Echocardiogram was done.     Assessment/Plan:    1. Afib with RVR; HR in the 110s this morning  To continue PO metoprolol  Not a candidate for AC given age and fall risk    2.  ENIO: IV fluids to be started today once IV placed  Monitor BMP  Tachy likely secondary to fluid deficit     3. Acute metabolic encephalopathy secondary to E Coli UTI: Treated   Now improved    4. Diarrhea: with suspected C Diff  C diff was ordered on 3/9 but not sent  Now resolved  On Po Vancomycin Q 6 hours until 3/20     5. Right hip pain post fall: CT pelvis negative  Tylenol as needed  PT to continue    6. Obturator hernia on CT; Seen by surgery no indication for surgical intervention    Discharge disposition: Home with home care, family declining GABI placement when medically stable    The patient is a 89 year old female with a past medical history of hypertension and CAD brought to the ER after sustaining a fall at home.  Admitted for hip pain and acute encephalopathy. CT head was negative. CT of the hip were negative for fracture. PT evaluated patient and recommended GABI however family declined. Acute metabolic encephalopathy attributed to UTI which was treated. Course was complicated by new onset afib with RVR requiring IV Cardizem Cardiology was consulted, given age and fall risk not a candidate for anticoagulation. Was started on metoprolol for rate control and titrated off IV Cardizem Echocardiogram was done.     Assessment/Plan:    1. Afib with RVR; HR in the 110s this morning  To continue PO metoprolol  Not a candidate for AC given age and fall risk    2.  ENIO: IV fluids to be started today once IV placed  Monitor BMP  Tachy likely secondary to fluid deficit     3. Acute metabolic encephalopathy secondary to E Coli UTI: Treated   Now improved    4. Diarrhea: with suspected C Diff  C diff was ordered on 3/9 but not sent  Now resolved  On Po Vancomycin Q 6 hours until 3/20     5. Right hip pain post fall: CT pelvis negative  Tylenol as needed  PT to continue    6. Obturator hernia on CT; Seen by surgery no indication for surgical intervention    Discharge disposition: Home with home care, family declining GABI placement when medically stable     Spoke with patient's daughter Sylvie Baeza regarding plan of care and patient's condition

## 2021-03-15 NOTE — PROCEDURE NOTE - ADDITIONAL PROCEDURE DETAILS
sono guided 18G IV secured. Good flash, flushes easily. Tourniquet removed, bed lowered, and sharps were disposed. Pt tolerated procedure well.

## 2021-03-15 NOTE — PROGRESS NOTE ADULT - SUBJECTIVE AND OBJECTIVE BOX
[place] [facility] - [facility], THE HEART CENTER                                   [address] [place] [address]                                                      PHONE: [phone]                                                         FAX: [phone]  [email or web address]  ---------------------------------------------------------------------------------------------------------------------------------    Overnight events/patient complaints:  Pt not oriented, awake eating    No Known Allergies    MEDICATIONS  (STANDING):  aspirin  chewable 81 milliGRAM(s) Oral daily  digoxin     Tablet 250 MICROGram(s) Oral every 6 hours  dronabinol 2.5 milliGRAM(s) Oral daily  heparin   Injectable 5000 Unit(s) SubCutaneous every 12 hours  influenza   Vaccine 0.5 milliLiter(s) IntraMuscular once  metoprolol succinate  milliGRAM(s) Oral every 12 hours  pantoprazole    Tablet 40 milliGRAM(s) Oral before breakfast  sodium chloride 0.9%. 1000 milliLiter(s) (75 mL/Hr) IV Continuous <Continuous>  vancomycin    Solution 125 milliGRAM(s) Oral every 6 hours    MEDICATIONS  (PRN):  acetaminophen   Tablet .. 650 milliGRAM(s) Oral every 6 hours PRN Mild Pain (1 - 3), Moderate Pain (4 - 6)  metoprolol tartrate Injectable 5 milliGRAM(s) IV Push every 6 hours PRN HR > 120,  valproic acid 250 milliGRAM(s) Oral every 6 hours PRN agitation      Vital Signs Last 24 Hrs  T(C): 37.2 (15 Mar 2021 05:13), Max: 37.2 (14 Mar 2021 22:00)  T(F): 99 (15 Mar 2021 05:13), Max: 99 (15 Mar 2021 05:13)  HR: 130 (15 Mar 2021 05:13) (68 - 135)  BP: 93/63 (15 Mar 2021 05:13) (93/63 - 105/69)  BP(mean): --  RR: 18 (15 Mar 2021 05:13) (18 - 20)  SpO2: 95% (15 Mar 2021 05:13) (92% - 96%)  ICU Vital Signs Last 24 Hrs  [facility]  I&O's Detail    Drug Dosing Weight  [facility]      PHYSICAL EXAM:  General: Appears well developed, well nourished alert and cooperative.  HEENT: Head; normocephalic, atraumatic. +JVD  Eyes: Pupils reactive, cornea wnl.  Neck: Supple, no nodes adenopathy, no NVD or carotid bruit or thyromegaly.  CARDIOVASCULAR: irregular, No murmur, rub, gallop or lift.   LUNGS: No rales, rhonchi or wheeze. Normal breath sounds bilaterally.  ABDOMEN: Soft, nontender without mass or organomegaly. bowel sounds normoactive.  EXTREMITIES: No clubbing, cyanosis or edema. Distal pulses wnl.   SKIN: warm and dry with normal turgor.  NEURO: Alert/oriented x 3/normal motor exam. No pathologic reflexes.    PSYCH: normal affect.        LABS:                        10.3   5.59  )-----------( 356      ( 13 Mar 2021 10:57 )             33.6     03-15    142  |  104  |  55.0<H>  ----------------------------<  110<H>  4.7   |  23.0  |  1.31<H>    Ca    8.5<L>      15 Mar 2021 07:44  Mg     2.0     03-15    TPro  5.6<L>  /  Alb  2.8<L>  /  TBili  <0.2<L>  /  DBili  x   /  AST  34<H>  /  ALT  21  /  AlkPhos  76  03-13    [facility]            RADIOLOGY & ADDITIONAL STUDIES:    INTERPRETATION OF TELEMETRY (personally reviewed): No Events         ECHO: < from: TTE Echo Complete w/o Contrast w/ Doppler (03.09.21 @ 15:03) >    Summary:   1. Technically good study.   2. Patient is tachycardic during the exam.   3. Normal global left ventricular systolic function.   4. Left ventricular ejection fraction, by visual estimation, is 65 to 70%.   5. Severely enlarged left atrium.   6. Severely enlarged right atrium.   7. Elevated mean left atrial pressure.   8. Mildly increased LV wall thickness.   9. Moderately enlarged right ventricle.  10. Moderate thickening and calcification of the anterior and posterior mitral valve leaflets.  11. Moderately decreased mitral leaflet mobility.  12. Severe mitral annular calcification.  13. Severe tricuspid regurgitation.  14. Mal coaptation of TV leaflets. PASP is underestimated based on wide open TR.  15. Estimated pulmonary artery systolic pressure is 59.6 mmHg assuming a right atrial pressure of 15 mmHg, which is consistent with moderate pulmonary hypertension.  16. Moderate aortic valve stenosis.  17. Calcified mobile echo density attached to the aortic valve leaflet tip, possibly Lambl's excrescence. Clinical correlation is advised.  18. Mitral valve mean gradient is 3.6 mmHg consistent with mild mitral stenosis.  19. Peak transaortic gradient equals 4.4 mmHg, mean transaortic gradient equals 3.2 mmHg, the calculated aortic valve area equals 1.19 cm² by the continuity equation consistent with moderate aortic stenosis.  20. Trivial pericardial effusion.    MD Douglas Electronically signed on 3/9/2021 at 6:36:20 PM    < end of copied text >           ASSESSMENT AND PLAN:  In summary, EDOUARD MALAVE is an 89y Female with past medical history significant for dementia HTN who presents with fall/AMS and with AF and RVR. Patient is confused and a poor historian.    1. Not an AC candidate due to fall risk. Continue with ASA.  2. AF rates vary with rapid response. No complaints. No bradycardia. Continue BB. Start digoxin load today  3. Check CXR today as pt with JVD

## 2021-03-15 NOTE — PROGRESS NOTE ADULT - ASSESSMENT
The patient is a 89 year old female with a past medical history of hypertension and CAD brought to the ER after sustaining a fall at home.  Admitted for hip pain and acute encephalopathy. CT head was negative. CT of the hip were negative for fracture. PT evaluated patient and recommended GABI however family declined. Acute metabolic encephalopathy attributed to UTI which was treated. Course was complicated by new onset afib with RVR requiring IV Cardizem Cardiology was consulted, given age and fall risk not a candidate for anticoagulation. Was started on metoprolol for rate control and titrated off IV Cardizem Echocardiogram was done.     Assessment/Plan:    1. Afib with RVR; 500ml fluid bolus x 1  Digoxin added by cardiology  To continue PO metoprolol  Not a candidate for AC given age and fall risk  Suspect secondary to hypovolemia     2.  ENIO: Worsening will poor oral and fluid intake despite assistance  Monitor BMP  Tachy likely secondary to fluid deficit     3. Acute metabolic encephalopathy secondary to E Coli UTI: Treated   Now improved    4. Diarrhea: with suspected C Diff  C diff was ordered on 3/9 but not sent  Now resolved  On Po Vancomycin Q 6 hours until 3/20     5. Right hip pain post fall: CT pelvis negative  Tylenol as needed  PT to continue    6. Obturator hernia on CT; Seen by surgery no indication for surgical intervention    Discharge disposition: Home with home care, family declining GABI placement when medically stable  The patient is a 89 year old female with a past medical history of hypertension and CAD brought to the ER after sustaining a fall at home.  Admitted for hip pain and acute encephalopathy. CT head was negative. CT of the hip were negative for fracture. PT evaluated patient and recommended GABI however family declined. Acute metabolic encephalopathy attributed to UTI which was treated. Course was complicated by new onset afib with RVR requiring IV Cardizem Cardiology was consulted, given age and fall risk not a candidate for anticoagulation. Was started on metoprolol for rate control and titrated off IV Cardizem Echocardiogram was done.     Assessment/Plan:    1. Afib with RVR; 500ml fluid bolus x 1  Digoxin added by cardiology  To continue PO metoprolol  Not a candidate for AC given age and fall risk  Suspect secondary to hypovolemia     2.  ENIO: Worsening will poor oral and fluid intake despite assistance  Monitor BMP  Tachy likely secondary to fluid deficit     3. Acute metabolic encephalopathy secondary to E Coli UTI: Treated   Now improved    4. Diarrhea: with suspected C Diff  C diff was ordered on 3/9 but not sent  Now resolved  On Po Vancomycin Q 6 hours until 3/20     5. Right hip pain post fall: CT pelvis negative  Tylenol as needed  PT to continue    6. Obturator hernia on CT; Seen by surgery no indication for surgical intervention    Discharge disposition: Home with home care, family declining GABI placement when medically stable     Spoke with patient's daughter updated on patient's condition and plan of care. Allowed permission to come visit her mother today

## 2021-03-15 NOTE — PROGRESS NOTE ADULT - SUBJECTIVE AND OBJECTIVE BOX
CC: Follow up    INTERVAL HPI/OVERNIGHT EVENTS: Patient seen and examined, HR still elevated this morning. Worsening ENIO with poor oral intake. Awake and alert, will not answer questions but will track and follow commands      Vital Signs Last 24 Hrs  T(C): 37.2 (15 Mar 2021 05:13), Max: 37.2 (14 Mar 2021 22:00)  T(F): 99 (15 Mar 2021 05:13), Max: 99 (15 Mar 2021 05:13)  HR: 130 (15 Mar 2021 05:13) (102 - 135)  BP: 93/63 (15 Mar 2021 05:13) (93/63 - 105/69)  BP(mean): --  RR: 18 (15 Mar 2021 05:13) (18 - 20)  SpO2: 95% (15 Mar 2021 05:13) (92% - 95%)    PHYSICAL EXAM:    GENERAL: NAD, Awake Alert  HEAD:  Atraumatic, Normocephalic  EYES: EOMI, PERRLA, conjunctiva and sclera clear  ENMT: Moist mucous membranes  NECK: Supple, No JVD  CHEST/LUNG: Clear to auscultation bilaterally; No rales, rhonchi, wheezing, or rubs  HEART:IRR; No murmurs, rubs, or gallops  ABDOMEN: Soft, Nontender, Nondistended; Bowel sounds present  EXTREMITIES:  2+ Peripheral Pulses, No clubbing, cyanosis, or edema        MEDICATIONS  (STANDING):  aspirin  chewable 81 milliGRAM(s) Oral daily  digoxin     Tablet 250 MICROGram(s) Oral every 6 hours  dronabinol 2.5 milliGRAM(s) Oral daily  heparin   Injectable 5000 Unit(s) SubCutaneous every 12 hours  influenza   Vaccine 0.5 milliLiter(s) IntraMuscular once  metoprolol succinate  milliGRAM(s) Oral every 12 hours  pantoprazole    Tablet 40 milliGRAM(s) Oral before breakfast  sodium chloride 0.9%. 1000 milliLiter(s) (100 mL/Hr) IV Continuous <Continuous>  vancomycin    Solution 125 milliGRAM(s) Oral every 6 hours    MEDICATIONS  (PRN):  acetaminophen   Tablet .. 650 milliGRAM(s) Oral every 6 hours PRN Mild Pain (1 - 3), Moderate Pain (4 - 6)  metoprolol tartrate Injectable 5 milliGRAM(s) IV Push every 6 hours PRN HR > 120,  valproic acid 250 milliGRAM(s) Oral every 6 hours PRN agitation      Allergies    No Known Allergies    Intolerances          LABS:      03-15    142  |  104  |  55.0<H>  ----------------------------<  110<H>  4.7   |  23.0  |  1.31<H>    Ca    8.5<L>      15 Mar 2021 07:44  Mg     2.0     03-15            RADIOLOGY & ADDITIONAL TESTS:

## 2021-03-16 NOTE — PROGRESS NOTE ADULT - SUBJECTIVE AND OBJECTIVE BOX
[place] [facility] - [facility], [facility] CENTER                                   [address] [place] [address]                                                      PHONE: [phone]                                                         FAX: [phone]  [email or web address]  ---------------------------------------------------------------------------------------------------------------------------------    Overnight events/patient complaints:  Patient cannot give hx.      PAST MEDICAL & SURGICAL HISTORY:  MI (myocardial infarction)  @ age 89    HTN (hypertension)    No significant past surgical history        No Known Allergies    MEDICATIONS  (STANDING):  aspirin  chewable 81 milliGRAM(s) Oral daily  dronabinol 2.5 milliGRAM(s) Oral daily  heparin   Injectable 5000 Unit(s) SubCutaneous every 12 hours  influenza   Vaccine 0.5 milliLiter(s) IntraMuscular once  metoprolol succinate  milliGRAM(s) Oral every 12 hours  pantoprazole    Tablet 40 milliGRAM(s) Oral before breakfast  sodium chloride 0.9%. 1000 milliLiter(s) (100 mL/Hr) IV Continuous <Continuous>  vancomycin    Solution 125 milliGRAM(s) Oral every 6 hours    MEDICATIONS  (PRN):  acetaminophen   Tablet .. 650 milliGRAM(s) Oral every 6 hours PRN Mild Pain (1 - 3), Moderate Pain (4 - 6)  metoprolol tartrate Injectable 5 milliGRAM(s) IV Push every 6 hours PRN HR > 120,  valproic acid 250 milliGRAM(s) Oral every 6 hours PRN agitation      Vital Signs Last 24 Hrs  T(C): 36.3 (16 Mar 2021 07:26), Max: 36.7 (16 Mar 2021 04:51)  T(F): 97.3 (16 Mar 2021 07:26), Max: 98.1 (16 Mar 2021 04:51)  HR: 59 (16 Mar 2021 07:26) (59 - 114)  BP: 114/57 (16 Mar 2021 07:26) (89/67 - 132/69)  BP(mean): --  RR: 20 (16 Mar 2021 07:26) (18 - 20)  SpO2: 96% (16 Mar 2021 07:26) (93% - 97%)  ICU Vital Signs Last 24 Hrs  [facility]  I&O's Detail    I&O's Summary    Drug Dosing Weight  [facility]      PHYSICAL EXAM:  General: Appears well developed, well nourished alert and cooperative.  HEENT: Head; normocephalic, atraumatic.  Eyes: Pupils reactive, cornea wnl.  Neck: Supple, +JVD  CARDIOVASCULAR: Normal S1 and S2, No murmur, rub, gallop or lift.   LUNGS: Decreased breath sounds at lung bases  ABDOMEN: Soft, nontender   EXTREMITIES: No clubbing, cyanosis or edema. Distal pulses wnl.   SKIN: warm and dry with normal turgor.  NEURO: Alert/oriented x 0          LABS:                        9.8    3.51  )-----------( 297      ( 16 Mar 2021 08:11 )             33.0     03-16    142  |  107  |  51.0<H>  ----------------------------<  76  4.4   |  21.0<L>  |  1.11    Ca    8.2<L>      16 Mar 2021 08:11  Mg     2.0     03-15      [facility]            RADIOLOGY & ADDITIONAL STUDIES:    INTERPRETATION OF TELEMETRY (personally reviewed): NSR with PACs and AT    ECG:    ECHO:     Summary:   1. Technically good study.   2. Patient is tachycardic during the exam.   3. Normal global left ventricular systolic function.   4. Left ventricular ejection fraction, by visual estimation, is 65 to 70%.   5. Severely enlarged left atrium.   6. Severely enlarged right atrium.   7. Elevated mean left atrial pressure.   8. Mildly increased LV wall thickness.   9. Moderately enlarged right ventricle.  10. Moderate thickening and calcification of the anterior and posterior mitral valve leaflets.  11. Moderately decreased mitral leaflet mobility.  12. Severe mitral annular calcification.  13. Severe tricuspid regurgitation.  14. Mal coaptation of TV leaflets. PASP is underestimated based on wide open TR.  15. Estimated pulmonary artery systolic pressure is 59.6 mmHg assuming a right atrial pressure of 15 mmHg, which is consistent with moderate pulmonary hypertension.  16. Moderate aortic valve stenosis.  17. Calcified mobile echo density attached to the aortic valve leaflet tip, possibly Lambl's excrescence. Clinical correlation is advised.  18. Mitral valve mean gradient is 3.6 mmHg consistent with mild mitral stenosis.  19. Peak transaortic gradient equals 4.4 mmHg, mean transaortic gradient equals 3.2 mmHg, the calculated aortic valve area equals 1.19 cm² by the continuity equation consistent with moderate aortic stenosis.  20. Trivial pericardial effusion.    MD Douglas Electronically signed on 3/9/2021 at 6:36:20 PM      STRESS TEST:    CARDIAC CATHETERIZATION:    ASSESSMENT AND PLAN:  In summary, EDOUARD MALAVE is an 89y Female with past medical history significant for dementia, HTN, Afib presents with fall found to have rapid afib    Rapid afib- converted to NSR/Sinus bradycardia.  Would load with amiodarone now that she is in sinus rhythm    HFpEF- new dx.  Echo consistent with elevated left atrial pressures and pulmonary pressures.  This is likely precipitating afib.  Pleural effusions worse on CXR from 3/15.  Would hold standing fluids and start diuresis.      Bradycardia- will cut metoprolol to 50mg daily due to low HRs post spontaneous cardioversion; can avoid digoxin    Thank you for allowing [facility] to participate in the care of this patient.  Please feel free to call with any questions or concerns.

## 2021-03-16 NOTE — CHART NOTE - NSCHARTNOTEFT_GEN_A_CORE
Source: Patient [ ]  Family [ ]   other [ x]    Current Diet: Diet, Soft:   DASH/TLC {Sodium & Cholesterol Restricted} (DASH)  Supplement Feeding Modality:  Oral  Ensure Enlive Cans or Servings Per Day:  1       Frequency:  Two Times a day (03-13-21 @ 15:04)    PO intake:  < 50% [ x]   50-75%  [ ]   %  [ ]  other :    Current Weight:   (3/6) 110 lbs  -Obtain daily wts, continue to monitor. 1+ L arm edema noted.     Pertinent Medications: MEDICATIONS  (STANDING):  aMIOdarone    Tablet   Oral   aMIOdarone    Tablet 400 milliGRAM(s) Oral every 8 hours  aspirin  chewable 81 milliGRAM(s) Oral daily  dronabinol 2.5 milliGRAM(s) Oral daily  furosemide   Injectable 20 milliGRAM(s) IV Push once  heparin   Injectable 5000 Unit(s) SubCutaneous every 12 hours  influenza   Vaccine 0.5 milliLiter(s) IntraMuscular once  metoprolol succinate ER 50 milliGRAM(s) Oral daily  pantoprazole    Tablet 40 milliGRAM(s) Oral before breakfast  vancomycin    Solution 125 milliGRAM(s) Oral every 6 hours    MEDICATIONS  (PRN):  acetaminophen   Tablet .. 650 milliGRAM(s) Oral every 6 hours PRN Mild Pain (1 - 3), Moderate Pain (4 - 6)  metoprolol tartrate Injectable 5 milliGRAM(s) IV Push every 6 hours PRN HR > 120,  valproic acid 250 milliGRAM(s) Oral every 6 hours PRN agitation    Pertinent Labs: CBC Full  -  ( 16 Mar 2021 08:11 )  WBC Count : 3.51 K/uL  RBC Count : 3.84 M/uL  Hemoglobin : 9.8 g/dL  Hematocrit : 33.0 %  Platelet Count - Automated : 297 K/uL  Mean Cell Volume : 85.9 fl  Mean Cell Hemoglobin : 25.5 pg  Mean Cell Hemoglobin Concentration : 29.7 gm/dL  Auto Neutrophil # : x  Auto Lymphocyte # : x  Auto Monocyte # : x  Auto Eosinophil # : x  Auto Basophil # : x  Auto Neutrophil % : x  Auto Lymphocyte % : x  Auto Monocyte % : x  Auto Eosinophil % : x  Auto Basophil % : x  03-16 Na142 mmol/L Glu 76 mg/dL K+ 4.4 mmol/L Cr  1.11 mg/dL BUN 51.0 mg/dL<H> Phos n/a   Alb n/a   PAB n/a       Skin: Intact    Nutrition focused physical exam previously conducted - found signs of malnutrition [ ]absent [x ]present    Subcutaneous fat loss: [x ] Orbital fat pads region, [ x]Buccal fat region, [ ]Triceps region,  [ ]Ribs region    Muscle wasting: [ x]Temples region, [x ]Clavicle region, [ x]Shoulder region, [ ]Scapula region, [ ]Interosseous region,  [ ]thigh region, [ ]Calf region    Estimated Needs:   [ x] no change since previous assessment  [ ] recalculated:     Current Nutrition Diagnosis: Pt remains at high nutrition risk and meets criteria for severe, chronic malnutrition related to inability to meet sufficient protein-energy in setting of advanced age, AMS, UTI as evidenced by severe muscle/fat loss and likely meeting <75% nutrient needs >1 month. Pt is alert but confused. Pt continues with suboptimal po intake. Pt requires total assistance with meals, consuming 0% per documentation. Last documented BM 3/15.      Recommendations:   1) Continue diet as tolerated. Consider appetite stimulant if poor po intake continues.   2) Continue Ensure Enlive BID to optimize po intake and provide an additional 350kcal, 20g protein per serving   3) Continue assistance with meals  4) Monitor wts and labs    Monitoring and Evaluation:   [ x] PO intake [x ] Tolerance to diet prescription [X] Weights  [X] Follow up per protocol [X] Labs:

## 2021-03-16 NOTE — PROGRESS NOTE ADULT - ASSESSMENT
The patient is a 89 year old female with a past medical history of hypertension and CAD brought to the ER after sustaining a fall at home.  Admitted for hip pain and acute encephalopathy. CT head was negative. CT of the hip were negative for fracture. PT evaluated patient and recommended GABI however family declined. Acute metabolic encephalopathy attributed to UTI which was treated. Course was complicated by new onset afib with RVR requiring IV Cardizem Cardiology was consulted, given age and fall risk not a candidate for anticoagulation. Was started on metoprolol for rate control and titrated off IV Cardizem Echocardiogram was done.     Assessment/Plan:    1. Afib with RVR; Rate controlled, Bradycardic now  Per cardiology added amiodarone and metoprolol with parameters to hold for HR <50  Dose of metoprolol  to 50mg OD   Not a candidate for AC given age and fall risk  Suspect secondary to hypovolemia- Heart rate improved as well as systolic blood pressure     2.  ENIO: Worsening will poor oral and fluid intake  Improving  Monitor BMP  DC IV fluids    3. Acute metabolic encephalopathy secondary to E Coli UTI: Treated   Now improved    4. Diarrhea: with suspected C Diff  C diff was ordered on 3/9 but not sent  Now resolved  On Po Vancomycin Q 6 hours until 3/20     5. Right hip pain post fall: CT pelvis negative  Tylenol as needed  PT to continue    6. Obturator hernia on CT; Seen by surgery no indication for surgical intervention    7. Acute on chronic diastolic CHF IV lasix 20mg X1 now  Discontinue IV fluids     Discharge disposition: Home with home care, family declining GABI placement when medically stable     Spoke with patient's daughter updated on patient's condition and plan of care.

## 2021-03-16 NOTE — PROGRESS NOTE ADULT - SUBJECTIVE AND OBJECTIVE BOX
CC: Follow up    INTERVAL HPI/OVERNIGHT EVENTS: Patient seen and examined, HR improved now eddie in the 50s.       Vital Signs Last 24 Hrs  T(C): 36.3 (16 Mar 2021 07:26), Max: 36.7 (16 Mar 2021 04:51)  T(F): 97.3 (16 Mar 2021 07:26), Max: 98.1 (16 Mar 2021 04:51)  HR: 59 (16 Mar 2021 07:26) (59 - 89)  BP: 114/57 (16 Mar 2021 07:26) (114/57 - 132/69)  BP(mean): --  RR: 20 (16 Mar 2021 07:26) (18 - 20)  SpO2: 96% (16 Mar 2021 07:26) (93% - 97%)    PHYSICAL EXAM:    GENERAL: NAD, Awake   HEAD:  Atraumatic, Normocephalic  EYES: EOMI, PERRLA, conjunctiva and sclera clear  ENMT: Moist mucous membranes  CHEST/LUNG: Clear to auscultation bilaterally; No rales, rhonchi, wheezing, or rubs  HEART: Regular rate and rhythm; No murmurs, rubs, or gallops  ABDOMEN: Soft, Nontender, Nondistended; Bowel sounds present  EXTREMITIES:  2+ Peripheral Pulses, No clubbing, cyanosis, or edema        MEDICATIONS  (STANDING):  aMIOdarone    Tablet   Oral   aMIOdarone    Tablet 400 milliGRAM(s) Oral every 8 hours  aspirin  chewable 81 milliGRAM(s) Oral daily  dronabinol 2.5 milliGRAM(s) Oral daily  furosemide   Injectable 20 milliGRAM(s) IV Push once  heparin   Injectable 5000 Unit(s) SubCutaneous every 12 hours  influenza   Vaccine 0.5 milliLiter(s) IntraMuscular once  metoprolol succinate ER 50 milliGRAM(s) Oral daily  pantoprazole    Tablet 40 milliGRAM(s) Oral before breakfast  vancomycin    Solution 125 milliGRAM(s) Oral every 6 hours    MEDICATIONS  (PRN):  acetaminophen   Tablet .. 650 milliGRAM(s) Oral every 6 hours PRN Mild Pain (1 - 3), Moderate Pain (4 - 6)  metoprolol tartrate Injectable 5 milliGRAM(s) IV Push every 6 hours PRN HR > 120,  valproic acid 250 milliGRAM(s) Oral every 6 hours PRN agitation      Allergies    No Known Allergies    Intolerances          LABS:                          9.8    3.51  )-----------( 297      ( 16 Mar 2021 08:11 )             33.0     03-16    142  |  107  |  51.0<H>  ----------------------------<  76  4.4   |  21.0<L>  |  1.11    Ca    8.2<L>      16 Mar 2021 08:11  Mg     2.0     03-15            RADIOLOGY & ADDITIONAL TESTS:

## 2021-03-17 NOTE — PROGRESS NOTE ADULT - SUBJECTIVE AND OBJECTIVE BOX
[place] [facility]ARDIOV[facility] - [facility], THE [facility]ART [facility]                                   [address] [place] [address]                                                      PHONE: [phone]                                                         FAX: [phone]  [email or web address]  ---------------------------------------------------------------------------------------------------------------------------------    Overnight events/patient complaints:  NAD feeling well today     No Known Allergies    MEDICATIONS  (STANDING):  aMIOdarone    Tablet   Oral   aMIOdarone    Tablet 400 milliGRAM(s) Oral every 8 hours  aspirin  chewable 81 milliGRAM(s) Oral daily  dronabinol 2.5 milliGRAM(s) Oral daily  heparin   Injectable 5000 Unit(s) SubCutaneous every 12 hours  influenza   Vaccine 0.5 milliLiter(s) IntraMuscular once  metoprolol succinate ER 25 milliGRAM(s) Oral daily  pantoprazole    Tablet 40 milliGRAM(s) Oral before breakfast  vancomycin    Solution 125 milliGRAM(s) Oral every 6 hours    MEDICATIONS  (PRN):  acetaminophen   Tablet .. 650 milliGRAM(s) Oral every 6 hours PRN Mild Pain (1 - 3), Moderate Pain (4 - 6)  valproic acid 250 milliGRAM(s) Oral every 6 hours PRN agitation      Vital Signs Last 24 Hrs  T(C): 36.4 (17 Mar 2021 09:24), Max: 36.4 (17 Mar 2021 05:10)  T(F): 97.5 (17 Mar 2021 09:24), Max: 97.6 (17 Mar 2021 05:10)  HR: 72 (17 Mar 2021 09:24) (50 - 80)  BP: 127/61 (17 Mar 2021 09:24) (125/61 - 155/62)  BP(mean): --  RR: 18 (17 Mar 2021 05:10) (16 - 18)  SpO2: 94% (17 Mar 2021 09:24) (94% - 95%)  ICU Vital Signs Last 24 Hrs  EDOUARD MALAVE  I&O's Detail    16 Mar 2021 07:01  -  17 Mar 2021 07:00  --------------------------------------------------------  IN:  Total IN: 0 mL    OUT:    Voided (mL): 350 mL  Total OUT: 350 mL    Total NET: -350 mL        I&O's Summary    16 Mar 2021 07:01  -  17 Mar 2021 07:00  --------------------------------------------------------  IN: 0 mL / OUT: 350 mL / NET: -350 mL      Drug Dosing Weight  EDOUARD MALAVE      PHYSICAL EXAM:  General: Appears well developed, well nourished alert and cooperative.  HEENT: Head; normocephalic, atraumatic.  Eyes: Pupils reactive, cornea wnl.  Neck: Supple, no nodes adenopathy, no NVD or carotid bruit or thyromegaly.  CARDIOVASCULAR: Normal S1 and S2, 2/6 murmur, rub, gallop or lift.   LUNGS: No rales, rhonchi or wheeze. Normal breath sounds bilaterally.  ABDOMEN: Soft, nontender without mass or organomegaly. bowel sounds normoactive.  EXTREMITIES: No clubbing, cyanosis or edema. Distal pulses wnl.   SKIN: warm and dry with normal turgor.  NEURO: Alert/oriented x 2   PSYCH: normal affect.        LABS:                        10.0   4.53  )-----------( 275      ( 17 Mar 2021 08:19 )             33.1     03-17    138  |  104  |  38.0<H>  ----------------------------<  71  4.1   |  23.0  |  0.77    Ca    8.1<L>      17 Mar 2021 08:19  Mg     1.7     03-17      EDOUARD MALAVE            RADIOLOGY & ADDITIONAL STUDIES:    INTERPRETATION OF TELEMETRY (personally reviewed): 50's        Summary:   1. Technically good study.   2. Patient is tachycardic during the exam.   3. Normal global left ventricular systolic function.   4. Left ventricular ejection fraction, by visual estimation, is 65 to 70%.   5. Severely enlarged left atrium.   6. Severely enlarged right atrium.   7. Elevated mean left atrial pressure.   8. Mildly increased LV wall thickness.   9. Moderately enlarged right ventricle.  10. Moderate thickening and calcification of the anterior and posterior mitral valve leaflets.  11. Moderately decreased mitral leaflet mobility.  12. Severe mitral annular calcification.  13. Severe tricuspid regurgitation.  14. Mal coaptation of TV leaflets. PASP is underestimated based on wide open TR.  15. Estimated pulmonary artery systolic pressure is 59.6 mmHg assuming a right atrial pressure of 15 mmHg, which is consistent with moderate pulmonary hypertension.  16. Moderate aortic valve stenosis.  17. Calcified mobile echo density attached to the aortic valve leaflet tip, possibly Lambl's excrescence. Clinical correlation is advised.  18. Mitral valve mean gradient is 3.6 mmHg consistent with mild mitral stenosis.  19. Peak transaortic gradient equals 4.4 mmHg, mean transaortic gradient equals 3.2 mmHg, the calculated aortic valve area equals 1.19 cm² by the continuity equation consistent with moderate aortic stenosis.  20. Trivial pericardial effusion.    MD Douglas Electronically signed on 3/9/2021 at 6:36:20 PM      ASSESSMENT AND PLAN:  In summary, EDOUARD MALAVE is an 89y Female with past medical history significant for dementia HTN who presents with fall/AMS.  Course is complicated by AF with RVR and repeat TTE normal EF moderate AS; TELE NSR 50's and volume status stable; off IVF        Not an AC candidate as fall risk continue with ASA    Change Toprol XL 50 to 25 mg daily     Agree with Amiodarone therapy to maintain NSR

## 2021-03-17 NOTE — PROGRESS NOTE ADULT - NUTRITIONAL ASSESSMENT
This patient has been assessed with a concern for Malnutrition and has been determined to have a diagnosis/diagnoses of Severe protein-calorie malnutrition.    This patient is being managed with:   Diet Soft-  DASH/TLC {Sodium & Cholesterol Restricted} (DASH)  Entered: Mar  2 2021 12:24AM    The following pending diet order is being considered for treatment of Severe protein-calorie malnutrition:  Diet Dysphagia 2 Mechanical Soft-Thin Liquids-  Supplement Feeding Modality:  Oral  Ensure Enlive Servings Per Day:  1       Frequency:  Three Times a day  Entered: Mar  6 2021  9:37AM  
This patient has been assessed with a concern for Malnutrition and has been determined to have a diagnosis/diagnoses of Severe protein-calorie malnutrition.    This patient is being managed with:   Diet Soft-  DASH/TLC {Sodium & Cholesterol Restricted} (DASH)  Entered: Mar  2 2021 12:24AM    The following pending diet order is being considered for treatment of Severe protein-calorie malnutrition:  Diet Dysphagia 2 Mechanical Soft-Thin Liquids-  Supplement Feeding Modality:  Oral  Ensure Enlive Servings Per Day:  1       Frequency:  Three Times a day  Entered: Mar  6 2021  9:37AM  
This patient has been assessed with a concern for Malnutrition and has been determined to have a diagnosis/diagnoses of Severe protein-calorie malnutrition.    This patient is being managed with:   Diet Soft-  DASH/TLC {Sodium & Cholesterol Restricted} (DASH)  Supplement Feeding Modality:  Oral  Ensure Enlive Cans or Servings Per Day:  1       Frequency:  Two Times a day  Entered: Mar 13 2021  3:05PM    The following pending diet order is being considered for treatment of Severe protein-calorie malnutrition:  Diet Dysphagia 2 Mechanical Soft-Thin Liquids-  Supplement Feeding Modality:  Oral  Ensure Enlive Servings Per Day:  1       Frequency:  Three Times a day  Entered: Mar  6 2021  9:37AM  
This patient has been assessed with a concern for Malnutrition and has been determined to have a diagnosis/diagnoses of Severe protein-calorie malnutrition.    This patient is being managed with:   Diet Soft-  DASH/TLC {Sodium & Cholesterol Restricted} (DASH)  Entered: Mar  2 2021 12:24AM    The following pending diet order is being considered for treatment of Severe protein-calorie malnutrition:  Diet Dysphagia 2 Mechanical Soft-Thin Liquids-  Supplement Feeding Modality:  Oral  Ensure Enlive Servings Per Day:  1       Frequency:  Three Times a day  Entered: Mar  6 2021  9:37AM  
This patient has been assessed with a concern for Malnutrition and has been determined to have a diagnosis/diagnoses of Severe protein-calorie malnutrition.    This patient is being managed with:   Diet Soft-  DASH/TLC {Sodium & Cholesterol Restricted} (DASH)  Entered: Mar  2 2021 12:24AM    The following pending diet order is being considered for treatment of Severe protein-calorie malnutrition:  Diet Dysphagia 2 Mechanical Soft-Thin Liquids-  Supplement Feeding Modality:  Oral  Ensure Enlive Servings Per Day:  1       Frequency:  Three Times a day  Entered: Mar  6 2021  9:37AM  
This patient has been assessed with a concern for Malnutrition and has been determined to have a diagnosis/diagnoses of Severe protein-calorie malnutrition.    This patient is being managed with:   Diet Soft-  DASH/TLC {Sodium & Cholesterol Restricted} (DASH)  Supplement Feeding Modality:  Oral  Ensure Enlive Cans or Servings Per Day:  1       Frequency:  Two Times a day  Entered: Mar 13 2021  3:05PM    The following pending diet order is being considered for treatment of Severe protein-calorie malnutrition:  Diet Dysphagia 2 Mechanical Soft-Thin Liquids-  Supplement Feeding Modality:  Oral  Ensure Enlive Servings Per Day:  1       Frequency:  Three Times a day  Entered: Mar  6 2021  9:37AM  
This patient has been assessed with a concern for Malnutrition and has been determined to have a diagnosis/diagnoses of Severe protein-calorie malnutrition.    This patient is being managed with:   Diet Soft-  DASH/TLC {Sodium & Cholesterol Restricted} (DASH)  Entered: Mar  2 2021 12:24AM    The following pending diet order is being considered for treatment of Severe protein-calorie malnutrition:  Diet Dysphagia 2 Mechanical Soft-Thin Liquids-  Supplement Feeding Modality:  Oral  Ensure Enlive Servings Per Day:  1       Frequency:  Three Times a day  Entered: Mar  6 2021  9:37AM  
This patient has been assessed with a concern for Malnutrition and has been determined to have a diagnosis/diagnoses of Severe protein-calorie malnutrition.    This patient is being managed with:   Diet Soft-  DASH/TLC {Sodium & Cholesterol Restricted} (DASH)  Supplement Feeding Modality:  Oral  Ensure Enlive Cans or Servings Per Day:  1       Frequency:  Two Times a day  Entered: Mar 13 2021  3:05PM    The following pending diet order is being considered for treatment of Severe protein-calorie malnutrition:  Diet Dysphagia 2 Mechanical Soft-Thin Liquids-  Supplement Feeding Modality:  Oral  Ensure Enlive Servings Per Day:  1       Frequency:  Three Times a day  Entered: Mar  6 2021  9:37AM  

## 2021-03-17 NOTE — PROGRESS NOTE ADULT - SUBJECTIVE AND OBJECTIVE BOX
CC: Follow up    INTERVAL HPI/OVERNIGHT EVENTS: Patient seen and examined, HR improved 50-70s. More awake with improved appetitie.       Vital Signs Last 24 Hrs  T(C): 36.4 (17 Mar 2021 09:24), Max: 36.4 (17 Mar 2021 05:10)  T(F): 97.5 (17 Mar 2021 09:24), Max: 97.6 (17 Mar 2021 05:10)  HR: 72 (17 Mar 2021 09:24) (50 - 80)  BP: 127/61 (17 Mar 2021 09:24) (125/61 - 155/62)  BP(mean): --  RR: 18 (17 Mar 2021 05:10) (16 - 18)  SpO2: 94% (17 Mar 2021 09:24) (94% - 95%)    PHYSICAL EXAM:    GENERAL: NAD, AOX2  HEAD:  Atraumatic, Normocephalic  EYES: EOMI, PERRLA, conjunctiva and sclera clear  ENMT: Moist mucous membranes  CHEST/LUNG: Clear to auscultation bilaterally; No rales, rhonchi, wheezing, or rubs  HEART: Regular rate and rhythm; No murmurs, rubs, or gallops  ABDOMEN: Soft, Nontender, Nondistended; Bowel sounds present  EXTREMITIES:  2+ Peripheral Pulses, No clubbing, cyanosis, or edema        MEDICATIONS  (STANDING):  aMIOdarone    Tablet 200 milliGRAM(s) Oral daily  aspirin  chewable 81 milliGRAM(s) Oral daily  dronabinol 2.5 milliGRAM(s) Oral daily  heparin   Injectable 5000 Unit(s) SubCutaneous every 12 hours  influenza   Vaccine 0.5 milliLiter(s) IntraMuscular once  metoprolol succinate ER 25 milliGRAM(s) Oral daily  pantoprazole    Tablet 40 milliGRAM(s) Oral before breakfast  vancomycin    Solution 125 milliGRAM(s) Oral every 6 hours    MEDICATIONS  (PRN):  acetaminophen   Tablet .. 650 milliGRAM(s) Oral every 6 hours PRN Mild Pain (1 - 3), Moderate Pain (4 - 6)  valproic acid 250 milliGRAM(s) Oral every 6 hours PRN agitation      Allergies    No Known Allergies    Intolerances          LABS:                          10.0   4.53  )-----------( 275      ( 17 Mar 2021 08:19 )             33.1     03-17    138  |  104  |  38.0<H>  ----------------------------<  71  4.1   |  23.0  |  0.77    Ca    8.1<L>      17 Mar 2021 08:19  Mg     1.7     03-17            RADIOLOGY & ADDITIONAL TESTS:

## 2021-03-17 NOTE — PROGRESS NOTE ADULT - ASSESSMENT
The patient is a 89 year old female with a past medical history of hypertension and CAD brought to the ER after sustaining a fall at home.  Admitted for hip pain and acute encephalopathy. CT head was negative. CT of the hip were negative for fracture. PT evaluated patient and recommended GABI however family declined. Acute metabolic encephalopathy attributed to UTI which was treated. Course was complicated by new onset afib with RVR requiring IV Cardizem Cardiology was consulted, given age and fall risk not a candidate for anticoagulation. Was started on metoprolol for rate control and titrated off IV Cardizem Echocardiogram was done.     Assessment/Plan:    1. Afib with RVR; Now in NSR hr controlled  Metoprolol reduced to 25mg OD   Spoke with cardiology, given bradycardia will change to Amiodarone 200mg PO OD to maintain NS  Not a candidate for AC given age and fall risk    2.  ENIO: REsolved  Appetite improved on marinol  Off IV hydration    3. Acute metabolic encephalopathy secondary to E Coli UTI: Treated   Now improved    4. Diarrhea: with suspected C Diff  C diff was ordered on 3/9 but not sent  Now resolved  On Po Vancomycin Q 6 hours until 3/20     5. Right hip pain post fall: CT pelvis negative  Tylenol as needed  PT to continue    6. Obturator hernia on CT; Seen by surgery no indication for surgical intervention    7. Acute on chronic diastolic CHF: Stable    Discharge disposition: Home with home care, family declining GABI placement, In AM  The patient is a 89 year old female with a past medical history of hypertension and CAD brought to the ER after sustaining a fall at home.  Admitted for hip pain and acute encephalopathy. CT head was negative. CT of the hip were negative for fracture. PT evaluated patient and recommended GABI however family declined. Acute metabolic encephalopathy attributed to UTI which was treated. Course was complicated by new onset afib with RVR requiring IV Cardizem Cardiology was consulted, given age and fall risk not a candidate for anticoagulation. Was started on metoprolol for rate control and titrated off IV Cardizem Echocardiogram was done.     Assessment/Plan:    1. Afib with RVR; Now in NSR hr controlled  Metoprolol reduced to 25mg OD   Spoke with cardiology, given bradycardia will change to Amiodarone 200mg PO OD to maintain NS  Not a candidate for AC given age and fall risk    2.  ENIO: REsolved  Appetite improved on marinol  Off IV hydration    3. Acute metabolic encephalopathy secondary to E Coli UTI: Treated   Now improved    4. Diarrhea: with suspected C Diff  C diff was ordered on 3/9 but not sent  Now resolved  On Po Vancomycin Q 6 hours until 3/20     5. Right hip pain post fall: CT pelvis negative  Tylenol as needed  PT to continue    6. Obturator hernia on CT; Seen by surgery no indication for surgical intervention    7. Acute on chronic diastolic CHF: Stable    Discharge disposition: Home with home care, family declining GABI placement, In AM     Spoke with patient's daughter updated on patient's condition and plan of care. Aware of possible discharge home in am

## 2021-03-18 NOTE — PROGRESS NOTE ADULT - SUBJECTIVE AND OBJECTIVE BOX
[place] [facility] - [facility], [facility]                                   [address] [place] [address]                                                      PHONE: [phone]                                                         FAX: [phone]  [email or web address]  ---------------------------------------------------------------------------------------------------------------------------------    Overnight events/patient complaints:  Pt without complaints    No Known Allergies    MEDICATIONS  (STANDING):  aMIOdarone    Tablet 200 milliGRAM(s) Oral daily  aspirin  chewable 81 milliGRAM(s) Oral daily  dronabinol 2.5 milliGRAM(s) Oral daily  heparin   Injectable 5000 Unit(s) SubCutaneous every 12 hours  influenza   Vaccine 0.5 milliLiter(s) IntraMuscular once  pantoprazole    Tablet 40 milliGRAM(s) Oral before breakfast  vancomycin    Solution 125 milliGRAM(s) Oral every 6 hours    MEDICATIONS  (PRN):  acetaminophen   Tablet .. 650 milliGRAM(s) Oral every 6 hours PRN Mild Pain (1 - 3), Moderate Pain (4 - 6)  valproic acid 250 milliGRAM(s) Oral every 6 hours PRN agitation      Vital Signs Last 24 Hrs  T(C): 36.4 (18 Mar 2021 05:21), Max: 36.4 (17 Mar 2021 17:13)  T(F): 97.6 (18 Mar 2021 05:21), Max: 97.6 (18 Mar 2021 05:21)  HR: 65 (18 Mar 2021 05:21) (65 - 69)  BP: 131/60 (18 Mar 2021 05:21) (131/60 - 133/67)  BP(mean): --  RR: 18 (18 Mar 2021 05:21) (18 - 18)  SpO2: 93% (18 Mar 2021 05:21) (93% - 93%)  ICU Vital Signs Last 24 Hrs  EDOUARD MALAVE  I&O's Detail    17 Mar 2021 07:01  -  18 Mar 2021 07:00  --------------------------------------------------------  IN:  Total IN: 0 mL    OUT:    Voided (mL): 100 mL  Total OUT: 100 mL    Total NET: -100 mL        Drug Dosing Weight  EDOUARD MALAVE      PHYSICAL EXAM:  General: Appears well developed, well nourished alert and cooperative.  HEENT: Head; normocephalic, atraumatic.  Eyes: Pupils reactive, cornea wnl.  Neck: Supple, no nodes adenopathy, no NVD or carotid bruit or thyromegaly.  CARDIOVASCULAR: Normal S1 and S2, No murmur, rub, gallop or lift.   LUNGS: No rales, rhonchi or wheeze. Normal breath sounds bilaterally.  ABDOMEN: Soft, nontender without mass or organomegaly. bowel sounds normoactive.  EXTREMITIES: No clubbing, cyanosis or edema. Distal pulses wnl.   SKIN: warm and dry with normal turgor.  NEURO: Alert /normal motor exam. No pathologic reflexes.    PSYCH: normal affect.        LABS:                        10.0   4.53  )-----------( 275      ( 17 Mar 2021 08:19 )             33.1     03-18    141  |  106  |  35.0<H>  ----------------------------<  82  3.9   |  24.0  |  0.75    Ca    8.1<L>      18 Mar 2021 06:29  Mg     1.7     03-18      EDOUARD MALAVE            RADIOLOGY & ADDITIONAL STUDIES:    INTERPRETATION OF TELEMETRY (personally reviewed): NSR sinus eddie         ECHO: < from: TTE Echo Complete w/o Contrast w/ Doppler (03.09.21 @ 15:03) >  Summary:   1. Technically good study.   2. Patient is tachycardic during the exam.   3. Normal global left ventricular systolic function.   4. Left ventricular ejection fraction, by visual estimation, is 65 to 70%.   5. Severely enlarged left atrium.   6. Severely enlarged right atrium.   7. Elevated mean left atrial pressure.   8. Mildly increased LV wall thickness.   9. Moderately enlarged right ventricle.  10. Moderate thickening and calcification of the anterior and posterior mitral valve leaflets.  11. Moderately decreased mitral leaflet mobility.  12. Severe mitral annular calcification.  13. Severe tricuspid regurgitation.  14. Mal coaptation of TV leaflets. PASP is underestimated based on wide open TR.  15. Estimated pulmonary artery systolic pressure is 59.6 mmHg assuming a right atrial pressure of 15 mmHg, which is consistent with moderate pulmonary hypertension.  16. Moderate aortic valve stenosis.  17. Calcified mobile echo density attached to the aortic valve leaflet tip, possibly Lambl's excrescence. Clinical correlation is advised.  18. Mitral valve mean gradient is 3.6 mmHg consistent with mild mitral stenosis.  19. Peak transaortic gradient equals 4.4 mmHg, mean transaortic gradient equals 3.2 mmHg, the calculated aortic valve area equals 1.19 cm² by the continuity equation consistent with moderate aortic stenosis.  20. Trivial pericardial effusion.    MD Douglas Electronically signed on 3/9/2021 at 6:36:20 PM    < end of copied text >       In summary, EDOUARD MALAVE is an 89y Female with past medical history significant for dementia HTN who presents with fall/AMS.  Course is complicated by AF with RVR and repeat TTE normal EF moderate AS; TELE NSR 50's and volume status stable;       -Not an AC candidate as fall risk continue with ASA  -DC toprol as sinus eddie. Cw Amiodarone 200mg daily   -Tele monitoring

## 2021-03-18 NOTE — PROGRESS NOTE ADULT - ASSESSMENT
89 year old female with a pmh of hypertension and CAD brought to the ER after sustaining a fall at home.  Admitted for hip pain and acute encephalopathy. CT head was negative. CT of the hip were negative for fracture. PT evaluated patient and recommended GABI however family declined. Acute metabolic encephalopathy attributed to UTI which was treated. Course was complicated by new onset afib with RVR requiring IV Cardizem Cardiology was consulted, given age and fall risk not a candidate for anticoagulation. Was started on metoprolol for rate control and titrated off IV Cardizem as patient had bradycardia metoprolol changed to amiodarone. now being d/c in stable condition.    #Afib with RVR  - currently sinus   - amiodarone  - cardio consult appreciate  - Not a candidate for AC given age and fall risk    #Acute metabolic encephalopathy secondary to E Coli UTI  - improved post treatment uti    #Diarrhea  - resolved  - possible c diff (suspected)- vanco q6 until 3/20    #Right hip pain post fall  - CT pelvis negative    #Obturator hernia on CT  - Seen by surgery no indication for surgical intervention    #chronic diastolic CHF    #severe protein calorie malnutrition   - nutritionist consult appreciated    #Discharge disposition: Home with home care, family declining GABI placement     called and spoke to patient daughter [phone]- will  mom at 12    [place]- [phone]

## 2021-03-18 NOTE — PROGRESS NOTE ADULT - PROVIDER SPECIALTY LIST ADULT
Cardiology
Cardiology
Hospitalist
Cardiology
Hospitalist
Cardiology
Electrophysiology
Hospitalist
Cardiology
Cardiology
Hospitalist
Cardiology

## 2021-03-18 NOTE — PROGRESS NOTE ADULT - SUBJECTIVE AND OBJECTIVE BOX
Patient is a 89y old  Female who presents with a chief complaint of Right hip pain (18 Mar 2021 10:38)    Patient seen and examined at bedside.     ALLERGIES:  No Known Allergies    MEDICATIONS  (STANDING):  aMIOdarone    Tablet 200 milliGRAM(s) Oral daily  aspirin  chewable 81 milliGRAM(s) Oral daily  dronabinol 2.5 milliGRAM(s) Oral daily  heparin   Injectable 5000 Unit(s) SubCutaneous every 12 hours  influenza   Vaccine 0.5 milliLiter(s) IntraMuscular once  pantoprazole    Tablet 40 milliGRAM(s) Oral before breakfast  vancomycin    Solution 125 milliGRAM(s) Oral every 6 hours    MEDICATIONS  (PRN):  acetaminophen   Tablet .. 650 milliGRAM(s) Oral every 6 hours PRN Mild Pain (1 - 3), Moderate Pain (4 - 6)  valproic acid 250 milliGRAM(s) Oral every 6 hours PRN agitation    Vital Signs Last 24 Hrs  T(F): 97.6 (18 Mar 2021 05:21), Max: 97.6 (18 Mar 2021 05:21)  HR: 65 (18 Mar 2021 05:21) (65 - 69)  BP: 131/60 (18 Mar 2021 05:21) (131/60 - 133/67)  RR: 18 (18 Mar 2021 05:21) (18 - 18)  SpO2: 93% (18 Mar 2021 05:21) (93% - 93%)  I&O's Summary    17 Mar 2021 07:01  -  18 Mar 2021 07:00  --------------------------------------------------------  IN: 0 mL / OUT: 100 mL / NET: -100 mL      PHYSICAL EXAM:  General: NAD, elderly hard of hearing   ENT: MMM, no thrush  Neck: Supple, No JVD  Lungs: good air entry, non-labored breathing  Cardio: +s1/s2  Abdomen: Soft, Nontender, Nondistended; Bowel sounds present  Extremities: No pitting edema    LABS:                        10.0   4.53  )-----------( 275      ( 17 Mar 2021 08:19 )             33.1     03-18    141  |  106  |  35.0  ----------------------------<  82  3.9   |  24.0  |  0.75    Ca    8.1      18 Mar 2021 06:29  Mg     1.7     03-18    eGFR if Non African American: 66 mL/min/1.73M2 (03-18-21 @ 06:29)  eGFR if African American: 77 mL/min/1.73M2 (03-18-21 @ 06:29)    RADIOLOGY & ADDITIONAL TESTS:  - no new tests

## 2021-03-18 NOTE — PROGRESS NOTE ADULT - REASON FOR ADMISSION
Right hip pain

## 2021-03-19 NOTE — ED PROVIDER NOTE - PHYSICAL EXAMINATION
Gen: Alert, NAD  Head: NC, AT, right PRRL, Left enucleated, EOMI, normal lids/conjunctiva  ENT: normal hearing, patent oropharynx without erythema/exudate, uvula midline  Neck: +supple, no tenderness/meningismus/JVD, +Trachea midline  Pulm: Bilateral BS, normal resp effort, no wheeze/stridor/retractions  CV: RRR, no M/R/G, +dist pulses  Abd: soft, NT/ND, +BS, no hepatosplenomegaly  Mskel: no edema/erythema/cyanosis  Skin: no rash  Neuro: AAOx2, mild motor weakness in BLE, otherwise no gross sensory/motor deficits,

## 2021-03-19 NOTE — ED ADULT TRIAGE NOTE - CHIEF COMPLAINT QUOTE
Patient alert to self, Hx of dementia, sent to ED by family due to immobility since yesterday. Usually able to ambulate with walker, unable to do so as per EMS report. No family present with patient for further information. EMS reports patient was admitted to hospital for UTI, IV ABX, Dx A-fib. discharged yesterday from [facility].  Patient Hx of [place] & missing left eye, no eye patch in place.

## 2021-03-19 NOTE — ED ADULT NURSE NOTE - NURSING ED PRESSURE ULCER LOCATION 2
Pt. received in stable condition and NAD. Pt. unable to communicate in English and states "I speak a little english, I speak [place]ic" and continues to dialogue in Green[place]ic. Unable to assess skin status due to triage note and communcation barrier. COREY Shah aware and will follow-up. MD at bedside now. VSS. Hand off given to COREY Shah. sacrum

## 2021-03-19 NOTE — ED ADULT NURSE NOTE - CHIEF COMPLAINT QUOTE
Patient alert to self, Hx of dementia, sent to ED by family due to immobility since yesterday. Usually able to ambulate with walker, unable to do so as per EMS report. No family present with patient for further information. EMS reports patient was admitted to hospital for UTI, IV ABX, Dx A-fib. discharged yesterday from [facility].  Patient Hx of [place] & missing left eye, no eye patch in place.

## 2021-03-19 NOTE — ED PROVIDER NOTE - CLINICAL SUMMARY MEDICAL DECISION MAKING FREE TEXT BOX
Patient return due to weakness causing inability to walk.  VSS.  Lab values do not require emergent intervention. CT scan demonstrates no acute pathology. d/w daughter who states needs evaluation and help at home.  d/w SW who will evaluate for home PT.  d/w medicine who asks for observation given likely disposition home shortly.  d/w Dr. Sauer and will transfer to obs.  Uneventful ED observation period. Non toxic.

## 2021-03-19 NOTE — ED PROVIDER NOTE - OBJECTIVE STATEMENT
Pertinent PMH/PSH/FHx/SHx and Review of Systems contained within:  Patient presents to the ED for weakness.  discharged yesterday and unable to walk.  PMH of CAD, CHF, afib.  VSS.  daughter took her home yesterday.  Prior to hospitalization on 3/1/21, patient was able to ambulate with a walker.  Patient during stay had afib as well.  Patient is FULL CODE.  d/w daughter upon presentation.  no falls.  no focal compalints.  Patient unable to provide much detail but "feels weak and cold."  Non toxic.  No aggravating or relieving factors. No other pertinent PMH.  No other pertinent PSH.  No other pertinent FHx.  Patient denies EtOH/tobacco/illicit substance use. No fever/chills, No photophobia/eye pain/changes in vision, No ear pain/sore throat/dysphagia, No chest pain/palpitations, no SOB/cough/wheeze/stridor, No abdominal pain, No N/V/D, no dysuria/frequency/discharge, No neck/back pain, no rash, no changes in neurological status/function.

## 2021-03-19 NOTE — ED CDU PROVIDER INITIAL DAY NOTE - PHYSICAL EXAMINATION
Gen: Alert, NAD, frail appearing  Head: NCAT, right PRRL, Left enucleated, EOMI, normal lids/conjunctiva  ENT: patent oropharynx without erythema/exudate, uvula midline  Neck: +supple, +Trachea midline  Pulm: Bilateral BS, normal resp effort, no wheeze/stridor/retractions  CV: RRR, +dist pulses  Abd: soft, NT/ND, +BS, no hepatosplenomegaly  Skin: small areas ecchymosis to b/l forearms   Neuro: AAOx1 to self, mild motor weakness in BLE, otherwise no gross sensory/motor deficits

## 2021-03-19 NOTE — ED CDU PROVIDER INITIAL DAY NOTE - OBJECTIVE STATEMENT
89yo F pmhx dementia, CAD, CHF, recent dx Afib on last admission presented to ED sent from home for difficulty ambulating. As per daughter Sylvie [phone] pt was able to ambulate with walker prior to 3/1 admission but has been having increased difficulty since home. Daughter was offered GABI for pt during admission but daughter refused. SW spoke with pt's daughter this evening who wants pt to come home with home PT. Does not want facility or GABI. Pt in observation for PT and SW. Pt with dementia, offers no additional history, only stating she feels "cold". Denies pain.

## 2021-03-19 NOTE — ED ADULT NURSE NOTE - NSIMPLEMENTINTERV_GEN_ALL_ED
Implemented All Fall with Harm Risk Interventions:  [place] to call system. Call bell, personal items and telephone within reach. Instruct patient to call for assistance. Room bathroom lighting operational. Non-slip footwear when patient is off stretcher. Physically safe environment: no spills, clutter or unnecessary equipment. Stretcher in lowest position, wheels locked, appropriate side rails in place. Provide visual cue, wrist band, yellow gown, etc. Monitor gait and stability. Monitor for mental status changes and reorient to person, place, and time. Review medications for side effects contributing to fall risk. Reinforce activity limits and safety measures with patient and family. Provide visual clues: red socks.

## 2021-03-19 NOTE — ED ADULT NURSE NOTE - ED STAT RN HANDOFF DETAILS
Report given to Gretel Mora RN in Observation. Patient in no apparent acute distress. Resting comfortably in stretcher. Patient placed in observation for pt and sw.

## 2021-03-19 NOTE — CHART NOTE - NSCHARTNOTEFT_GEN_A_CORE
SWNote: p/c from pt's EDMD(dr Leon) to inform worker that pt's daughter brought pt for possible GABI ,pt unable to walk . Pt was d/c yesterday from this hospital, pt's dghtr declined GABI . Worker called pt's dghtr Sylvie [phone], who states she wants pt home she never mentionned GABI facility, she would like pt to return home with home PT . Worker informed Dr Leon ,pt will be left in SW handoff to be  seen by PT and case management in the am.

## 2021-03-19 NOTE — GOALS OF CARE CONVERSATION - ADVANCED CARE PLANNING - CONVERSATION DETAILS
patient discharged yesterday from [facility] and now unable to walk.  Prior to hospitalization, patient was ambulating with a walker.  discussed prior planning and patient has stated that she "refuses to sign a DNR and thinks she can live foreever."  Daughter Sylvie states she is aware of the poor prognosis associated with cardiac arrest or time on a ventilator but does not feel like she can overrule her mother's prior wishes.  Patient remains FULL CODE at this time.

## 2021-03-19 NOTE — ED ADULT NURSE NOTE - OBJECTIVE STATEMENT
pt BIBA c/o generalized weakness per daughter, pt was d/c'd from [facility] and went home but was unable to ambulate and perform ADL's. Pt arrives a&ox2 disoriented to date/year, which is not usual baseline but has been present since previous [facility] admission. Pt is calm and cooperative, Absent left eye, crusted skin in place, no bleeding noted. Small sacral stage 2 PU in place to right side lower sacral area, clean and dry, persistent redness noted to surrounding area, mepilex in place.  jennifer-care performed, 1x stool occurrence soft and brown, position change with heels floated.

## 2021-03-20 NOTE — ED CDU PROVIDER SUBSEQUENT DAY NOTE - PROGRESS NOTE DETAILS
Pt evaluated at bedside, resting comfortably, eating breakfast tray, no acute complaints  PT, SW pending Pt seen by PT who recommends home with 24/7 assist since pt family unwilling to do GABI. Per SW/CM team pt daughter Sylvie unwilling for GABI, she will pay for private aid, CM arranged for home care. Will d/c. Pt sent home with printed copies of available labs/radiology results for outpatient follow up.  I spoke to daughter Sylvie on phone to discuss all results/ plan, she confirms she is unwilling to sent pt to any GABI facility and wants to take pt home, she will care for her at home

## 2021-03-20 NOTE — PHYSICAL THERAPY INITIAL EVALUATION ADULT - PERTINENT HX OF CURRENT PROBLEM, REHAB EVAL
89yo F pmhx dementia, CAD, CHF, recent dx Afib on last admission presented to ED sent from home for difficulty ambulating

## 2021-03-20 NOTE — ED ADULT NURSE REASSESSMENT NOTE - NS ED NURSE REASSESS COMMENT FT1
Pt resting comfortably in stretcher, NAD noted, respirations even and unlabored,  safety maintained.
Received report from offgoing RN. Charting as noted. Patient in no apparent acute distress. Denies any pain or discomfort at this time. Vital signs stable. See flowsheet as per vital signs. IV patent and flushing. Patient to be placed on observation for PT consult. Plan of care explained. Verbalized understanding. Call bell within reach. Safety maintained.
pt received from Dana KAY RN, charting as noted.  Pt alert and oriented to self.  Pt resting comfortably in stretcher, will not answer RN questions only states she is "cold".  Pt provided with warm blankets.  NAD noted, respirations even and unlabored.  Safety precautions in place.  Plan of care explained, pt verbalized understanding. Pt to see PT and SW in AM.
Assumed care of the patient @0730. Pt A&Ox1, [place]. Incontinence care provided. Turned and positioned q2 hours. Resting in comfort. Call bell within reach and encouraged to use when assistance needed. Will continue to monitor. Safety maintained.

## 2021-03-20 NOTE — PHYSICAL THERAPY INITIAL EVALUATION ADULT - TRANSFER SAFETY CONCERNS NOTED: SIT/STAND, REHAB EVAL
losing balance/decreased proprioception/decreased sequencing ability/decreased weight-shifting ability

## 2021-03-20 NOTE — PHYSICAL THERAPY INITIAL EVALUATION ADULT - DIAGNOSIS, PT EVAL
Impaired functional mobility secondary to weakness, impaired balance/motor/postural control, decreased cognition

## 2021-03-20 NOTE — ED CDU PROVIDER DISPOSITION NOTE - NSFOLLOWUPINSTRUCTIONS_ED_ALL_ED_FT
- Please follow up with your Primary Care Doctor in 1 - 2 days. If you cannot follow-up with your primary care doctor please return to the ED for any urgent issues.  - Seek immediate medical care for any new, worsening or concerning signs or symptoms.   - You were given copies of all your test results, please bring to your primary care doctor for review of any abnormal test results  Feel better!      Weakness    WHAT YOU NEED TO KNOW:    Weakness is a loss of muscle strength. It may be caused by brain, nerve, or muscle problems. Physical and mental conditions such as heart problems, pregnancy, dehydration, or depression may also cause weakness. Reactions to certain drugs can cause weakness. Parts of your body may become weak if you need to wear a cast or splint or have been on bed rest for a long time.    DISCHARGE INSTRUCTIONS:    Call 911 for any of the following:   •You have any of the following signs of a stroke: ?Numbness or drooping on one side of your face       ?Weakness in an arm or leg      ?Confusion or difficulty speaking      ?Dizziness, a severe headache, or vision loss      •You lose feeling in your weakened body area.      •You have electric shock-like feelings down your arms and legs when you flex or move your neck.      •You have sudden or increased trouble speaking, swallowing, or breathing.      Return to the emergency department if:   •You have severe pain in your back, arms, or legs that worsens.      •You have sudden or worsened muscle weakness or loss of movement.      •You are not able to control when you urinate or have a bowel movement.      Contact your healthcare provider if:   •You feel depressed or anxious.       •You have questions or concerns about your condition or care.       Manage weakness:   •Use assistive devices as directed. These help protect you from injury. Examples include a walker or cane. Have someone install handrails in your home. These will help you get out of a bathtub or stand up from a toilet. Use a shower chair so you can sit while you shower. Sit down on the toilet or another chair to dry off and put on your clothes. Get help going up and down stairs if your legs are weak.       •Go to physical or occupational therapy if directed. A physical therapist can teach you exercises to help strengthen weak muscles. An occupational therapist can show you ways to do your daily activities more easily. For example, light forks and spoons can be easier to use if you have hand weakness. You may also learn ways to organize your household items so you are not moving heavy items.      •Balance rest with exercise. Exercise can help increase your muscle strength and energy. Do not exercise for long periods at a time. Take breaks often to rest. Too much exercise can cause muscle strain or make you more tired. Ask your healthcare provider how much exercise is right for you.      •Eat a variety of healthy foods. Too much or too little food may cause weakness or tiredness. Ask your healthcare provider what a healthy amount of food is for you. Healthy foods include fruits, vegetables, whole-grain breads, low-fat dairy products, lean meats and fish, nuts, and cooked beans.      •Do not smoke. Nicotine and other chemicals in cigarettes and cigars can make your symptoms worse, and can cause lung damage. Ask your healthcare provider for information if you currently smoke and need help to quit. E-cigarettes or smokeless tobacco still contain nicotine. Talk to your healthcare provider before you use these products.       •Do not use caffeine, alcohol, or illegal drugs. These may cause muscle twitching, which could lead to worsened weakness.       Follow up with your healthcare provider as directed: Write down your questions so you remember to ask them during your visits.

## 2021-03-20 NOTE — PROVIDER CONTACT NOTE (OTHER) - SITUATION
PT evaluation completed
Pt. was d/c home 3/18/21, dtr. brought her back c/o pt. is unable to walk . PT rec. GABI , dtr. declines GABI . Home Care with PT was set up and they are scheduled to see pt. today at home.

## 2021-03-20 NOTE — PHYSICAL THERAPY INITIAL EVALUATION ADULT - IMPAIRMENTS FOUND, PT EVAL
aerobic capacity/endurance/arousal, attention, and cognition/cognitive impairment/fine motor/gait, locomotion, and balance/gross motor/muscle strength/posture

## 2021-03-20 NOTE — ED CDU PROVIDER SUBSEQUENT DAY NOTE - ATTENDING CONTRIBUTION TO CARE
I agree with the PA's note and was available for any issues/concerns. I was directly involved in patient care. My brief overall assessment is as follows:    no acute events overnight; patient awaiting physical therapy and  assessment.

## 2021-03-20 NOTE — PROVIDER CONTACT NOTE (OTHER) - ASSESSMENT
Dtr. shared that she will privately hire if she needs to care for pt. She is informed to  phone from Home care since they have been trying to schedule a visit today for pt. Daughter was also informed that pt's medical team is informed and made aware of the decision.
Chart reviewed, MD orders received. Per RN Iza, pt clear for mobilization. Pt received lying in stretcher, NAD, Pt A&O x0 and agreeable to PT evaluation. Please see full evaluation note for detail. Pt c/o "I'm freezing" during session. Pt left lying in stretcher, NAD, all lines/devices intact and CBWR. RN aware. PT will follow.

## 2021-03-20 NOTE — ED CDU PROVIDER DISPOSITION NOTE - ATTENDING CONTRIBUTION TO CARE
I agree with the PA's note and was available for any issues/concerns. I was directly involved in patient care. My brief overall assessment is as follows:     evaluation noted; patient and family feel comfortable with discharge and medical plan; PMD or clinic follow up recommended for reassessment. Patient is aware of signs/symptoms to return to the emergency department.

## 2021-03-20 NOTE — PHYSICAL THERAPY INITIAL EVALUATION ADULT - ADDITIONAL COMMENTS
Patient is a poor historian, AAYUSH, extreme difficulty answering questions. Per chart, patient lives with daughter, able to ambulate with RW prior to recent hospitalization. Daughter wants patient to come home vs GABI

## 2021-03-20 NOTE — PROVIDER CONTACT NOTE (OTHER) - ACTION/TREATMENT ORDERED:
Daughter will come to  pt.     No other CM related barriers noted or reported.
Goals: to be completed in 2 weeks:  Bed mobility: min assist x1  Transfers: mod assist x1, RW  Gait: mod assist x1, 10-20 feet, RW

## 2021-03-20 NOTE — ED CDU PROVIDER DISPOSITION NOTE - CLINICAL COURSE
89yo F pmhx dementia, CAD, CHF, recent dx Afib on last admission presented to ED sent from home for difficulty ambulating. As per daughter Sylvie [phone] pt was able to ambulate with walker prior to 3/1 admission but has been having increased difficulty since home. Daughter was offered GABI for pt during admission but daughter refused.  Pt seen by PT who recommends home with 24/7 assist since pt family unwilling to do GABI. Per SW/CM team pt daughter Sylvie unwilling for GABI, she will pay for private aid, CM arranged for home care. Will d/c. Pt sent home with printed copies of available labs/radiology results for outpatient follow up.  I spoke to daughter Sylvie on phone to discuss all results/ plan, she confirms she is unwilling to sent pt to any GABI facility and wants to take pt home, she will care for her at home.

## 2021-03-20 NOTE — PROVIDER CONTACT NOTE (OTHER) - BACKGROUND
CCC spoke to daughter and informed her that as per PT rec.  pt. will benefit from GABI, dtr. continues to decline GABI placement and mentioned that she is capable of taking care of pt. at home.

## 2021-03-20 NOTE — PROVIDER CONTACT NOTE (OTHER) - RECOMMENDATIONS
GABI. If family is unwilling, patient will require 24/7 assist at home.
Pt. can be discharged home with daughter, home care PT is scheduled to start from today as informed by the [facility] [email or web address] [phone].

## 2021-03-20 NOTE — CHART NOTE - NSCHARTNOTEFT_GEN_A_CORE
As per PA, patient is medically stable. As per PT, patient is to attend GABI. Patient is AOx1 as per RN and PA. SHAD placed call to patient's daughter, Sylvie Dennis ([phone]) to discuss discharge plan. Sylvie reported patient is not to attend GABI. Sylvie reported she prefers patient return home with home PT. Patient resides with Sylvie and her granddaughter. SHAD placed call to [facility] and made CCC aware daughter is requesting to take patient home and is requesting home PT. CCC to follow for discharge needs.

## 2021-03-20 NOTE — PHYSICAL THERAPY INITIAL EVALUATION ADULT - ORIENTATION, REHAB EVAL
Agreeable that her name is Jeri, unable to state last name/not oriented to person, place, time or situation

## 2021-04-15 NOTE — ED ADULT NURSE NOTE - OBJECTIVE STATEMENT
89y female BIBA arousable to verbal stimuli, AOx1 at pt baseline. pt sent to ED for diarrhea x5 days and poor PO intake. respirations even and unlabored. no s/sx of discomfort. abd soft and nondistended. skin appropriate for age and race.

## 2021-04-15 NOTE — ED PROVIDER NOTE - PROGRESS NOTE DETAILS
physician consent for contrast, no known allergies, normal renal function -Slowey DO Jose Luciano, Resident: Spoke to daughter about ocular findings, findings known to patient and family, follows with optho every six months and choice made by optho, attending Dr. Malone, to not intervene as per daughter.

## 2021-04-15 NOTE — ED ADULT TRIAGE NOTE - CHIEF COMPLAINT QUOTE
pt biba for eval of not eating for past few days. pt at baseline mental status (alert and confused).

## 2021-04-15 NOTE — ED PROVIDER NOTE - OBJECTIVE STATEMENT
Pt is a 89 y.o. F hx CAD, MI, HTN, CHF, Afib, hard of hearing, presenting with diarrhea for five days. Diarrhea nonbloody. Pt also eating less and less interactive. Pt demented, at baseline AAOx1. History provided by daughter, Slyvie Baeza ([phone]), and primary caretaker. Pt is a 89 y.o. F hx CAD, MI, HTN, CHF, Afib, hard of hearing, presenting with diarrhea for five days. Diarrhea nonbloody. Pt also eating less and less interactive. Pt demented, at baseline AAOx1. History provided by daughter, Sylvie Baeza ([phone]), and primary caretaker. Pt is full code.

## 2021-04-15 NOTE — ED ADULT TRIAGE NOTE - BP NONINVASIVE DIASTOLIC (MM HG)
67 Mohs Histo Method Verbiage: Each section was then chromacoded and processed in the Mohs lab using the Mohs protocol and submitted for frozen section.

## 2021-04-15 NOTE — ED PROVIDER NOTE - CARE PLAN
Principal Discharge DX:	Pneumonia due to infectious organism, unspecified laterality, unspecified part of lung  Secondary Diagnosis:	Acute cystitis with hematuria  Secondary Diagnosis:	Colitis

## 2021-04-15 NOTE — ED PROVIDER NOTE - PHYSICAL EXAMINATION
General: NAD, elderly cachetic woman  Head:  NC, AT, temporal wasting  Eyes: PERRLA, no scleral icterus, left eye deformed with clean dressing overlying it  Ears: no erythema/drainage  Nose: midline, no bleeding/drainage  Throat: MMM  Cardiac: regular rate, no m/r/g, no lower extremity edema  Respiratory: CTABL, no wheezes/rales/rhonchi, equal chest wall expansions, no use of accessory muscles, no retractions  Abdomen: soft, nondistended, nontender, no rebound tenderness, no guarding, nonperitonitic  MSK/Vascular: full ROM, soft compartments, warm extremities  Neuro: Alert and oriented x1, motor/sensory intact

## 2021-04-15 NOTE — H&P ADULT - ASSESSMENT
The patient is a 89 year old female with a past medical history of dementia, afib not on AC, and previous c diff infection who was brought to the ER for complaints of diarrhea,. Admitted for pancolitis secondary to suspected recurrent C diff     Assessment/Plan:    1. 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 AFib not on AC due to fall risk: Continue aspirin and amiodarone    VTE- Heparin subcut    HCP Daughter Sylvie     Full Code

## 2021-04-15 NOTE — H&P ADULT - HISTORY OF PRESENT ILLNESS
The patient is a 89 year old female with a past medical history of dementia, afib not on AC, and previous c diff infection who was brought to the ER for complaints of diarrhea. The history was obtained from the patient's daughter Sylvie who states since Saturday patient has had profuse watery diarrhea 5-6 episodes per day with decreased oral intake. She tried to encourage Po and fluid intake however noted today patient appeared weak so she brought her in for evaluation. In the ED, leukocytosis with pancolitis on CT abdomen and pelvis. Was given a dose of IV rocephin and azithromcyin for possible left lower lobe infiltrate on chest xray with a bolus of 500cc of NS x1. Patient was recently admitted to [facility] for [facility] in 03/2021 course was complicated by dementia with behavioural disturbance and new onset afib.

## 2021-04-15 NOTE — ED PROVIDER NOTE - ATTENDING CONTRIBUTION TO CARE
I, Dana Amado, have personally seen and examined this patient. I have fully participated in the care of this patient. I have reviewed all pertinent clinical information, including history, physical exam, plan and the Resident's note and agree except as noted below.     89yo F w/ dementia at baseline MS AOX1 mstly nonverbal, diarrhea for last week with increased weakness/fatigue and decreeased po intake.     Gen: NAD, AOx1- cachectic  Head: NCAT  HEENT: left eye patched, oral mucosa moist, normal conjunctiva, neck supple  Lung: CTAB, no respiratory distress  CV: rrr, no murmur, Normal perfusion  Abd: soft, mild distension diffuse lower abd ttp with guarding, no rebound  MSK: No edema, no visible deformities  Neuro: moving all extremiies equally   Skin: No rash   Psych: demented     patient with abd pain and FTT, ct a/p, check urine, will hydrate, labs, reasses. discsuss GOC and home care with daughter

## 2021-04-15 NOTE — PROCEDURE NOTE - ADDITIONAL PROCEDURE DETAILS
Due to pt with hx of dementia, disorientation, medical management interference, unsecured mittens placed   RN may use NGT to administer Vanco as per MD order  Monitor and escalate prn.

## 2021-04-15 NOTE — ED PROVIDER NOTE - CLINICAL SUMMARY MEDICAL DECISION MAKING FREE TEXT BOX
Pt is a 89 y.o. F hx CAD, MI, HTN, CHF, Afib, hard of hearing, presenting with abdominal tenderness and diarrhea. Labs, meds, imaging, ekg.

## 2021-04-15 NOTE — CHART NOTE - NSCHARTNOTEFT_GEN_A_CORE
Called by RN after pt pulled out NGT with mitten in place.    Spoke with daughter, Sylvie, who wishes to have NGT placed back in and may restrain as needed.  Sutter sump tube placed in right nostril without difficulty.  Placement confirmed with +auscultation.  CXR centered over diaphragm to confirm NGT placement  RN to hold administering meds thru NGT until confirmed by CXR  Monitor and reassess prn.

## 2021-04-16 NOTE — PROGRESS NOTE ADULT - ASSESSMENT
The patient is a 89 year old female with a past medical history of dementia, afib not on AC, and previous c diff infection who was brought to the ER for complaints of diarrhea,. Admitted for pancolitis secondary to suspected recurrent C diff. C diff positive     Assessment/Plan:    1. Diarrhea with pancolitis on CT secondary to Recurrent C Diff; Patient : Suspect recurrent C diff with recent antibiotic use  PO vancomcyin  IV hydration    2. AFib not on AC due to fall risk: Continue aspirin and amiodarone    VTE- Heparin subcut    HCP Daughter Sylvie     Full Code    The patient is a 89 year old female with a past medical history of dementia, afib not on AC, and previous c diff infection who was brought to the ER for complaints of diarrhea,. Admitted for pancolitis secondary to recurrent C diff.     Assessment/Plan:    1. Diarrhea with pancolitis on CT secondary to Recurrent C Diff; Patient : Suspect recurrent C diff with recent antibiotic use  PO vancomcyin: NG tube was placed as patient failed dysphagia screen. Pulled out overnight and replaced with wrist restraints  Repeat Dysphagia screen this morning   IV hydration  Follow up CBC and BMP     2. AFib not on AC due to fall risk: Continue aspirin and amiodarone    3. Mass infiltrated the left orbital tissue with associated hemorrhage concerning for malignancy vs infection:  WIll discuss history with patient's daughter. may need opthalmology evaluation    VTE- Heparin subcut    HCP Daughter Sylvie     Full Code, Poor overall prognosis. Palliative care consulted   The patient is a 89 year old female with a past medical history of dementia, afib not on AC, and previous c diff infection who was brought to the ER for complaints of diarrhea,. Admitted for pancolitis secondary to recurrent C diff.     Assessment/Plan:    1. Diarrhea with pancolitis on CT secondary to Recurrent C Diff; Patient : Suspect recurrent C diff with recent antibiotic use  PO vancomcyin: NG tube was placed as patient failed dysphagia screen. Pulled out overnight and replaced with wrist restraints  Seen by DEVEN abdul [facility] nectar diet  NG tube discontinued     2. AFib not on AC due to fall risk: Continue aspirin and amiodarone    3. Mass infiltrated the left orbital tissue with associated hemorrhage concerning for malignancy vs infection:  WIll discuss history with patient's daughter. may need opthalmology evaluation    VTE- Heparin subcut    HCP Daughter Sylvie     Full Code, Poor overall prognosis. Palliative care consulted   The patient is a 89 year old female with a past medical history of dementia, afib not on AC, and previous c diff infection who was brought to the ER for complaints of diarrhea,. Admitted for pancolitis secondary to recurrent C diff.     Assessment/Plan:    1. Diarrhea with pancolitis on CT secondary to Recurrent C Diff; Patient : Suspect recurrent C diff with recent antibiotic use  PO vancomcyin: NG tube was placed as patient failed dysphagia screen. Pulled out overnight and replaced with wrist restraints  Seen by DEVEN abdul [facility] nectar diet  NG tube discontinued     2. AFib not on AC due to fall risk: Continue aspirin and amiodarone    3. Mass infiltrated the left orbital tissue with associated hemorrhage concerning for malignancy vs infection:  WIll discuss history with patient's daughter. may need opthalmology evaluation    VTE- Heparin subcut    HCP Daughter Sylvie     Full Code, Poor overall prognosis. Palliative care consulted    Spoke with patient's daugther Sylvie regarding patient's condition and plan of care   The patient is a 89 year old female with a past medical history of dementia, afib not on AC, and previous c diff infection who was brought to the ER for complaints of diarrhea,. Admitted for pancolitis secondary to recurrent C diff.     Assessment/Plan:    1. Diarrhea with pancolitis on CT secondary to Recurrent C Diff; Patient : Suspect recurrent C diff with recent antibiotic use  PO vancomcyin: NG tube was placed as patient failed dysphagia screen. Pulled out overnight and replaced with wrist restraints  Seen by DEVEN abdul [facility] nectar diet  NG tube discontinued     2. AFib not on AC due to fall risk: Continue aspirin and amiodarone    3. Mass infiltrated the left orbital tissue with associated hemorrhage concerning for malignancy vs infection:  According to the patient's daughter, patient was diagnosed with an infection but was not a candidate for surgery given her age. She wears a patch over the eye. Daughter states she follows with Dr Malone in [place]. Recommend outpatient follow up on discharge    VTE- Heparin subcut    HCP Daughter Sylvie     Full Code, Poor overall prognosis. Palliative care consulted    Spoke with patient's daughter Sylvie regarding patient's condition and plan of care

## 2021-04-16 NOTE — SWALLOW BEDSIDE ASSESSMENT ADULT - COMMENTS
As per charting, pt is a "The patient is a 89 year old female with a past medical history of dementia, afib not on AC, and previous c diff infection who was brought to the ER for complaints of diarrhea,. Admitted for pancolitis secondary to recurrent C diff."

## 2021-04-16 NOTE — SWALLOW BEDSIDE ASSESSMENT ADULT - SLP GENERAL OBSERVATIONS
Pt received upright in bed A&A, reduced cognition, [place], +NGT, +patch over left eye, +b/l handmitts, +1:1 supervision at bedside, pain scale 0/10 pre & post eval

## 2021-04-16 NOTE — CONSULT NOTE ADULT - ASSESSMENT
The patient is a 89 year old female with a past medical history of dementia, afib not on AC, and previous c diff infection who was brought to the ER for complaints of diarrhea,. Admitted for pancolitis secondary to recurrent C diff.     Assessment/Plan:    1. Diarrhea with pancolitis on CT secondary to Recurrent C Diff; Patient : Suspect recurrent C diff with recent antibiotic use  PO vancomcyin: NG tube was placed as patient failed dysphagia screen. Pulled out overnight and replaced with wrist restraints  Seen by DEVEN abdul [facility] nectar diet  NG tube discontinued     2. AFib not on AC due to fall risk: Continue aspirin and amiodarone    3. Mass infiltrated the left orbital tissue with associated hemorrhage concerning for malignancy vs infection:  WIll discuss history with patient's daughter. may need opthalmology evaluation    VTE- Heparin subcut    HCP Daughter Sylvie     Full Code, Poor overall prognosis. Palliative care consulted   89 yo female with a history of dementia, cdiff, presents with diarrhea.  Admitted to [facility], where she is being treated for cdiff colitis.

## 2021-04-16 NOTE — CONSULT NOTE ADULT - PROBLEM SELECTOR RECOMMENDATION 2
-patient's functional status: FAST 7c.  Meets criteria for hospice.  -Appears appropriate for home hospice: low symptom burden.

## 2021-04-16 NOTE — DIETITIAN NUTRITION RISK NOTIFICATION - ADDITIONAL COMMENTS/DIETITIAN RECOMMENDATIONS
1) Advance diet to puree/nectar per SLP recommendations   2) Add Ensure Enlive TID, nectar-thick  3) Rx MVI and vit C 500mg daily   4) Provide encouragement/assistance as needed during mealtimes to inc PO

## 2021-04-16 NOTE — CONSULT NOTE ADULT - SUBJECTIVE AND OBJECTIVE BOX
HPI:  The patient is a 89 year old female with a past medical history of dementia, afib not on AC, and previous c diff infection who was brought to the ER for complaints of diarrhea. The history was obtained from the patient's daughter Sylvie who states since Saturday patient has had profuse watery diarrhea 5-6 episodes per day with decreased oral intake. She tried to encourage Po and fluid intake however noted today patient appeared weak so she brought her in for evaluation. In the ED, leukocytosis with pancolitis on CT abdomen and pelvis. Was given a dose of IV rocephin and azithromcyin for possible left lower lobe infiltrate on chest xray with a bolus of 500cc of NS x1. Patient was recently admitted to [facility] for [facility] in 2021 course was complicated by dementia with behavioural disturbance and new onset afib.  (15 Apr 2021 17:42)      HPI:  89 yo female with a history of dementia, afib not on a/c, previous cdiff infection, who was brought to the ED for complaint of diarrhea.  History obtained from daughter: 5-6 episodes per day with decreased PO intake.  Patient appeared weak, prompting daughter to bring the patient in for evaluation.  Found with leukocytosis and pancolitis on CT abd/pelvis.  Given IV rocephin, azithromycin, for possible LLL infiltrate on chest Xray with bolus of 500 cc of NS x 1.  Recent admission here at [facility] in 3/2021, with course notable for dementia with behavioral disturbance with new onset afib.      Patient admitted, suspicion for recurrent Cdiff, 2/2 recent antibiotic use. NGT placed, as patient failed dysphagia screen: patient pulled it, and at the direction of the daugther, it was replaced.   Patient also noted with mass infiltrated the left orbital tissue with associated hemorrhage concerning for malignancy vs infection.  Primary team notes that patient may  need an opthalmology consult.      Patient care consulted for goals of care.        PERTINENT PMH REVIEWED: Yes No    PAST MEDICAL & SURGICAL HISTORY:  HTN (hypertension)    MI (myocardial infarction)  @ age 89    No significant past surgical history        SOCIAL HISTORY:                                     Admitted from:  home  SNF  GABI     Surrogate/HCP/Guardian: Phone#:    FAMILY HISTORY:  No pertinent family history in first degree relatives        Baseline ADLs (prior to admission):  Independent/ Dependent      Allergies    No Known Allergies    Intolerances        Present Symptoms:     Dyspnea: 0 1 2 3   Nausea/Vomiting: Yes No  Anxiety:  Yes No  Depression: Yes No  Fatigue: Yes No  Loss of appetite: Yes No    Pain:             Character-            Duration-            Effect-            Factors-            Frequency-            Location-            Severity-    Review of Systems: Reviewed                     Negative:                     Positive:  Unable to obtain due to poor mentation   All others negative    MEDICATIONS  (STANDING):  aMIOdarone    Tablet 200 milliGRAM(s) Oral daily  aspirin enteric coated 81 milliGRAM(s) Oral daily  heparin   Injectable 5000 Unit(s) SubCutaneous every 12 hours  sodium chloride 0.9%. 1000 milliLiter(s) (75 mL/Hr) IV Continuous <Continuous>  vancomycin    Solution 125 milliGRAM(s) Oral every 6 hours    MEDICATIONS  (PRN):  ondansetron Injectable 4 milliGRAM(s) IV Push every 6 hours PRN Nausea      PHYSICAL EXAM:    Vital Signs Last 24 Hrs  T(C): 36.7 (2021 04:52), Max: 36.8 (2021 02:04)  T(F): 98.1 (2021 04:52), Max: 98.2 (2021 02:04)  HR: 77 (2021 04:52) (76 - 88)  BP: 107/50 (2021 04:52) (97/51 - 140/78)  BP(mean): --  RR: 18 (2021 04:52) (16 - 18)  SpO2: 93% (2021 04:52) (93% - 97%)    General: alert  oriented x ____ lethargic agitated                  cachexia  nonverbal  coma    Karnofsky:  %    HEENT: normal  dry mouth  ET tube/trach    Lungs: comfortable tachypnea/labored breathing  excessive secretions    CV: normal  tachycardia    GI: normal  distended  tender  no BS               PEG/NG/OG tube  constipation  last BM:     : normal  incontinent  oliguria/anuria  martinez    MSK: normal  weakness  edema             ambulatory  bedbound/wheelchair bound    Skin: normal  pressure ulcers- Stage_____  no rash    LABS:                        9.9    17.31 )-----------( 513      ( 2021 08:16 )             33.1     04-16    142  |  102  |  42.0<H>  ----------------------------<  84  3.4<L>   |  28.0  |  0.80    Ca    8.2<L>      2021 08:16  Mg     2.3     04-16    TPro  6.1<L>  /  Alb  2.3<L>  /  TBili  0.4  /  DBili  x   /  AST  23  /  ALT  7   /  AlkPhos  91  04-15    PT/INR - ( 15 Apr 2021 12:38 )   PT: 13.2 sec;   INR: 1.15 ratio         PTT - ( 15 Apr 2021 12:38 )  PTT:24.2 sec  Urinalysis Basic - ( 15 Apr 2021 14:44 )    Color: Yellow / Appearance: Clear / S.010 / pH: x  Gluc: x / Ketone: Negative  / Bili: Small / Urobili: Negative mg/dL   Blood: x / Protein: 30 mg/dL / Nitrite: Negative   Leuk Esterase: Small / RBC: 3-5 /HPF / WBC 26-50   Sq Epi: x / Non Sq Epi: Occasional / Bacteria: Many      I&O's Summary      RADIOLOGY & ADDITIONAL STUDIES:    ADVANCE DIRECTIVES:   DNR YES NO  Completed on:                     MOLST  YES NO   Completed on:  Living Will  YES NO   Completed on:     HPI:  The patient is a 89 year old female with a past medical history of dementia, afib not on AC, and previous c diff infection who was brought to the ER for complaints of diarrhea. The history was obtained from the patient's daughter Sylvie who states since Saturday patient has had profuse watery diarrhea 5-6 episodes per day with decreased oral intake. She tried to encourage Po and fluid intake however noted today patient appeared weak so she brought her in for evaluation. In the ED, leukocytosis with pancolitis on CT abdomen and pelvis. Was given a dose of IV rocephin and azithromcyin for possible left lower lobe infiltrate on chest xray with a bolus of 500cc of NS x1. Patient was recently admitted to [facility] for [facility] in 2021 course was complicated by dementia with behavioural disturbance and new onset afib.  (15 Apr 2021 17:42)    HPI:  89 yo female with a history of dementia, afib not on a/c, previous cdiff infection, who was brought to the ED for complaint of diarrhea.  History obtained from daughter: 5-6 episodes per day with decreased PO intake.  Patient appeared weak, prompting daughter to bring the patient in for evaluation.  Found with leukocytosis and pancolitis on CT abd/pelvis.  Given IV rocephin, azithromycin, for possible LLL infiltrate on chest Xray with bolus of 500 cc of NS x 1.  Recent admission here at [facility] in 3/2021, with course notable for dementia with behavioral disturbance with new onset afib.      Patient admitted, suspicion for recurrent Cdiff, 2/2 recent antibiotic use. NGT placed, as patient failed dysphagia screen: patient pulled it, and at the direction of the daugther, it was replaced.   Patient also noted with mass infiltrated the left orbital tissue with associated hemorrhage concerning for malignancy vs infection.  Primary team notes that patient may need an opthalmology consult.      Patient care consulted for goals of care.      Unable to obtain HPI, ROS from patient 2/2 to mental status.      Spoke to daughter, Sylvie Dennis, who reports that patient diarrhea began on Saturday, along with minimal PO intake.  She reports that patient has had multiple hospitalizations over the past several months: one in the fall with a resulting rehab stay afterwards spanning a couple of months, during which time the patient began to develop dementia, and lost a great deal of weight: went from 122 lbs-->86 lbs.  She stated patient had a recent hospitalization in March, and until that point in time, the patient was ambulatory with a walker.  Prior to this admission, the patient had an OT, PT and RN in place 2 times a week.  She wear Depends.    Daughter reports that patient developed an eye infection about 10 years ago and follows regularly with an ophthalmologist every six months.  A decision was made that the patient wouldn't survive surgery.    PERTINENT PMH REVIEWED: Yes    PAST MEDICAL & SURGICAL HISTORY:  HTN (hypertension)    MI (myocardial infarction)  @ age 89    No significant past surgical history        SOCIAL HISTORY:  from home.  Patient has a PT, OT and RN visiting 2 times a week.  No aide.  Daughter provides for patient's ADLs, IADLs.                                   Admitted from:  home      Surrogate/HCP/Guardian: Sylvie Dennis Phone#: [phone]    FAMILY HISTORY:  No pertinent family history in first degree relatives    Baseline ADLs (prior to admission): dependent for ADLs, IADLs.  Wheelchair bound, able to transfer into a shower from wheelchair with assist.    Allergies    No Known Allergies    Intolerances        Present Symptoms: unable to assess, given mental status: patient is unable to report symptoms    Dyspnea: unable to report  Nausea/Vomiting: unable to report  Anxiety:  unable to report  Depression: unable to report  Fatigue: unable to report  Loss of appetite: unable to report  Pain: unable to report        Review of Systems: Unable to obtain due to poor mentation       MEDICATIONS  (STANDING):  aMIOdarone    Tablet 200 milliGRAM(s) Oral daily  aspirin enteric coated 81 milliGRAM(s) Oral daily  heparin   Injectable 5000 Unit(s) SubCutaneous every 12 hours  sodium chloride 0.9%. 1000 milliLiter(s) (75 mL/Hr) IV Continuous <Continuous>  vancomycin    Solution 125 milliGRAM(s) Oral every 6 hours    MEDICATIONS  (PRN):  ondansetron Injectable 4 milliGRAM(s) IV Push every 6 hours PRN Nausea      PHYSICAL EXAM:    Vital Signs Last 24 Hrs  T(C): 36.7 (2021 04:52), Max: 36.8 (2021 02:04)  T(F): 98.1 (2021 04:52), Max: 98.2 (2021 02:04)  HR: 77 (2021 04:52) (76 - 88)  BP: 107/50 (2021 04:52) (97/51 - 140/78)  BP(mean): --  RR: 18 (2021 04:52) (16 - 18)  SpO2: 93% (2021 04:52) (93% - 97%)    Karnofsky: 40  %    Gen: In NAD.  No pain behaviors or work of breathing noted.  Neuro: verbal, repeats, "I want to go to sleep."  Does not provide appropriate responses to questions asked  Head: NC/AT  Eyes: sclerae non-icteric  Resp: unlabored  CV: S1, S2  GI: soft, non-tender, + bowels sounds  Peripheral Vasc: no pedal edema  Int: warm and dry  Psych: no psychomotor agitation    LABS:                        9.9    17.31 )-----------( 513      ( 2021 08:16 )             33.1     04-16    142  |  102  |  42.0<H>  ----------------------------<  84  3.4<L>   |  28.0  |  0.80    Ca    8.2<L>      2021 08:16  Mg     2.3     04-16    TPro  6.1<L>  /  Alb  2.3<L>  /  TBili  0.4  /  DBili  x   /  AST  23  /  ALT  7   /  AlkPhos  91  04-15    PT/INR - ( 15 Apr 2021 12:38 )   PT: 13.2 sec;   INR: 1.15 ratio         PTT - ( 15 Apr 2021 12:38 )  PTT:24.2 sec  Urinalysis Basic - ( 15 Apr 2021 14:44 )    Color: Yellow / Appearance: Clear / S.010 / pH: x  Gluc: x / Ketone: Negative  / Bili: Small / Urobili: Negative mg/dL   Blood: x / Protein: 30 mg/dL / Nitrite: Negative   Leuk Esterase: Small / RBC: 3-5 /HPF / WBC 26-50   Sq Epi: x / Non Sq Epi: Occasional / Bacteria: Many      I&O's Summary      RADIOLOGY & ADDITIONAL STUDIES: reviewed    ADVANCE DIRECTIVES:   DNR No                 MOLST  No   Living Will: No    Total time spent in advanced care plannin min   HPI:  The patient is a 89 year old female with a past medical history of dementia, afib not on AC, and previous c diff infection who was brought to the ER for complaints of diarrhea. The history was obtained from the patient's daughter Sylvie who states since Saturday patient has had profuse watery diarrhea 5-6 episodes per day with decreased oral intake. She tried to encourage Po and fluid intake however noted today patient appeared weak so she brought her in for evaluation. In the ED, leukocytosis with pancolitis on CT abdomen and pelvis. Was given a dose of IV rocephin and azithromcyin for possible left lower lobe infiltrate on chest xray with a bolus of 500cc of NS x1. Patient was recently admitted to [facility] for [facility] in 2021 course was complicated by dementia with behavioural disturbance and new onset afib.  (15 Apr 2021 17:42)    HPI:  89 yo female with a history of dementia, afib not on a/c, previous cdiff infection, who was brought to the ED for complaint of diarrhea.  History obtained from daughter: 5-6 episodes per day with decreased PO intake.  Patient appeared weak, prompting daughter to bring the patient in for evaluation.  Found with leukocytosis and pancolitis on CT abd/pelvis.  Given IV rocephin, azithromycin, for possible LLL infiltrate on chest Xray with bolus of 500 cc of NS x 1.  Recent admission here at [facility] in 3/2021, with course notable for dementia with behavioral disturbance and new onset afib.      Patient admitted, suspicion for recurrent Cdiff, 2/2 recent antibiotic use. NGT placed, as patient failed dysphagia screen: patient pulled it, and at the direction of the daugther, it was replaced.  Patient seen by SLP, cleared for pureed/nectar diet.  Patient also noted with mass infiltrated the left orbital tissue with associated hemorrhage concerning for malignancy vs infection.  Primary team notes that patient may need an opthalmology consult.      Patient care consulted for goals of care.      Unable to obtain HPI, ROS from patient 2/2 to mental status.      Spoke to daughter, Sylvie Dennis, who reports that patient diarrhea began on Saturday, along with minimal PO intake.  She reports that patient has had multiple hospitalizations over the past several months: one in the fall with a resulting rehab stay afterwards spanning a couple of months, during which time the patient began to develop dementia, and lost a great deal of weight: went from 122 lbs-->86 lbs.  She stated patient had a recent hospitalization in March, and until that point in time, the patient was ambulatory with a walker.  Prior to the current admission, the patient had an OT, PT and RN in place 2 times a week.  She wear Depends.    Daughter reports that patient developed an eye infection about 10 years ago and follows regularly with an ophthalmologist every six months.  A decision was made that the patient wouldn't survive surgery, and to this end, it has been avoided.    PERTINENT PMH REVIEWED: Yes    PAST MEDICAL & SURGICAL HISTORY:  HTN (hypertension)    MI (myocardial infarction)  @ age 89    No significant past surgical history        SOCIAL HISTORY:  from home.  Patient has a PT, OT and RN visiting 2 times a week.  No aide.  Daughter provides for patient's ADLs, IADLs.                                   Admitted from:  home      Surrogate/HCP/Guardian: Sylvie Dennis Phone#: [phone]    FAMILY HISTORY:  No pertinent family history in first degree relatives    Baseline ADLs (prior to admission): dependent for ADLs, IADLs.  Wheelchair bound, able to transfer into a shower from wheelchair with assist.    Allergies    No Known Allergies    Intolerances        Present Symptoms: unable to assess, given mental status: patient is unable to report symptoms    Dyspnea: unable to report  Nausea/Vomiting: unable to report  Anxiety:  unable to report  Depression: unable to report  Fatigue: unable to report  Loss of appetite: unable to report  Pain: unable to report        Review of Systems: Unable to obtain due to poor mentation       MEDICATIONS  (STANDING):  aMIOdarone    Tablet 200 milliGRAM(s) Oral daily  aspirin enteric coated 81 milliGRAM(s) Oral daily  heparin   Injectable 5000 Unit(s) SubCutaneous every 12 hours  sodium chloride 0.9%. 1000 milliLiter(s) (75 mL/Hr) IV Continuous <Continuous>  vancomycin    Solution 125 milliGRAM(s) Oral every 6 hours    MEDICATIONS  (PRN):  ondansetron Injectable 4 milliGRAM(s) IV Push every 6 hours PRN Nausea      PHYSICAL EXAM:    Vital Signs Last 24 Hrs  T(C): 36.7 (2021 04:52), Max: 36.8 (2021 02:04)  T(F): 98.1 (2021 04:52), Max: 98.2 (2021 02:04)  HR: 77 (2021 04:52) (76 - 88)  BP: 107/50 (2021 04:52) (97/51 - 140/78)  BP(mean): --  RR: 18 (2021 04:52) (16 - 18)  SpO2: 93% (2021 04:52) (93% - 97%)    Karnofsky: 40  %    Gen: In NAD.  No pain behaviors or work of breathing noted.  Neuro: verbal, repeats, "I want to go to sleep."  Does not provide appropriate responses to questions asked  Head: NC/AT  Eyes: sclerae non-icteric  Resp: unlabored  CV: S1, S2  GI: soft, non-tender, + bowels sounds  Peripheral Vasc: no pedal edema  Int: warm and dry  Psych: no psychomotor agitation    LABS:                        9.9    17.31 )-----------( 513      ( 2021 08:16 )             33.1     04-16    142  |  102  |  42.0<H>  ----------------------------<  84  3.4<L>   |  28.0  |  0.80    Ca    8.2<L>      2021 08:16  Mg     2.3     04-16    TPro  6.1<L>  /  Alb  2.3<L>  /  TBili  0.4  /  DBili  x   /  AST  23  /  ALT  7   /  AlkPhos  91  04-15    PT/INR - ( 15 Apr 2021 12:38 )   PT: 13.2 sec;   INR: 1.15 ratio         PTT - ( 15 Apr 2021 12:38 )  PTT:24.2 sec  Urinalysis Basic - ( 15 Apr 2021 14:44 )    Color: Yellow / Appearance: Clear / S.010 / pH: x  Gluc: x / Ketone: Negative  / Bili: Small / Urobili: Negative mg/dL   Blood: x / Protein: 30 mg/dL / Nitrite: Negative   Leuk Esterase: Small / RBC: 3-5 /HPF / WBC 26-50   Sq Epi: x / Non Sq Epi: Occasional / Bacteria: Many      I&O's Summary      RADIOLOGY & ADDITIONAL STUDIES: reviewed    ADVANCE DIRECTIVES:   DNR No                 MOLST  No   Living Will: No    Total time spent in advanced care plannin min

## 2021-04-16 NOTE — CONSULT NOTE ADULT - PROBLEM SELECTOR RECOMMENDATION 3
-See by SLP, who recommended that diet be advanced to pureed-nectar consistency fluid   -Nutrition following, recommends: Ensure Enlive TID, nectar-thick, MVI and vit C 500mg daily, encouragement/assistance as needed during mealtimes.

## 2021-04-16 NOTE — SWALLOW BEDSIDE ASSESSMENT ADULT - SWALLOW EVAL: DIAGNOSIS
Oral stage functional for consistencies administered, suspect pharyngeal dysphagia with thin liquids due to +wet vocal quality post swallow.  No overt s/s aspiration after puree or nectar thick liquids.

## 2021-04-16 NOTE — DIETITIAN INITIAL EVALUATION ADULT. - ADD RECOMMEND
Advance diet to puree/nectar per SLP recommendations, add Ensure Enlive TID nectar-thick; Rx MVI and vit C 500mg daily

## 2021-04-16 NOTE — CONSULT NOTE ADULT - PROBLEM SELECTOR RECOMMENDATION 4
-supportive care  -consider OT/PT eval/therapy, particularly is admission anticipated to be protracted.  Patient had been followed by OT and PT in the community, as she experienced functional decline following admission in March. -supportive care  -consider OT/PT eval/therapy, particularly if admission is anticipated to be protracted.  Patient had been followed by OT and PT in the community prior to admission, as she experienced functional decline following admission in March.

## 2021-04-16 NOTE — DIETITIAN INITIAL EVALUATION ADULT. - ETIOLOGY
related to inability to tolerate sufficient protein-energy needs 2/2 advanced age, dysphagia, PNA, colitis

## 2021-04-16 NOTE — CONSULT NOTE ADULT - SUBJECTIVE AND OBJECTIVE BOX
[facility]  INFECTIOUS DISEASES AND INTERNAL MEDICINE at [place]  =======================================================  Mikal Collins MD  Diplomates American Board of Internal Medicine and Infectious Diseases  Tel: [phone]      Fax: [phone]  =======================================================      [id number]  EDOUARD MALAVE    CC: Patient is a 89y old  Female who presents with a chief complaint of Diarrhea (16 Apr 2021 10:36)      89 year old female with a past medical history of dementia, afib not on AC, and previous c diff infection who was brought to the ER for complaints of diarrhea. The history was obtained from the patient's daughter Sylvie who states since Saturday patient has had profuse watery diarrhea 5-6 episodes per day with decreased oral intake. She tried to encourage Po and fluid intake however noted today patient appeared weak so she brought her in for evaluation. In the ED, leukocytosis with pancolitis on CT abdomen and pelvis. Was given a dose of IV rocephin and azithromcyin for possible left lower lobe infiltrate on chest xray with a bolus of 500cc of NS x1. Patient was recently admitted to [facility] for [facility] in 03/2021 course was complicated by dementia with behavioural disturbance and new onset afib.       Past Medical & Surgical Hx:  PAST MEDICAL & SURGICAL HISTORY:  HTN (hypertension)    MI (myocardial infarction)  @ age 89    No significant past surgical history            Social Hx:    FAMILY HISTORY:  No pertinent family history in first degree relatives        Allergies    No Known Allergies    Intolerances             REVIEW OF SYSTEMS:  CONSTITUTIONAL:  No Fever or chills  HEENT:  No diplopia or blurred vision.  No earache, sore throat or runny nose.  CARDIOVASCULAR:  No pressure, squeezing, strangling, tightness, heaviness or aching about the chest, neck, axilla or epigastrium.  RESPIRATORY:  No cough, shortness of breath  GASTROINTESTINAL:  No nausea, vomiting or diarrhea.  GENITOURINARY:  No dysuria, frequency or urgency. No Blood in urine  MUSCULOSKELETAL:  no joint aches, no muscle pain  SKIN:  No change in skin, hair or nails.  NEUROLOGIC:  No Headaches, seizures or weakness.  PSYCHIATRIC:  No disorder of thought or mood.  ENDOCRINE:  No heat or cold intolerance  HEMATOLOGICAL:  No easy bruising or bleeding.       Physical Exam:    GEN: NAD, pleasant  HEENT: normocephalic and atraumatic. EOMI. PERRL.  Anicteric  NECK: Supple.   LUNGS: Clear to auscultation.  HEART: Regular rate and rhythm without murmur.  ABDOMEN: Soft, nontender, and nondistended.  Positive bowel sounds.    : No CVA tenderness  EXTREMITIES: Without any edema.  MSK: No joint swelling  NEUROLOGIC: Cranial nerves II through XII are grossly intact. No Focal Deficits  PSYCHIATRIC: Appropriate affect .  SKIN: No Rash        Vitals:    T(F): 97.6 (16 Apr 2021 12:02), Max: 98.2 (16 Apr 2021 02:04)  HR: 63 (16 Apr 2021 12:02)  BP: 113/62 (16 Apr 2021 12:02)  RR: 18 (16 Apr 2021 12:02)  SpO2: 93% (16 Apr 2021 12:02) (93% - 97%)  temp max in last 48H T(F): , Max: 98.2 (04-16-21 @ 02:04)    Current Antibiotics:  vancomycin    Solution 125 milliGRAM(s) Oral every 6 hours    Other medications:  aMIOdarone    Tablet 200 milliGRAM(s) Oral daily  aspirin enteric coated 81 milliGRAM(s) Oral daily  heparin   Injectable 5000 Unit(s) SubCutaneous every 12 hours  sodium chloride 0.9%. 1000 milliLiter(s) IV Continuous <Continuous>                            9.9    17.31 )-----------( 513      ( 16 Apr 2021 08:16 )             33.1     04-16    142  |  102  |  42.0<H>  ----------------------------<  84  3.4<L>   |  28.0  |  0.80    Ca    8.2<L>      16 Apr 2021 08:16  Mg     2.3     04-16    TPro  6.1<L>  /  Alb  2.3<L>  /  TBili  0.4  /  DBili  x   /  AST  23  /  ALT  7   /  AlkPhos  91  04-15    RECENT CULTURES:      WBC Count: 17.31 K/uL (04-16-21 @ 08:16)  WBC Count: 16.52 K/uL (04-15-21 @ 12:38)    Creatinine, Serum: 0.80 mg/dL (04-16-21 @ 08:16)  Creatinine, Serum: 0.92 mg/dL (04-15-21 @ 12:38)               COVID-19 PCR: NotDetec (04-15-21 @ 17:01)   [facility]  INFECTIOUS DISEASES AND INTERNAL MEDICINE at [place]  =======================================================  Mikal Collins MD  Diplomates American Board of Internal Medicine and Infectious Diseases  Tel: [phone]      Fax: [phone]  =======================================================      [id number]  EDOUARD MALAVE    CC: Patient is a 89y old  Female who presents with a chief complaint of Diarrhea (16 Apr 2021 10:36)      89 year old female with a past medical history of dementia, afib not on AC, and previous c diff infection who was brought to the ER for complaints of diarrhea. The history was obtained from the patient's daughter Sylvie who states since Saturday patient has had profuse watery diarrhea 5-6 episodes per day with decreased oral intake. She tried to encourage Po and fluid intake however noted today patient appeared weak so she brought her in for evaluation. In the ED, leukocytosis with pancolitis on CT abdomen and pelvis. Was given a dose of IV rocephin and azithromcyin for possible left lower lobe infiltrate on chest xray with a bolus of 500cc of NS x1. Patient was recently admitted to [facility] for [facility] in 03/2021 course was complicated by dementia with behavioural disturbance and new onset afib.       Past Medical & Surgical Hx:  PAST MEDICAL & SURGICAL HISTORY:  HTN (hypertension)    MI (myocardial infarction)  @ age 89    No significant past surgical history            Social Hx:    FAMILY HISTORY:  No pertinent family history in first degree relatives        Allergies    No Known Allergies    Intolerances             REVIEW OF SYSTEMS:  not able to obtain    Physical Exam:    GEN: NAD, pleasant, nonverbal  HEENT: normocephalic and atraumatic. EOMI. PERRL.  Anicteric left eye patch  NECK: Supple.   LUNGS: Clear to auscultation.  HEART: Regular rate and rhythm without murmur.  ABDOMEN: Soft, nontender, and nondistended.  Positive bowel sounds.    : No CVA tenderness  EXTREMITIES: Without any edema.  MSK: No joint swelling  NEUROLOGIC: Cranial nerves II through XII are grossly intact. No Focal Deficits  PSYCHIATRIC: not assessed  SKIN: No Rash        Vitals:    T(F): 97.6 (16 Apr 2021 12:02), Max: 98.2 (16 Apr 2021 02:04)  HR: 63 (16 Apr 2021 12:02)  BP: 113/62 (16 Apr 2021 12:02)  RR: 18 (16 Apr 2021 12:02)  SpO2: 93% (16 Apr 2021 12:02) (93% - 97%)  temp max in last 48H T(F): , Max: 98.2 (04-16-21 @ 02:04)    Current Antibiotics:  vancomycin    Solution 125 milliGRAM(s) Oral every 6 hours    Other medications:  aMIOdarone    Tablet 200 milliGRAM(s) Oral daily  aspirin enteric coated 81 milliGRAM(s) Oral daily  heparin   Injectable 5000 Unit(s) SubCutaneous every 12 hours  sodium chloride 0.9%. 1000 milliLiter(s) IV Continuous <Continuous>                            9.9    17.31 )-----------( 513      ( 16 Apr 2021 08:16 )             33.1     04-16    142  |  102  |  42.0<H>  ----------------------------<  84  3.4<L>   |  28.0  |  0.80    Ca    8.2<L>      16 Apr 2021 08:16  Mg     2.3     04-16    TPro  6.1<L>  /  Alb  2.3<L>  /  TBili  0.4  /  DBili  x   /  AST  23  /  ALT  7   /  AlkPhos  91  04-15    RECENT CULTURES:      WBC Count: 17.31 K/uL (04-16-21 @ 08:16)  WBC Count: 16.52 K/uL (04-15-21 @ 12:38)    Creatinine, Serum: 0.80 mg/dL (04-16-21 @ 08:16)  Creatinine, Serum: 0.92 mg/dL (04-15-21 @ 12:38)               COVID-19 PCR: NotDetec (04-15-21 @ 17:01)      < from: Xray Chest 1 View- PORTABLE-Urgent (Xray Chest 1 View- PORTABLE-Urgent .) (04.15.21 @ 23:12) >    IMPRESSION:  NG tube in satisfactory position.    Effusion/atelectasis/possible consolidation at the left base, unchanged.        < end of copied text >    < from: CT Abdomen and Pelvis w/ IV Cont (04.15.21 @ 16:44) >    FINDINGS:  LOWER CHEST: Mild cardiomegaly. Small left and trace right pleural effusions. Bibasilar atelectasis. Enlarged nodular opacity right middle lobe possible impacted airway (3:6-8). Right basilar intralobular septal thickening. Mitral annulus calcification    LIVER: Within normal limits.  BILE DUCTS: Mild intrahepatic biliary duct dilatation unchanged from 09/22/2020.  GALLBLADDER: Gallstones. Distended gallbladder.  SPLEEN: Within normal limits.  PANCREAS: Within normal limits.  ADRENALS: Within normal limits.  KIDNEYS/URETERS: Within normal limits.    BLADDER: Within normal limits.  REPRODUCTIVE ORGANS: Uterus and adnexa within normal limits.    BOWEL: No bowel obstruction. Appendix normal. Pan proctocolitis. Suggest correlation for C. difficile.  PERITONEUM: Small ascites.  VESSELS: Atherosclerotic disease  RETROPERITONEUM/LYMPH NODES: No lymphadenopathy.  ABDOMINAL WALL: Within normal limits.  BONES: ORIF left hip. Compression deformities L3 and L1. Compression deformity T12 progressive since 09/22/2020    IMPRESSION:  Pan proctocolitis. Correlate for C. difficile infection.  Dilated gallbladder with gallstones.  Small left pleural effusion and trace right pleural effusion    < end of copied text >

## 2021-04-16 NOTE — CONSULT NOTE ADULT - PROBLEM SELECTOR RECOMMENDATION 5
-Outreached to daughter, Sylvie Dennis.  Sylvie shares that she is the health care proxy.  States one of the patient's sons  of COVID, a second son has been estranged for 7 years.  -Introduced role of palliative care in symptom management, care planning, support, and transitions in care to those with serious illness.  Emotional support offered.  -Reviewed clinical course.  Discussed that given patient's functional status, she meets criteria for hospice 2/2 advanced dementia.  Daughter states that she would not consider hospice at this time, as she is interested in pursuing therapy (OT/PT/RN) for patient.  -Discussed that she may wish to consider to complete therapy with patient, and on completion, transition patient to hospice.  Sylvie did not commit to any preferences.  This may be valuable to explore with her prior to the patient's discharge.  -In the setting of a concern that the patient would not survive eye surgery, her woesening functional status, and her advanced age, broached code status.  -Daughter shares that the patient told her years back, to not let her die, and to place DNR/I orders feels as though she would be going against her wishes.    -Discussed that patient may have made such requests in a different state of health, and that it is difficult to imagine all of the circumstances, health-wise, that one might find oneself in.  -Discussed that patient would not be expected to survive CPR, and should she do so, would be unlikely to survive the hospitalization and return to her home.  -Encouraged ongoing discussion by patient with other family to discern what matters most to the patient (daughter does not believe that the patient can weigh in at this point), so that a care plan consistent with these preferences can be implemented.  -As palliative care is not managing symptoms and care goals are clear, will sign off.  Please re-consult as needed.  Thank you for involving us in the care of this pt.    Pt d/w Dr. Hand, RN -Outreached to daughter, Sylvie Dennis.  Sylvie shares that she is the health care proxy.  States one of the patient's sons  of COVID, a second son has been estranged for 7 years.  -Introduced role of palliative care in symptom management, care planning, support, and transitions in care to those with serious illness.  Emotional support offered.  -Reviewed clinical course.  Discussed that given patient's functional status, she meets criteria for hospice 2/2 advanced dementia.  Daughter states that she would not consider hospice at this time, as she is interested in pursuing therapy (OT/PT/RN) for patient.  -Discussed that she may wish to consider to complete therapy with patient, and on completion, transition patient to hospice.  Sylvie did not commit to any preferences.  This may be valuable to explore with her prior to the patient's discharge.  -In the setting of a concern that the patient would not survive eye surgery, her worsening functional status, and her advanced age, broached code status.  -Daughter shares that the patient told her years back, to not let her die, and to place DNR/I orders feels as though she would be going against her wishes.    -Discussed that patient may have made such requests in a different state of health, and that it is difficult to imagine all of the circumstances, health-wise, that one might find oneself in.  -Discussed that patient would not be expected to survive CPR, and should she do so, would be unlikely to survive the hospitalization and return to her home.  -Encouraged ongoing discussion by patient with other family to discern what matters most to the patient (daughter does not believe that the patient can weigh in at this point), so that a care plan consistent with these preferences can be implemented.  -As palliative care is not managing symptoms and care goals are clear, will sign off.  Please re-consult as needed.  Thank you for involving us in the care of this pt.    Pt d/w Dr. Hand, RN

## 2021-04-16 NOTE — DIETITIAN INITIAL EVALUATION ADULT. - ORAL INTAKE PTA/DIET HISTORY
Per EMR Pt with decreased PO intake PTA, reports of 5-6 episodes of watery diarrhea/day x 5 days PTA. Pt initially failed SLP screening,  NGT was placed which Pt pulled and per  direction of Pt's daughter was replaced. Per discussion with SLP, clare completed and will be recommending puree/nectar. At previous admission Pt weight noted 110lbs, visually appears more accurate at this time. Aware Pt with poor PO intake at that admission as well.

## 2021-04-16 NOTE — DIETITIAN INITIAL EVALUATION ADULT. - PERTINENT LABORATORY DATA
04-16 Na142 mmol/L Glu 84 mg/dL K+ 3.4 mmol/L<L> Cr  0.80 mg/dL BUN 42.0 mg/dL<H> Phos n/a   Alb n/a   PAB n/a

## 2021-04-16 NOTE — SWALLOW BEDSIDE ASSESSMENT ADULT - SWALLOW EVAL: RECOMMENDED FEEDING/EATING TECHNIQUES
allow for swallow between intakes/check mouth frequently for oral residue/pocketing/crush medication (when feasible)/maintain upright posture during/after eating for 30 mins/oral hygiene/position upright (90 degrees)/small sips/bites

## 2021-04-16 NOTE — CONSULT NOTE ADULT - ASSESSMENT
89 year old female with a past medical history of dementia, afib not on AC, and previous c diff infection who was brought to the ER for complaints of diarrhea. The history was obtained from the patient's daughter Sylvie who states since Saturday patient has had profuse watery diarrhea 5-6 episodes per day with decreased oral intake.   In the ED, leukocytosis with pancolitis on CT abdomen and pelvis. and found to have C diff.     Recurrent C diff  Leukocytosis    - 2 episodes of fecal incontinence documented overnight  - Continue oral vancomycin 125 mg po q6  - on prior admission in March treated with course of vancomycin for suspected C diff diarrhea and improved  - UA + ? contaminated- no fever and no signs of blaer or kidney infection on imaging, will observe off abx for now   - Trend Fever  - Trend Leukocytosis    d/w attending  Will Follow

## 2021-04-16 NOTE — DIETITIAN INITIAL EVALUATION ADULT. - OTHER INFO
89 year old female with a past medical history of dementia, afib not on AC was brought to the ER for complaints of diarrhea. The patient's daughter Sylvie who states since Saturday patient has had profuse watery diarrhea 5-6 episodes per day with decreased oral intake. She tried to encourage Po and fluid intake however noted today patient appeared weak so she brought her in for evaluation. In the ED, leukocytosis with pancolitis on CT abdomen and pelvis. Patient was recently admitted to [facility] for [facility] in 03/2021 course was complicated by dementia with behavioural disturbance and new onset afib. 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

## 2021-04-17 NOTE — PROGRESS NOTE ADULT - ASSESSMENT
89 year old female with a past medical history of dementia, afib not on AC, and previous c diff infection who was brought to the ER for complaints of diarrhea. The history was obtained from the patient's daughter Sylvie who states since Saturday patient has had profuse watery diarrhea 5-6 episodes per day with decreased oral intake.   In the ED, leukocytosis with pancolitis on CT abdomen and pelvis. and found to have C diff.     Recurrent C diff  Leukocytosis    - 2 episodes of fecal incontinence documented overnight  - Continue oral vancomycin 125 mg po q6  - on prior admission in March treated with course of vancomycin for suspected C diff diarrhea and improved  - UA + ? contaminated- no fever and no signs of bladder or kidney infection on imaging, will observe off abx for now      When diarrhea resolves, can consider discharge planning  and will simplify antibiotics to:  Vancomycin 125mg PO Q6H x 2 weeks, then  Vancomycin 125mg PO Q8H x 1 week, then  Vancomycin 125mg PO Q12H x 1 week, then  Vancomycin 125mg PO Q24H x 1 week, then  Vancomycin 125mg PO Q48H x 1 week     can provide Rx for 101 doses.     Instruction can be printed out on paper to be absolutely clear

## 2021-04-17 NOTE — PROGRESS NOTE ADULT - ASSESSMENT
89 year old female with a past medical history of dementia, afib not on AC, and previous c diff infection who was brought to the ER for complaints of diarrhea,. Admitted for pancolitis secondary to recurrent C diff.   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 She wears a patch over the eye. Daughter states she follows with Dr Malone in [place]. Recommend outpatient follow up on discharge    VTE- Heparin subcut    Full Code, Poor overall prognosis. Palliative care following

## 2021-04-17 NOTE — PROCEDURE NOTE - NSINFORMCONSENT_GEN_A_CORE
Pt with dementia, disoriented
Benefits, risks, and possible complications of procedure explained to patient/caregiver who verbalized understanding and gave verbal consent.

## 2021-04-17 NOTE — PROCEDURE NOTE - NSINDICATIONS_GEN_A_CORE
pt with dysphagia, failed swallow eval, placed to administer po vanco for C.diff/other
fluid administration

## 2021-04-18 NOTE — PROGRESS NOTE ADULT - ASSESSMENT
89 year old female with a past medical history of dementia, afib not on AC, and previous c diff infection who was brought to the ER for complaints of diarrhea,. Admitted for pancolitis secondary to recurrent C diff.   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 She wears a patch over the eye. Daughter states she follows with Dr Malone in [place]. Recommend outpatient follow up on discharge    VTE- Heparin subcut    Full Code, Poor overall prognosis. Palliative care following      PT eval for dispo

## 2021-04-19 NOTE — CHART NOTE - NSCHARTNOTEFT_GEN_A_CORE
Source: Patient [ ]  Family [ ]   other [x] RN    Current Diet:   Diet, Dysphagia 1 Pureed-Nectar Consistency Fluid:   Supplement Feeding Modality:  Oral  Ensure Clear Cans or Servings Per Day:  1       Frequency:  Three Times a day (04-19-21 @ 11:58)    Patient reports [ ] nausea  [ ] vomiting [ ] diarrhea [ ] constipation  [x]chewing problems [x] swallowing issues  [ ] other:     PO intake:  < 50% [ ]   50-75%  [ ]   %  [ ]  other :    Source for PO intake [ ] Patient [ ] family [ ] chart [ ] staff [ ] other    Enteral /Parenteral Nutrition:     Current Weight:     % Weight Change     Pertinent Medications: MEDICATIONS  (STANDING):  aMIOdarone    Tablet 200 milliGRAM(s) Oral daily  aspirin enteric coated 81 milliGRAM(s) Oral daily  heparin   Injectable 5000 Unit(s) SubCutaneous every 12 hours  multivitamin 1 Tablet(s) Oral daily  vancomycin    Solution 125 milliGRAM(s) Oral every 6 hours    MEDICATIONS  (PRN):  ondansetron Injectable 4 milliGRAM(s) IV Push every 6 hours PRN Nausea    Pertinent Labs: CBC Full  -  ( 18 Apr 2021 09:57 )  WBC Count : 6.66 K/uL  RBC Count : 4.30 M/uL  Hemoglobin : 10.0 g/dL  Hematocrit : 33.7 %  Platelet Count - Automated : 536 K/uL  Mean Cell Volume : 78.4 fl  Mean Cell Hemoglobin : 23.3 pg  Mean Cell Hemoglobin Concentration : 29.7 gm/dL  04-19 Na143 mmol/L Glu 86 mg/dL K+ 3.2 mmol/L<L> Cr  0.58 mg/dL BUN 22.0 mg/dL<H> Phos n/a   Alb n/a   PAB n/a       Skin: Stage 2 sacrum, L. lateral bunion    Nutrition focused physical exam conducted - found signs of malnutrition [ ]absent [ ]present    Subcutaneous fat loss: [ ] Orbital fat pads region, [ ]Buccal fat region, [ ]Triceps region,  [ ]Ribs region    Muscle wasting: [ ]Temples region, [ ]Clavicle region, [ ]Shoulder region, [ ]Scapula region, [ ]Interosseous region,  [ ]thigh region, [ ]Calf region    Estimated Needs:   [ ] no change since previous assessment  [ ] recalculated:     Current Nutrition Diagnosis:    Recommendations:     Monitoring and Evaluation:   [ ] PO intake [ ] Tolerance to diet prescription [X] Weights  [X] Follow up per protocol [X] Labs: Source: Patient [ ]  Family [ ]   other [x] RN    Current Diet:   Diet, Dysphagia 1 Pureed-Nectar Consistency Fluid:   Supplement Feeding Modality:  Oral  Ensure Clear Cans or Servings Per Day:  1       Frequency:  Three Times a day (04-19-21 @ 11:58)    Patient reports [ ] nausea  [ ] vomiting [ ] diarrhea [ ] constipation  [x]chewing problems [x] swallowing issues  [ ] other:     PO intake:  < 50% [x]   50-75%  [ ]   %  [ ]  other :    Current Weight:   (4/16)   110 lbs per RD initial  (4/15)   119.9 lbs    % Weight Change: No recent weight documented, will continue to monitor.     Pertinent Medications: MEDICATIONS  (STANDING):  aMIOdarone    Tablet 200 milliGRAM(s) Oral daily  aspirin enteric coated 81 milliGRAM(s) Oral daily  heparin   Injectable 5000 Unit(s) SubCutaneous every 12 hours  multivitamin 1 Tablet(s) Oral daily  vancomycin    Solution 125 milliGRAM(s) Oral every 6 hours    MEDICATIONS  (PRN):  ondansetron Injectable 4 milliGRAM(s) IV Push every 6 hours PRN Nausea    Pertinent Labs: CBC Full  -  ( 18 Apr 2021 09:57 )  WBC Count : 6.66 K/uL  RBC Count : 4.30 M/uL  Hemoglobin : 10.0 g/dL  Hematocrit : 33.7 %  Platelet Count - Automated : 536 K/uL  Mean Cell Volume : 78.4 fl  Mean Cell Hemoglobin : 23.3 pg  Mean Cell Hemoglobin Concentration : 29.7 gm/dL  04-19 Na143 mmol/L Glu 86 mg/dL K+ 3.2 mmol/L<L> Cr  0.58 mg/dL BUN 22.0 mg/dL<H> Phos n/a   Alb n/a   PAB n/a       Skin: Stage 2 sacrum, L. lateral bunion    Nutrition focused physical exam conducted - found signs of malnutrition [ ]absent [x]present    Subcutaneous fat loss: [x] Orbital fat pads region, [x]Buccal fat region, [ ]Triceps region,  [ ]Ribs region    Muscle wasting: [x]Temples region, [x]Clavicle region, [ ]Shoulder region, [ ]Scapula region, [ ]Interosseous region,  [ ]thigh region, [ ]Calf region    Estimated Needs:   [x] no change since previous assessment  [ ] recalculated:     Current Nutrition Diagnosis: Pt remains at high nutrition risk secondary to malnutrition (severe chronic) related to inability to tolerate sufficient protein-energy needs 2/2 advanced age, dysphagia, PNA, colitis as evidenced by likely meeting <75% EER x 1 month, severe muscle/fat loss. Aware Pt +C. diff continues with loose stool per flowsheets. Pt with poor PO intake despite assistance and encouragement from staff.     Recommendations:   1) Add Ensure Enlive TID, nectar-thick   2) Rx MVI and vit C 500mg daily  3) Recommend banatrol daily to add bulk to stool    Monitoring and Evaluation:   [x] PO intake [x] Tolerance to diet prescription [X] Weights  [X] Follow up per protocol [X] Labs:

## 2021-04-19 NOTE — DISCHARGE NOTE NURSING/CASE MANAGEMENT/SOCIAL WORK - PATIENT PORTAL LINK FT
You can access the FollowMyHealth Patient Portal offered by [facility] by registering at the following website: [email or web address]. By joining [employer]’s FollowMyHealth portal, you will also be able to view your health information using other applications (apps) compatible with our system.

## 2021-04-19 NOTE — DISCHARGE NOTE NURSING/CASE MANAGEMENT/SOCIAL WORK - NSDCFUADDAPPT_GEN_ALL_CORE_FT
You have a followup [facility]ointment with Dr. Sanches on 4/26/21 at 2:00PM.  Address:  [address].  [address] [place] [address]  Phone: [phone]

## 2021-04-19 NOTE — PROGRESS NOTE ADULT - ASSESSMENT
89 year old female with a past medical history of dementia, afib not on AC, and previous c diff infection who was brought to the ER for complaints of diarrhea,. Admitted for pancolitis secondary to recurrent C diff.   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 She wears a patch over the eye. Daughter states she follows with Dr Malone in [place]. Recommend outpatient follow up on discharge    VTE- Heparin subcut    Full Code, Poor overall prognosis. Palliative care following      dc planning likely am

## 2021-04-19 NOTE — PROGRESS NOTE ADULT - ASSESSMENT
89 year old female with a past medical history of dementia, afib not on AC, and previous c diff infection who was brought to the ER for complaints of diarrhea. The history was obtained from the patient's daughter Sylvie who states since Saturday patient has had profuse watery diarrhea 5-6 episodes per day with decreased oral intake.   In the ED, leukocytosis with pancolitis on CT abdomen and pelvis. and found to have C diff.     Recurrent C diff  Leukocytosis    - afebrile  - WBC now normal  - 1 episode of fecal incontinence documented today  - Continue oral vancomycin 125 mg po q6  - on prior admission in March treated with course of vancomycin for suspected C diff diarrhea and improved  - UA + ? contaminated, UCX klebsiella- no fever and no signs of bladder or kidney infection on imaging,  afebrile with normal WBC will observe off abx for now    - contact iso    When diarrhea resolves, can consider discharge planning  and will simplify antibiotics to:  Vancomycin 125mg PO Q6H x 2 weeks, then  Vancomycin 125mg PO Q12H x 1 week, then  Vancomycin 125mg PO Q24H x 1 week, then  Vancomycin 125mg PO Q48H x 1 week then  vancomycin 125 mg po q72h x 1 week then stop    Risk for C diff recurrence will remain    please call with questions

## 2021-04-20 NOTE — PROGRESS NOTE ADULT - PROVIDER SPECIALTY LIST ADULT
Hospitalist
Hospitalist
Infectious Disease
Hospitalist

## 2021-04-20 NOTE — PROGRESS NOTE ADULT - SUBJECTIVE AND OBJECTIVE BOX
[facility]  INFECTIOUS DISEASES AND INTERNAL MEDICINE at [place]  =======================================================  Mikal Collins MD  Diplomates American Board of Internal Medicine and Infectious Diseases  Tel: [phone]      Fax: [phone]  =======================================================    EDOUARD MALAVE [id number]    Follow up: recurrent c diff  soft BM this AM      Allergies:  No Known Allergies           REVIEW OF SYSTEMS:  cannot obtain    Physical Exam:  GEN: NAD, pleasant  HEENT: normocephalic and atraumatic. EOMI. PERRL.  Anicteric   NECK: Supple.   LUNGS: Clear to auscultation.  HEART: Regular rate and rhythm without murmur.  ABDOMEN: Soft, nontender, and nondistended.  Positive bowel sounds.    : No CVA tenderness  EXTREMITIES: Without any edema. LLE dorsum small superficial skin opening with serous drainage, no surrounding erythema or warmth  MSK: no joint swelling  NEUROLOGIC: Cranial nerves II through XII are grossly intact. No focal deficits  PSYCHIATRIC: confused  SKIN: No Rash      Vitals:    T(F): 97.9 (20 Apr 2021 04:49), Max: 97.9 (20 Apr 2021 04:49)  HR: 69 (20 Apr 2021 04:49)  BP: 149/74 (20 Apr 2021 04:49)  RR: 18 (20 Apr 2021 04:49)  SpO2: 93% (20 Apr 2021 04:49) (93% - 94%)  temp max in last 48H T(F): , Max: 97.9 (04-20-21 @ 04:49)    Current Antibiotics:  vancomycin    Solution 125 milliGRAM(s) Oral every 6 hours    Other medications:  aMIOdarone    Tablet 200 milliGRAM(s) Oral daily  aspirin enteric coated 81 milliGRAM(s) Oral daily  heparin   Injectable 5000 Unit(s) SubCutaneous every 12 hours  multivitamin 1 Tablet(s) Oral daily        04-20    146<H>  |  108<H>  |  17.0  ----------------------------<  70  3.7   |  25.0  |  0.50    Ca    8.2<L>      20 Apr 2021 10:22  Mg     1.8     04-20      RECENT CULTURES:  04-15 @ 21:58 .Urine Clean Catch (Midstream) Klebsiella pneumoniae ESBL    >100,000 CFU/ml Klebsiella pneumoniae ESBL        04-15 @ 14:23 .Blood Blood     No growth at 48 hours            WBC Count: 6.66 K/uL (04-18-21 @ 09:57)  WBC Count: 10.97 K/uL (04-17-21 @ 09:51)  WBC Count: 17.31 K/uL (04-16-21 @ 08:16)    Creatinine, Serum: 0.50 mg/dL (04-20-21 @ 10:22)  Creatinine, Serum: 0.58 mg/dL (04-19-21 @ 08:36)  Creatinine, Serum: 0.62 mg/dL (04-18-21 @ 09:55)  Creatinine, Serum: 0.71 mg/dL (04-17-21 @ 09:51)  Creatinine, Serum: 0.80 mg/dL (04-16-21 @ 08:16)               COVID-19 PCR: NotDetec (04-19-21 @ 14:28)  COVID-19 PCR: NotDetec (04-15-21 @ 17:01)    
[facility]  INFECTIOUS DISEASES AND INTERNAL MEDICINE at [place]  =======================================================  Mikal Collins MD  Diplomates American Board of Internal Medicine and Infectious Diseases  Tel: [phone]      Fax: [phone]  =======================================================      [employer]-[id number]  EDOUARD MALAVE    follow up: C diff, recurrence      still with some loose BM        FAMILY HISTORY:  No pertinent family history in first degree relatives      Allergies  No Known Allergies         REVIEW OF SYSTEMS:  not able to obtain    Physical Exam:    GEN: NAD, pleasant, nonverbal  HEENT: normocephalic and atraumatic. EOMI. PERRL.   LEFT eye with necrotic skin/ eye  NECK: Supple.   LUNGS: Clear to auscultation.  HEART: Regular rate and rhythm without murmur.  ABDOMEN: Soft, nontender, and nondistended.  Positive bowel sounds.    : No CVA tenderness  EXTREMITIES: Without any edema.  MSK: No joint swelling  NEUROLOGIC: Cranial nerves II through XII are grossly intact. No Focal Deficits  PSYCHIATRIC: not assessed  SKIN: No Rash    Vitals:  ============  T(F): 98.1 (17 Apr 2021 10:40), Max: 98.1 (16 Apr 2021 23:42)  HR: 74 (17 Apr 2021 10:40)  BP: 104/56 (17 Apr 2021 10:40)  RR: 18 (17 Apr 2021 10:40)  SpO2: 96% (17 Apr 2021 10:40) (94% - 96%)  temp max in last 48H T(F): , Max: 98.2 (04-16-21 @ 02:04)    =======================================================  Current Antibiotics:  vancomycin    Solution 125 milliGRAM(s) Oral every 6 hours    Other medications:  aMIOdarone    Tablet 200 milliGRAM(s) Oral daily  aspirin enteric coated 81 milliGRAM(s) Oral daily  dextrose 5% + sodium chloride 0.9% with potassium chloride 20 mEq/L 1000 milliLiter(s) IV Continuous <Continuous>  heparin   Injectable 5000 Unit(s) SubCutaneous every 12 hours      =======================================================  Labs:                        9.6    10.97 )-----------( 505      ( 17 Apr 2021 09:51 )             31.2     04-17    145  |  106  |  33.0<H>  ----------------------------<  77  3.0<L>   |  23.0  |  0.71    Ca    8.1<L>      17 Apr 2021 09:51  Mg     2.3     04-16         Culture - Urine (collected 04-15-21 @ 21:58)  Source: .Urine Clean Catch (Midstream)    Culture - Blood (collected 04-15-21 @ 14:23)  Source: .Blood Blood    Culture - Blood (collected 04-15-21 @ 14:23)  Source: .Blood Blood        COVID-19 PCR: NotDetec (04-15-21 @ 17:01)        < from: Xray Chest 1 View- PORTABLE-Urgent (Xray Chest 1 View- PORTABLE-Urgent .) (04.15.21 @ 23:12) >    IMPRESSION:  NG tube in satisfactory position.    Effusion/atelectasis/possible consolidation at the left base, unchanged.        < end of copied text >    < from: CT Abdomen and Pelvis w/ IV Cont (04.15.21 @ 16:44) >    FINDINGS:  LOWER CHEST: Mild cardiomegaly. Small left and trace right pleural effusions. Bibasilar atelectasis. Enlarged nodular opacity right middle lobe possible impacted airway (3:6-8). Right basilar intralobular septal thickening. Mitral annulus calcification    LIVER: Within normal limits.  BILE DUCTS: Mild intrahepatic biliary duct dilatation unchanged from 09/22/2020.  GALLBLADDER: Gallstones. Distended gallbladder.  SPLEEN: Within normal limits.  PANCREAS: Within normal limits.  ADRENALS: Within normal limits.  KIDNEYS/URETERS: Within normal limits.    BLADDER: Within normal limits.  REPRODUCTIVE ORGANS: Uterus and adnexa within normal limits.    BOWEL: No bowel obstruction. Appendix normal. Pan proctocolitis. Suggest correlation for C. difficile.  PERITONEUM: Small ascites.  VESSELS: Atherosclerotic disease  RETROPERITONEUM/LYMPH NODES: No lymphadenopathy.  ABDOMINAL WALL: Within normal limits.  BONES: ORIF left hip. Compression deformities L3 and L1. Compression deformity T12 progressive since 09/22/2020    IMPRESSION:  Pan proctocolitis. Correlate for C. difficile infection.  Dilated gallbladder with gallstones.  Small left pleural effusion and trace right pleural effusion    < end of copied text >  
  seen for cdiff    no events.  still with watery stool x1  poor po intake, aides helping    MEDICATIONS  (STANDING):  aMIOdarone    Tablet 200 milliGRAM(s) Oral daily  aspirin enteric coated 81 milliGRAM(s) Oral daily  heparin   Injectable 5000 Unit(s) SubCutaneous every 12 hours  multivitamin 1 Tablet(s) Oral daily  vancomycin    Solution 125 milliGRAM(s) Oral every 6 hours    MEDICATIONS  (PRN):  ondansetron Injectable 4 milliGRAM(s) IV Push every 6 hours PRN Nausea      Allergies    No Known Allergies    Vital Signs Last 24 Hrs  T(C): 36.3 (19 Apr 2021 10:43), Max: 36.6 (19 Apr 2021 06:53)  T(F): 97.4 (19 Apr 2021 10:43), Max: 97.8 (19 Apr 2021 06:53)  HR: 60 (19 Apr 2021 10:43) (60 - 64)  BP: 141/48 (19 Apr 2021 10:43) (134/55 - 141/48)  BP(mean): --  RR: 18 (19 Apr 2021 10:43) (17 - 18)  SpO2: 95% (19 Apr 2021 10:43) (94% - 95%)    PHYSICAL EXAM:    GENERAL: NAD frail  CHEST/LUNG: Clear to percussion bilaterally  HEART: Regular rate and rhythm; S1 S2  ABDOMEN: Soft,  Bowel sounds present  EXTREMITIES:  no edema       LABS:                        10.0   6.66  )-----------( 536      ( 18 Apr 2021 09:57 )             33.7     04-19    143  |  108<H>  |  22.0<H>  ----------------------------<  86  3.2<L>   |  24.0  |  0.58    Ca    8.3<L>      19 Apr 2021 08:36  Phos  2.5     04-18  Mg     1.9     04-19    TPro  5.8<L>  /  Alb  2.4<L>  /  TBili  0.3<L>  /  DBili  x   /  AST  13  /  ALT  7   /  AlkPhos  63  04-18          CAPILLARY BLOOD GLUCOSE            RADIOLOGY & ADDITIONAL TESTS:  
  seen for cdiff, dysphagia    minimally responsive. opens eyes to voice  ros unable to obtain.    MEDICATIONS  (STANDING):  aMIOdarone    Tablet 200 milliGRAM(s) Oral daily  aspirin enteric coated 81 milliGRAM(s) Oral daily  dextrose 5% + sodium chloride 0.9% with potassium chloride 20 mEq/L 1000 milliLiter(s) (55 mL/Hr) IV Continuous <Continuous>  heparin   Injectable 5000 Unit(s) SubCutaneous every 12 hours  vancomycin    Solution 125 milliGRAM(s) Oral every 6 hours    MEDICATIONS  (PRN):  ondansetron Injectable 4 milliGRAM(s) IV Push every 6 hours PRN Nausea      Allergies    No Known Allergies      Vital Signs Last 24 Hrs  T(C): 36.7 (2021 10:40), Max: 36.7 (2021 23:42)  T(F): 98.1 (2021 10:40), Max: 98.1 (2021 23:42)  HR: 74 (2021 10:40) (68 - 75)  BP: 104/56 (2021 10:40) (93/53 - 112/64)  BP(mean): --  RR: 18 (2021 10:40) (18 - 18)  SpO2: 96% (2021 10:40) (94% - 96%)    PHYSICAL EXAM:    GENERAL: frail   CHEST/LUNG: dec bs at bases, poor effort   HEART: Regular rate and rhythm; S1 S2  ABDOMEN: Soft, Bowel sounds present  EXTREMITIES:  no edema   NERVOUS SYSTEM:  opens eye to voice, not speaking. does not follow commands     left eye with patch  LABS:                        9.6    10.97 )-----------( 505      ( 2021 09:51 )             31.2     04-17    145  |  106  |  33.0<H>  ----------------------------<  77  3.0<L>   |  23.0  |  0.71    Ca    8.1<L>      2021 09:51  Mg     2.3     04-16        Urinalysis Basic - ( 15 Apr 2021 14:44 )    Color: Yellow / Appearance: Clear / S.010 / pH: x  Gluc: x / Ketone: Negative  / Bili: Small / Urobili: Negative mg/dL   Blood: x / Protein: 30 mg/dL / Nitrite: Negative   Leuk Esterase: Small / RBC: 3-5 /HPF / WBC 26-50   Sq Epi: x / Non Sq Epi: Occasional / Bacteria: Many        CAPILLARY BLOOD GLUCOSE            RADIOLOGY & ADDITIONAL TESTS:  
[facility]  INFECTIOUS DISEASES AND INTERNAL MEDICINE at [place]  =======================================================  Mikal Collins MD  Diplomates American Board of Internal Medicine and Infectious Diseases  Tel: [phone]      Fax: [phone]  =======================================================    EDOUARD MALAVE [id number]    Follow up: recurrent c diff  asking for scissors to cut off wrist Id band  axox1 confused      Allergies:  No Known Allergies           REVIEW OF SYSTEMS:  cannot obtain      Physical Exam:  GEN: NAD, pleasant  HEENT: normocephalic and atraumatic. EOMI. PERRL.  Anicteric left eye necrosis  NECK: Supple.   LUNGS: Clear to auscultation.  HEART: Regular rate and rhythm without murmur.  ABDOMEN: Soft, nontender, and nondistended.  Positive bowel sounds.    : No CVA tenderness  EXTREMITIES: Without any edema.  MSK: no joint swelling  NEUROLOGIC: Cranial nerves II through XII are grossly intact. No focal deficits  PSYCHIATRIC: Appropriate affect .  SKIN: No Rash      Vitals:    T(F): 97.4 (19 Apr 2021 10:43), Max: 97.8 (19 Apr 2021 06:53)  HR: 60 (19 Apr 2021 10:43)  BP: 141/48 (19 Apr 2021 10:43)  RR: 18 (19 Apr 2021 10:43)  SpO2: 95% (19 Apr 2021 10:43) (94% - 95%)  temp max in last 48H T(F): , Max: 98 (04-18-21 @ 04:43)    Current Antibiotics:  vancomycin    Solution 125 milliGRAM(s) Oral every 6 hours    Other medications:  aMIOdarone    Tablet 200 milliGRAM(s) Oral daily  aspirin enteric coated 81 milliGRAM(s) Oral daily  heparin   Injectable 5000 Unit(s) SubCutaneous every 12 hours  multivitamin 1 Tablet(s) Oral daily                            10.0   6.66  )-----------( 536      ( 18 Apr 2021 09:57 )             33.7     04-19    143  |  108<H>  |  22.0<H>  ----------------------------<  86  3.2<L>   |  24.0  |  0.58    Ca    8.3<L>      19 Apr 2021 08:36  Phos  2.5     04-18  Mg     1.9     04-19    TPro  5.8<L>  /  Alb  2.4<L>  /  TBili  0.3<L>  /  DBili  x   /  AST  13  /  ALT  7   /  AlkPhos  63  04-18    RECENT CULTURES:  04-15 @ 21:58 .Urine Clean Catch (Midstream)     >100,000 CFU/ml Klebsiella pneumoniae  Susceptibility to follow.        04-15 @ 14:23 .Blood Blood     No growth at 48 hours            WBC Count: 6.66 K/uL (04-18-21 @ 09:57)  WBC Count: 10.97 K/uL (04-17-21 @ 09:51)  WBC Count: 17.31 K/uL (04-16-21 @ 08:16)  WBC Count: 16.52 K/uL (04-15-21 @ 12:38)    Creatinine, Serum: 0.58 mg/dL (04-19-21 @ 08:36)  Creatinine, Serum: 0.62 mg/dL (04-18-21 @ 09:55)  Creatinine, Serum: 0.71 mg/dL (04-17-21 @ 09:51)  Creatinine, Serum: 0.80 mg/dL (04-16-21 @ 08:16)  Creatinine, Serum: 0.92 mg/dL (04-15-21 @ 12:38)               COVID-19 PCR: [facility] (04-15-21 @ 17:01)    
  seen for c diff    no events  denies pain.  improved diarrhea per RN. poor po intake  ros unable to obtain as not very verbal    MEDICATIONS  (STANDING):  aMIOdarone    Tablet 200 milliGRAM(s) Oral daily  aspirin enteric coated 81 milliGRAM(s) Oral daily  dextrose 5% + sodium chloride 0.9% with potassium chloride 20 mEq/L 1000 milliLiter(s) (55 mL/Hr) IV Continuous <Continuous>  heparin   Injectable 5000 Unit(s) SubCutaneous every 12 hours  potassium chloride   Powder 40 milliEquivalent(s) Oral every 4 hours  vancomycin    Solution 125 milliGRAM(s) Oral every 6 hours    MEDICATIONS  (PRN):  ondansetron Injectable 4 milliGRAM(s) IV Push every 6 hours PRN Nausea      Allergies    No Known Allergies      Vital Signs Last 24 Hrs  T(C): 36.7 (18 Apr 2021 04:43), Max: 36.7 (18 Apr 2021 04:43)  T(F): 98 (18 Apr 2021 04:43), Max: 98 (18 Apr 2021 04:43)  HR: 65 (18 Apr 2021 04:43) (65 - 69)  BP: 123/49 (18 Apr 2021 04:43) (120/46 - 123/49)  BP(mean): --  RR: 14 (18 Apr 2021 04:43) (14 - 17)  SpO2: 95% (18 Apr 2021 04:43) (95% - 95%)    PHYSICAL EXAM:    GENERAL: NAD  CHEST/LUNG: Clear to percussion bilaterally poor effort   HEART: Regular rate and rhythm; S1 S2  ABDOMEN: Soft,  Bowel sounds present  EXTREMITIES: no edema   NERVOUS SYSTEM:  opens eye to voice moves ext x 4 spontaneously, gen weakness    LABS:                        10.0   6.66  )-----------( 536      ( 18 Apr 2021 09:57 )             33.7     04-18    145  |  107  |  26.0<H>  ----------------------------<  125<H>  2.9<LL>   |  28.0  |  0.62    Ca    8.2<L>      18 Apr 2021 09:55  Phos  2.5     04-18  Mg     2.1     04-18    TPro  5.8<L>  /  Alb  2.4<L>  /  TBili  0.3<L>  /  DBili  x   /  AST  13  /  ALT  7   /  AlkPhos  63  04-18          CAPILLARY BLOOD GLUCOSE            RADIOLOGY & ADDITIONAL TESTS:  
CC: Diarrhea/c diff    INTERVAL HPI/OVERNIGHT EVENTS: Patient seen and examined. No further diarrhea. had one soft stool as per nurse. Appetite poor. Appears comfortable.     Vital Signs Last 24 Hrs  T(C): 36.6 (20 Apr 2021 04:49), Max: 36.6 (20 Apr 2021 04:49)  T(F): 97.9 (20 Apr 2021 04:49), Max: 97.9 (20 Apr 2021 04:49)  HR: 69 (20 Apr 2021 04:49) (69 - 99)  BP: 149/74 (20 Apr 2021 04:49) (149/74 - 152/77)  BP(mean): --  RR: 18 (20 Apr 2021 04:49) (18 - 18)  SpO2: 93% (20 Apr 2021 04:49) (93% - 94%)    ROS:  Unable to assess due to baseline mental status    PHYSICAL EXAM:    GENERAL: NAD, elderly,  frail  CHEST/LUNG: Clear to percussion bilaterally  HEART: Regular rate and rhythm; S1 S2  ABDOMEN: Soft,  Bowel sounds present  EXTREMITIES:  no edema     I&O's Detail            20 Apr 2021 10:22    146    |  108    |  17.0   ----------------------------<  70     3.7     |  25.0   |  0.50     Ca    8.2        20 Apr 2021 10:22  Mg     1.8       20 Apr 2021 10:22        CAPILLARY BLOOD GLUCOSE              MEDICATIONS  (STANDING):  aMIOdarone    Tablet 200 milliGRAM(s) Oral daily  aspirin enteric coated 81 milliGRAM(s) Oral daily  dronabinol 2.5 milliGRAM(s) Oral daily  heparin   Injectable 5000 Unit(s) SubCutaneous every 12 hours  multivitamin 1 Tablet(s) Oral daily  vancomycin    Solution 125 milliGRAM(s) Oral every 6 hours    MEDICATIONS  (PRN):  ondansetron Injectable 4 milliGRAM(s) IV Push every 6 hours PRN Nausea      RADIOLOGY & ADDITIONAL TESTS:  
CC: Follow up    INTERVAL HPI/OVERNIGHT EVENTS: Patient seen and examined, overnight NGt was pulled out and then replaced. This morning she is alert and responds to name but confused.       Vital Signs Last 24 Hrs  T(C): 36.4 (2021 12:02), Max: 36.8 (2021 02:04)  T(F): 97.6 (2021 12:02), Max: 98.2 (2021 02:04)  HR: 63 (2021 12:02) (63 - 82)  BP: 113/62 (2021 12:02) (97/51 - 140/78)  BP(mean): --  RR: 18 (2021 12:02) (16 - 18)  SpO2: 93% (2021 12:02) (93% - 97%)    PHYSICAL EXAM:    GENERAL: NAD, AOX1  HEAD:  Atraumatic, Normocephalic  EYES: conjunctiva and sclera clear  ENMT: Moist mucous membranes  CHEST/LUNG: Clear to auscultation bilaterally; No rales, rhonchi, wheezing, or rubs  HEART: Regular rate and rhythm; No murmurs, rubs, or gallops  ABDOMEN: Soft, Nontender, Nondistended; Bowel sounds present  EXTREMITIES:  2+ Peripheral Pulses, No clubbing, cyanosis, or edema        MEDICATIONS  (STANDING):  aMIOdarone    Tablet 200 milliGRAM(s) Oral daily  aspirin enteric coated 81 milliGRAM(s) Oral daily  heparin   Injectable 5000 Unit(s) SubCutaneous every 12 hours  sodium chloride 0.9%. 1000 milliLiter(s) (75 mL/Hr) IV Continuous <Continuous>  vancomycin    Solution 125 milliGRAM(s) Oral every 6 hours    MEDICATIONS  (PRN):  ondansetron Injectable 4 milliGRAM(s) IV Push every 6 hours PRN Nausea      Allergies    No Known Allergies    Intolerances          LABS:                          9.9    17.31 )-----------( 513      ( 2021 08:16 )             33.1     04-16    142  |  102  |  42.0<H>  ----------------------------<  84  3.4<L>   |  28.0  |  0.80    Ca    8.2<L>      2021 08:16  Mg     2.3     04-16    TPro  6.1<L>  /  Alb  2.3<L>  /  TBili  0.4  /  DBili  x   /  AST  23  /  ALT  7   /  AlkPhos  91  04-15    PT/INR - ( 15 Apr 2021 12:38 )   PT: 13.2 sec;   INR: 1.15 ratio         PTT - ( 15 Apr 2021 12:38 )  PTT:24.2 sec  Urinalysis Basic - ( 15 Apr 2021 14:44 )    Color: Yellow / Appearance: Clear / S.010 / pH: x  Gluc: x / Ketone: Negative  / Bili: Small / Urobili: Negative mg/dL   Blood: x / Protein: 30 mg/dL / Nitrite: Negative   Leuk Esterase: Small / RBC: 3-5 /HPF / WBC 26-50   Sq Epi: x / Non Sq Epi: Occasional / Bacteria: Many        RADIOLOGY & ADDITIONAL TESTS:

## 2021-04-20 NOTE — PROGRESS NOTE ADULT - ATTENDING COMMENTS
recurrent cdiff  asymptomatic bacteruria ID recommending hold off on treatment, c/w cdiff txt  appetite stimulant      d/w daughter over the phone  plan for dc in am to home.  she is requesting transport. cm/sw updated

## 2021-04-20 NOTE — PROGRESS NOTE ADULT - NUTRITIONAL ASSESSMENT
This patient has been assessed with a concern for Malnutrition and has been determined to have a diagnosis/diagnoses of Severe protein-calorie malnutrition.    This patient is being managed with:   Diet Dysphagia 1 Pureed-Nectar Consistency Fluid-  Entered: Apr 16 2021 10:28AM    
This patient has been assessed with a concern for Malnutrition and has been determined to have a diagnosis/diagnoses of Severe protein-calorie malnutrition.    This patient is being managed with:   Diet Dysphagia 1 Pureed-Nectar Consistency Fluid-  Supplement Feeding Modality:  Oral  Ensure Clear Cans or Servings Per Day:  1       Frequency:  Three Times a day  Entered: Apr 19 2021 11:58AM    
This patient has been assessed with a concern for Malnutrition and has been determined to have a diagnosis/diagnoses of Severe protein-calorie malnutrition.    This patient is being managed with:   Diet Dysphagia 1 Pureed-Nectar Consistency Fluid-  Supplement Feeding Modality:  Oral  Ensure Enlive Servings Per Day:  1       Frequency:  Three Times a day  Entered: Apr 20 2021  7:43AM    
This patient has been assessed with a concern for Malnutrition and has been determined to have a diagnosis/diagnoses of Severe protein-calorie malnutrition.    This patient is being managed with:   Diet Dysphagia 1 Pureed-Nectar Consistency Fluid-  Entered: Apr 16 2021 10:28AM

## 2021-04-20 NOTE — PROGRESS NOTE ADULT - ASSESSMENT
89 year old female with a past medical history of dementia, afib not on AC, and previous c diff infection who was brought to the ER for complaints of diarrhea,. Admitted for pancolitis secondary to recurrent C diff.   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      Diarrhea with pancolitis on CT secondary to Recurrent C Diff- now having formed stool.      PO vancomycin per ID,    dysphagia: post NG tube but subsequently passed swallow eval     c/w pureed/nectar consistency. 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 She wears a patch over the eye. Daughter states she follows with Dr Malone in [place]. Recommend outpatient follow up on discharge    VTE- Heparin subcut    Dispo: DC home when home services resumed, in am 89 year old female with a past medical history of dementia, afib not on AC, and previous c diff infection who was brought to the ER for complaints of diarrhea,. Admitted for pancolitis secondary to recurrent C diff.   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      Diarrhea with pancolitis on CT secondary to Recurrent C Diff- now having formed stool.      PO vancomycin per ID,    dysphagia: post NG tube but subsequently passed swallow eval     c/w pureed/nectar consistency. 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 She wears a patch over the eye. Daughter states she follows with Dr Malone in [place]. Recommend outpatient follow up on discharge    ESBL UTI; hold off on abx given cdiff and no signs of uti  no fevers/new leukocytosis    VTE- Heparin subcut    Dispo: DC home when home services resumed, in am

## 2021-04-20 NOTE — PROGRESS NOTE ADULT - ASSESSMENT
89 year old female with a past medical history of dementia, afib not on AC, and previous c diff infection who was brought to the ER for complaints of diarrhea. The history was obtained from the patient's daughter Sylvie who states since Saturday patient has had profuse watery diarrhea 5-6 episodes per day with decreased oral intake.   In the ED, leukocytosis with pancolitis on CT abdomen and pelvis. and found to have C diff.     Recurrent C diff  Leukocytosis    - afebrile  - WBC now normal  - now with soft stool  - Continue oral vancomycin 125 mg po q6  - on prior admission in March treated with course of vancomycin for suspected C diff diarrhea and improved  - UA + ? contaminated, UCX klebsiella ESBL- no fever and no signs of bladder or kidney infection on imaging,  afebrile with normal WBC will observe off abx for now    - contact isolation    When diarrhea resolves, can consider discharge planning  and will simplify antibiotics to:  Vancomycin 125mg PO Q6H x 2 weeks, then  Vancomycin 125mg PO Q12H x 1 week, then  Vancomycin 125mg PO Q24H x 1 week, then  Vancomycin 125mg PO Q48H x 1 week then  vancomycin 125 mg po q72h x 1 week then stop    Risk for C diff recurrence will remain      d/w RN  please call with questions

## 2021-04-21 NOTE — DISCHARGE NOTE PROVIDER - HOSPITAL COURSE
The patient is a 89 year old female with a past medical history of dementia, afib not on AC, and previous c diff infection who was brought to the ER for complaints of diarrhea. The history was obtained from the patient's daughter Sylvie who states  patient has had profuse watery diarrhea 5-6 episodes per day with decreased oral intake. She tried to encourage Po and fluid intake however noted today patient appeared weak so she brought her in for evaluation. In the ED, leukocytosis with pancolitis on CT abdomen and pelvis. Was given a dose of IV rocephin and azithromcyin for possible left lower lobe infiltrate on chest xray with a bolus of 500cc of NS x1. Patient was recently admitted to [facility] for UT in 03/2021 course was complicated by dementia with behavioural disturbance and new onset afib. Patient was  found to have C diff. started on oral Vanco. UA + ? contaminated, UCX klebsiella- no fever and no signs of bladder or kidney infection on imaging,  afebrile with normal WBC will observe off abx. Diarrhea resolved, will dc on oral Vancomycin with long taper. As per ID, risk for C diff recurrence will remain. The patient is medically stable for discharge.  Vital Signs Last 24 Hrs  T(C): 36.6 (21 Apr 2021 04:19), Max: 36.6 (21 Apr 2021 04:19)  T(F): 97.9 (21 Apr 2021 04:19), Max: 97.9 (21 Apr 2021 04:19)  HR: 71 (21 Apr 2021 04:19) (70 - 71)  BP: 123/51 (21 Apr 2021 04:19) (123/51 - 141/52)  BP(mean): --  RR: 18 (21 Apr 2021 04:19) (18 - 18)  SpO2: 94% (21 Apr 2021 04:19) (91% - 94%)        20 Apr 2021 10:22    146    |  108    |  17.0   ----------------------------<  70     3.7     |  25.0   |  0.50     Ca    8.2        20 Apr 2021 10:22  Mg     1.8       20 Apr 2021 10:22        CAPILLARY BLOOD GLUCOSE             The patient is a 89 year old female with a past medical history of dementia, afib not on AC, and previous c diff infection who was brought to the ER for complaints of diarrhea. The history was obtained from the patient's daughter Sylvie who states  patient has had profuse watery diarrhea 5-6 episodes per day with decreased oral intake. She tried to encourage Po and fluid intake however noted today patient appeared weak so she brought her in for evaluation. In the ED, leukocytosis with pancolitis on CT abdomen and pelvis. Was given a dose of IV rocephin and azithromcyin for possible left lower lobe infiltrate on chest xray with a bolus of 500cc of NS x1. Patient was recently admitted to [facility] for UT in 03/2021 course was complicated by dementia with behavioural disturbance and new onset afib. Patient was  found to have C diff. started on oral Vanco. UA + ? contaminated, UCX klebsiella- no fever and no signs of bladder or kidney infection on imaging,  afebrile with normal WBC will observe off abx. Diarrhea resolved, will dc on oral Vancomycin with long taper. As per ID, risk for C diff recurrence will remain. The patient is medically stable for discharge.    Vital Signs Last 24 Hrs  T(C): 36.6 (21 Apr 2021 04:19), Max: 36.6 (21 Apr 2021 04:19)  T(F): 97.9 (21 Apr 2021 04:19), Max: 97.9 (21 Apr 2021 04:19)  HR: 71 (21 Apr 2021 04:19) (70 - 71)  BP: 123/51 (21 Apr 2021 04:19) (123/51 - 141/52)  BP(mean): --  RR: 18 (21 Apr 2021 04:19) (18 - 18)  SpO2: 94% (21 Apr 2021 04:19) (91% - 94%)        20 Apr 2021 10:22    146    |  108    |  17.0   ----------------------------<  70     3.7     |  25.0   |  0.50     Ca    8.2        20 Apr 2021 10:22  Mg     1.8       20 Apr 2021 10:22        CAPILLARY BLOOD GLUCOSE

## 2021-04-21 NOTE — GOALS OF CARE CONVERSATION - ADVANCED CARE PLANNING - CONVERSATION DETAILS
Spoke with daughter Sylvie Dennis. States she is next of kin and caregiver of patient. Daughter states patient remains full code. She wants all medical conditions treated. She wants to see her mother live to be 89.

## 2021-04-21 NOTE — DISCHARGE NOTE PROVIDER - CARE PROVIDER_API CALL
Brianne Collins  INTERNAL MEDICINE  [address] [place] [id number]  Phone: [phone]  Fax: [phone]  Follow Up Time:     PMD,   Phone: (   )    -  Fax: (   )    -  Follow Up Time:

## 2021-04-21 NOTE — DISCHARGE NOTE PROVIDER - DETAILS OF MALNUTRITION DIAGNOSIS/DIAGNOSES
This patient has been assessed with a concern for Malnutrition and was treated during this hospitalization for the following Nutrition diagnosis/diagnoses:     -  04/16/2021: Severe protein-calorie malnutrition

## 2021-04-21 NOTE — DISCHARGE NOTE PROVIDER - NSDCMRMEDTOKEN_GEN_ALL_CORE_FT
amiodarone 200 mg oral tablet: 1 tab(s) orally once a day  aspirin 81 mg oral delayed release tablet: 1 tab(s) orally once a day   amiodarone 200 mg oral tablet: 1 tab(s) orally once a day  aspirin 81 mg oral delayed release tablet: 1 tab(s) orally once a day  vancomycin 125 mg oral capsule: 125mg PO Q6H x 2 weeks  125mg PO Q12H x 1 weeks  125mg PO Q24H x 1 week  125mg PO Q48H x 1 week   125 mg po q72h x 1 week then stop   amiodarone 200 mg oral tablet: 1 tab(s) orally once a day  aspirin 81 mg oral delayed release tablet: 1 tab(s) orally once a day  dronabinol 2.5 mg oral capsule: 1 cap(s) orally once a day MDD:1 tab  vancomycin 125 mg oral capsule: 125mg PO Q6H x 2 weeks  125mg PO Q12H x 1 weeks  125mg PO Q24H x 1 week  125mg PO Q48H x 1 week   125 mg po q72h x 1 week then stop

## 2021-04-21 NOTE — DISCHARGE NOTE PROVIDER - NSDCFUADDAPPT_GEN_ALL_CORE_FT
You have a followup [facility] Appointment with Dr. Sanches on 4/26/21 at 2:00PM.  Address:  [address]ree Bose.  [address] [place] [address]  Phone: [phone]

## 2021-04-21 NOTE — DISCHARGE NOTE PROVIDER - NSDCCPCAREPLAN_GEN_ALL_CORE_FT
PRINCIPAL DISCHARGE DIAGNOSIS  Diagnosis: C. difficile colitis  Assessment and Plan of Treatment: Complete oral Vancomycin course as prescribed  Follow up with ID/PMD 1-2 weeks, sooner if needed      SECONDARY DISCHARGE DIAGNOSES  Diagnosis: Colitis  Assessment and Plan of Treatment:     Diagnosis: Dysphagia  Assessment and Plan of Treatment: Pureed diet with nectar thick liquids  Aspiration precautions

## 2021-04-21 NOTE — DISCHARGE NOTE PROVIDER - INSTRUCTIONS
Pureed diet with nectar thick liquids  Ensure TID Pureed diet with nectar thick liquids  Ensure 3 x daily

## 2021-05-10 NOTE — ED ADULT TRIAGE NOTE - PRO INTERPRETER NEED 2
English SSKI Counseling:  I discussed with the patient the risks of SSKI including but not limited to thyroid abnormalities, metallic taste, GI upset, fever, headache, acne, arthralgias, paraesthesias, lymphadenopathy, easy bleeding, arrhythmias, and allergic reaction.

## 2021-06-15 NOTE — ED PROVIDER NOTE - NS ED ATTENDING STATEMENT MOD
I have personally seen and examined this patient.  I have fully participated in the care of this patient. I have reviewed all pertinent clinical information, including history, physical exam, plan and the Medical/PA/NP Student and Resident’s note and agree except as noted. I have personally seen and examined this patient. I have fully participated in the care of this patient. I have reviewed all pertinent clinical information, including history, physical exam, plan and the Medical/PA/NP Student's note and agree except as noted.

## 2021-06-15 NOTE — ED PROVIDER NOTE - PHYSICAL EXAMINATION
PE:  Gen: Elderly female lying comfortably in bed. AA&Ox2 (oriented to person and place, knows she is 89).  HEENT: NC, AT. Left eye patched for unknown reason. PERRL b/l. Neck supple  Skin: Ecchymosis of b/l arms. Otherwish no discoloration jaundice cyanosis or erythema noted  Cardio:   Pulm:  GI:  Neuro: Affect appropriate  Ext: Warm and dry. No edema. Pulses 2+ throughout PE:  Gen: Elderly female lying comfortably in bed. AA&Ox2 (oriented to person and place, knows she is 89).  HEENT: NC, AT. Left eye patched for unknown reason. PERRL b/l. Neck supple  Skin: Ecchymosis of b/l arms. Heel ulcer of left covered with biopatch. Otherwish no discoloration jaundice cyanosis or erythema noted  Cardio: S1 s2 heard.   Pulm: clear to auscultation b/l  GI: Soft nontender abd +bs   Neuro: Affect appropriate  Ext: Warm and dry. No edema. Pulses 2+ throughout PE:  Gen: Elderly female lying comfortably in bed. AA&Ox2 (oriented to person and place, knows she is 89).  HEENT: NC, AT. Left eye patched for unknown reason. PERRL b/l. Neck supple  Skin: Ecchymosis of b/l arms. Erythema of lower back and sacral area. Left heel stage 1 pressure ulcer, no signs of infection. Otherwish no discoloration jaundice cyanosis or erythema noted  Cardio: S1 s2 heard.   Pulm: clear to auscultation b/l  GI: Soft nontender abd +bs   Neuro: Affect appropriate  Ext: Pressure ulcer as mentioned in skin. Warm and dry. No edema. Pulses 2+ throughout

## 2021-06-15 NOTE — ED PROVIDER NOTE - ATTENDING CONTRIBUTION TO CARE
Pt. awake and alert. PT. oriented to person and place. Pt. non-toxic appearing. Lungs CTA b/l. Abdomen soft/NT. Mild bruising noted to b/l upper ext. I, Dr. Garcia, performed a face to face bedside interview with this patient regarding history of present illness, review of symptoms and relevant past medical, social and family history.  I completed an independent physical examination.  I have also reviewed the PA student's note(s) and discussed the plan with the PA student.

## 2021-06-15 NOTE — ED PROVIDER NOTE - PROGRESS NOTE DETAILS
Pt. stable appearing.  Vitals are stable. Lab results discussed with Granddaughter who is at the bedside. UA was just sent. As per sign-out from Dr. Garcia, patient with a couple of weeks of confusion likely secondary to UTI. Family prefers out-patient management.

## 2021-06-15 NOTE — ED PROVIDER NOTE - PATIENT PORTAL LINK FT
You can access the FollowMyHealth Patient Portal offered by [facility] by registering at the following website: [email or web address]. By joining [facility]’s FollowMyHealth portal, you will also be able to view your health information using other applications (apps) compatible with our system.

## 2021-06-15 NOTE — ED ADULT TRIAGE NOTE - CHIEF COMPLAINT QUOTE
Patient BIBA to ED today from home with c/o increased confusion as per family.  Patient has home health aide, hx dementia.

## 2021-06-15 NOTE — ED PROVIDER NOTE - NSFOLLOWUPINSTRUCTIONS_ED_ALL_ED_FT
1) Follow up with your doctor within one week  2) Return to the ER for worsening or concerning symptoms  3) Take antibiotics as prescribed      Urinary Tract Infection in Older Adults    WHAT YOU NEED TO KNOW:    A urinary tract infection (UTI) is caused by bacteria that get inside your urinary tract. Your urinary tract includes your kidneys, ureters, bladder, and urethra. Urine is made in your kidneys, and it flows from the ureters to the bladder. Urine leaves the bladder through the urethra. A UTI is more common in your lower urinary tract, which includes your bladder and urethra.    Kidney, Ureters, Bladder         DISCHARGE INSTRUCTIONS:    Return to the emergency department if:   •You are urinating very little or not at all.      •You are vomiting.      •You have a high fever with shaking chills.       •You have side or back pain that gets worse.      Call your doctor if:   •You have a fever.      •You are a woman and you have increased white or yellow discharge from your vagina.      •You do not feel better after 2 days of taking antibiotics.      •You have questions or concerns about your condition or care.      Medicines:   •Medicines help treat the bacterial infection or decrease pain and burning when you urinate. You may also need medicines to decrease the urge to urinate often. If you have UTIs often (called recurrent UTIs), you may be given antibiotics to take regularly. You will be given directions for when and how to use antibiotics. The goal is to prevent UTIs but not cause antibiotic resistance by using antibiotics too often.      •Take your medicine as directed. Contact your healthcare provider if you think your medicine is not helping or if you have side effects. Tell him or her if you are allergic to any medicine. Keep a list of the medicines, vitamins, and herbs you take. Include the amounts, and when and why you take them. Bring the list or the pill bottles to follow-up visits. Carry your medicine list with you in case of an emergency.      Self-care:   •Drink liquids as directed. Liquids can help flush bacteria from your urinary tract. Ask how much liquid to drink each day and which liquids are best for you. You may need to drink more liquids than usual to help flush out the bacteria. Do not drink alcohol, caffeine, and citrus juices. These can irritate your bladder and increase your symptoms.      •Apply heat on your abdomen for 20 to 30 minutes every 2 hours for as many days as directed. Heat helps decrease discomfort and pressure in your bladder.      Prevent a UTI:   •Urinate when you feel the urge. Do not hold your urine. Bacteria can grow if urine stays in the bladder too long. It may be helpful to urinate at least every 3 to 4 hours.      •Urinate after you have sex to flush away bacteria that can enter your urinary tract during sex.      •Wear cotton underwear and clothes that are loose. Tight pants and nylon underwear can trap moisture and cause bacteria to grow.      •Cranberry juice or cranberry supplements may help prevent UTIs. Your healthcare provider can recommend the right juice or supplement for you.      •Women should wipe front to back after urinating or having a bowel movement. This may prevent germs from getting into the urinary tract. Do not douche or use feminine deodorants. These can change the chemical balance in your vagina. You may also be given vaginal estrogen medicine. This medicine helps prevent recurrent UTIs in women who have gone through menopause or are in jennifer-menopause.      Follow up with your healthcare provider as directed: Write down your questions so you remember to ask them during your visits.       Dementia    WHAT YOU NEED TO KNOW:    Dementia is a condition that causes loss of memory, thought control, and judgment. Alzheimer disease is the most common cause of dementia. Other common causes are loss of blood flow or nerve damage in the brain, and long-term alcohol or drug use. Dementia cannot be cured or prevented, but treatment may slow or reduce your symptoms.    DISCHARGE INSTRUCTIONS:    Return to the emergency department if you or someone close to you notices:   •You have signs of delirium, such as extreme confusion, and seeing or hearing things that are not there.      •You become angry or violent, and cannot be calmed down.      •You faint and cannot be woken.      Call your doctor or have someone else call if:   •You have a fever.      •You have increased confusion, behavior, or mood changes.      •You have questions or concerns about your condition or care.      Medicines: You may need any of the following:   •Antianxiety medicine may be used to help reduce anxiety and keep you calm.      •Antipsychotics may be used to help control any anger or violence.      •Antidepressants may be used to help improve your mood and reduce your symptoms of depression.      •Take your medicine as directed. Contact your healthcare provider if you think your medicine is not helping or if you have side effects. Tell him of her if you are allergic to any medicine. Keep a list of the medicines, vitamins, and herbs you take. Include the amounts, and when and why you take them. Bring the list or the pill bottles to follow-up visits. Carry your medicine list with you in case of an emergency.      Manage dementia: You may begin to need an in-home aide to help you remember your daily tasks. Ask your healthcare provider for a list of organizations that can help. It is best to arrange for help while you are thinking clearly. The following may also help you manage dementia:  •Keep your mind and body active. Do activities that you love, such as art, gardening, or listening to music. Call or visit people often. This will keep your social skills sharp, and may help reduce depression.      •Take all of your medicines as directed. This will help control medical conditions, such as high blood pressure, high cholesterol, and diabetes.      •Write daily schedules and routines. Record medical appointments, times to take your medicines, meal times, or any other things to remember. Write down reminders to use the bathroom if you have trouble remembering. You may need to ask someone to write things down for you.      •Place clocks and calendars where you can see them. This will help you remember appointments and tasks.      •Do not smoke. Nicotine and other chemicals in cigarettes and cigars can cause blood vessel damage. Ask your healthcare provider for information if you currently smoke and need help to quit. E-cigarettes or smokeless tobacco still contain nicotine. Talk to your healthcare provider before you use these products.      •Eat healthy foods. Examples are fruits, vegetables, whole-grain breads, low-fat dairy products, beans, lean meats, and fish. Ask if you need to be on a special diet.      Follow up with your healthcare provider as directed: Write down your questions so you remember to ask them during your visits. Ask someone to go in with you if you have trouble understanding or remembering what your healthcare provider tells you. The person can take notes for you during the visit and go over the notes with you later.

## 2021-06-15 NOTE — ED PROVIDER NOTE - OBJECTIVE STATEMENT
89 y/o F with PMH of dementia p/w increased confusion x1 day. Family states patient seems more confused, is more talkative and is talking to dead family members. Patient denies any pain. 89 y/o F with PMH of dementia p/w increased confusion x1 day. Family states patient seems more confused, is more talkative and is talking to dead family members. Patient is bedbound at baseline. Also c/o heel pain b/l, "burning" she describes it. Patient denies any pain.

## 2021-06-15 NOTE — ED ADULT NURSE NOTE - OBJECTIVE STATEMENT
per family pt was more  confused then normal speaking in full sentences, offers no other complaints, resting in bed on monitor pt aox1  per family pt inc confusion for over 3 weeks pt moving all extremities equal bilaterally   does present with left heal dressing and pressure sore.

## 2021-06-15 NOTE — ED PROVIDER NOTE - CLINICAL SUMMARY MEDICAL DECISION MAKING FREE TEXT BOX
89 y/o female with PMH of dementia p/w subjective increased confusion per family. Will work up with labs and CTH.

## 2021-06-19 NOTE — ED ADULT NURSE REASSESSMENT NOTE - NS ED NURSE REASSESS COMMENT FT1
report given to RENE Hewitt in ED holding room. pt transported to [facility] in stable condition by RN.

## 2021-06-19 NOTE — ED PROVIDER NOTE - PHYSICAL EXAMINATION
Gen: elederly, left eye patch  HEENT: Mucous membranes moist, pink conjunctivae, EOMI  CV: RRR, nl s1/s2.  Resp: CTAB, normal rate and effort  GI: Abdomen soft, NT, ND. No rebound, no guarding  : No CVAT  Neuro: awake, alert, [place], moving all extremities.   MSK: No spine or joint tenderness to palpation  Skin: old ecchymoses b/l arms. pressure ulcers to left heel/lateral foot c/d/i.

## 2021-06-19 NOTE — ED ADULT NURSE NOTE - OBJECTIVE STATEMENT
pt presents to ed alert and oriented to self only not place or time. pt presents with daughter who states they were called back to be admitted for IV antibiotics for UTI. pt c/o burning with urination. as per daughter, pt is not back to her baseline as of yet. hx dementia. pt presents with L foot pressure injury, chronic. denies fever at home. bedbound at baseline.

## 2021-06-19 NOTE — ED ADULT NURSE REASSESSMENT NOTE - NS ED NURSE REASSESS COMMENT FT1
pt c/o burning feeling on her bottom as per family at bedside. SNA cleaned, turned and repositioned pt without complications. pt tolerated well. will continue to reassess.

## 2021-06-19 NOTE — ED PROVIDER NOTE - OBJECTIVE STATEMENT
89 y/o female hx dementia, chronic left eye infection >10 years per daughter, htn here 3 days ago for worsening confusion/behavior, found to have MDR uti on urine culture, called back for iv abx. Pt with continued behavioral issues recently when normally dementia is "mild." No other acute medical complaints. Was takin keflex.     limited ros by [employer]/historian.

## 2021-06-19 NOTE — H&P ADULT - EXTREMITIES COMMENTS
left heel ulcer small size , no discharge , no surrounding skin redness  left lateral foot skin wound dressing in place

## 2021-06-19 NOTE — H&P ADULT - HISTORY OF PRESENT ILLNESS
Pt is 89 y/o F with PMH of dementia p/w increased confusion x1 day to ED on 6/16 and evaluated diagnosed  with UTI discharged home with oral Keflex and daughter states that patient seems getting more confused and agitated   Patient is bedbound at baseline. Also c/o heel pain b/l, "burning" she describes it. Patient denies any pain.She has wounds in left foot ulcer cronically  Daughter states that pt has no fever , no diarrhea , no chills , no cough  , good appetite

## 2021-06-19 NOTE — H&P ADULT - NSHPPHYSICALEXAM_GEN_ALL_CORE
Vital Signs Last 24 Hrs  T(C): 36.6 (19 Jun 2021 11:28), Max: 36.6 (19 Jun 2021 11:28)  T(F): 97.8 (19 Jun 2021 11:28), Max: 97.8 (19 Jun 2021 11:28)  HR: 60 (19 Jun 2021 11:28) (60 - 60)  BP: 126/70 (19 Jun 2021 11:28) (126/70 - 126/70)  BP(mean): --  RR: 20 (19 Jun 2021 11:28) (20 - 20)  SpO2: 97% (19 Jun 2021 11:28) (97% - 97%)

## 2021-06-19 NOTE — ED POST DISCHARGE NOTE - RESULT SUMMARY
Multi-drug resistant ESBL in urine only sensitive to IV abx, I s/w daughter states her mother is very confused and difficult to handle..  Daughter will send in by ambulance

## 2021-06-19 NOTE — ED ADULT NURSE NOTE - INTERVENTIONS DEFINITIONS
[place] to call system/Call bell, personal items and telephone within reach/Non-slip footwear when patient is off stretcher/Stretcher in lowest position, wheels locked, appropriate side rails in place

## 2021-06-19 NOTE — H&P ADULT - NSHPLABSRESULTS_GEN_ALL_CORE
8.9    5.29  )-----------( 317      ( 19 Jun 2021 12:39 )             28.6     06-19    137  |  104  |  20.7<H>  ----------------------------<  87  5.2   |  25.0  |  0.64    Ca    8.5<L>      19 Jun 2021 12:39    TPro  6.2<L>  /  Alb  2.7<L>  /  TBili  0.4  /  DBili  x   /  AST  44<H>  /  ALT  18  /  AlkPhos  67  06-19

## 2021-06-19 NOTE — H&P ADULT - CONSTITUTIONAL
[facility]     detailed exam

## 2021-06-19 NOTE — H&P ADULT - ASSESSMENT
Pt is with UTI MDR bacterial infection , h/o atrial fib , and dementia       1- UTI   MDR bacterial infection   start meropenem    will call ID in am to evaluate course duration   add probiotic     2- Dementia with acute encephalopathy , agitated due to infection likely   cont enhanced supervision f family not around     3- h/o atrial fib   cont amio   stable     4- Anemia   will check iron studies , vit b1 2   folate     5- foot heel ulcer on the left   cont daily wound care , heel elevation   bed bound   no indication  for PT   DVT prophylaxis heparin sq

## 2021-06-19 NOTE — ED ADULT TRIAGE NOTE - CHIEF COMPLAINT QUOTE
Pt BIBA from home, pt currently taking keflex for UTI, called to come back to ED for IV antibiotics, hx of dementia

## 2021-06-19 NOTE — ED PROVIDER NOTE - CLINICAL SUMMARY MEDICAL DECISION MAKING FREE TEXT BOX
pt with morganella and klebsiella MDR uti on culture from 3 days ago. sensitive to meropenem. will send culture, meropenem. no sirs currently. admission for iv abx.

## 2021-06-20 NOTE — PATIENT PROFILE ADULT - FALLEN IN THE PAST
"Cardiology  Consult Note    Attending: Dr Godwin    Reason for the Consult: CHF exacerbation    Consulting Service: IM-C    4/7/2017    SUBJECTIVE  57 y.o. female with PMH of woman with PMHx of schizophrenia, DM, HTN,crack cocaine abuse and ICM (EF 28% s/p ICD) s/p PCI in 2006 and 2013 who was admitted for atypical chest pain. Patient described the pain as body aches but the chest is the main site for the pain. The patient denies salty food intake and has been off her medications for "three days". Upon ED admission she presented hypertensive with /80, normal O2 sat and drug test positive for cocaine, opiods and benzos. EKG failed to show ischemic changes and troponins 0.023 with BNP of 2500's which is similar to the last time she was admitted in July 2016.In addition she presented overloaded with crackels and JVD on the physical exam. She was given lasix 80 mg IV x1 in the ED and then started on BID regimen. Thus far urine output has been > 150 ml/hour and patient reports to be feeling better.   She is followed by Dr MAO Buck in the cardiology clinic. She was last seen in the clinic in May 2016. Patient sustains recurrent admissions for CHF exacerbation due to poor medication compliance.   ?  Review of Systems   Constitution: Negative for chills, fever, weight gain and weight loss.   HENT: Negative.    Eyes: Negative.    Cardiovascular: See HPI  Respiratory: Negative for shortness of breath. Negative for cough.    Endocrine: Negative.    Hematologic/Lymphatic: Negative.    Skin: Negative for color change and rash.   Musculoskeletal: Negative.    Gastrointestinal: Negative for abdominal pain, change in bowel habit, constipation, diarrhea and heartburn.   Genitourinary: Negative.    Neurological: Negative for dizziness, light-headedness and loss of balance.   Psychiatric/Behavioral: Negative for altered mental status.     OBJECTIVE  Current Facility-Administered Medications   Medication Dose Route Frequency " Provider Last Rate Last Dose    albuterol-ipratropium 2.5mg-0.5mg/3mL nebulizer solution 3 mL  3 mL Nebulization Q4H PRN Isaiah Hua MD        aspirin EC tablet 81 mg  81 mg Oral Daily Isaiah Hua MD   81 mg at 04/07/17 0811    atorvastatin tablet 40 mg  40 mg Oral Daily Isaiah Hua MD   40 mg at 04/07/17 0811    benztropine tablet 1 mg  1 mg Oral QHS Isaiah Hua MD   1 mg at 04/07/17 0031    clopidogrel tablet 75 mg  75 mg Oral Daily Isaiah Hua MD   75 mg at 04/07/17 0811    dextrose 50% injection 12.5 g  12.5 g Intravenous PRN Isaiah Hua MD        dextrose 50% injection 25 g  25 g Intravenous PRN Isaiah Hua MD        fluoxetine capsule 20 mg  20 mg Oral Daily Isaiah Hua MD   20 mg at 04/07/17 0811    furosemide injection 80 mg  80 mg Intravenous BID Isaiah Hua MD   80 mg at 04/07/17 0811    glucagon (human recombinant) injection 1 mg  1 mg Intramuscular PRN Isaiah Hua MD        glucose chewable tablet 16 g  16 g Oral PRN Isaiah Hua MD        glucose chewable tablet 24 g  24 g Oral PRN Isaiah Hua MD        haloperidol tablet 10 mg  10 mg Oral QHS Isaiah Hua MD   10 mg at 04/07/17 0032    haloperidol tablet 5 mg  5 mg Oral QAM Isaiah Hua MD   5 mg at 04/07/17 0812    heparin (porcine) injection 5,000 Units  5,000 Units Subcutaneous Q8H Isaiah Hua MD   5,000 Units at 04/07/17 0620    insulin aspart pen 0-5 Units  0-5 Units Subcutaneous QID (AC + HS) PRN Isaiah Hua MD        lisinopril tablet 5 mg  5 mg Oral Daily Isaiah Hua MD   5 mg at 04/07/17 0811    mirtazapine tablet 30 mg  30 mg Oral QHS Isaiah Hua MD        nitroGLYCERIN SL tablet 0.4 mg  0.4 mg Sublingual Q5 Min PRN Isaiah Hua MD        risperidone tablet 4 mg  4 mg Oral QHS Isaiah Hua MD        sodium chloride 0.9% flush 3 mL  3 mL Intravenous Q8H Isaiah Hua MD   3 mL at 04/07/17 0630    trazodone tablet 150 mg  150 mg Oral QHS Isaiah Hua MD   150 mg at  "04/07/17 0031       Past Medical History:   Diagnosis Date    Asthma     Bipolar 1 disorder     Cardiomyopathy     Ischemic Cardiomyopathy with EF 25-30% by echo 5/17/16    Chronic systolic (congestive) heart failure     COPD (chronic obstructive pulmonary disease)     emphysema    Coronary artery disease     ROCHELLE 2006 and 2013    Depression     Diabetes mellitus     H/O echocardiogram 05/17/2016    EF 25-30%. LV diastolic dysfxn. Severe LAE. mod MRCynthia PASP 86.    Hypertension     ICD (implantable cardioverter-defibrillator) in place 07/09/2013    MI (myocardial infarction)     x 3    PTSD (post-traumatic stress disorder)     Schizophrenia     Seizures        Past Surgical History:   Procedure Laterality Date    CARDIAC CATHETERIZATION      CARDIAC DEFIBRILLATOR PLACEMENT      [place]    CORONARY STENT PLACEMENT      INSERT / REPLACE / REMOVE PACEMAKER  2013     [facility]       Review of patient's allergies indicates:  No Known Allergies    Family History   Problem Relation Age of Onset    Adopted: Yes       Social History     Social History    Marital status:      Spouse name: N/A    Number of children: 4    Years of education: N/A     Occupational History    On disability      Social History Main Topics    Smoking status: Current Every Day Smoker     Packs/day: 0.50     Years: 20.00     Last attempt to quit: 3/6/2013    Smokeless tobacco: None    Alcohol use No    Drug use: No    Sexual activity: No     Other Topics Concern    None     Social History Narrative       Physical Exam  Vitals: /81 (BP Method: cNIBP)  Pulse 68  Temp 98.2 °F (36.8 °C) (Oral)   Resp (!) 21  Ht 5' 2" (1.575 m)  Wt 69 kg (152 lb 1.9 oz)  SpO2 98%  Breastfeeding? No  BMI 27.82 kg/m2 @CABRERA(5546)@ liters/minute  Gen: NAD  Head/Eyes/Ears/Nose: NCAT  Neck: 14 cm JVD  Lung: CTAB, mild rales and ronchi  Heart: Normal S1/S2, regular rate and rhythm, no " murmurs/rubs/gallops, normal PMI  Abdomen: Soft, NT/ND, NABS, no masses, no guarding/rebounding, no HSM  Extremities: 1+ LE edema bilaterally   Skin: Normal color and turgor. No rashes, no petechia, no ecchymoses.   Neuro: AAOx3      Recent Labs  Lab 04/06/17 2107      K 3.8   CO2 21*      BUN 11   CREATININE 0.8   GLU 74   CALCIUM 7.8*   ALT 29   AST 28   ALKPHOS 116   BILITOT 1.9*   PROT 6.2   ALBUMIN 2.8*         Recent Labs  Lab 04/06/17 2107   WBC 5.83   HGB 11.2*   HCT 33.5*      MCV 84       Recent Labs  Lab 04/06/17 2107   INR 1.3*       Recent Labs  Lab 04/06/17 2107   TROPONINI 0.023        Results  2D echo 05/2016  CONCLUSIONS     1 - Moderate left ventricular enlargement.     2 - Moderately to severely depressed left ventricular systolic function (EF 25-30%).     3 - Eccentric hypertrophy.     4 - Left ventricular diastolic dysfunction.     5 - Severe left atrial enlargement.     6 - Low normal right ventricular systolic function .     7 - Moderate mitral regurgitation.     8 - Moderate tricuspid regurgitation.     9 - Intermediate central venous pressure.     10 - Pulmonary hypertension. The estimated PA systolic pressure is 86 mmHg.     EKG   NSR, LAB, LAD, PVC  ?  ASSESSMENT AND PLAN  57 y.o. female with PMHx of schizophrenia, DM, HTN,crack cocaine abuse and ICM (EF 28% s/p ICD) s/p PCI in 2006 and 2013 who was admitted for atypical chest pain. Patient described the pain as body aches but the chest is the main site for the pain.     Recommendations:   -CHF exacerbation on the setting of drug abuse and poor medication compliance  -agree IVP lasix 80 mg BID for now  -continue lytes monitoring and keep K>4 and Mg> 2  -SW consult for rehab and medication compliance  -enrollment on the electronic HTN program  -follow up with Dr Buck as outpatient post hospital discharge  -will continue to follow up this patient with you    Thanks for allowing us to take care of Ms Kwan. In case  you have any further questions, please don't hesitate in contacting us    Discussed with Cardiology Attending: Dr Godwin.     Sukhdeep Correa MD  Cardiology Fellow  # [phone]     yes

## 2021-06-20 NOTE — PROGRESS NOTE ADULT - SUBJECTIVE AND OBJECTIVE BOX
Internal Medicine Hospitalist Progress Note    follow up for agitation , UTI , dementia     pt is seen in am , awake , confused o place   hard of hearing   c/o pain in the legs                   ROS: as above, all remaining ROS are negative.       BACKGROUND:  MEDICATIONS  (STANDING):  aMIOdarone    Tablet 200 milliGRAM(s) Oral daily  ascorbic acid 500 milliGRAM(s) Oral daily  aspirin  chewable 81 milliGRAM(s) Oral daily  folic acid 1 milliGRAM(s) Oral daily  heparin   Injectable 5000 Unit(s) SubCutaneous every 12 hours  meropenem  IVPB 1000 milliGRAM(s) IV Intermittent every 24 hours  saccharomyces boulardii 250 milliGRAM(s) Oral two times a day    MEDICATIONS  (PRN):    Allergies    No Known Allergies    Intolerances            VITALS:  Vital Signs Last 24 Hrs  T(C): 36.8 (20 Jun 2021 08:11), Max: 37.1 (19 Jun 2021 19:20)  T(F): 98.2 (20 Jun 2021 08:11), Max: 98.8 (20 Jun 2021 02:54)  HR: 73 (20 Jun 2021 08:11) (60 - 74)  BP: 143/60 (20 Jun 2021 08:11) (126/70 - 152/60)  BP(mean): --  RR: 18 (20 Jun 2021 08:11) (16 - 20)  SpO2: 97% (20 Jun 2021 08:11) (95% - 98%) Daily Height in cm: 149.86 (19 Jun 2021 11:28)    Daily   CAPILLARY BLOOD GLUCOSE        I&O's Summary       PHYSICAL EXAM:      Constitutional: awake ,  no distress     Neck: supple , no JVD     Respiratory: CTA bilateral     Cardiovascular: regular s1 /s2     Gastrointestinal: soft no tenderness , Bs positive     Extremities: no pretibial edema   bilateral heel wound dressing in place           LABS:                        8.9    5.29  )-----------( 317      ( 19 Jun 2021 12:39 )             28.6     06-19    137  |  104  |  20.7<H>  ----------------------------<  87  5.2   |  25.0  |  0.64    Ca    8.5<L>      19 Jun 2021 12:39    TPro  6.2<L>  /  Alb  2.7<L>  /  TBili  0.4  /  DBili  x   /  AST  44<H>  /  ALT  18  /  AlkPhos  67  06-19

## 2021-06-20 NOTE — PROGRESS NOTE ADULT - ASSESSMENT
89 yo F with Dementia , HTN , admitted for worsening confusion , agitation with MDR UTI     admitted for IV abx  therapy     1- UTI   multidrug resistant   cont iv abx , follow up cx result     2- Acute encephalopathy with underlying dementia due to infection likely   cont to monitor   improving   treat infection   iv fluid     3- hyperkalemia   lokelma given   repeat BMP in am     bed bound   no PT   dvt prophylaxis     full code

## 2021-06-21 NOTE — PROGRESS NOTE ADULT - SUBJECTIVE AND OBJECTIVE BOX
Internal Medicine Hospitalist Progress Note    follow up for agitation , UTI , dementia     pt is seen in am , awake , confused o place cooperative   no overnight events reported     hard of hearing               ROS limited pt was confused not able to provide history     BACKGROUND:  MEDICATIONS  (STANDING):  aMIOdarone    Tablet 200 milliGRAM(s) Oral daily  ascorbic acid 500 milliGRAM(s) Oral daily  aspirin  chewable 81 milliGRAM(s) Oral daily  folic acid 1 milliGRAM(s) Oral daily  heparin   Injectable 5000 Unit(s) SubCutaneous every 12 hours  meropenem  IVPB 1000 milliGRAM(s) IV Intermittent every 24 hours  saccharomyces boulardii 250 milliGRAM(s) Oral two times a day    MEDICATIONS  (PRN):    Allergies    No Known Allergies    Intolerances            Vital Signs Last 24 Hrs  T(C): 36.9 (21 Jun 2021 04:49), Max: 37.2 (20 Jun 2021 22:34)  T(F): 98.4 (21 Jun 2021 04:49), Max: 98.9 (20 Jun 2021 22:34)  HR: 77 (21 Jun 2021 04:49) (73 - 77)  BP: 140/52 (21 Jun 2021 04:49) (127/65 - 156/73)  BP(mean): --  RR: 14 (21 Jun 2021 04:49) (14 - 16)  SpO2: 97% (21 Jun 2021 04:49) (97% - 98%)      Constitutional: awake ,  no distress   EYE : left eye patch in place cronic per daughter )     Respiratory: CTA bilateral anteriorly     Cardiovascular: regular s1 /s2     Gastrointestinal: soft no tenderness , Bs positive     Extremities: no pretibial edema     bilateral heel wound dressing in place       ulCulture - Urine (06.16.21 @ 03:52)   - Tigecycline: R <=2   - Tigecycline: S <=2   - Tobramycin: R >8   - Tobramycin: R >8   - Trimethoprim/Sulfamethoxazole: R >2/38   - Trimethoprim/Sulfamethoxazole: R >2/38   - Ciprofloxacin: R >2   - Ciprofloxacin: R >2   - Ertapenem: S <=0.5   - Ertapenem: S <=0.5   - Gentamicin: R >8   - Gentamicin: R >8   - Imipenem: S <=1   - Imipenem: S <=1   - Levofloxacin: R >4   - Levofloxacin: R >4   - Meropenem: S <=1   - Meropenem: S <=1   - Nitrofurantoin: R >64 Should not be used to treat pyelonephritis   - Nitrofurantoin: R >64 Should not be used to treat pyelonephritis   - Piperacillin/Tazobactam: S 16   - Piperacillin/Tazobactam: S 16   - Amikacin: S <=16   - Amikacin: S <=16   - Amoxicillin/Clavulanic Acid: R >16/8   - Amoxicillin/Clavulanic Acid: I 16/8   - Ampicillin: R >16 These ampicillin results predict results for amoxicillin   - Ampicillin: R >16 These ampicillin results predict results for amoxicillin   - Ampicillin/Sulbactam: R >16/8 Enterobacter, Citrobacter, and Serratia may develop resistance during prolonged therapy (3-4 days)   - Ampicillin/Sulbactam: R >16/8 Enterobacter, Citrobacter, and Serratia may develop resistance during prolonged therapy (3-4 days)   - Aztreonam: R >16   - Aztreonam: R >16   - Cefazolin: R >16   - Cefazolin: R >16 (MIC_CL_COM_[id number]) For uncomplicated UTI with K. pneumoniae, E. coli, or P. mirablis: NORMAN <=16 is sensitive and NORMAN >=32 is resistant. This also predicts results for oral agents cefaclor, cefdinir, cefpodoxime, cefprozil, cefuroxime axetil, cephalexin and locarbef for uncomplicated UTI. Note that some isolates may be susceptible to these agents while testing resistant to cefazolin.   - Cefepime: R >16   - Cefepime: I 16   - Cefoxitin: S <=8   - Cefoxitin: S <=8   - Ceftriaxone: R >32 Enterobacter, Citrobacter, and Serratia may develop resistance during prolonged therapy                           8.9    5.29  )-----------( 317      ( 19 Jun 2021 12:39 )             28.6   06-19    137  |  104  |  20.7<H>  ----------------------------<  87  5.2   |  25.0  |  0.64    Ca    8.5<L>      19 Jun 2021 12:39    TPro  6.2<L>  /  Alb  2.7<L>  /  TBili  0.4  /  DBili  x   /  AST  44<H>  /  ALT  18  /  AlkPhos  67  06-19

## 2021-06-21 NOTE — CDI QUERY NOTE - NSCDIOTHERTXTBX_GEN_ALL_CORE_HH
ED- Pt with continued behavioral issues recently when normally dementia is "mild.  Principal Discharge DX:	UTI (urinary tract infection).    H&P- 1- UTI   2- Dementia with acute encephalopathy , agitated due to infection likely     6/20 Hospitalist- 2- Acute encephalopathy with underlying dementia due to infection likely       Please specify type of encephalopathy    1) Acute metabolic encephalopathy  2) Other  3) Not clinically significant

## 2021-06-21 NOTE — PROGRESS NOTE ADULT - ASSESSMENT
89 yo F with Dementia , HTN , admitted for worsening confusion , agitation due to  UTI , multi drug resistant , on merrem iv    admitted for IV abx  therapy     1- UTI , multi drug resistant   cont meropenem , ID consult   afebrile     2- Acute encephalopathy with underlying dementia due to infection likely   cont to monitor , improving at baseline ?   will d/w daughter   iv fluid     3- hyperkalemia , improving   s/p lokelma   pending BMP this am   ordered     bed bound   no indication for PT , lives with his daughter     dvt prophylaxis     full code per family decision

## 2021-06-21 NOTE — CONSULT NOTE ADULT - ASSESSMENT
89y  Female with h/o dementia. Patient brought to the ED with increased confusion x1 day to ED on 6/16 and evaluated diagnosed  with UTI discharged home with oral Keflex. Patient's daughter noticed patient getting more confused and agitated. Patient is bedbound at baseline. Brought her back to the ER.  In the ER patient is afebrile, no leukocytosis. Started on IV Meropenem.       Morganella morganii in UTI  Klebsiella pneumoniae ESBL UTI      - Urine cultures reporting Morganella morganii and Klebsiella pneumoniae ESBL  - RVP/COVID 19 PCR negative   - CXR no PNA   - UA negative for UTI   - Continue Meropenem, last day today to complete 3 days of antibiotics for treatment of uncomplicated UTI.   - Follow up cultures  - Trend Fever  - Trend Leukocytosis      Thank you for allowing me to participate in the care of your patient.   Will Follow      d/w Dr Goncalvse      89y  Female with h/o dementia. Patient brought to the ED with increased confusion x1 day to ED on 6/16 and evaluated diagnosed  with UTI discharged home with oral Keflex. Patient's daughter noticed patient getting more confused and agitated. Patient is bedbound at baseline. Brought her back to the ER.  In the ER patient is afebrile, no leukocytosis. Started on IV Meropenem.       Morganella morganii in UTI  Klebsiella pneumoniae ESBL UTI      - Urine cultures reporting Morganella morganii and Klebsiella pneumoniae ESBL  - RVP/COVID 19 PCR negative   - CXR no PNA   - UA negative for UTI   - Continue Meropenem, last day today to complete 3 days of antibiotics for treatment of uncomplicated UTI.   - Follow up cultures  - Trend Fever  - Trend Leukocytosis      Thank you for allowing me to participate in the care of your patient.   Will sign off. Please call PRN.       d/w Dr Goncalves

## 2021-06-21 NOTE — CONSULT NOTE ADULT - SUBJECTIVE AND OBJECTIVE BOX
[facility]  INFECTIOUS DISEASES AND INTERNAL MEDICINE at [place]  =======================================================  Mikal Collins MD  Diplomates American Board of Internal Medicine and Infectious Diseases  Tel: [phone]      Fax: [phone]  =======================================================      [facility]-[id number]  EDOUARD MALAVE    History obtained from the chart. Patient unable to provide history due o medical condition.     CC: Patient is a 89y old  Female who presents with a chief complaint of confusion , agitation , MDR UTI (2021 09:13)      89y  Female with h/o dementia. Patient brought to the ED with increased confusion x1 day to ED on  and evaluated diagnosed  with UTI discharged home with oral Keflex. Patient's daughter noticed patient getting more confused and agitated. Patient is bedbound at baseline. Brought her back to the ER.  In the ER patient is afebrile, no leukocytosis. Started on IV Meropenem.  ID input requested.         Past Medical & Surgical Hx:  HTN (hypertension)  MI (myocardial infarction) @ age 89  No significant past surgical history      Social Hx:  Unable to obtain due to medical condition      FAMILY HISTORY:  FHx: colon cancer (Mother)  at age 56      Allergies  No Known Allergies       REVIEW OF SYSTEMS:  Unable to obtain due to medical condition       Physical Exam:  GEN: NAD, lethargic  HEENT: normocephalic and atraumatic. EOMI. PERRL.  Anicteric  NECK: Supple.   LUNGS: decreased basal BS B/L   HEART: Regular rate and rhythm   ABDOMEN: Soft, nontender, and nondistended.  Positive bowel sounds.    : No CVA tenderness  EXTREMITIES: Without any edema.  MSK: No joint swelling  NEUROLOGIC:  wakes to stimuli, not answering questions   PSYCHIATRIC: unable to obtain.   SKIN: No Rash      Vitals:  T(F): 97.6 (2021 07:47), Max: 98.9 (2021 22:34)  HR: 70 (2021 07:47)  BP: 142/62 (2021 12:00)  RR: 18 (2021 07:47)  SpO2: 96% (2021 07:47) (96% - 98%)  temp max in last 48H T(F): , Max: 98.9 (21 @ 22:34)      Current Antibiotics:  meropenem  IVPB 1000 milliGRAM(s) IV Intermittent every 24 hours    Other medications:  aMIOdarone    Tablet 200 milliGRAM(s) Oral daily  ascorbic acid 500 milliGRAM(s) Oral daily  aspirin  chewable 81 milliGRAM(s) Oral daily  folic acid 1 milliGRAM(s) Oral daily  heparin   Injectable 5000 Unit(s) SubCutaneous every 12 hours  saccharomyces boulardii 250 milliGRAM(s) Oral two times a day                 10.3   9.81  )-----------( 357      ( 2021 11:11 )             33.1         135  |  99  |  19.3  ----------------------------<  237<H>  3.9   |  25.0  |  0.67    Ca    8.7      2021 11:11      RECENT CULTURES:   @ 12:41 .Blood Blood-Peripheral     No growth at 48 hours     @ 03:52 .Urine Clean Catch (Midstream) Morganella morganii  Klebsiella pneumoniae ESBL    >100,000 CFU/ml Morganella morganii  >100,000 CFU/ml Klebsiella pneumoniae ESBL      WBC Count: 9.81 K/uL (21 @ 11:11)  WBC Count: 5.29 K/uL (21 @ 12:39)    Creatinine, Serum: 0.67 mg/dL (21 @ 11:11)  Creatinine, Serum: 0.64 mg/dL (21 @ 12:39)    COVID-19 PCR: NotDetec (21 @ 12:40)      Urinalysis (21 @ 16:43)    Glucose Qualitative, Urine: Negative mg/dL    Blood, Urine: Negative    pH Urine: 6.0    Color: Yellow    Urine Appearance: Clear    Bilirubin: Negative    Ketone - Urine: Negative    Specific Gravity: 1.015    Protein, Urine: Negative mg/dL    Urobilinogen: Negative mg/dL    Nitrite: Negative    Leukocyte Esterase Concentration: Negative            < from: Xray Chest 1 View- PORTABLE-Urgent (Xray Chest 1 View- PORTABLE-Urgent .) (06.15.21 @ 22:30) >  EXAM:  XR CHEST PORTABLE URGENT 1V                          PROCEDURE DATE:  06/15/2021      INTERPRETATION:  AP chest on Adilene 15, 2021 at 11:07 PM. Patient has altered mental status.    Heart is likely enlarged. Dense a calcified mitral area again seen.    On April 15 of this year there was a mild left base effusion which is again seen but shows improvement.    Postmastectomy deformity left upper humerus again noted.    IMPRESSION: Persistent mild left base effusion but there is improvement.    < end of copied text >

## 2021-06-22 NOTE — DIETITIAN NUTRITION RISK NOTIFICATION - TREATMENT: THE FOLLOWING DIET HAS BEEN RECOMMENDED
Diet, Regular:   Dysphagia 3, Soft, Thin Liquids ([id number])  Supplement Feeding Modality:  Oral  Ensure Enlive Cans or Servings Per Day:  1       Frequency:  Three Times a day (06-20-21 @ 10:35) [Active]

## 2021-06-22 NOTE — DIETITIAN INITIAL EVALUATION ADULT. - PERTINENT LABORATORY DATA
06-21 Na135 mmol/L Glu 237 mg/dL<H> K+ 3.9 mmol/L Cr  0.67 mg/dL BUN 19.3 mg/dL Phos n/a   Alb n/a   PAB n/a

## 2021-06-22 NOTE — DIETITIAN INITIAL EVALUATION ADULT. - PERTINENT MEDS FT
MEDICATIONS  (STANDING):  aMIOdarone    Tablet 200 milliGRAM(s) Oral daily  ascorbic acid 500 milliGRAM(s) Oral daily  aspirin  chewable 81 milliGRAM(s) Oral daily  folic acid 1 milliGRAM(s) Oral daily  heparin   Injectable 5000 Unit(s) SubCutaneous every 12 hours  iron sucrose IVPB 200 milliGRAM(s) IV Intermittent every 24 hours  meropenem  IVPB 1000 milliGRAM(s) IV Intermittent every 24 hours  saccharomyces boulardii 250 milliGRAM(s) Oral two times a day    MEDICATIONS  (PRN):

## 2021-06-22 NOTE — GOALS OF CARE CONVERSATION - ADVANCED CARE PLANNING - CONVERSATION DETAILS
pt with some confusion, spoke to daughter who is HCP, states pt has never wanted a molst/dnr/dni due to past issues when her  had one. pt limited historian right now but daughter wants full code.
daughter Sylvie is care giver , spoke to her in details regarding her mom's advance age , dementia , recurrent UTI's and given her functional status and her general condition , overall poor prognosis , if she would consider DNr/ DNI   Molts form given and expalined to her that there are options she can decide and her mom due to deemntia and poor cogntion can not have this discussion with her or expect her to make decision     daughter agrees to think about it , to review the form we will provide

## 2021-06-22 NOTE — PROGRESS NOTE ADULT - SUBJECTIVE AND OBJECTIVE BOX
Internal Medicine Hospitalist Progress Note    follow up for  UTI , dementia   had low grade fever this am   pt is confused , calm cooperative     no distress     spoke to her daughter Sylvie on the phone              Vital Signs Last 24 Hrs  T(C): 36.9 (21 Jun 2021 04:49), Max: 37.2 (20 Jun 2021 22:34)  T(F): 98.4 (21 Jun 2021 04:49), Max: 98.9 (20 Jun 2021 22:34)  HR: 77 (21 Jun 2021 04:49) (73 - 77)  BP: 140/52 (21 Jun 2021 04:49) (127/65 - 156/73)  BP(mean): --  RR: 14 (21 Jun 2021 04:49) (14 - 16)  SpO2: 97% (21 Jun 2021 04:49) (97% - 98%)      Constitutional: awake ,  no distress   EYE : left eye patch in place cronic per daughter )     Respiratory: CTA bilateral anteriorly     Cardiovascular: regular s1 /s2     Gastrointestinal: soft no tenderness , Bs positive     Extremities: no pretibial edema     bilateral heel wound dressing in place       ulCulture - Urine (06.16.21 @ 03:52)   - Tigecycline: R <=2   - Tigecycline: S <=2   - Tobramycin: R >8   - Tobramycin: R >8   - Trimethoprim/Sulfamethoxazole: R >2/38   - Trimethoprim/Sulfamethoxazole: R >2/38   - Ciprofloxacin: R >2   - Ciprofloxacin: R >2   - Ertapenem: S <=0.5   - Ertapenem: S <=0.5   - Gentamicin: R >8   - Gentamicin: R >8   - Imipenem: S <=1   - Imipenem: S <=1   - Levofloxacin: R >4   - Levofloxacin: R >4   - Meropenem: S <=1   - Meropenem: S <=1   - Nitrofurantoin: R >64 Should not be used to treat pyelonephritis   - Nitrofurantoin: R >64 Should not be used to treat pyelonephritis   - Piperacillin/Tazobactam: S 16   - Piperacillin/Tazobactam: S 16   - Amikacin: S <=16   - Amikacin: S <=16   - Amoxicillin/Clavulanic Acid: R >16/8   - Amoxicillin/Clavulanic Acid: I 16/8   - Ampicillin: R >16 These ampicillin results predict results for amoxicillin   - Ampicillin: R >16 These ampicillin results predict results for amoxicillin   - Ampicillin/Sulbactam: R >16/8 Enterobacter, Citrobacter, and Serratia may develop resistance during prolonged therapy (3-4 days)   - Ampicillin/Sulbactam: R >16/8 Enterobacter, Citrobacter, and Serratia may develop resistance during prolonged therapy (3-4 days)   - Aztreonam: R >16   - Aztreonam: R >16   - Cefazolin: R >16   - Cefazolin: R >16 (MIC_CL_COM_ENTERIC_CEFAZU) For uncomplicated UTI with K. pneumoniae, E. coli, or P. mirablis: NORMAN <=16 is sensitive and NORMAN >=32 is resistant. This also predicts results for oral agents cefaclor, cefdinir, cefpodoxime, cefprozil, cefuroxime axetil, cephalexin and locarbef for uncomplicated UTI. Note that some isolates may be susceptible to these agents while testing resistant to cefazolin.   - Cefepime: R >16   - Cefepime: I 16   - Cefoxitin: S <=8   - Cefoxitin: S <=8   - Ceftriaxone: R >32 Enterobacter, Citrobacter, and Serratia may develop resistance during prolonged therapy                           8.9    5.29  )-----------( 317      ( 19 Jun 2021 12:39 )             28.6   06-19    137  |  104  |  20.7<H>  ----------------------------<  87  5.2   |  25.0  |  0.64    Ca    8.5<L>      19 Jun 2021 12:39    TPro  6.2<L>  /  Alb  2.7<L>  /  TBili  0.4  /  DBili  x   /  AST  44<H>  /  ALT  18  /  AlkPhos  67  06-19             Internal Medicine Hospitalist Progress Note    follow up for  UTI , dementia   had low grade fever this am   pt is confused , calm cooperative     no distress     spoke to her daughter Sylvie on the phone              Vital Signs Last 24 Hrs  T(C): 36.9 (21 Jun 2021 04:49), Max: 37.2 (20 Jun 2021 22:34)  T(F): 98.4 (21 Jun 2021 04:49), Max: 98.9 (20 Jun 2021 22:34)  HR: 77 (21 Jun 2021 04:49) (73 - 77)  BP: 140/52 (21 Jun 2021 04:49) (127/65 - 156/73)  BP(mean): --  RR: 14 (21 Jun 2021 04:49) (14 - 16)  SpO2: 97% (21 Jun 2021 04:49) (97% - 98%)      Constitutional: awake ,  no distress     EYE : left eye patch in place cronic per daughter )     Respiratory: CTA bilateral anteriorly     Cardiovascular: regular s1 /s2     Gastrointestinal: soft no tenderness , Bs positive     Extremities: no pretibial edema                             10.3   9.81  )-----------( 357      ( 21 Jun 2021 11:11 )             33.1   06-21    135  |  99  |  19.3  ----------------------------<  237<H>  3.9   |  25.0  |  0.67    Ca    8.7      21 Jun 2021 11:11      ulCulture - Urine (06.16.21 @ 03:52)   - Tigecycline: R <=2   - Tigecycline: S <=2   - Tobramycin: R >8   - Tobramycin: R >8   - Trimethoprim/Sulfamethoxazole: R >2/38   - Trimethoprim/Sulfamethoxazole: R >2/38   - Ciprofloxacin: R >2   - Ciprofloxacin: R >2   - Ertapenem: S <=0.5   - Ertapenem: S <=0.5   - Gentamicin: R >8   - Gentamicin: R >8   - Imipenem: S <=1   - Imipenem: S <=1   - Levofloxacin: R >4   - Levofloxacin: R >4   - Meropenem: S <=1   - Meropenem: S <=1   - Nitrofurantoin: R >64 Should not be used to treat pyelonephritis   - Nitrofurantoin: R >64 Should not be used to treat pyelonephritis   - Piperacillin/Tazobactam: S 16   - Piperacillin/Tazobactam: S 16   - Amikacin: S <=16   - Amikacin: S <=16   - Amoxicillin/Clavulanic Acid: R >16/8   - Amoxicillin/Clavulanic Acid: I 16/8   - Ampicillin: R >16 These ampicillin results predict results for amoxicillin   - Ampicillin: R >16 These ampicillin results predict results for amoxicillin   - Ampicillin/Sulbactam: R >16/8 Enterobacter, Citrobacter, and Serratia may develop resistance during prolonged therapy (3-4 days)   - Ampicillin/Sulbactam: R >16/8 Enterobacter, Citrobacter, and Serratia may develop resistance during prolonged therapy (3-4 days)   - Aztreonam: R >16   - Aztreonam: R >16   - Cefazolin: R >16   - Cefazolin: R >16 (MIC_CL_COM_ENTERIC_CEFAZU) For uncomplicated UTI with K. pneumoniae, E. coli, or P. mirablis: NORMAN <=16 is sensitive and NORMAN >=32 is resistant. This also predicts results for oral agents cefaclor, cefdinir, cefpodoxime, cefprozil, cefuroxime axetil, cephalexin and locarbef for uncomplicated UTI. Note that some isolates may be susceptible to these agents while testing resistant to cefazolin.   - Cefepime: R >16   - Cefepime: I 16   - Cefoxitin: S <=8   - Cefoxitin: S <=8   - Ceftriaxone: R >32 Enterobacter, Citrobacter, and Serratia may develop resistance during prolonged therapy

## 2021-06-22 NOTE — ADVANCED PRACTICE NURSE CONSULT - RECOMMEDATIONS
·	Turn and Reposition Q2H while in bed  ·	Offload patient's back to alternating sides when repositioning with Z-Patricio pillow  ·	Reposition Q1H while in chair with seat cushion in place  ·	Cair boots

## 2021-06-22 NOTE — ADVANCED PRACTICE NURSE CONSULT - ASSESSMENT
Patient is a 89 year old female with Dementia , HTN , admitted for worsening confusion , agitation due to  UTI , multi drug resistant , admitted for IV abx  therapy.   Wound RN and assigned bedside RN at bedside for skin assessment.    ·	Sacral Area - Persistent blanchable redness (3L x 7W), intact, no drainage     ·	Left Heel - Stage II PI healing (1L x 0.5W), yellow/pink ill-defined borders, no drainage. Periwound area (2.4L x 4W) boggy, flaky dry intact, no drainage.      ·	Right Heal - Persistent Blanchable Redness (3L x 4W), intact, dry flaky, intact, no drainage     ·	Left Lateral Foot - Scab/Healed Ulceration (0.5 x 0.4) - scab in place, dry, adhered. Periwound area with top layer epidermal loss (3.5L x 1.6W) light pink, dry with flaky surround skin, no drainage

## 2021-06-22 NOTE — PROGRESS NOTE ADULT - ASSESSMENT
89 yo F with Dementia , HTN , admitted for worsening confusion , agitation due to  UTI , multi drug resistant , on merrem iv    admitted for IV abx  therapy     1- UTI , multi drug resistant   cont meropenem , ID consult   afebrile     2- Acute encephalopathy with underlying dementia due to infection likely   cont to monitor , improving at baseline ?   will d/w daughter   iv fluid     3- hyperkalemia , improving   s/p lokelma   pending BMP this am   ordered     bed bound   no indication for PT , lives with his daughter     dvt prophylaxis     full code per family decision    89 yo F with Dementia , HTN , admitted for worsening confusion , agitation due to  UTI , multi drug resistant , admitted for IV abx  therapy   Pt had repeat UA and cx on admission , UA neg nitrite ,evaluated by ID , cx psedumonanas +   low garde fever completed 3 days iv merrem     1- UTI , multiple microorganism   on iv meropenem  low grade fever today   ID follow up   wll cont to monitor fever   d/w daughter   resistant for discharge till her infection clearing   reassurance given   discharge in 1-2 days if no fever       2- Acute encephalopathy probably multifactorial with underlying dementia and infection   non specific   improved at baseline mental status     will d/w daughter       3- hyperkalemia ,resolved   s/p lokelma     4- Anemia of cronic illness with cronic iron deficiency   will add venofer iv   cont folic acid , vt c     bed bound   no indication for PT , lives with his daughter     dvt prophylaxis     Full code per daughter

## 2021-06-22 NOTE — DIETITIAN INITIAL EVALUATION ADULT. - OTHER INFO
89 yo F with Dementia , HTN , admitted for worsening confusion , agitation due to  UTI , multi drug resistant , admitted for IV abx  therapy   Pt had repeat UA and cx on admission , UA neg nitrite ,evaluated by ID , cx pseudomonas +   low grade fever completed 3 days iv merrem     Pt found asleep, opened eyes to verbal stimuli, but unable to provide any meaningful information.  Lunch tray untouched at bedside.    Per previous admission 2 months ago-  weight per RD likely 110lbs-- unsure of accuracy   current adm weight 89lbs -  88lbs per bedscale- pt appears emaciated

## 2021-06-23 NOTE — PROGRESS NOTE ADULT - SUBJECTIVE AND OBJECTIVE BOX
Internal Medicine Hospitalist Progress Note    follow up for  UTI , dementia     pt is confused , calm cooperative   no overnight events , afebrile     d/w ID stopped         Vital Signs Last 24 Hrs  T(C): 36.7 (23 Jun 2021 07:51), Max: 36.9 (22 Jun 2021 16:04)  T(F): 98 (23 Jun 2021 07:51), Max: 98.5 (22 Jun 2021 16:04)  HR: 72 (23 Jun 2021 07:51) (72 - 87)  BP: 104/55 (23 Jun 2021 07:51) (104/55 - 131/67)  BP(mean): --  RR: 18 (23 Jun 2021 07:51) (17 - 18)  SpO2: 98% (23 Jun 2021 07:51) (96% - 98%)    Constitutional: confused hard of hearing    EYE : left eye patch in place (ronic per daughter )     Respiratory: CTA bilateral anteriorly     Cardiovascular: regular rate s1 /s2    Gastrointestinal: soft no tenderness , Bs positive     Extremities: no pretibial edema                           9.0    5.52  )-----------( 343      ( 23 Jun 2021 07:14 )             29.5   06-23    139  |  102  |  33.0<H>  ----------------------------<  95  4.3   |  30.0<H>  |  0.54    Ca    8.7      23 Jun 2021 07:14        Culture - Urine (06.16.21 @ 03:52)   - Tigecycline: R <=2   - Tigecycline: S <=2   - Tobramycin: R >8   - Tobramycin: R >8   - Trimethoprim/Sulfamethoxazole: R >2/38   - Trimethoprim/Sulfamethoxazole: R >2/38   - Ciprofloxacin: R >2   - Ciprofloxacin: R >2   - Ertapenem: S <=0.5   - Ertapenem: S <=0.5   - Gentamicin: R >8   - Gentamicin: R >8   - Imipenem: S <=1   - Imipenem: S <=1   - Levofloxacin: R >4   - Levofloxacin: R >4   - Meropenem: S <=1   - Meropenem: S <=1   - Nitrofurantoin: R >64 Should not be used to treat pyelonephritis   - Nitrofurantoin: R >64 Should not be used to treat pyelonephritis   - Piperacillin/Tazobactam: S 16   - Piperacillin/Tazobactam: S 16   - Amikacin: S <=16   - Amikacin: S <=16   - Amoxicillin/Clavulanic Acid: R >16/8   - Amoxicillin/Clavulanic Acid: I 16/8   - Ampicillin: R >16 These ampicillin results predict results for amoxicillin   - Ampicillin: R >16 These ampicillin results predict results for amoxicillin   - Ampicillin/Sulbactam: R >16/8 Enterobacter, Citrobacter, and Serratia may develop resistance during prolonged therapy (3-4 days)   - Ampicillin/Sulbactam: R >16/8 Enterobacter, Citrobacter, and Serratia may develop resistance during prolonged therapy (3-4 days)   - Aztreonam: R >16   - Aztreonam: R >16   - Cefazolin: R >16   - Cefazolin: R >16 (MIC_CL_COM_ENTERIC_CEFAZU) For uncomplicated UTI with K. pneumoniae, E. coli, or P. mirablis: NORMAN <=16 is sensitive and NORMAN >=32 is resistant. This also predicts results for oral agents cefaclor, cefdinir, cefpodoxime, cefprozil, cefuroxime axetil, cephalexin and locarbef for uncomplicated UTI. Note that some isolates may be susceptible to these agents while testing resistant to cefazolin.   - Cefepime: R >16   - Cefepime: I 16   - Cefoxitin: S <=8   - Cefoxitin: S <=8   - Ceftriaxone: R >32 Enterobacter, Citrobacter, and Serratia may develop resistance during prolonged therapy

## 2021-06-23 NOTE — SWALLOW BEDSIDE ASSESSMENT ADULT - SWALLOW EVAL: RECOMMENDED DIET
Oral & pharyngeal stage of swallow clinically unremarkable for administered PO, with no overt s/s aspiration

## 2021-06-23 NOTE — PROGRESS NOTE ADULT - ASSESSMENT
89 yo F with Dementia , HTN , admitted for worsening confusion , agitation due to  UTI , multi drug resistant , admitted for IV abx  therapy   Pt had repeat UA and cx on admission , UA neg nitrite ,evaluated by ID , cx psedumonanas +   low grade fever day 5 , completed 5 days course of merrem   id follow up no further indication for therapy , if spikes rec to send blood cx and do Ct of pelvis r/o pyelo     1- UTI , multiple microorganism   s/p po keflex , iv merrem   discharge in 1-2 days if no fever       2- Acute encephalopathy probably multifactorial with underlying dementia and infection   non specific   improved at baseline mental status    will d/w daughter in lent on 6/22    possible discharge in 24 hours if afebrile         3- hyperkalemia ,resolved   s/p lokelma     4- Anemia of cronic illness with cronic iron deficiency   will add venofer iv   cont folic acid , vt c     bed bound   no indication for PT , lives with his daughter     dvt prophylaxis     Full code per daughter    89 yo F with Dementia , HTN , admitted for worsening confusion , agitation due to  UTI , multi drug resistant , admitted for IV abx  therapy   Pt had repeat UA and cx on admission , UA neg nitrite ,evaluated by ID , cx psedumonanas +   low grade fever day 5 , completed 5 days course of merrem   id follow up no further indication for therapy , if spikes rec to send blood cx and do Ct of pelvis r/o pyelo     1- UTI , multiple microorganism   s/p po keflex outpatient admitted due to worsening confusion found to have multi bact   s/p iv merrem   discharge in 1-2 days if no fever     2- Acute metabolic encephalopathy  with underlying dementia and worsening  probably   due to infection /UTI     now improved at baseline mental status    [facility]    possible discharge in 24 hours if afebrile         3- hyperkalemia ,resolved   s/p lokelma     4- Anemia of cronic illness with cronic iron deficiency   will add venofer iv   cont folic acid , vt c     bed bound   no indication for PT , lives with his daughter     dvt prophylaxis     Full code per daughter

## 2021-06-23 NOTE — PROGRESS NOTE ADULT - NUTRITIONAL ASSESSMENT
Care Everywhere: updated  Immunization: updated  Health Maintenance: updated  Media Review: review for outside mammogram report   Legacy Review:   Order placed:   Upcoming appts:       This patient has been assessed with a concern for Malnutrition and has been determined to have a diagnosis/diagnoses of Severe protein-calorie malnutrition.    This patient is being managed with:   Diet Regular-  Dysphagia 3 Soft Thin Liquids ([id number])  Supplement Feeding Modality:  Oral  Ensure Enlive Cans or Servings Per Day:  1       Frequency:  Three Times a day  Entered: Jun 20 2021 10:35AM

## 2021-06-23 NOTE — SWALLOW BEDSIDE ASSESSMENT ADULT - SLP PERTINENT HISTORY OF CURRENT PROBLEM
Pt known to this dept & was seen during admission in April 2021 for swallow eval with RX for puree, nectar thick fluids. Follow-up assessment, at that time, was not reliable for a vaild assessment due to limited acceptance of fluids

## 2021-06-23 NOTE — SWALLOW BEDSIDE ASSESSMENT ADULT - COMMENTS
As per MD note: "89 yo F with Dementia , HTN , admitted for worsening confusion , agitation due to  UTI , multi drug resistant , admitted for IV abx  therapy"

## 2021-06-23 NOTE — PROGRESS NOTE ADULT - ASSESSMENT
89y  Female with h/o dementia. Patient brought to the ED with increased confusion x1 day to ED on 6/16 and evaluated diagnosed  with UTI discharged home with oral Keflex. Patient's daughter noticed patient getting more confused and agitated. Patient is bedbound at baseline. Brought her back to the ER.  In the ER patient is afebrile, no leukocytosis. Started on IV Meropenem.       Fever  Morganella morganii in UTI  Klebsiella pneumoniae ESBL UTI  Pseudomonas aeruginosa UTI      - Urine cultures reporting Morganella morganii and Klebsiella pneumoniae ESBL from prior ER visit  - Urine culture Pseudomonas aeruginosa   - Blood cultures from 6/19 no growth   - RVP/COVID 19 PCR negative   - CXR no PNA   - UA negative for UTI   - Completed Meropenem, 3 days of antibiotics for treatment of uncomplicated UTI, all above organisms sensitive to Meropenem  - D/C Meropenem  - If has repeat fever will need to check CT A/P with contrast and check blood cultures   - Follow up cultures  - Trend Fever  - Trend Leukocytosis      Thank you for allowing me to participate in the care of your patient.   Will follow.       d/w Dr Goncalves

## 2021-06-23 NOTE — PROGRESS NOTE ADULT - REASON FOR ADMISSION
confusion , agitation , MDR UTI

## 2021-06-23 NOTE — PROGRESS NOTE ADULT - SUBJECTIVE AND OBJECTIVE BOX
[facility]  INFECTIOUS DISEASES AND INTERNAL MEDICINE at [place]  =======================================================  Mikal Collins MD  Diplomates American Board of Internal Medicine and Infectious Diseases  Tel: [phone]      Fax: [phone]  =======================================================    EDOUARD MALAVE [id number]    Recalled for low grade fever on 6/21    No fever since  Remains non- communicative        Allergies:  No Known Allergies       REVIEW OF SYSTEMS:  Unable to obtain due to medical condition       Physical Exam:  GEN: NAD, more awake  HEENT: normocephalic and atraumatic. EOMI. PERRL.  Anicteric. 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   LUNGS: decreased basal BS B/L   HEART: Regular rate and rhythm   ABDOMEN: Soft, nontender, and nondistended.  Positive bowel sounds.    : No CVA tenderness  EXTREMITIES: Without any edema.  MSK: No joint swelling  NEUROLOGIC: not answering questions or following commands   PSYCHIATRIC: unable to obtain.   SKIN: No Rash      Vitals:  T(F): 98 (23 Jun 2021 07:51), Max: 98.5 (22 Jun 2021 16:04)  HR: 72 (23 Jun 2021 07:51)  BP: 104/55 (23 Jun 2021 07:51)  RR: 18 (23 Jun 2021 07:51)  SpO2: 98% (23 Jun 2021 07:51) (96% - 98%)  temp max in last 48H T(F): , Max: 100.8 (06-21-21 @ 22:02)      Current Antibiotics:  meropenem  IVPB 1000 milliGRAM(s) IV Intermittent every 24 hours    Other medications:  aMIOdarone    Tablet 200 milliGRAM(s) Oral daily  ascorbic acid 500 milliGRAM(s) Oral daily  aspirin  chewable 81 milliGRAM(s) Oral daily  folic acid 1 milliGRAM(s) Oral daily  heparin   Injectable 5000 Unit(s) SubCutaneous every 12 hours  iron sucrose IVPB 200 milliGRAM(s) IV Intermittent every 24 hours  saccharomyces boulardii 250 milliGRAM(s) Oral two times a day                            9.0    5.52  )-----------( 343      ( 23 Jun 2021 07:14 )             29.5     06-23    139  |  102  |  33.0<H>  ----------------------------<  95  4.3   |  30.0<H>  |  0.54    Ca    8.7      23 Jun 2021 07:14      RECENT CULTURES:  06-19 @ 21:50 .Urine Clean Catch (Midstream) Pseudomonas aeruginosa    50,000 - 99,000 CFU/mL Pseudomonas aeruginosa    06-19 @ 12:41 .Blood Blood-Peripheral     No growth at 48 hours    06-16 @ 03:52 .Urine Clean Catch (Midstream) Morganella morganii  Klebsiella pneumoniae ESBL    >100,000 CFU/ml Morganella morganii  >100,000 CFU/ml Klebsiella pneumoniae ESBL      WBC Count: 5.52 K/uL (06-23-21 @ 07:14)  WBC Count: 9.81 K/uL (06-21-21 @ 11:11)  WBC Count: 5.29 K/uL (06-19-21 @ 12:39)    Creatinine, Serum: 0.54 mg/dL (06-23-21 @ 07:14)  Creatinine, Serum: 0.67 mg/dL (06-21-21 @ 11:11)  Creatinine, Serum: 0.64 mg/dL (06-19-21 @ 12:39)    COVID-19 PCR: NotDetec (06-19-21 @ 12:40)

## 2021-06-23 NOTE — SWALLOW BEDSIDE ASSESSMENT ADULT - SLP GENERAL OBSERVATIONS
Pt received & seen seated upright in bed, awake/alert, decreased hearing acuity, reduced cognition, eye patch (left eye), 0/10 pain

## 2021-06-24 NOTE — DISCHARGE NOTE NURSING/CASE MANAGEMENT/SOCIAL WORK - NSDCVIVACCINE_GEN_ALL_CORE_FT
Tdap; 30-Jun-2015 01:01; Nayely Liu (RN); [employer]; [id number]; IntraMuscular; Deltoid Right.; 0.5 milliLiter(s); VIS (VIS Published: 09-May-2013, VIS Presented: 30-Jun-2015);   Tdap; 04-Dec-2020 01:51; Shi Soria (RN); [employer]; [id number] (Exp. Date: 31-Jul-2022); IntraMuscular; Deltoid Left.; 0.5 milliLiter(s); VIS (VIS Published: 09-May-2013, VIS Presented: 04-Dec-2020);

## 2021-06-24 NOTE — DISCHARGE NOTE PROVIDER - DETAILS OF MALNUTRITION DIAGNOSIS/DIAGNOSES
This patient has been assessed with a concern for Malnutrition and was treated during this hospitalization for the following Nutrition diagnosis/diagnoses:     -  06/22/2021: Severe protein-calorie malnutrition

## 2021-06-24 NOTE — DISCHARGE NOTE PROVIDER - NSDCMRMEDTOKEN_GEN_ALL_CORE_FT
amiodarone 200 mg oral tablet: 1 tab(s) orally once a day  ascorbic acid 500 mg oral tablet: 1 tab(s) orally once a day  aspirin 81 mg oral delayed release tablet: 1 tab(s) orally once a day  dronabinol 2.5 mg oral capsule: 1 cap(s) orally once a day MDD:1 tab  folic acid 1 mg oral tablet: 1 tab(s) orally once a day   amiodarone 200 mg oral tablet: 1 tab(s) orally once a day  ascorbic acid 500 mg oral tablet: 1 tab(s) orally once a day  aspirin 81 mg oral delayed release tablet: 1 tab(s) orally once a day  dronabinol 2.5 mg oral capsule: 1 cap(s) orally once a day MDD:1 tab  folic acid 1 mg oral tablet: 1 tab(s) orally once a day  polyethylene glycol 3350 oral powder for reconstitution: 17 gram(s) orally once a day  senna oral tablet: 2 tab(s) orally once a day (at bedtime)

## 2021-06-24 NOTE — PROGRESS NOTE ADULT - SUBJECTIVE AND OBJECTIVE BOX
[facility]  INFECTIOUS DISEASES AND INTERNAL MEDICINE at [place]  =======================================================  Mikal Collins MD  Diplomates American Board of Internal Medicine and Infectious Diseases  Tel: [phone]      Fax: [phone]  =======================================================    EDOUARD MALAVE [id number]    Follow up low grade fever on 6/21    No fever since  Remains non- communicative      Allergies:  No Known Allergies       REVIEW OF SYSTEMS:  Unable to obtain due to medical condition       Physical Exam:  GEN: NAD, more awake  HEENT: normocephalic and atraumatic. EOMI. PERRL.  Anicteric. 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   LUNGS: decreased basal BS B/L   HEART: Regular rate and rhythm   ABDOMEN: Soft, nontender, and nondistended.  Positive bowel sounds.    : No CVA tenderness  EXTREMITIES: Without any edema.  MSK: No joint swelling  NEUROLOGIC: not answering questions or following commands   PSYCHIATRIC: unable to obtain.   SKIN: No Rash      Vitals:  T(F): 98 (23 Jun 2021 07:51), Max: 98.5 (22 Jun 2021 16:04)  HR: 72 (23 Jun 2021 07:51)  BP: 104/55 (23 Jun 2021 07:51)  RR: 18 (23 Jun 2021 07:51)  SpO2: 98% (23 Jun 2021 07:51) (96% - 98%)  temp max in last 48H T(F): , Max: 100.8 (06-21-21 @ 22:02)      Current Antibiotics:  meropenem  IVPB 1000 milliGRAM(s) IV Intermittent every 24 hours    Other medications:  aMIOdarone    Tablet 200 milliGRAM(s) Oral daily  ascorbic acid 500 milliGRAM(s) Oral daily  aspirin  chewable 81 milliGRAM(s) Oral daily  folic acid 1 milliGRAM(s) Oral daily  heparin   Injectable 5000 Unit(s) SubCutaneous every 12 hours  iron sucrose IVPB 200 milliGRAM(s) IV Intermittent every 24 hours  saccharomyces boulardii 250 milliGRAM(s) Oral two times a day                            9.0    5.52  )-----------( 343      ( 23 Jun 2021 07:14 )             29.5     06-23    139  |  102  |  33.0<H>  ----------------------------<  95  4.3   |  30.0<H>  |  0.54    Ca    8.7      23 Jun 2021 07:14      RECENT CULTURES:  06-19 @ 21:50 .Urine Clean Catch (Midstream) Pseudomonas aeruginosa    50,000 - 99,000 CFU/mL Pseudomonas aeruginosa    06-19 @ 12:41 .Blood Blood-Peripheral     No growth at 48 hours    06-16 @ 03:52 .Urine Clean Catch (Midstream) Morganella morganii  Klebsiella pneumoniae ESBL    >100,000 CFU/ml Morganella morganii  >100,000 CFU/ml Klebsiella pneumoniae ESBL      WBC Count: 5.52 K/uL (06-23-21 @ 07:14)  WBC Count: 9.81 K/uL (06-21-21 @ 11:11)  WBC Count: 5.29 K/uL (06-19-21 @ 12:39)    Creatinine, Serum: 0.54 mg/dL (06-23-21 @ 07:14)  Creatinine, Serum: 0.67 mg/dL (06-21-21 @ 11:11)  Creatinine, Serum: 0.64 mg/dL (06-19-21 @ 12:39)    COVID-19 PCR: NotDetec (06-19-21 @ 12:40)      < from: Xray Chest 1 View- PORTABLE-Urgent (Xray Chest 1 View- PORTABLE-Urgent .) (06.15.21 @ 22:30) >  EXAM:  XR CHEST PORTABLE URGENT 1V                          PROCEDURE DATE:  06/15/2021      INTERPRETATION:  AP chest on Adilene 15, 2021 at 11:07 PM. Patient has altered mental status.    Heart is likely enlarged. Dense a calcified mitral area again seen.    On April 15 of this year there was a mild left base effusion which is again seen but shows improvement.    Postmastectomy deformity left upper humerus again noted.    IMPRESSION: Persistent mild left base effusion but there is improvement.    < end of copied text >

## 2021-06-24 NOTE — DISCHARGE NOTE PROVIDER - NSDCCPCAREPLAN_GEN_ALL_CORE_FT
PRINCIPAL DISCHARGE DIAGNOSIS  Diagnosis: UTI (urinary tract infection)  Assessment and Plan of Treatment: Completed abx as prescribed      SECONDARY DISCHARGE DIAGNOSES  Diagnosis: Metabolic encephalopathy  Assessment and Plan of Treatment: 2/2 UTI, now resolved    Diagnosis: Dementia  Assessment and Plan of Treatment: Chronic     PRINCIPAL DISCHARGE DIAGNOSIS  Diagnosis: UTI (urinary tract infection)  Assessment and Plan of Treatment: Completed abx as prescribed      SECONDARY DISCHARGE DIAGNOSES  Diagnosis: Metabolic encephalopathy  Assessment and Plan of Treatment: 2/2 UTI, now resolved    Diagnosis: Dementia  Assessment and Plan of Treatment: Chronic at baseline , no behavioral issues    Diagnosis: Anemia of chronic disease  Assessment and Plan of Treatment: give daikly iron pills

## 2021-06-24 NOTE — DISCHARGE NOTE PROVIDER - NSDCPNSUBOBJ_GEN_ALL_CORE
pt is awake seen in am , asking for food hungry   no overnight events   no fever       Vital Signs Last 24 Hrs  T(C): 36.4 (24 Jun 2021 05:12), Max: 36.7 (23 Jun 2021 07:51)  T(F): 97.5 (24 Jun 2021 05:12), Max: 98 (23 Jun 2021 07:51)  HR: 74 (24 Jun 2021 05:12) (72 - 79)  BP: 130/65 (24 Jun 2021 05:12) (104/55 - 130/65)  BP(mean): --  RR: 18 (23 Jun 2021 21:00) (18 - 20)  SpO2: 98% (24 Jun 2021 05:12) (96% - 98%) on RA    PHYSICAL EXAM:  Constitutional: Confused, hard of hearing  EYE: Left eye patch in place (Chronic per daughter )   Respiratory: CTA bilateral anteriorly   Cardiovascular: Regular rate s1 /s2  Gastrointestinal: Soft no tenderness , Bs positive   Extremities: No pretibial edema                             9.0    5.52  )-----------( 343      ( 23 Jun 2021 07:14 )             29.5   06-23    139  |  102  |  33.0<H>  ----------------------------<  95  4.3   |  30.0<H>  |  0.54    Ca    8.7      23 Jun 2021 07:14

## 2021-06-24 NOTE — DISCHARGE NOTE PROVIDER - HOSPITAL COURSE
89 yo F with Dementia, HTN admitted w/ UTI & encephalopathy. Completed 3 days of IV abx. ID recommendations appreciated. Mentation improved. Now medically stable fro discharge home.     Vital Signs Last 24 Hrs  T(C): 36.4 (24 Jun 2021 05:12), Max: 36.7 (23 Jun 2021 07:51)  T(F): 97.5 (24 Jun 2021 05:12), Max: 98 (23 Jun 2021 07:51)  HR: 74 (24 Jun 2021 05:12) (72 - 79)  BP: 130/65 (24 Jun 2021 05:12) (104/55 - 130/65)  BP(mean): --  RR: 18 (23 Jun 2021 21:00) (18 - 20)  SpO2: 98% (24 Jun 2021 05:12) (96% - 98%)    PHYSICAL EXAM:  Constitutional: Confused, hard of hearing  EYE: Left eye patch in place (Chronic per daughter )   Respiratory: CTA bilateral anteriorly   Cardiovascular: Regular rate s1 /s2  Gastrointestinal: Soft no tenderness , Bs positive   Extremities: No pretibial edema    89 yo F with Dementia, HTN admitted w/ UTI & encephalopathy. Completed 3 days of IV abx. ID recommendations appreciated. Mentation improved. Now medically stable fro discharge home.     Vital Signs Last 24 Hrs  T(C): 36.4 (24 Jun 2021 05:12), Max: 36.7 (23 Jun 2021 07:51)  T(F): 97.5 (24 Jun 2021 05:12), Max: 98 (23 Jun 2021 07:51)  HR: 74 (24 Jun 2021 05:12) (72 - 79)  BP: 130/65 (24 Jun 2021 05:12) (104/55 - 130/65)  BP(mean): --  RR: 18 (23 Jun 2021 21:00) (18 - 20)  SpO2: 98% (24 Jun 2021 05:12) (96% - 98%) on RA    PHYSICAL EXAM:  Constitutional: Confused, hard of hearing  EYE: Left eye patch in place (Chronic per daughter )   Respiratory: CTA bilateral anteriorly   Cardiovascular: Regular rate s1 /s2  Gastrointestinal: Soft no tenderness , Bs positive   Extremities: No pretibial edema    89 yo F with Dementia, HTN admitted w/ UTI & encephalopathy. Completed 3 days of IV abx. ID recommendations appreciated. Mentation improved. Now medically stable for discharge home.     Vital Signs Last 24 Hrs  T(C): 36.4 (24 Jun 2021 05:12), Max: 36.7 (23 Jun 2021 07:51)  T(F): 97.5 (24 Jun 2021 05:12), Max: 98 (23 Jun 2021 07:51)  HR: 74 (24 Jun 2021 05:12) (72 - 79)  BP: 130/65 (24 Jun 2021 05:12) (104/55 - 130/65)  BP(mean): --  RR: 18 (23 Jun 2021 21:00) (18 - 20)  SpO2: 98% (24 Jun 2021 05:12) (96% - 98%) on RA    PHYSICAL EXAM:  Constitutional: Confused, hard of hearing  EYE: Left eye patch in place (Chronic per daughter )   Respiratory: CTA bilateral anteriorly   Cardiovascular: Regular rate s1 /s2  Gastrointestinal: Soft no tenderness , Bs positive   Extremities: No pretibial edema    89 yo F with Dementia, HTN admitted w/ UTI & encephalopathy ith underlying dementia , pt is Completed 3 days of IV abx. ID recommendations appreciated. Mentation at baseline   She is of abx stable repeated UA on admission , uri ne cx result. reviewed   Pt is fever free    Now medically stable for discharge home with daughter . goals MOLTs discussed daughter     total time spedn 35 min

## 2021-06-24 NOTE — DISCHARGE NOTE NURSING/CASE MANAGEMENT/SOCIAL WORK - PATIENT PORTAL LINK FT
You can access the FollowMyHealth Patient Portal offered by [facility] by registering at the following website: [email or web address]. By joining [facility]’s FollowMyHealth portal, you will also be able to view your health information using other applications (apps) compatible with our system.
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
## 2021-09-02 NOTE — ED PROVIDER NOTE - NS ED MD DISPO DISCHARGE CCDA
Application Tool (Optional): Liquid Nitrogen Sprayer Render Post-Care Instructions In Note?: no Show Applicator Variable?: Yes Detail Level: Detailed Number Of Freeze-Thaw Cycles: 1 freeze-thaw cycle Duration Of Freeze Thaw-Cycle (Seconds): 3 Patient/Caregiver provided printed discharge information.

## 2021-11-02 NOTE — H&P ADULT - NSHPLABSRESULTS_GEN_ALL_CORE
11.6   6.64  )-----------( 457      ( 02 Nov 2021 11:00 )             36.2       11-02    143  |  104  |  77.5<H>  ----------------------------<  83  5.8<H>   |  19.0<L>  |  1.74<H>    Ca    8.8      02 Nov 2021 11:00    TPro  6.1<L>  /  Alb  2.5<L>  /  TBili  0.8  /  DBili  x   /  AST  1386<H>  /  ALT  593<H>  /  AlkPhos  108  11-02 11.6   6.64  )-----------( 457      ( 02 Nov 2021 11:00 )             36.2       11-02    143  |  104  |  77.5<H>  ----------------------------<  83  5.8<H>   |  19.0<L>  |  1.74<H>    Ca    8.8      02 Nov 2021 11:00    TPro  6.1<L>  /  Alb  2.5<L>  /  TBili  0.8  /  DBili  x   /  AST  1386<H>  /  ALT  593<H>  /  AlkPhos  108  11-02        < from: CT Chest No Cont (11.02.21 @ 14:51) >    INDINGS:  CHEST:  LUNGS AND LARGE AIRWAYS: Patent central airways. There are extensive patchy peribronchovascular consolidative opacities throughout the lungs, most prominent in the left lung. There is a 2.1 x 1.2 cm centrally cavitary consolidative opacity in the right middle lobe on image 87. There is scattered mucous plugging in the left lower lobe dependently. These findings are suggestive of aspiration pneumonia. 3 mm calcified granuloma in the left lower lobe on image 84. Trace emphysematous changes, most prominent in the upper lobes.  PLEURA: No pleural effusion.  VESSELS: Within normal limits.  HEART: Mitral annulus calcification. Heart size is normal. No pericardial effusion.  MEDIASTINUM AND OWEN: No lymphadenopathy.  CHEST WALL AND LOWER NECK: Within normal limits.    ABDOMEN AND PELVIS:  LIVER: There is mild homogeneous increased attenuation of the liver parenchyma, which may represent depositional disease including hemachromatosis or amiodarone therapy. The liver is not enlarged.  BILE DUCTS: Normal caliber.  GALLBLADDER: Faceted gallstones in the gallbladder. Mild intra and extrahepatic biliary duct dilatation, which may be related to the patient's stated age. Correlate with LFTs.  SPLEEN: Within normal limits.  PANCREAS: Within normal limits.  ADRENALS: Within normal limits.  KIDNEYS/URETERS: Proteinaceous right renal cyst. Otherwise, within normal limits.    BLADDER: Enriquez catheter in the predominantly collapsed bladder.  REPRODUCTIVE ORGANS: Uterus and adnexa within normal limits.    BOWEL: Sigmoid diverticulosis. No bowel obstruction. Appendix is normal.  PERITONEUM: No ascites.  VESSELS: Extensive atherosclerotic calcification of the nonaneurysmal abdominal aorta and iliac arteries.  RETROPERITONEUM/LYMPH NODES: No lymphadenopathy.  ABDOMINAL WALL: Within normal limits.  BONES: Healed impacted left humeral neck fracture. Status post ORIF of a left intertrochanteric femur fracture. Compression deformities of T12, L1, and L3.    IMPRESSION:  1. Faceted gallstones. Intra and extrahepatic biliary dilatation. Correlate with LFTs. If there is clinical suspicion for acute cholecystitis or choledocholithiasis, consider right upper quadrant ultrasound and/or noncontrast MRCP for further evaluation.  2. Extensive patchy peribronchovascular opacities throughout the lungs, with left lower lobe mucus plugging, concerning for aspiration pneumonia.      < from: CT Head No Cont (11.02.21 @ 12:44) >    IMPRESSION:  No acute intracranial hemorrhage or acute territorial infarction. Generalized parenchymal volume loss and extensive chronic microvascular ischemic changes.  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 Invasive neoplasm not entirely excluded. Recommend correlation with clinical aspects of the case. Further evaluation may be obtained with contrast-enhanced MRI of the orbits.    < end of copied text >

## 2021-11-02 NOTE — H&P ADULT - NSHPREVIEWOFSYSTEMS_GEN_ALL_CORE
CONSTITUTIONAL: no fevers, chills, night sweats, weight changes  HEENT: no vision changes or diplopia, no tinnitus, no sore throat  RESPIRATORY: denies dyspnea, sob, nocturnal cough, sputum or hemoptysis  CARDIOVASCULAR: no CP, palpitations or lower extremity edema  GI: no dysphagia, nausea, abd pain, constipation, diarrhea, stool change or blood in stool  : no dysuria or hematuria, no flank pain, no incontinence or urinary retention  MSK: no joint pain or swelling  INTEGUMENTARY: no rashes or lesions  NEUROLOGICAL: no HA, confusion, syncope, numbness, weakness, tremors or ataxia  PSYCH: denies depression  ENDOCRINE: no polyuria, polydipsia, no temp intolerance, no tremors, no changes in skin, hair or nails  HEMATOLOGIC/LYMPHATIC: no lymph node enlargement, abnormal bruising or bleeding unable to obtain ros due to AMS

## 2021-11-02 NOTE — H&P ADULT - HISTORY OF PRESENT ILLNESS
89 y/o F w/ PMH of HTN and advanced dementia (baseline AOx1) was brought in by family after being found minimally responsive.  At baseline pt reported to be aaox1 however family noted she was lethargic and brought her in.  Pt is a poor historian and unable to obtain information information from her. Information obtained from chart.  On arrival pt reported to be tachypneic and hypothermic.  Pt was empirically tx w/ 1L IVF and vanco/zosyn.     89 y/o F w/ PMH of HTN and advanced dementia (baseline AOx1) was brought in by family after being found minimally responsive.  At baseline pt reported to be aaox1 however family noted she was lethargic and brought her in.  Pt is a poor historian and unable to obtain information information from her. Information obtained from chart.  On arrival pt reported to be tachypneic and hypothermic.  Pt was empirically tx w/ 1L IVF and vanco/zosyn.  Of note, pt has another MRN ([id number]) and her name is Jeri Johnalan.

## 2021-11-02 NOTE — H&P ADULT - ASSESSMENT
89 y/o F w/ PMH of HTN and advanced dementia (baseline AOx1) was brought in by family after being found minimally responsive.  At baseline pt reported to be aaox1 however family noted she was lethargic and brought her in.  Pt is a poor historian and unable to obtain information information from her. Information obtained from chart.  On arrival pt reported to be tachypnic and hypothermic.  Pt was empirically tx w/ 1L IVF and vanco/zosyn.     89 y/o F w/ PMH of HTN and advanced dementia (baseline AOx1) was brought in by family after being found minimally responsive.  At baseline pt reported to be aaox1 however family noted she was lethargic and brought her in.  Pt is a poor historian and unable to obtain information information from her. Information obtained from chart.  On arrival pt reported to be tachypnic and hypothermic.  Pt was empirically tx w/ 1L IVF and vanco/zosyn.  Will admit for acute metabolic encephalopathy and sepsis.         Acute toxic metabolic encephalopathy / dehydration / Advanced dementia   - Admit to SDU for close monitoring   - Baseline mentation AAOx1 but suspect current encephalopathy 2/2 sepsis and dehydration  - CTH negative for acute pathology   - f/u sepsis management and w/u  - NPO given poor mentation at this time  - Aspiration precautions   - Monitor closely on telemetry       Sepsis likely 2/2 UTI vs aspiration PNA  - Hypothermic and tachypnic on admission   - CT chest shows extensive patchy peribronchovascular opacities throughout lungs, LLL mucus plugging concerning for aspiration PNA  - UA +LE and nitrates, bacteria but 6-10wbc  - Will maintain on Zosyn for both aspiration PNA and UTI coverage   - Pt pan cultured in ED, will f/u cx results and sensitivities   - LA 5.9 likely due to combo of dehydration and acute infection.  IV hydration and trend LA levels   - Monitor CBC and temperature curve  - Bear hugger if hypothermic again   - Chest PT and aspiration precautions  - NPO pending swallow eval       AGMA likely multifactorial 2/2 lactic acidosis / uremia / ARF  - Baseline renal function unknown  - s/p 1L IVF in ED.  Will maintain on IV hydration and trend LA and renal fxn  - AG 21 uncorrected and serum HCO3 19  - Stat ABG to better assess acid base disturbance   - Monitor chemistry and lytes closely    - Avoid nephrotoxic medication or renally dose if needed          Hyperkalemia  - Reported to be hemolyzed however will repeat BMP to confirm  - Stat EKG and monitor on telemetry   - If remains elevated increased rate of IVF, consider insulin and reassess        Transaminitis, etiology unclear   - CT a/p reports gallstones, intra/extrahepatic biliary dilatation  - Will order RUQ US and trend LFTs   - Avoid hepatotoxic medications       Severe protein calorie malnutrition   - Nutritional consult  - NPO for now however pending swallow eval      VTE ppx: heparin sq     89 y/o F w/ PMH of HTN and advanced dementia (baseline AOx1) was brought in by family after being found minimally responsive.  At baseline pt reported to be aaox1 however family noted she was lethargic and brought her in.  Pt is a poor historian and unable to obtain information information from her. Information obtained from chart.  On arrival pt reported to be tachypnic and hypothermic.  Pt was empirically tx w/ 1L IVF and vanco/zosyn.  Will admit for acute metabolic encephalopathy and sepsis.         Acute toxic metabolic encephalopathy / dehydration / Advanced dementia   - Admit to SDU for close monitoring   - Baseline mentation AAOx1 but suspect current encephalopathy 2/2 sepsis and dehydration  - CTH negative for acute pathology   - f/u sepsis management and w/u  - NPO given poor mentation at this time  - Aspiration precautions   - Monitor closely on telemetry       Sepsis likely 2/2 UTI vs aspiration PNA  - Hypothermic and tachypnic on admission   - CT chest shows extensive patchy peribronchovascular opacities throughout lungs, LLL mucus plugging concerning for aspiration PNA  - UA +LE and nitrates, bacteria but 6-10wbc  - Will maintain on Zosyn for both aspiration PNA and UTI coverage   - Pt pan cultured in ED, will f/u cx results and sensitivities   - LA 5.9 likely due to combo of dehydration and acute infection.  IV hydration and trend LA levels   - Monitor CBC and temperature curve  - Bear hugger if hypothermic again   - Chest PT and aspiration precautions  - NPO pending swallow eval       AGMA likely multifactorial 2/2 lactic acidosis / uremia / ARF  - Baseline renal function unknown  - s/p 1L IVF in ED.  Will maintain on IV hydration and trend LA and renal fxn  - AG 21 uncorrected and serum HCO3 19  - Stat ABG to better assess acid base disturbance   - Monitor chemistry and lytes closely    - Avoid nephrotoxic medication or renally dose if needed          Hyperkalemia  - Reported to be hemolyzed however will repeat BMP to confirm  - Stat EKG and monitor on telemetry   - If remains elevated increased rate of IVF, consider insulin and reassess        Transaminitis, etiology unclear   - CT a/p reports gallstones, intra/extrahepatic biliary dilatation  - Chart review shows pt was on amiodarone which can be etiology; hold amio   - Will order RUQ US and trend LFTs   - Avoid hepatotoxic medications       Severe protein calorie malnutrition   - Nutritional consult  - NPO for now however pending swallow eval      VTE ppx: heparin sq       89 y/o F w/ PMH of HTN and advanced dementia (baseline AOx1) was brought in by family after being found minimally responsive.  At baseline pt reported to be aaox1 however family noted she was lethargic and brought her in.  Pt is a poor historian and unable to obtain information information from her. Information obtained from chart.  On arrival pt reported to be tachypnic and hypothermic.  Pt was empirically tx w/ 1L IVF and vanco/zosyn.  Will admit for acute metabolic encephalopathy and sepsis.         Acute toxic metabolic encephalopathy / dehydration / Advanced dementia   - Admit to SDU for close monitoring   - Baseline mentation AAOx1 but suspect current encephalopathy 2/2 sepsis and dehydration  - CTH negative for acute pathology   - f/u sepsis management and w/u  - NPO given poor mentation at this time  - Aspiration precautions   - Monitor closely on telemetry       Sepsis likely 2/2 UTI vs aspiration PNA  - Hypothermic and tachypnic on admission   - CT chest shows extensive patchy peribronchovascular opacities throughout lungs, LLL mucus plugging concerning for aspiration PNA  - UA +LE and nitrates, bacteria but 6-10wbc  - Will maintain on Zosyn for both aspiration PNA and UTI coverage   - Pt pan cultured in ED, will f/u cx results and sensitivities   - LA 5.9 likely due to combo of dehydration and acute infection.  IV hydration and trend LA levels   - Monitor CBC and temperature curve  - Bear hugger if hypothermic again   - Chest PT and aspiration precautions  - NPO pending swallow eval       AGMA likely multifactorial 2/2 lactic acidosis / uremia / ARF  - Baseline renal function unknown  - s/p 1L IVF in ED.  Will maintain on IV hydration and trend LA and renal fxn  - AG 21 uncorrected and serum HCO3 19  - Stat ABG to better assess acid base disturbance   - Monitor chemistry and lytes closely    - Avoid nephrotoxic medication or renally dose if needed          Hyperkalemia  - Reported to be hemolyzed however will repeat BMP to confirm  - Stat EKG and monitor on telemetry   - If remains elevated increased rate of IVF, consider insulin and reassess        Transaminitis, etiology unclear   - CT a/p reports gallstones, intra/extrahepatic biliary dilatation  - Chart review shows pt was on amiodarone which can be etiology; hold amio   - Will order RUQ US and trend LFTs   - Avoid hepatotoxic medications       Severe protein calorie malnutrition   - Nutritional consult  - NPO for now however pending swallow eval      Functional quadriplegia  - Supportive care      VTE ppx: heparin sq

## 2021-11-02 NOTE — ED PROVIDER NOTE - CLINICAL SUMMARY MEDICAL DECISION MAKING FREE TEXT BOX
Pt presenting today for evaluation of AMS. Presenting obtunded, responding to painful stimuli and protecting airway. WIll order stat CT head, labs, coags, ua, ucx, cxr, ekg, ordered. WIll attempt to contact family. Per EMS pt is a full code.

## 2021-11-02 NOTE — ED ADULT NURSE NOTE - OBJECTIVE STATEMENT
assumed pt care in critical care, pt responsive to pain at this time.  pt brought in from home for unresponsiveness this morning.  pt unable to provide history at this time.

## 2021-11-02 NOTE — ED PROVIDER NOTE - OBJECTIVE STATEMENT
89 y/o F pt with known pmhx of htn (takes amlodipine) presenting via EMS with c/o AMS. EMS report that pt lives at home with daughter, were called this morning because pt was unresponsive. EMS report initial GCS3, on arrival pt responding to painful stimuli, protecting airway. Hx severely limited. Per EMS, pt is a full code 89 y/o F pt with known pmhx of htn (takes amlodipine) presenting via EMS with c/o AMS. EMS report that pt lives at home with daughter, were called this morning because pt was unresponsive. EMS report initial GCS3, on arrival pt responding to painful stimuli, protecting airway. Hx severely limited. Per EMS, pt is a full code    Pt's full name is Haven Neves  1922  MRN: [id number]

## 2021-11-02 NOTE — ED ADULT NURSE NOTE - CAS TRG GEN SKIN COLOR
Name: Margaret oCbb    : 1947     Service Dept: [facility]    Patient's Pharmacies:    [address] [facility] [address]  [facility] [address]  Phone: [phone] Fax: [address], [address]  [address] [facility] [address]  Phone: [phone] Fax: [phone]       Chief Complaint   Patient presents with    Knee Pain        There were no vitals taken for this visit. Allergies   Allergen Reactions    Azithromycin Diarrhea    Erythromycin Diarrhea     GI distress      Current Outpatient Medications   Medication Sig Dispense Refill    cholestyramine (QUESTRAN) 4 gram packet DISSOLVE & TAKE THE CONTENTS OF 1 PACKET BY MOUTH 4 TIMES DAILY BEFORE MEAL(S) AND AT BEDTIME IN A 2 4 OZ LIQUID      LORazepam (ATIVAN) 0.5 mg tablet       metoclopramide HCl (REGLAN) 10 mg tablet       ondansetron hcl (ZOFRAN) 8 mg tablet       sucralfate (CARAFATE) 1 gram tablet       famotidine (PEPCID) 20 mg tablet       omeprazole (PRILOSEC) 40 mg capsule TAKE 1 CAPSULE BY MOUTH EVERY MORNING AT BREAKFAST      HYDROcodone-chlorpheniramine (TUSSIONEX) 10-8 mg/5 mL suspension SHAKE LIQUID AND TAKE 5 ML BY MOUTH EVERY 12 HOURS AS NEEDED FOR COUGH      hydroCHLOROthiazide (MICROZIDE) 12.5 mg capsule       aspirin 81 mg chewable tablet Take 81 mg by mouth daily.  guaiFENesin-codeine (ROBITUSSIN AC) 100-10 mg/5 mL solution Take 5 mL by mouth nightly as needed for Cough.  ondansetron (Zofran ODT) 4 mg disintegrating tablet 1 Tablet by SubLINGual route every eight (8) hours as needed for Nausea or Vomiting. 10 Tablet 0    gabapentin (NEURONTIN) 300 mg capsule Take 300 mg by mouth nightly.  meloxicam (MOBIC) 15 mg tablet Take 15 mg by mouth daily.  losartan (COZAAR) 50 mg tablet Take 50 mg by mouth daily.       cetirizine (ZYRTEC) 10 mg tablet Take 10 mg by mouth daily.  Cholecalciferol, Vitamin D3, 3,000 unit tab Take 2,000 Units by mouth daily. Current Facility-Administered Medications   Medication Dose Route Frequency Provider Last Rate Last Admin    [COMPLETED] lidocaine (XYLOCAINE) 10 mg/mL (1 %) injection 9 mL  9 mL Other ONCE Nino Rea MD   9 mL at 10/19/21 1200    [COMPLETED] triamcinolone acetonide (KENALOG-40) 40 mg/mL injection 40 mg  40 mg Intra artICUlar ONCE Omar BARBER MD   40 mg at 10/19/21 1200      Patient Active Problem List   Diagnosis Code    Internal and external hemorrhoids without complication T19.0, M85.6    Knee osteoarthritis M17.10      Family History   Problem Relation Age of Onset    Breast Cancer Mother     Heart Disease Father     Breast Cancer Sister     Breast Cancer Maternal Aunt     Breast Cancer Niece     Breast Cancer Maternal Aunt       Social History     Socioeconomic History    Marital status:      Spouse name: Not on file    Number of children: Not on file    Years of education: Not on file    Highest education level: Not on file   Tobacco Use    Smoking status: Former Smoker    Smokeless tobacco: Never Used   Substance and Sexual Activity    Alcohol use: Never    Drug use: Never     Social Determinants of Health     Financial Resource Strain:     Difficulty of Paying Living Expenses:    Food Insecurity:     Worried About Running Out of Food in the Last Year:     [address] in the Last Year:    Transportation Needs:     Lack of Transportation (Medical):      Lack of Transportation (Non-Medical):    Physical Activity:     Days of Exercise per Week:     Minutes of Exercise per Session:    Stress:     Feeling of Stress :    Social Connections:     Frequency of Communication with Friends and Family:     Frequency of Social Gatherings with Friends and Family:     Attends Yarsanism Services:     Active Member of Clubs or Organizations:  Attends Club or Organization Meetings:     Marital Status:       Past Surgical History:   Procedure Laterality Date    HX BREAST BIOPSY      HX COLONOSCOPY  2010    HX MASTECTOMY Right     age 39      Past Medical History:   Diagnosis Date    Asthma     Breast cancer ([address]     age 39   Kary Shelton C. difficile diarrhea     GERD (gastroesophageal reflux disease)     HTN (hypertension)     Menopause     Stool color black         I have reviewed and agree with [address] [facility] and ROS and intake form in chart and the record furthermore I have reviewed prior medical record(s) regarding this patients care during this appointment. Review of Systems:   Patient is a pleasant appearing individual, appropriately dressed, well hydrated, well nourished, who is alert, appropriately oriented for age, and in no acute distress with a normal gait and normal affect who does not appear to be in any significant pain. Physical Exam:  Right Knee -Decrease range of motion with flexion, Knee arc of greater than 50 degrees, Some crepitation, Grossly neurovascularly intact, Good cap refill, No skin lesion, Moderate swelling, No gross instability, Some quadriceps weakness, Kellgren and Vishal at least grade 3    Left Knee - Full Range of Motion, No crepitation, Grossly neurovascularly intact, Good cap refill, No skin lesion, No swelling, No gross instability, No quadriceps weakness    Procedure Documentation:    I discussed in detail the risks, benefits and complications of an injection which included but are not limited to infection, skin reactions, hot swollen joint, and anaphylaxis with the patient. The patient verbalized understanding and gave informed consent for the injection. The patient's knee was flexed to 90° and the skin prepped using sterile alcohol solution. A sterile needle was inserted into the right knee and the mixture of 9 mL Lidocaine 1%, 1 mL Kenalog 40 mg was injected under sterile technique.  The needle was withdrawn and the puncture site sealed with a Band-Aid. Technique: Under sterile conditions a PayDragon ultrasound unit with a variable frequency (7.0-14.0 MHz) linear transducer was used to localize the placement of needle into the right knee joint. Findings: Successful needle placement for knee injection. Final images were taken and saved for permanent record. The patient tolerated the injection well. The patient was instructed to call the office immediately if there is any pain, redness, warmth, fever, or chills. Encounter Diagnoses     ICD-10-CM ICD-9-CM   1. Right knee pain, unspecified chronicity  M25.561 719.46   2. Osteoarthritis of right knee, unspecified osteoarthritis type  M17.11 715.96       HPI:  The patient is here with a chief complaint of right knee pain, status post right knee arthroscopic meniscectomy with arthritic flare. Assessment/Plan:  Plan will be for cortisone injection. If it helps, it is all we need to do. As part of continued conservative pain management options the patient was advised to utilize Tylenol or OTC NSAIDS as long as it is not medically contraindicated. Return to Office: Follow-up and Dispositions    · Return if symptoms worsen or fail to improve. Administrations This Visit     lidocaine (XYLOCAINE) 10 mg/mL (1 %) injection 9 mL     Admin Date  10/19/2021 Action  Given Dose  9 mL Route  Other Administered By  Sanjuanita Kate LPN          triamcinolone acetonide (KENALOG-40) 40 mg/mL injection 40 mg     Admin Date  10/19/2021 Action  Given Dose  40 mg Route  Intra artICUlar Administered By  Sanjuanita Kate LPN               Scribed by Chico Perla LPN as dictated by [employer] [facility] [place] MOUNIKA Rodriguez MD.  Documentation True and Accepted Nino Rodriguez MD Normal for race

## 2021-11-02 NOTE — ED ADULT NURSE REASSESSMENT NOTE - NS ED NURSE REASSESS COMMENT FT1
Pt baseline mental status Pt VSS at this time, Pt resting comfortably in bed report given to holding room RN Pt moved to B12 L

## 2021-11-02 NOTE — ED PROVIDER NOTE - ATTENDING CONTRIBUTION TO CARE
Yared: I performed a face to face evaluation of this patient and performed a full history and physical examination on the patient.  I agree with the resident's history, physical examination, and plan of the patient unless otherwise noted. My brief assessment is as follows: 89 y/o female found unresponsive this morning, responds/localizes pain stimulian starts to open eyes. protecting airway. full code per ems. limited history. hypothermic, nl rate, with rhonchi b/l L>R, abd soft, moves all extremities, left heel ulcer. stat ct head, chest/abd/pel, labs, warm pt, abx, discuss level of care with family.

## 2021-11-02 NOTE — H&P ADULT - NSHPSOCIALHISTORY_GEN_ALL_CORE
Lives at home with family, AAOx1 at baseline Lives at home with family, AAOx1 at baseline, bed bound, has OT and PT 2x per week

## 2021-11-02 NOTE — H&P ADULT - CONVERSATION DETAILS
Discussed current condition of pt and prognosis.  Discussed goals of care and daughter (HCP) wants pt to be DNR only.  Discussed at length DNI however she want to think it over.

## 2021-11-02 NOTE — ED PROVIDER NOTE - PROGRESS NOTE DETAILS
D/w daughter, nikky. States that pt's baseline is a/ox1, bedridden and non-ambulatory. She began vomiting yesterday and would not eat. This morning noted that the pt was not waking up so she called 911. States that she would like the pt to be DNR/DNI and will come to the hospital to sign MOLST. - Sukhwinder Arvizu, PGY-3 Reviewed all results with pt as well as plans for admission. Pt is comfortable with plan for admission. Questions answered. - Sukhwinder Arvizu, PGY-3

## 2021-11-02 NOTE — ED ADULT NURSE NOTE - INTERVENTIONS DEFINITIONS
Physically safe environment: no spills, clutter or unnecessary equipment/Stretcher in lowest position, wheels locked, appropriate side rails in place/Provide visual cue, wrist band, yellow gown, etc./Monitor gait and stability/Monitor for mental status changes and reorient to person, place, and time

## 2021-11-02 NOTE — ED PROVIDER NOTE - PHYSICAL EXAMINATION
Gen: responds to painful stimuli  Head: normocephalic, atraumatic  Lung: no respiratory distress, rhonchi to L base  CV: normal s1/s2, rrr,   Abd: soft, no tenderness, non-distended  MSK: No edema, no visible deformities, full range of motion in all 4 extremities  Neuro: No focal neurologic deficits  Skin: No rash   Psych: normal affect

## 2021-11-02 NOTE — H&P ADULT - DECISION MAKER
CARPAL TUNNEL POSTOP CARE INSTRUCTIONS    WHAT TO EXPECT AFTER SURGERY:  • Depending on the severity of your condition prior to your surgery, your hand may remain numb after surgery for up to 6-8 weeks before an improvement is noted.   • Immediately after surgery, you may experience an increase in numbness from what you had prior to surgery because of the local anesthetic used during surgery.   • If you have nighttime symptoms prior to surgery, you may notice that they are improved the first night after surgery even with a feeling of increase in numbness. This is a good sign that the nerve has been decompressed.     DIET:  • If you had general anesthesia, the effects  may persist for 24 hours. You may experience nausea and/or vomiting after surgery. Begin your diet postoperatively with a bland, limited diet and gradually increase to your routine diet as tolerated. After tolerating bland diet, you may resume regular home diet without limitations.    ACTIVITY:  • Get plenty of rest.  • No driving or drinking alcohol while taking pain medications.   • Elevate hand above the level of chest or higher to help decrease swelling and for pain control for the next 24-48 hours.  Do not use a sling.  Instead, elevate your hand on a side table when sitting in a chair or on a pillow on your chest when lying down.  • Begin leisurely walks as soon as possible as this helps with circulation and decreases the chances of blood clots. Take several walks daily following surgery.  • No strength activities with operated hand for three weeks. No grasping, pushing and pulling with affected hand for at least the first 3 weeks. We will increase your use of your hand as you recover from your surgery.  • No typing for 2 weeks.  • You are encouraged to open and close your fingers normally after surgery on the operated hand. Do NOT just hold the hand still or it will get stiff.   • Do not smoke as it delays healing and increases your risk for  complications.    PAIN CONTROL:  • Take your pain medications as needed.  When narcotics are not needed you may use extra strength Tylenol, Ibuprofen, or NSAIDS.   • Most narcotics can be constipating so increase fluids, fiber, fruits and vegetables in your diet or take an over the counter stool softener while on narcotics.  We recommend over-the-counter Colace.  • You may use ice to surgical area.  Apply for 15 minutes every hour to reduce pain and swelling.  Do not apply ice directly to skin; place towel or gauze between ice and skin.  • Do NOT apply heat to your surgical area. This may result in full thickness burns.      INCISION CARE:  • Keep dressing clean and dry for 48 hours. After 48 hours, you may remove the dressing and shower.  • After shower, apply bacitracin ointment and a bandaid. Change this twice daily.  • Your sutures will be removed at your 2-week postoperative appointment in the clinic. Soon after, you will be seen by Occupational Therapy.    FOLLOW UP:  • You should have a follow up appointment for the 2nd week after surgery. This was scheduled at the time of your preoperative appointment. Refer to your discharge paperwork for the date and time. If you do not have one, please call [phone] to schedule.    WHEN TO CALL:    • Fever greater than 101.4 or chills  • Increased redness or drainage from incision.  • Increased pain not controlled by your pain medication.  • Any bleeding from incision that does not stop with pressure (hold direct pressure for 10 minutes without checking the bleeding).  • Sudden increased localized swelling that continues to expand.    If you have any questions or concerns, please contact our office at [phone].    [address] [place] [address]  Phone: [phone]     Fax: [phone]    Home Instruction Sheet  ANESTHESIA for ADULT       What are the side effects?  Side effects depend on the medication used, and may not even be  present.    Most Common Sometimes   Irritability  Poor Balance  Sleeping for Several Hours  Drowsiness  Fatigue  Difficulty Concentrating Change in Behavior  Hyperactivity  Nausea (upset stomach)  Gas (flatulence)  Dizziness  Hiccups  Constipation  Blurred Vision     1. The effects of sedation medicine can last up to 24 hours.  You may be drowsy or irritable for 2 to 8 hours after receiving medicine.  2. You may need to sleep after leaving the testing area.  Sleeping after sedation will help reduce irritability.  3. Diet:  - Do not give anything to eat or drink until you are fully awake.  Eat a light meal and advance to a normal diet unless instructed differently:   - Do not drink alcohol beverages for the next 24 hours.  - Avoid greasy or spicy foods today.  4. Activity:  - You may be dizzy and/or unsteady.  Ask for help walking, using the bathroom, or stairs to protect yourself from injury.  - You should not drive a vehicle, operate machinery or power tools for 24 hours because your reflexes may be slow and your vision may be blurry.  - Do not sign any legally binding documents or make important decisions for 24 hours after receiving sedation.  5. Medications:   Unless told otherwise, do not take any non-prescription medications (cold medicine, etc.) for 24 hours, as these medications in combination with sedation medication, can cause increased drowsiness.  If you feel that you need over the counter medication, discuss with your physician.    When Should I Call the Doctor?  - Vomiting more than twice.  - Extreme irritability or unusual changes in behavior.  - Trouble arousing.  - Inability to urinate.  - Trouble breathing - call 911.    We would like to take this opportunity to thank you. Because your confidence in your caregivers is very important to us, it is our commitment to always treat you and your family with courtesy and respect. Our goal is to always answer any questions or worries or concerns you may  have about the care you received.  If you have any suggestions for improvement or worries or concerns please do not hesitate to let us know.                                                                                        Health Care Proxy

## 2021-11-02 NOTE — H&P ADULT - NSHPPHYSICALEXAM_GEN_ALL_CORE
GENERAL: pt examined bedside, laying comfortably in bed in NAD  HEENT: NC/AT, moist oral mucosa, clear conjunctiva, sclera nonicteric  RESPIRATORY: Normal respiratory effort; CTA b/l, no wheezing, rhonchi, rales  CARDIOVASCULAR: RRR, normal S1 and S2, no murmur/rub/gallop  ABDOMEN: soft, NT/ND, normoactive bowel sounds, no rebound/guarding  MSK: No joint deformities, edema, erythema  EXTREMITIES: No cynaosis, no clubbing, no lower extremity edema; Peripheral pulses are 2+ bilaterally  PSYCH: affect appropriate and cooperative  NEUROLOGY: A+O to person, place, and time, no focal neurologic deficits appreciated   SKIN: No rashes or no palpable lesions GENERAL: elderly cachectic female, examined bedside, laying comfortably in bed in NAD  HEENT: NC/AT, dry oral mucosa, clear conjunctiva, sclera nonicteric, left orbital enucleation   RESPIRATORY: Normal respiratory effort; CTA b/l, no wheezing, rhonchi, rales  CARDIOVASCULAR: RRR, normal S1 and S2  ABDOMEN: soft, NT/ND, normoactive bowel sounds, no rebound/guarding  : +martinez placed in ED  MSK: No joint deformities, edema, erythema  EXTREMITIES: No cynaosis, no clubbing, no lower extremity edema; Peripheral pulses are 2+ bilaterally  PSYCH: intermittently agitated   NEUROLOGY: A+O x0, moves extremities spontaneously, withdraws to noxious stim   SKIN: No rashes or no palpable lesions

## 2021-11-03 NOTE — CONSULT NOTE ADULT - SUBJECTIVE AND OBJECTIVE BOX
[facility] - [facility]                                   [address] [place] [address]                                                      PHONE: [phone]                                                         FAX: [phone]  [email or web address]  ---------------------------------------------------------------------------------------------------------------------------------    Reason for Consult: LBBB AF     HPI:  NEHEMIAS LAUGHLIN is an 89y Female with HTN, persistent AF not on AC due to fall risk on Amiodarone therapy prior TTE 3/2021 normal EF LA/RA severely dilated severe TR severe PHTN moderate AS mild MS dementia (baseline AOx1) who presents to [facility] due to AMS and minimally responsive.  At baseline, Patient reported to be AAO x 1 however family noted she was lethargic and brought her [facility] for further evaluations.  Patient is a poor historian and unable to obtain history thus history obtained from chart. On arrival patient was found to be tachypneic and hypothermic.  Patient was empirically treated with one liter IVF and ABx therapy.      PAST MEDICAL & SURGICAL HISTORY:  HTN (hypertension)    No significant past surgical history    No Known Allergies    MEDICATIONS  (STANDING):  heparin   Injectable 5000 Unit(s) SubCutaneous every 8 hours  lactated ringers. 1000 milliLiter(s) (85 mL/Hr) IV Continuous <Continuous>  piperacillin/tazobactam IVPB.. 3.375 Gram(s) IV Intermittent every 12 hours    MEDICATIONS  (PRN):  acetaminophen     Tablet .. 650 milliGRAM(s) Oral every 6 hours PRN Temp greater or equal to 38C (100.4F), Mild Pain (1 - 3)  aluminum hydroxide/magnesium hydroxide/simethicone Suspension 30 milliLiter(s) Oral every 4 hours PRN Dyspepsia  melatonin 3 milliGRAM(s) Oral at bedtime PRN Insomnia      Social History:  Cigarettes:         none            Alchohol:      none            Illicit Drug Abuse:  none     ROS: Negative other than as mentioned in HPI.    Vital Signs Last 24 Hrs  T(C): 36.4 (2021 04:53), Max: 37.4 (2021 11:19)  T(F): 97.6 (2021 04:53), Max: 99.3 (2021 11:19)  HR: 77 (2021 07:51) (72 - 85)  BP: 130/60 (2021 07:51) (105/51 - 130/60)  BP(mean): --  RR: 20 (2021 07:51) (20 - 36)  SpO2: 92% (2021 07:51) (92% - 99%)  ICU Vital Signs Last 24 Hrs  [facility]  I&O's Detail    I&O's Summary    Drug Dosing Weight  [facility]      PHYSICAL EXAM:  General: Appears well developed, alert and cooperative.  HEENT: Head; normocephalic, atraumatic.  Eyes: Pupils reactive, cornea wnl.  Neck: Supple, no nodes adenopathy, no NVD or carotid bruit or thyromegaly.  CARDIOVASCULAR: Normal S1 and S2, 3/6 murmur, rub, gallop or lift.   LUNGS: Decrease BS B/L   ABDOMEN: Soft, nontender without mass or organomegaly. bowel sounds normoactive.  EXTREMITIES: No clubbing, cyanosis or edema. Distal pulses wnl.   SKIN: warm and dry with normal turgor.  NEURO: Alert/oriented x 0  PSYCH: normal affect.        LABS:                        11.6   6.64  )-----------( 457      ( 2021 11:00 )             36.2     11-    142  |  104  |  80.7<H>  ----------------------------<  131<H>  6.1<HH>   |  18.0<L>  |  2.02<H>    Ca    8.3<L>      2021 17:44    TPro  6.1<L>  /  Alb  2.5<L>  /  TBili  0.8  /  DBili  x   /  AST  1386<H>  /  ALT  593<H>  /  AlkPhos  108  11    NEHEMIAS LAUGHLIN  CARDIAC MARKERS ( 2021 20:19 )  x     / 0.05 ng/mL / x     / x     / x      CARDIAC MARKERS ( 2021 11:00 )  x     / 0.05 ng/mL / x     / x     / x          PT/INR - ( 2021 11:00 )   PT: 15.0 sec;   INR: 1.31 ratio         PTT - ( 2021 11:00 )  PTT:28.6 sec  Urinalysis Basic - ( 2021 12:08 )    Color: Yellow / Appearance: Slightly Turbid / S.020 / pH: x  Gluc: x / Ketone: Trace  / Bili: Small / Urobili: 1   Blood: x / Protein: 30 mg/dL / Nitrite: Positive   Leuk Esterase: Trace / RBC: 3-5 /HPF / WBC 6-10   Sq Epi: x / Non Sq Epi: Occasional / Bacteria: Moderate        RADIOLOGY & ADDITIONAL STUDIES:    INTERPRETATION OF TELEMETRY (personally reviewed):    ECG:  < from: 12 Lead ECG (21 @ 19:30) >  Diagnosis Line Sinus rhythm withPremature atrial complexes  Left axis deviation  Left bundle branch block  Abnormal ECG    Confirmed by YANG PURCELL ([id number]) on 11/3/2021 5:15:35 AM    < end of copied text >      ECHO: 3/2021 normal EF LA/RA severely dilated severe TR severe PHTN moderate AS       < from: CT Chest No Cont (21 @ 14:51) >  IMPRESSION:  1. Faceted gallstones. Intra and extrahepatic biliary dilatation. Correlate with LFTs. If there is clinical suspicion for acute cholecystitis or choledocholithiasis, consider right upper quadrant ultrasound and/or noncontrast MRCP for further evaluation.  2. Extensive patchy peribronchovascular opacities throughout the lungs, with left lower lobe mucus plugging, concerning for    < end of copied text >        Assessment and Plan:  In summary, NEHEMIAS LAUGHLIN is an 89y Female with past medical history significant for HTN, persistent AF not on AC due to fall risk on Amiodarone therapy prior TTE 3/2021 normal EF LA/RA severely dilated severe TR severe PHTN moderate AS mild MS dementia (baseline AOx1) who presents to [facility] due to AMS and minimally responsive.  At baseline, Patient reported to be AAO x 1 however family noted she was lethargic and brought her [facility] for further evaluations.  Patient is a poor historian and unable to obtain history thus history obtained from chart. On arrival patient was found to be tachypneic and hypothermic.  Patient was empirically treated with one liter IVF and ABx therapy.  ENIO       Plan  1.  Sepsis UTI and ASP PNA as per primary team   2.  Agree with Holding Amiodarone due to abnormal LFT's  3.  Agree with gentle IVF   4.  Low dose B-Blockers     Poor prognosis     Conservative cardiovascular management

## 2021-11-03 NOTE — PATIENT PROFILE ADULT - STATED REASON FOR ADMISSION
Per chart - pt. family brought pt. to the hospital due to pt. being minimally responsive and lethargic

## 2021-11-03 NOTE — CONSULT NOTE ADULT - PROBLEM SELECTOR RECOMMENDATION 9
Etiology unknown, most likely multifactorial, drug induced from Amiodarone, passive congestion secondary ti tricuspid regurgitation,   CT abdomen/pelvis revealed gallstones, intra/extrahepatic biliary dilatation. RUQ US w/ gallstones and sludge with no US evidence of acute cholecystitis    Liver enzymes now trending down slightly, INR 1.59, Albumin 2.9  Will order liver serologies  Continue to hold hepatotoxic agent  Trend liver enzymes and coags  Patient is not a candidate for EUS/ERCP as patient will not be able to tolerate the procedure. There is no indication for any upper endoscopic examination due to her advanced age.

## 2021-11-03 NOTE — PROGRESS NOTE ADULT - SUBJECTIVE AND OBJECTIVE BOX
Chief Complaint:  AMS    SUBJECTIVE / OVERNIGHT EVENTS: No acute events reported overnight.  Pt still minimally responsive, unable to obtain ROS.       I&O's Summary        PHYSICAL EXAM:  Vital Signs Last 24 Hrs  T(C): 36.4 (2021 04:53), Max: 36.4 (2021 21:35)  T(F): 97.6 (2021 04:53), Max: 97.6 (2021 04:53)  HR: 77 (2021 07:51) (72 - 78)  BP: 130/60 (2021 07:51) (105/53 - 130/60)  BP(mean): --  RR: 20 (2021 07:51) (20 - 26)  SpO2: 92% (2021 07:51) (92% - 99%)      GENERAL: elderly cachectic female, examined bedside, laying comfortably in bed in NAD  HEENT: NC/AT, dry oral mucosa, clear conjunctiva and sclera nonicteric rt eye, left orbital enucleation   RESPIRATORY: poor inspiratory effort, bibasilar rales, no wheezing or rhonchi   CARDIOVASCULAR: RRR, normal S1 and S2  ABDOMEN: soft, NT/ND, normoactive bowel sounds, no rebound/guarding  : +martinez placed in ED  EXTREMITIES: No cynaosis, no clubbing, no lower extremity edema; Peripheral pulses are 2+ bilaterally  PSYCH: intermittently agitated   NEUROLOGY: A+O x0, not following commands, moves extremities spontaneously, withdraws to noxious stim   SKIN: No rashes or no palpable lesions        LABS:                        9.9    7.34  )-----------( 311      ( 2021 12:28 )             30.8     11-03    143  |  106  |  83.8<H>  ----------------------------<  75  4.6   |  23.0  |  2.22<H>    Ca    7.8<L>      2021 12:28  Phos  4.2     11-03  Mg     1.7     11-03    TPro  4.7<L>  /  Alb  2.1<L>  /  TBili  0.9  /  DBili  x   /  AST  1043<H>  /  ALT  571<H>  /  AlkPhos  92  11-03    PT/INR - ( 2021 12:28 )   PT: 18.0 sec;   INR: 1.59 ratio         PTT - ( 2021 12:28 )  PTT:44.7 sec  CARDIAC MARKERS ( 2021 20:19 )  x     / 0.05 ng/mL / x     / x     / x      CARDIAC MARKERS ( 2021 11:00 )  x     / 0.05 ng/mL / x     / x     / x          Urinalysis Basic - ( 2021 12:08 )    Color: Yellow / Appearance: Slightly Turbid / S.020 / pH: x  Gluc: x / Ketone: Trace  / Bili: Small / Urobili: 1   Blood: x / Protein: 30 mg/dL / Nitrite: Positive   Leuk Esterase: Trace / RBC: 3-5 /HPF / WBC 6-10   Sq Epi: x / Non Sq Epi: Occasional / Bacteria: Moderate        Culture - Blood (collected 2021 11:20)  Source: .Blood Blood-Peripheral  Gram Stain (2021 09:01):    Growth in aerobic and anaerobic bottles: Gram Negative Rods    Gram Stain performed by:    [facility]    [address] [place] [address]    .    TYPE: (C=Critical, N=Notification, A=Abnormal) C    TESTS:  _ GS    DATE/TIME CALLED: _ 2021 08:55:59    CALLED TO: Bogdan Owens RN    READ BACK (2 Patient Identifiers)(Y/N): _ Y    READ BACK VALUES (Y/N): _ Y    CALLED BY: Bogdan Johnson    Culture - Blood (collected 2021 11:19)  Source: .Blood Blood-Peripheral  Gram Stain (2021 08:54):    Growth in aerobic bottle: Gram Negative Rods    ***Blood Panel PCR results on this specimen are available    approximately 3 hours after the Gram stain result.***    Gram stain, PCR, and/or culture results may not always    correspond due to difference in methodologies.    ************************************************************    This PCR assay was performed using [employer].    The following targets are tested for: Enterococcus,    vancomycin resistant enterococci, Listeria monocytogenes,    coagulase negative staphylococci, S. aureus,    methicillin resistant S. aureus, Streptococcus agalactiae    (Group B), S. pneumoniae, S. pyogenes (Group A),    Acinetobacter baumannii, Enterobacter cloacae, E. coli,    Klebsiella oxytoca, K. pneumoniae, Proteus sp.,    Serratia marcescens, Haemophilus influenzae,    Neisseria meningitidis, Pseudomonas aeruginosa, Candida    albicans, C. glabrata, C krusei, C parapsilosis,    C. tropicalis and the KPC resistance gene.    "Due to technical problems, Pseudomonas aeruginosa    until further notice".    Gram Stain and BCID performed by:    [facility]    [address] [place] [id number]    .    TYPE: (C=Critical, N=Notification, A=Abnormal) C    TESTS:  _ GS    DATE/TIME CALLED: _ 2021 08:53:44    CALLED TO: Bogdan Owens RN    READ BACK (2 Patient Identifiers)(Y/N): _ Y    READ BACK VALUES (Y/N): _ Y    CALLED BY: Bogdan Johnson  Organism: Blood Culture PCR (2021 09:31)  Organism: Blood Culture PCR (2021 09:31)      CAPILLARY BLOOD GLUCOSE            RADIOLOGY & ADDITIONAL TESTS:    < from: US Abdomen Upper Quadrant Right (21 @ 17:57) >  FINDINGS:    Liver: Within normal limits.  Bile ducts: Normal caliber. Common bile duct measures 11 mm. Mild intrahepatic biliary duct distention.  Gallbladder: Sludge and stones. No wall thickening or pericholecystic fluid.  Pancreas: Scattered by bowel gas.  Right kidney: 6.9 cm. No hydronephrosis. Atrophic.  Ascites: None.  IVC: Visualized portions are within normal limits.    IMPRESSION:    Gallstones and sludge. No ultrasound evidence of acute cholecystitis. Minimally prominent common duct for patient age.    If acute cholecystitis is still of clinical concern, DISIDA scan could be performed.    < end of copied text >        < from: CT Chest No Cont (21 @ 14:51) >  FINDINGS:  CHEST:  LUNGS AND LARGE AIRWAYS: Patent central airways. There are extensive patchy peribronchovascular consolidative opacities throughout the lungs, most prominent in the left lung. There is a 2.1 x 1.2 cm centrally cavitary consolidative opacity in the right middle lobe on image 87. There is scattered mucous plugging in the left lower lobe dependently. These findings are suggestive of aspiration pneumonia. 3 mm calcified granuloma in the left lower lobe on image 84. Trace emphysematous changes, most prominent in the upper lobes.  PLEURA: No pleural effusion.  VESSELS: Within normal limits.  HEART: Mitral annulus calcification. Heart size is normal. No pericardial effusion.  MEDIASTINUM AND OWEN: No lymphadenopathy.  CHEST WALL AND LOWER NECK: Within normal limits.    ABDOMEN AND PELVIS:  LIVER: There is mild homogeneous increased attenuation of the liver parenchyma, which may represent depositional disease including hemachromatosis or amiodarone therapy. The liver is not enlarged.  BILE DUCTS: Normal caliber.  GALLBLADDER: Faceted gallstones in the gallbladder. Mild intra and extrahepatic biliary duct dilatation, which may be related to the patient's stated age. Correlate with LFTs.  SPLEEN: Within normal limits.  PANCREAS: Within normal limits.  ADRENALS: Within normal limits.  KIDNEYS/URETERS: Proteinaceous right renal cyst. Otherwise, within normal limits.    BLADDER: Martinez catheter in the predominantly collapsed bladder.  REPRODUCTIVE ORGANS: Uterus and adnexa within normal limits.    BOWEL: Sigmoid diverticulosis. No bowel obstruction. Appendix is normal.  PERITONEUM: No ascites.  VESSELS: Extensive atherosclerotic calcification of the nonaneurysmal abdominal aorta and iliac arteries.  RETROPERITONEUM/LYMPH NODES: No lymphadenopathy.  ABDOMINAL WALL: Within normal limits.  BONES: Healed impacted left humeral neck fracture. Status post ORIF of a left intertrochanteric femur fracture. Compression deformities of T12, L1, and L3.    IMPRESSION:  1. Faceted gallstones. Intra and extrahepatic biliary dilatation. Correlate with LFTs. If there is clinical suspicion for acute cholecystitis or choledocholithiasis, consider right upper quadrant ultrasound and/or noncontrast MRCP for further evaluation.  2. Extensive patchy peribronchovascular opacities throughout the lungs, with left lower lobe mucus plugging, concerning for    < end of copied text >          MEDICATIONS  (STANDING):  heparin   Injectable 5000 Unit(s) SubCutaneous every 8 hours  metoprolol tartrate 25 milliGRAM(s) Oral every 12 hours  piperacillin/tazobactam IVPB.. 3.375 Gram(s) IV Intermittent every 12 hours    MEDICATIONS  (PRN):  acetaminophen     Tablet .. 650 milliGRAM(s) Oral every 6 hours PRN Temp greater or equal to 38C (100.4F), Mild Pain (1 - 3)  aluminum hydroxide/magnesium hydroxide/simethicone Suspension 30 milliLiter(s) Oral every 4 hours PRN Dyspepsia  melatonin 3 milliGRAM(s) Oral at bedtime PRN Insomnia

## 2021-11-03 NOTE — CONSULT NOTE ADULT - ATTENDING COMMENTS
My exam the patient was noncommunicative and minimally responsive until I try to examine her at which time she would constantly push my hands away and scream stop.  In all probability the abnormal transaminases which are now trending down are due to either acute passive congestion of the liver due to her underlying severe valvular heart disease, particularly tricuspid regurgitation and possibly as a result of the amiodarone.  I cannot altogether exclude a flare of underlying chronic liver disease or acute hepatitis as there are no prior liver function tests available for my review.  She will therefore undergo a full set of hepatitis serologies.  She has gallstones with very mild biliary dilation but she would not tolerate an MRCP and she is certainly not a candidate for endoscopic ultrasound in view of her age and the fact that she has what appears to be an aspiration pneumonia.  Therefore at this time I would simply recommend trending her liver function tests and awaiting further serologies.

## 2021-11-03 NOTE — ADVANCED PRACTICE NURSE CONSULT - ASSESSMENT
Patient is disoriented and total assist with care.  Patient with fecal incontinent episode at time of assessment.  Current Piero Score of 11.  Primary and Wound RN at bedside for site assessment.     ·	L Heel - Unstageable PI (4cm x 5cm) - brown, tan wound bed with irregular, open edges, moderate seropurulent drainage.  Periwound red, blanching, intact    ·	Right Heel DTI (4cm x 4cm) - persistent nonblanching purple discoloration, skin intact, no drainage noted.  Periwound skin clean dry intact    ·	Coccyx - DTI & Stage 2 PI, suspicious for Unstageable  (6cm x 5cm) -  persistent nonblanching purple discoloration, epidermal loss to proximal aspect with pink, purple wound bed,  Minimal serosanguinous drainage.

## 2021-11-03 NOTE — CONSULT NOTE ADULT - SUBJECTIVE AND OBJECTIVE BOX
[facility]  INFECTIOUS DISEASES AND INTERNAL MEDICINE at [place]  =======================================================  Mikal Collins MD  Diplomates American Board of Internal Medicine and Infectious Diseases  Tel: [phone]      Fax: [phone]  =======================================================      [facility]-[id number]  [place] [facility]    History obtained from the chart. Patient unable to provide history.     CC: Patient is a 89y old  Female who presents with a chief complaint of AMS (2021 13:11)      89y  Female with h/o HTN and advanced dementia (baseline AOx1). Patient was brought in by family after being found minimally responsive.  At baseline she is reported to be aaox1 however family noted she was lethargic and brought her in. On arrival pt reported to be tachypneic and hypothermic. Admitted for sepsis and started on vancomycin and zosyn. Blood cultures with GNR. ID input requested.       Past Medical & Surgical Hx:  HTN (hypertension)  Dementia   No significant past surgical history      Social Hx:  Unable to obtain due to medical condition       FAMILY HISTORY:  Unable to obtain due to medical condition       Allergies  No Known Allergies       REVIEW OF SYSTEMS:  Unable to obtain due to medical condition       Physical Exam:  GEN: Lethargic   HEENT: normocephalic and atraumatic.  Anicteric Rt eye with patch   NECK: Supple.   LUNGS: Decreased basal BS B/L   HEART: Regular rate and rhythm   ABDOMEN: Soft, nontender, and nondistended.  Positive bowel sounds.    : No CVA tenderness  EXTREMITIES: Without any edema.  MSK: No joint swelling  NEUROLOGIC: Lethargic, not responsive   PSYCHIATRIC: Unable to assess due to medical condition .  SKIN: No Rash      Vitals:  T(F): 97.6 (2021 04:53), Max: 97.6 (2021 04:53)  HR: 77 (2021 07:51)  BP: 130/60 (2021 07:51)  RR: 20 (2021 07:51)  SpO2: 92% (2021 07:51) (92% - 99%)  temp max in last 48H T(F): , Max: 99.3 (21 @ 11:19)      Current Antibiotics:  piperacillin/tazobactam IVPB.. 3.375 Gram(s) IV Intermittent every 12 hours    Other medications:  heparin   Injectable 5000 Unit(s) SubCutaneous every 8 hours  lactated ringers. 1000 milliLiter(s) IV Continuous <Continuous>  magnesium sulfate  IVPB 1 Gram(s) IV Intermittent once  metoprolol tartrate 25 milliGRAM(s) Oral every 12 hours                 9.9    7.34  )-----------( 311      ( 2021 12:28 )             30.8         143  |  106  |  83.8<H>  ----------------------------<  75  4.6   |  23.0  |  2.22<H>    Ca    7.8<L>      2021 12:28  Phos  4.2       Mg     1.7         TPro  4.7<L>  /  Alb  2.1<L>  /  TBili  0.9  /  DBili  x   /  AST  1043<H>  /  ALT  571<H>  /  AlkPhos  92  -03      RECENT CULTURES:   @ 11:20 .Blood Blood-Peripheral     Growth in aerobic and anaerobic bottles: Gram Negative Rods  Gram Stain performed by:  [facility]  [address] [place] [id number]     @ 11:19 .Blood Blood-Peripheral Blood Culture PCR    Growth in aerobic bottle: Gram Negative Rods  ***Blood Panel PCR results on this specimen are available  approximately 3 hours after the Gram stain result.***  Gram stain, PCR, and/or culture results may not always  correspond due to difference in methodologies.  ************************************************************  This PCR assay was performed using [employer].  The following targets are tested for: Enterococcus,  vancomycin resistant enterococci, Listeria monocytogenes,  coagulase negative staphylococci, S. aureus,  methicillin resistant S. aureus, Streptococcus agalactiae  (Group B), S. pneumoniae, S. pyogenes (Group A),  Acinetobacter baumannii, Enterobacter cloacae, E. coli,  Klebsiella oxytoca, K. pneumoniae, Proteus sp.,  Serratia marcescens, Haemophilus influenzae,  Neisseria meningitidis, Pseudomonas aeruginosa, Candida  albicans, C. glabrata, C krusei, C parapsilosis,  C. tropicalis and the KPC resistance gene.  "Due to technical problems, Pseudomonas aeruginosa  until further notice".  Gram Stain and BCID performed by:  [facility]  [address] [place] [id number]      WBC Count: 7.34 K/uL (21 @ 12:28)  WBC Count: 6.64 K/uL (21 @ 11:00)    Creatinine, Serum: 2.22 mg/dL (21 @ 12:28)  Creatinine, Serum: 2.02 mg/dL (21 @ 17:44)  Creatinine, Serum: 1.74 mg/dL (21 @ 11:00)    Urinalysis Basic - ( 2021 12:08 )    Color: Yellow / Appearance: Slightly Turbid / S.020 / pH: x  Gluc: x / Ketone: Trace  / Bili: Small / Urobili: 1   Blood: x / Protein: 30 mg/dL / Nitrite: Positive   Leuk Esterase: Trace / RBC: 3-5 /HPF / WBC 6-10   Sq Epi: x / Non Sq Epi: Occasional / Bacteria: Moderate          < from: US Abdomen Upper Quadrant Right (21 @ 17:57) >  EXAM:  US ABDOMEN RT UPR QUADRANT                          PROCEDURE DATE:  2021      INTERPRETATION:  CLINICAL INFORMATION: 89 years  Female with transaminitis.    COMPARISON: CT abdomen and pelvis earlier the same day    TECHNIQUE: Sonography of the right upper quadrant. Difficult study due to patient's inability to cooperate.    FINDINGS:    Liver: Within normal limits.  Bile ducts: Normal caliber. Common bile duct measures 11 mm. Mild intrahepatic biliary duct distention.  Gallbladder: Sludge and stones. No wall thickening or pericholecystic fluid.  Pancreas: Scattered by bowel gas.  Right kidney: 6.9 cm. No hydronephrosis. Atrophic.  Ascites: None.  IVC: Visualized portions are within normal limits.    IMPRESSION:    Gallstones and sludge. No ultrasound evidence of acute cholecystitis. Minimally prominent common duct for patient age.    If acute cholecystitis is still of clinical concern, DISIDA scan could be performed.  < end of copied text >        < from: CT Chest No Cont (21 @ 14:51) >  EXAM:  CT CHEST                        EXAM:  CT ABDOMEN AND PELVIS                          PROCEDURE DATE:  2021      INTERPRETATION:  CLINICAL INFORMATION: Altered mental status. Vomiting.    COMPARISON: None.    CONTRAST/COMPLICATIONS:  IV Contrast: NONE  Oral Contrast:  Complications: None reported at time of study completion    PROCEDURE:  CT of the Chest, Abdomen and Pelvis was performed.  Sagittal and coronal reformats were performed.    FINDINGS:  CHEST:  LUNGS AND LARGE AIRWAYS: Patent central airways. There are extensive patchy peribronchovascular consolidative opacities throughout the lungs, most prominent in the left lung. There is a 2.1 x 1.2 cm centrally cavitary consolidative opacity in the right middle lobe on image 87. There is scattered mucous plugging in the left lower lobe dependently. These findings are suggestive of aspiration pneumonia. 3 mm calcified granuloma in the left lower lobe on image 84. Trace emphysematous changes, most prominent in the upper lobes.  PLEURA: No pleural effusion.  VESSELS: Within normal limits.  HEART: Mitral annulus calcification. Heart size is normal. No pericardial effusion.  MEDIASTINUM AND OWEN: No lymphadenopathy.  CHEST WALL AND LOWER NECK: Within normal limits.    ABDOMEN AND PELVIS:  LIVER: There is mild homogeneous increased attenuation of the liver parenchyma, which may represent depositional disease including hemachromatosis or amiodarone therapy. The liver is not enlarged.  BILE DUCTS: Normal caliber.  GALLBLADDER: Faceted gallstones in the gallbladder. Mild intra and extrahepatic biliary duct dilatation, which may be related to the patient's stated age. Correlate with LFTs.  SPLEEN: Within normal limits.  PANCREAS: Within normal limits.  ADRENALS: Within normal limits.  KIDNEYS/URETERS: Proteinaceous right renal cyst. Otherwise, within normal limits.    BLADDER: Enriquez catheter in the predominantly collapsed bladder.  REPRODUCTIVE ORGANS: Uterus and adnexa within normal limits.    BOWEL: Sigmoid diverticulosis. No bowel obstruction. Appendix is normal.  PERITONEUM: No ascites.  VESSELS: Extensive atherosclerotic calcification of the nonaneurysmal abdominal aorta and iliac arteries.  RETROPERITONEUM/LYMPH NODES: No lymphadenopathy.  ABDOMINAL WALL: Within normal limits.  BONES: Healed impacted left humeral neck fracture. Status post ORIF of a left intertrochanteric femur fracture. Compression deformities of T12, L1, and L3.    IMPRESSION:  1. Faceted gallstones. Intra and extrahepatic biliary dilatation. Correlate with LFTs. If there is clinical suspicion for acute cholecystitis or choledocholithiasis, consider right upper quadrant ultrasound and/or noncontrast MRCP for further evaluation.  2. Extensive patchy peribronchovascular opacities throughout the lungs, with left lower lobe mucus plugging, concerning for aspiration pneumonia.  < end of copied text >

## 2021-11-03 NOTE — PROGRESS NOTE ADULT - ASSESSMENT
89 y/o F w/ PMH of HTN and advanced dementia (baseline AOx1) was brought in by family after being found minimally responsive.  At baseline pt reported to be aaox1 however family noted she was lethargic and brought her in.  Pt is a poor historian and unable to obtain information information from her. Information obtained from chart.  On arrival pt reported to be tachypnic and hypothermic.  Pt was empirically tx w/ 1L IVF and vanco/zosyn.  Will admit for acute metabolic encephalopathy and sepsis.         Acute toxic metabolic encephalopathy / dehydration / Advanced dementia   - Remains encephalopathic   - Baseline mentation AAOx1 but suspect current encephalopathy 2/2 sepsis and dehydration  - CTH negative for acute pathology   - Sepsis management as noted below   - NPO given poor mentation at this time  - Aspiration precautions   - Monitor closely on telemetry       Sepsis likely 2/2 GNR bacteremia vs UTI vs aspiration PNA  - Hypothermic and tachypnic on admission   - CT chest shows extensive patchy peribronchovascular opacities throughout lungs, LLL mucus plugging concerning for aspiration PNA  - CT a/p non-con reviewed and noted above   - UA +LE and nitrates, bacteria but 6-10wbc  - Gram stain x2 reporting GNR however cultures without growth     - Will maintain on Zosyn for emperic coverage pending ID recs   - LA 5.9 now WNL s/p IV hydration   - Monitor CBC and temperature curve  - Bear hugger if hypothermic again   - Chest PT and aspiration precautions  - NPO pending swallow eval       AGMA likely multifactorial 2/2 lactic acidosis / uremia / ARF  - BUN/Cr trending up   - Will maintain on gentle IV hydration   - AG improved and serum HCO3 WNL  - ABG ordered however VBG drawn and has mixed acid/base disturbance  - Will order renal US and consider nephro consult if renal fxn continues to decline  - Monitor chemistry, lytes and I/O's  - Avoid nephrotoxic medication or renally dose if needed          Hyperkalemia / hypocalcemia   - Potasium now WNL  - Pseudohypocalcemia, corrected calcium 9.3   - Monitor lytes         Transaminitis, etiology unclear   - Could be 2/2 amiodarone therapy  - CT a/p reports gallstones, intra/extrahepatic biliary dilatation  - RUQ US w/ gallstones and sludge but no US evidence of acute cholecystitis    - Slight improvement in LFTs   - INR trending up but no intervention indicated at this time  - Trend LFTs/coags and avoid hepatotoxic medications       PAF / prolonged QTc  - Currently rate controlled   - Hold amio given transaminitis  - Start on metoprolol 25mg q12h for rate control for now  - EKG reviewed and has chronic LBBB,  and QTc 552  - Avoid QTc prolonging medications and repeat EKG in AM to reassess  - Maintain K~4 and Mg~2  - Cardiology consulted and recs noted      Normocytic anemia   - Likely mixed anemia given elevated RDW, will order iron studies   - Hemodynamically stable at time with no signs of active bleeding  - Monitor CBC and transfuse if Hb <7-8       Severe protein calorie malnutrition   - Nutritional consult  - NPO for now however pending swallow eval      Functional quadriplegia  - Supportive care        VTE ppx: heparin sq    Dispo: pt remains acute, no plans for d/c and will remain in SDU.

## 2021-11-03 NOTE — CONSULT NOTE ADULT - SUBJECTIVE AND OBJECTIVE BOX
89 y/o F w/ PMH of HTN and advanced dementia (baseline AOx1) was brought in by family after being found minimally responsive. Patient is awake, nonverbal at the time of examination therefore HPI obtained from the H&P. Patient was brought in after found to be lethargic and minimally responsive.  On arrival pt reported to be tachypneic and hypothermic and admitted for acute metabolic encephalopathy and sepsis. Blood culture resulted Gram negative rods. Gastroenterology is now consulted foe elevated liver enzymes. At the time of my evaluation patient in no acute distress noted. No abdominal tenderness or hepatomegaly noted.     Review of Systems:  · ENMT: negative  · Respiratory and Thorax: negative  · Cardiovascular: negative  · Gastrointestinal: see above.  · Genitourinary:	negative  · Musculoskeletal: negative  · Neurological: negative  · Psychiatric: negative  · Hematology/Lymphatics: negative  · Endocrine: negative      PAST MEDICAL/SURGICAL HISTORY:  HTN (hypertension)    No significant past surgical history      SOCIAL HISTORY:  - TOBACCO: Denies  - ALCOHOL: Denies  - ILLICIT DRUG USE: Denies    FAMILY HISTORY:  No known history of gastrointestinal or liver disease;  No pertinent family history in first degree relatives        HOME MEDICATIONS:  amiodarone 200 mg oral tablet: 1 tab(s) orally once a day (2021 17:21)  aspirin 81 mg oral tablet:  (2021 17:22)  dronabinol 2.5 mg oral capsule: 1 cap(s) orally once a day (2021 17:22)  folic acid 1 mg oral tablet: 1 tab(s) orally once a day (2021 17:22)  Senna 8.6 mg oral tablet: 2 tab(s) orally once a day (at bedtime) (2021 17:22)    INPATIENT MEDICATIONS:  MEDICATIONS  (STANDING):  heparin   Injectable 5000 Unit(s) SubCutaneous every 8 hours  lactated ringers. 1000 milliLiter(s) (65 mL/Hr) IV Continuous <Continuous>  magnesium sulfate  IVPB 1 Gram(s) IV Intermittent once  metoprolol tartrate 25 milliGRAM(s) Oral every 12 hours  piperacillin/tazobactam IVPB.. 3.375 Gram(s) IV Intermittent every 12 hours    MEDICATIONS  (PRN):  acetaminophen     Tablet .. 650 milliGRAM(s) Oral every 6 hours PRN Temp greater or equal to 38C (100.4F), Mild Pain (1 - 3)  aluminum hydroxide/magnesium hydroxide/simethicone Suspension 30 milliLiter(s) Oral every 4 hours PRN Dyspepsia  melatonin 3 milliGRAM(s) Oral at bedtime PRN Insomnia    ALLERGIES:  No Known Allergies    T(C): 36.5 (21 @ 16:01), Max: 36.5 (21 @ 16:01)  HR: 72 (21 @ 16:01) (72 - 77)  BP: 98/32 (21 @ 16:01) (98/32 - 130/60)  RR: 16 (21 @ 16:01) (16 - 22)  SpO2: 100% (21 @ 16:01) (92% - 100%)      PHYSICAL EXAM:  Constitutional: No acute distress, thin appearing, elderly    Neuro: Awake, nonverbal at the time of my examination, (oriented x1 on baseline per documentation)  HEENT: PERRL, anicteric sclerae,   Neck: supple, no JVD  CV:+S1S2,   Pulm/chest: lung sounds CTA and equal bilaterally, no accessory muscle use noted  Abd: soft, NT, ND, +BS  Ext: No cyanosis, clubbing or edema  Skin: warm, no jaundice, BLE ecchymosis       LABS:             9.9    7.34  )-----------( 311      (  @ 12:28 )             30.8                11.6   6.64  )-----------( 457      (  @ 11:00 )             36.2       PT/INR - ( 2021 12:28 )   PT: 18.0 sec;   INR: 1.59 ratio         PTT - ( 2021 12:28 )  PTT:44.7 sec      143  |  106  |  83.8<H>  ----------------------------<  75  4.6   |  23.0  |  2.22<H>    Ca    7.8<L>      2021 12:28  Phos  4.2       Mg     1.7         TPro  4.7<L>  /  Alb  2.1<L>  /  TBili  0.9  /  DBili  x   /  AST  1043<H>  /  ALT  571<H>  /  AlkPhos  92  11-03    LIVER FUNCTIONS - ( 2021 12:28 )  Alb: 2.1 g/dL / Pro: 4.7 g/dL / ALK PHOS: 92 U/L / ALT: 571 U/L / AST: 1043 U/L / GGT: x             Urinalysis Basic - ( 2021 12:08 )    Color: Yellow / Appearance: Slightly Turbid / S.020 / pH: x  Gluc: x / Ketone: Trace  / Bili: Small / Urobili: 1   Blood: x / Protein: 30 mg/dL / Nitrite: Positive   Leuk Esterase: Trace / RBC: 3-5 /HPF / WBC 6-10   Sq Epi: x / Non Sq Epi: Occasional / Bacteria: Moderate      Culture - Blood (collected 2021 11:20)  Source: .Blood Blood-Peripheral  Gram Stain (2021 09:01):    Growth in aerobic and anaerobic bottles: Gram Negative Rods    Gram Stain performed by:    [facility]    [address] [place] [address]    .    TYPE: (C=Critical, N=Notification, A=Abnormal) C    TESTS:  _ GS    DATE/TIME CALLED: _ 2021 08:55:59    CALLED TO: Bogdan Owens RN    READ BACK (2 Patient Identifiers)(Y/N): _ Y    READ BACK VALUES (Y/N): _ Y    CALLED BY: Bogdan Johnson    Culture - Blood (collected 2021 11:19)  Source: .Blood Blood-Peripheral  Gram Stain (2021 08:54):    Growth in aerobic bottle: Gram Negative Rods    ***Blood Panel PCR results on this specimen are available    approximately 3 hours after the Gram stain result.***    Gram stain, PCR, and/or culture results may not always    correspond due to difference in methodologies.    ************************************************************    This PCR assay was performed using [employer].    The following targets are tested for: Enterococcus,    vancomycin resistant enterococci, Listeria monocytogenes,    coagulase negative staphylococci, S. aureus,    methicillin resistant S. aureus, Streptococcus agalactiae    (Group B), S. pneumoniae, S. pyogenes (Group A),    Acinetobacter baumannii, Enterobacter cloacae, E. coli,    Klebsiella oxytoca, K. pneumoniae, Proteus sp.,    Serratia marcescens, Haemophilus influenzae,    Neisseria meningitidis, Pseudomonas aeruginosa, Candida    albicans, C. glabrata, C krusei, C parapsilosis,    C. tropicalis and the KPC resistance gene.    "Due to technical problems, Pseudomonas aeruginosa    until further notice".    Gram Stain and BCID performed by:    [facility]    [address] [place] [id number]    .    TYPE: (C=Critical, N=Notification, A=Abnormal) C    TESTS:  _ GS    DATE/TIME CALLED: _ 2021 08:53:44    CALLED TO: Bogdan Owens RN    READ BACK (2 Patient Identifiers)(Y/N): _ Y    READ BACK VALUES (Y/N): _ Y    CALLED BY: Bogdan Johnson  Organism: Blood Culture PCR (2021 09:31)  Organism: Blood Culture PCR (2021 09:31)    < from: US Abdomen Upper Quadrant Right (21 @ 17:57) >  FINDINGS:    Liver: Within normal limits.  Bile ducts: Normal caliber. Common bile duct measures 11 mm. Mild intrahepatic biliary duct distention.  Gallbladder: Sludge and stones. No wall thickening or pericholecystic fluid.  Pancreas: Scattered by bowel gas.  Right kidney: 6.9 cm. No hydronephrosis. Atrophic.  Ascites: None.  IVC: Visualized portions are within normal limits.    IMPRESSION:    Gallstones and sludge. No ultrasound evidence of acute cholecystitis. Minimally prominent common duct for patient age.    If acute cholecystitis is still of clinical concern, DISIDA scan could be performed.    < end of copied text >    < from: CT Abdomen and Pelvis No Cont (21 @ 12:45) >  FINDINGS:  CHEST:  LUNGS AND LARGE AIRWAYS: Patent central airways. There are extensive patchy peribronchovascular consolidative opacities throughout the lungs, most prominent in the left lung. There is a 2.1 x 1.2 cm centrally cavitary consolidative opacity in the right middle lobe on image 87. There is scattered mucous plugging in the left lower lobe dependently. These findings are suggestive of aspiration pneumonia. 3 mm calcified granuloma in the left lower lobe on image 84. Trace emphysematous changes, most prominent in the upper lobes.  PLEURA: No pleural effusion.  VESSELS: Within normal limits.  HEART: Mitral annulus calcification. Heart size is normal. No pericardial effusion.  MEDIASTINUM AND OWEN: No lymphadenopathy.  CHEST WALL AND LOWER NECK: Within normal limits.    ABDOMEN AND PELVIS:  LIVER: There is mild homogeneous increased attenuation of the liver parenchyma, which may represent depositional disease including hemachromatosis or amiodarone therapy. The liver is not enlarged.  BILE DUCTS: Normal caliber.  GALLBLADDER: Faceted gallstones in the gallbladder. Mild intra and extrahepatic biliary duct dilatation, which may be related to the patient's stated age. Correlate with LFTs.  SPLEEN: Within normal limits.  PANCREAS: Within normal limits.  ADRENALS: Within normal limits.  KIDNEYS/URETERS: Proteinaceous right renal cyst. Otherwise, within normal limits.    BLADDER: Enriquez catheter in the predominantly collapsed bladder.  REPRODUCTIVE ORGANS: Uterus and adnexa within normal limits.    BOWEL: Sigmoid diverticulosis. No bowel obstruction. Appendix is normal.  PERITONEUM: No ascites.  VESSELS: Extensive atherosclerotic calcification of the nonaneurysmal abdominal aorta and iliac arteries.  RETROPERITONEUM/LYMPH NODES: No lymphadenopathy.  ABDOMINAL WALL: Within normal limits.  BONES: Healed impacted left humeral neck fracture. Status post ORIF of a left intertrochanteric femur fracture. Compression deformities of T12, L1, and L3.    IMPRESSION:  1. Faceted gallstones. Intra and extrahepatic biliary dilatation. Correlate with LFTs. If there is clinical suspicion for acute cholecystitis or choledocholithiasis, consider right upper quadrant ultrasound and/or noncontrast MRCP for further evaluation.  2. Extensive patchy peribronchovascular opacities throughout the lungs, with left lower lobe mucus plugging, concerning for aspiration pneumonia.    < end of copied text >     89 y/o F w/ PMH of HTN and advanced dementia (baseline AOx1) was brought in by family after being found minimally responsive. Patient is awake, nonverbal at the time of examination therefore HPI obtained from the H&P. Patient was brought in after found to be lethargic and minimally responsive.  On arrival pt reported to be tachypneic and hypothermic and admitted for acute metabolic encephalopathy and sepsis. Blood culture resulted Gram negative rods. Gastroenterology is now consulted foe elevated liver enzymes. At the time of my evaluation patient in no acute distress noted. No abdominal tenderness or hepatomegaly noted. Cat scan of abd/pelvis and chest revealed faceted gallstones. Intra and extrahepatic biliary dilatation.   2. Extensive patchy peribronchovascular opacities throughout the lungs, with left lower lobe mucus plugging, concerning for aspiration pneumonia.    US showed gallstones and sludge. No ultrasound evidence of acute cholecystitis. Minimally prominent common duct for patient age.      Review of Systems:  · ENMT: negative  · Respiratory and Thorax: negative  · Cardiovascular: negative  · Gastrointestinal: see above.  · Genitourinary:	negative  · Musculoskeletal: negative  · Neurological: negative  · Psychiatric: negative  · Hematology/Lymphatics: negative  · Endocrine: negative      PAST MEDICAL/SURGICAL HISTORY:  HTN (hypertension)    No significant past surgical history      SOCIAL HISTORY:  - TOBACCO: Denies  - ALCOHOL: Denies  - ILLICIT DRUG USE: Denies    FAMILY HISTORY:  No known history of gastrointestinal or liver disease;  No pertinent family history in first degree relatives        HOME MEDICATIONS:  amiodarone 200 mg oral tablet: 1 tab(s) orally once a day (2021 17:21)  aspirin 81 mg oral tablet:  (2021 17:22)  dronabinol 2.5 mg oral capsule: 1 cap(s) orally once a day (2021 17:22)  folic acid 1 mg oral tablet: 1 tab(s) orally once a day (2021 17:22)  Senna 8.6 mg oral tablet: 2 tab(s) orally once a day (at bedtime) (2021 17:22)    INPATIENT MEDICATIONS:  MEDICATIONS  (STANDING):  heparin   Injectable 5000 Unit(s) SubCutaneous every 8 hours  lactated ringers. 1000 milliLiter(s) (65 mL/Hr) IV Continuous <Continuous>  magnesium sulfate  IVPB 1 Gram(s) IV Intermittent once  metoprolol tartrate 25 milliGRAM(s) Oral every 12 hours  piperacillin/tazobactam IVPB.. 3.375 Gram(s) IV Intermittent every 12 hours    MEDICATIONS  (PRN):  acetaminophen     Tablet .. 650 milliGRAM(s) Oral every 6 hours PRN Temp greater or equal to 38C (100.4F), Mild Pain (1 - 3)  aluminum hydroxide/magnesium hydroxide/simethicone Suspension 30 milliLiter(s) Oral every 4 hours PRN Dyspepsia  melatonin 3 milliGRAM(s) Oral at bedtime PRN Insomnia    ALLERGIES:  No Known Allergies    T(C): 36.5 (21 @ 16:01), Max: 36.5 (21 @ 16:01)  HR: 72 (21 @ 16:01) (72 - 77)  BP: 98/32 (21 @ 16:01) (98/32 - 130/60)  RR: 16 (21 @ 16:01) (16 - 22)  SpO2: 100% (21 @ 16:01) (92% - 100%)      PHYSICAL EXAM:  Constitutional: No acute distress, thin appearing, elderly, cachectic  Neuro: Awake, nonverbal at the time of my examination, (oriented x1 on baseline per documentation)  HEENT: PERRL, anicteric sclerae,   Neck: supple, no JVD  CV:+S1S2,   Pulm/chest: lung sounds CTA and equal bilaterally, no accessory muscle use noted  Abd: soft, NT, ND, +BS  Ext: No cyanosis, clubbing or edema, muscle wasting throughout  Skin: warm, no jaundice, BLE ecchymosis       LABS:             9.9    7.34  )-----------( 311      (  @ 12:28 )             30.8                11.6   6.64  )-----------( 457      (  @ 11:00 )             36.2       PT/INR - ( 2021 12:28 )   PT: 18.0 sec;   INR: 1.59 ratio         PTT - ( 2021 12:28 )  PTT:44.7 sec      143  |  106  |  83.8<H>  ----------------------------<  75  4.6   |  23.0  |  2.22<H>    Ca    7.8<L>      2021 12:28  Phos  4.2       Mg     1.7         TPro  4.7<L>  /  Alb  2.1<L>  /  TBili  0.9  /  DBili  x   /  AST  1043<H>  /  ALT  571<H>  /  AlkPhos  92  03    LIVER FUNCTIONS - ( 2021 12:28 )  Alb: 2.1 g/dL / Pro: 4.7 g/dL / ALK PHOS: 92 U/L / ALT: 571 U/L / AST: 1043 U/L / GGT: x             Urinalysis Basic - ( 2021 12:08 )    Color: Yellow / Appearance: Slightly Turbid / S.020 / pH: x  Gluc: x / Ketone: Trace  / Bili: Small / Urobili: 1   Blood: x / Protein: 30 mg/dL / Nitrite: Positive   Leuk Esterase: Trace / RBC: 3-5 /HPF / WBC 6-10   Sq Epi: x / Non Sq Epi: Occasional / Bacteria: Moderate      Culture - Blood (collected 2021 11:20)  Source: .Blood Blood-Peripheral  Gram Stain (2021 09:01):    Growth in aerobic and anaerobic bottles: Gram Negative Rods    Gram Stain performed by:    [facility]    [address] [place] [address]    .    TYPE: (C=Critical, N=Notification, A=Abnormal) C    TESTS:  _ GS    DATE/TIME CALLED: _ 2021 08:55:59    CALLED TO: Bogdan Owens RN    READ BACK (2 Patient Identifiers)(Y/N): _ Y    READ BACK VALUES (Y/N): _ Y    CALLED BY: Bogdan Johnson    Culture - Blood (collected 2021 11:19)  Source: .Blood Blood-Peripheral  Gram Stain (2021 08:54):    Growth in aerobic bottle: Gram Negative Rods    ***Blood Panel PCR results on this specimen are available    approximately 3 hours after the Gram stain result.***    Gram stain, PCR, and/or culture results may not always    correspond due to difference in methodologies.    ************************************************************    This PCR assay was performed using [employer].    The following targets are tested for: Enterococcus,    vancomycin resistant enterococci, Listeria monocytogenes,    coagulase negative staphylococci, S. aureus,    methicillin resistant S. aureus, Streptococcus agalactiae    (Group B), S. pneumoniae, S. pyogenes (Group A),    Acinetobacter baumannii, Enterobacter cloacae, E. coli,    Klebsiella oxytoca, K. pneumoniae, Proteus sp.,    Serratia marcescens, Haemophilus influenzae,    Neisseria meningitidis, Pseudomonas aeruginosa, Candida    albicans, C. glabrata, C krusei, C parapsilosis,    C. tropicalis and the KPC resistance gene.    "Due to technical problems, Pseudomonas aeruginosa    until further notice".    Gram Stain and BCID performed by:    [facility]    [address] [place] [id number]    .    TYPE: (C=Critical, N=Notification, A=Abnormal) C    TESTS:  _ GS    DATE/TIME CALLED: _ 2021 08:53:44    CALLED TO: Bogdan Owens RN    READ BACK (2 Patient Identifiers)(Y/N): _ Y    READ BACK VALUES (Y/N): _ Y    CALLED BY: Bogdan Johnson  Organism: Blood Culture PCR (2021 09:31)  Organism: Blood Culture PCR (2021 09:31)    < from: US Abdomen Upper Quadrant Right (21 @ 17:57) >  FINDINGS:    Liver: Within normal limits.  Bile ducts: Normal caliber. Common bile duct measures 11 mm. Mild intrahepatic biliary duct distention.  Gallbladder: Sludge and stones. No wall thickening or pericholecystic fluid.  Pancreas: Scattered by bowel gas.  Right kidney: 6.9 cm. No hydronephrosis. Atrophic.  Ascites: None.  IVC: Visualized portions are within normal limits.    IMPRESSION:    Gallstones and sludge. No ultrasound evidence of acute cholecystitis. Minimally prominent common duct for patient age.    If acute cholecystitis is still of clinical concern, DISIDA scan could be performed.    < end of copied text >    < from: CT Abdomen and Pelvis No Cont (21 @ 12:45) >  FINDINGS:  CHEST:  LUNGS AND LARGE AIRWAYS: Patent central airways. There are extensive patchy peribronchovascular consolidative opacities throughout the lungs, most prominent in the left lung. There is a 2.1 x 1.2 cm centrally cavitary consolidative opacity in the right middle lobe on image 87. There is scattered mucous plugging in the left lower lobe dependently. These findings are suggestive of aspiration pneumonia. 3 mm calcified granuloma in the left lower lobe on image 84. Trace emphysematous changes, most prominent in the upper lobes.  PLEURA: No pleural effusion.  VESSELS: Within normal limits.  HEART: Mitral annulus calcification. Heart size is normal. No pericardial effusion.  MEDIASTINUM AND OWEN: No lymphadenopathy.  CHEST WALL AND LOWER NECK: Within normal limits.    ABDOMEN AND PELVIS:  LIVER: There is mild homogeneous increased attenuation of the liver parenchyma, which may represent depositional disease including hemachromatosis or amiodarone therapy. The liver is not enlarged.  BILE DUCTS: Normal caliber.  GALLBLADDER: Faceted gallstones in the gallbladder. Mild intra and extrahepatic biliary duct dilatation, which may be related to the patient's stated age. Correlate with LFTs.  SPLEEN: Within normal limits.  PANCREAS: Within normal limits.  ADRENALS: Within normal limits.  KIDNEYS/URETERS: Proteinaceous right renal cyst. Otherwise, within normal limits.    BLADDER: Enriquez catheter in the predominantly collapsed bladder.  REPRODUCTIVE ORGANS: Uterus and adnexa within normal limits.    BOWEL: Sigmoid diverticulosis. No bowel obstruction. Appendix is normal.  PERITONEUM: No ascites.  VESSELS: Extensive atherosclerotic calcification of the nonaneurysmal abdominal aorta and iliac arteries.  RETROPERITONEUM/LYMPH NODES: No lymphadenopathy.  ABDOMINAL WALL: Within normal limits.  BONES: Healed impacted left humeral neck fracture. Status post ORIF of a left intertrochanteric femur fracture. Compression deformities of T12, L1, and L3.    IMPRESSION:  1. Faceted gallstones. Intra and extrahepatic biliary dilatation. Correlate with LFTs. If there is clinical suspicion for acute cholecystitis or choledocholithiasis, consider right upper quadrant ultrasound and/or noncontrast MRCP for further evaluation.  2. Extensive patchy peribronchovascular opacities throughout the lungs, with left lower lobe mucus plugging, concerning for aspiration pneumonia.    < end of copied text >     89 y/o F w/ PMH of HTN and advanced dementia (baseline AOx1) was brought in by family after being found minimally responsive. Patient is awake, nonverbal at the time of examination therefore HPI obtained from the H&P. Patient was brought in after found to be lethargic and minimally responsive.  On arrival pt reported to be tachypneic and hypothermic and admitted for acute metabolic encephalopathy and sepsis. Blood culture resulted Gram negative rods. Gastroenterology is now consulted foe elevated liver enzymes. At the time of my evaluation patient in no acute distress noted. No abdominal tenderness or hepatomegaly noted. Cat scan of abd/pelvis and chest revealed faceted gallstones. Intra and extrahepatic biliary dilatation.   2. Extensive patchy peribronchovascular opacities throughout the lungs, with left lower lobe mucus plugging, concerning for aspiration pneumonia.    US showed gallstones and sludge. No ultrasound evidence of acute cholecystitis. Minimally prominent common duct for patient age. Also TTE 3/2021 normal EF LA/RA severely dilated severe TR severe PHTN moderate AS mild MS      Review of Systems:  · ENMT: negative  · Respiratory and Thorax: negative  · Cardiovascular: negative  · Gastrointestinal: see above.  · Genitourinary:	negative  · Musculoskeletal: negative  · Neurological: negative  · Psychiatric: negative  · Hematology/Lymphatics: negative  · Endocrine: negative      PAST MEDICAL/SURGICAL HISTORY:  HTN (hypertension)    No significant past surgical history      SOCIAL HISTORY:  - TOBACCO: Denies  - ALCOHOL: Denies  - ILLICIT DRUG USE: Denies    FAMILY HISTORY:  No known history of gastrointestinal or liver disease;  No pertinent family history in first degree relatives        HOME MEDICATIONS:  amiodarone 200 mg oral tablet: 1 tab(s) orally once a day (2021 17:21)  aspirin 81 mg oral tablet:  (2021 17:22)  dronabinol 2.5 mg oral capsule: 1 cap(s) orally once a day (2021 17:22)  folic acid 1 mg oral tablet: 1 tab(s) orally once a day (2021 17:22)  Senna 8.6 mg oral tablet: 2 tab(s) orally once a day (at bedtime) (2021 17:22)    INPATIENT MEDICATIONS:  MEDICATIONS  (STANDING):  heparin   Injectable 5000 Unit(s) SubCutaneous every 8 hours  lactated ringers. 1000 milliLiter(s) (65 mL/Hr) IV Continuous <Continuous>  magnesium sulfate  IVPB 1 Gram(s) IV Intermittent once  metoprolol tartrate 25 milliGRAM(s) Oral every 12 hours  piperacillin/tazobactam IVPB.. 3.375 Gram(s) IV Intermittent every 12 hours    MEDICATIONS  (PRN):  acetaminophen     Tablet .. 650 milliGRAM(s) Oral every 6 hours PRN Temp greater or equal to 38C (100.4F), Mild Pain (1 - 3)  aluminum hydroxide/magnesium hydroxide/simethicone Suspension 30 milliLiter(s) Oral every 4 hours PRN Dyspepsia  melatonin 3 milliGRAM(s) Oral at bedtime PRN Insomnia    ALLERGIES:  No Known Allergies    T(C): 36.5 (21 @ 16:01), Max: 36.5 (21 @ 16:01)  HR: 72 (21 @ 16:01) (72 - 77)  BP: 98/32 (21 @ 16:01) (98/32 - 130/60)  RR: 16 (21 @ 16:01) (16 - 22)  SpO2: 100% (21 @ 16:01) (92% - 100%)      PHYSICAL EXAM:  Constitutional: No acute distress, thin appearing, elderly, cachectic  Neuro: Awake, nonverbal at the time of my examination, (oriented x1 on baseline per documentation)  HEENT: PERRL, anicteric sclerae,   Neck: supple, no JVD  CV:+S1S2,   Pulm/chest: lung sounds CTA and equal bilaterally, no accessory muscle use noted  Abd: soft, NT, ND, +BS  Ext: No cyanosis, clubbing or edema, muscle wasting throughout  Skin: warm, no jaundice, BLE ecchymosis       LABS:             9.9    7.34  )-----------( 311      (  @ 12:28 )             30.8                11.6   6.64  )-----------( 457      (  @ 11:00 )             36.2       PT/INR - ( 2021 12:28 )   PT: 18.0 sec;   INR: 1.59 ratio         PTT - ( 2021 12:28 )  PTT:44.7 sec      143  |  106  |  83.8<H>  ----------------------------<  75  4.6   |  23.0  |  2.22<H>    Ca    7.8<L>      2021 12:28  Phos  4.2       Mg     1.7         TPro  4.7<L>  /  Alb  2.1<L>  /  TBili  0.9  /  DBili  x   /  AST  1043<H>  /  ALT  571<H>  /  AlkPhos  92  03    LIVER FUNCTIONS - ( 2021 12:28 )  Alb: 2.1 g/dL / Pro: 4.7 g/dL / ALK PHOS: 92 U/L / ALT: 571 U/L / AST: 1043 U/L / GGT: x             Urinalysis Basic - ( 2021 12:08 )    Color: Yellow / Appearance: Slightly Turbid / S.020 / pH: x  Gluc: x / Ketone: Trace  / Bili: Small / Urobili: 1   Blood: x / Protein: 30 mg/dL / Nitrite: Positive   Leuk Esterase: Trace / RBC: 3-5 /HPF / WBC 6-10   Sq Epi: x / Non Sq Epi: Occasional / Bacteria: Moderate      Culture - Blood (collected 2021 11:20)  Source: .Blood Blood-Peripheral  Gram Stain (2021 09:01):    Growth in aerobic and anaerobic bottles: Gram Negative Rods    Gram Stain performed by:    [facility]    [address] [place] [id number]    .    TYPE: (C=Critical, N=Notification, A=Abnormal) C    TESTS:  _ GS    DATE/TIME CALLED: _ 2021 08:55:59    CALLED TO: Bogdan Owens RN    READ BACK (2 Patient Identifiers)(Y/N): _ Y    READ BACK VALUES (Y/N): _ Y    CALLED BY: Bogdan Johnson    Culture - Blood (collected 2021 11:19)  Source: .Blood Blood-Peripheral  Gram Stain (2021 08:54):    Growth in aerobic bottle: Gram Negative Rods    ***Blood Panel PCR results on this specimen are available    approximately 3 hours after the Gram stain result.***    Gram stain, PCR, and/or culture results may not always    correspond due to difference in methodologies.    ************************************************************    This PCR assay was performed using [employer].    The following targets are tested for: Enterococcus,    vancomycin resistant enterococci, Listeria monocytogenes,    coagulase negative staphylococci, S. aureus,    methicillin resistant S. aureus, Streptococcus agalactiae    (Group B), S. pneumoniae, S. pyogenes (Group A),    Acinetobacter baumannii, Enterobacter cloacae, E. coli,    Klebsiella oxytoca, K. pneumoniae, Proteus sp.,    Serratia marcescens, Haemophilus influenzae,    Neisseria meningitidis, Pseudomonas aeruginosa, Candida    albicans, C. glabrata, C krusei, C parapsilosis,    C. tropicalis and the KPC resistance gene.    "Due to technical problems, Pseudomonas aeruginosa    until further notice".    Gram Stain and BCID performed by:    [facility]    [address] [place] [address]    .    TYPE: (C=Critical, N=Notification, A=Abnormal) C    TESTS:  _ GS    DATE/TIME CALLED: _ 2021 08:53:44    CALLED TO: Bogdan Owens RN    READ BACK (2 Patient Identifiers)(Y/N): _ Y    READ BACK VALUES (Y/N): _ Y    CALLED BY: Bogdan Johnson  Organism: Blood Culture PCR (2021 09:31)  Organism: Blood Culture PCR (2021 09:31)    < from: US Abdomen Upper Quadrant Right (21 @ 17:57) >  FINDINGS:    Liver: Within normal limits.  Bile ducts: Normal caliber. Common bile duct measures 11 mm. Mild intrahepatic biliary duct distention.  Gallbladder: Sludge and stones. No wall thickening or pericholecystic fluid.  Pancreas: Scattered by bowel gas.  Right kidney: 6.9 cm. No hydronephrosis. Atrophic.  Ascites: None.  IVC: Visualized portions are within normal limits.    IMPRESSION:    Gallstones and sludge. No ultrasound evidence of acute cholecystitis. Minimally prominent common duct for patient age.    If acute cholecystitis is still of clinical concern, DISIDA scan could be performed.    < end of copied text >    < from: CT Abdomen and Pelvis No Cont (11.02.21 @ 12:45) >  FINDINGS:  CHEST:  LUNGS AND LARGE AIRWAYS: Patent central airways. There are extensive patchy peribronchovascular consolidative opacities throughout the lungs, most prominent in the left lung. There is a 2.1 x 1.2 cm centrally cavitary consolidative opacity in the right middle lobe on image 87. There is scattered mucous plugging in the left lower lobe dependently. These findings are suggestive of aspiration pneumonia. 3 mm calcified granuloma in the left lower lobe on image 84. Trace emphysematous changes, most prominent in the upper lobes.  PLEURA: No pleural effusion.  VESSELS: Within normal limits.  HEART: Mitral annulus calcification. Heart size is normal. No pericardial effusion.  MEDIASTINUM AND OWEN: No lymphadenopathy.  CHEST WALL AND LOWER NECK: Within normal limits.    ABDOMEN AND PELVIS:  LIVER: There is mild homogeneous increased attenuation of the liver parenchyma, which may represent depositional disease including hemachromatosis or amiodarone therapy. The liver is not enlarged.  BILE DUCTS: Normal caliber.  GALLBLADDER: Faceted gallstones in the gallbladder. Mild intra and extrahepatic biliary duct dilatation, which may be related to the patient's stated age. Correlate with LFTs.  SPLEEN: Within normal limits.  PANCREAS: Within normal limits.  ADRENALS: Within normal limits.  KIDNEYS/URETERS: Proteinaceous right renal cyst. Otherwise, within normal limits.    BLADDER: Enriquez catheter in the predominantly collapsed bladder.  REPRODUCTIVE ORGANS: Uterus and adnexa within normal limits.    BOWEL: Sigmoid diverticulosis. No bowel obstruction. Appendix is normal.  PERITONEUM: No ascites.  VESSELS: Extensive atherosclerotic calcification of the nonaneurysmal abdominal aorta and iliac arteries.  RETROPERITONEUM/LYMPH NODES: No lymphadenopathy.  ABDOMINAL WALL: Within normal limits.  BONES: Healed impacted left humeral neck fracture. Status post ORIF of a left intertrochanteric femur fracture. Compression deformities of T12, L1, and L3.    IMPRESSION:  1. Faceted gallstones. Intra and extrahepatic biliary dilatation. Correlate with LFTs. If there is clinical suspicion for acute cholecystitis or choledocholithiasis, consider right upper quadrant ultrasound and/or noncontrast MRCP for further evaluation.  2. Extensive patchy peribronchovascular opacities throughout the lungs, with left lower lobe mucus plugging, concerning for aspiration pneumonia.    < end of copied text >     89 y/o F w/ PMH of HTN and advanced dementia (baseline AOx1) was brought in by family after being found minimally responsive. Patient is awake, nonverbal at the time of examination therefore HPI obtained from the H&P. Patient was brought in after found to be lethargic and minimally responsive.  On arrival pt reported to be tachypneic and hypothermic and admitted for acute metabolic encephalopathy and sepsis. Blood culture resulted Gram negative rods. Gastroenterology is now consulted foe elevated liver enzymes. On admission yesterday the AST was 1386 and today is 1043.  The ALT was 593 and today is 571.  The alkaline phosphatase and total bilirubin have remained normal. At the time of my evaluation patient in no acute distress noted. No abdominal tenderness or hepatomegaly noted. Cat scan of abd/pelvis and chest revealed faceted gallstones. Intra and extrahepatic biliary dilatation.   2. Extensive patchy peribronchovascular opacities throughout the lungs, with left lower lobe mucus plugging, concerning for aspiration pneumonia.    US showed gallstones and sludge. No ultrasound evidence of acute cholecystitis. Minimally prominent common duct for patient age. Also TTE 3/2021 normal EF LA/RA severely dilated severe TR severe PHTN moderate AS mild MS      Review of Systems:  · ENMT: negative  · Respiratory and Thorax: negative  · Cardiovascular: negative  · Gastrointestinal: see above.  · Genitourinary:	negative  · Musculoskeletal: negative  · Neurological: negative  · Psychiatric: negative  · Hematology/Lymphatics: negative  · Endocrine: negative      PAST MEDICAL/SURGICAL HISTORY:  HTN (hypertension)    No significant past surgical history      SOCIAL HISTORY:  - TOBACCO: Denies  - ALCOHOL: Denies  - ILLICIT DRUG USE: Denies    FAMILY HISTORY:  No known history of gastrointestinal or liver disease;  No pertinent family history in first degree relatives        HOME MEDICATIONS:  amiodarone 200 mg oral tablet: 1 tab(s) orally once a day (2021 17:21)  aspirin 81 mg oral tablet:  (2021 17:22)  dronabinol 2.5 mg oral capsule: 1 cap(s) orally once a day (2021 17:22)  folic acid 1 mg oral tablet: 1 tab(s) orally once a day (2021 17:22)  Senna 8.6 mg oral tablet: 2 tab(s) orally once a day (at bedtime) (2021 17:22)    INPATIENT MEDICATIONS:  MEDICATIONS  (STANDING):  heparin   Injectable 5000 Unit(s) SubCutaneous every 8 hours  lactated ringers. 1000 milliLiter(s) (65 mL/Hr) IV Continuous <Continuous>  magnesium sulfate  IVPB 1 Gram(s) IV Intermittent once  metoprolol tartrate 25 milliGRAM(s) Oral every 12 hours  piperacillin/tazobactam IVPB.. 3.375 Gram(s) IV Intermittent every 12 hours    MEDICATIONS  (PRN):  acetaminophen     Tablet .. 650 milliGRAM(s) Oral every 6 hours PRN Temp greater or equal to 38C (100.4F), Mild Pain (1 - 3)  aluminum hydroxide/magnesium hydroxide/simethicone Suspension 30 milliLiter(s) Oral every 4 hours PRN Dyspepsia  melatonin 3 milliGRAM(s) Oral at bedtime PRN Insomnia    ALLERGIES:  No Known Allergies    T(C): 36.5 (21 @ 16:01), Max: 36.5 (21 @ 16:01)  HR: 72 (21 @ 16:01) (72 - 77)  BP: 98/32 (21 @ 16:01) (98/32 - 130/60)  RR: 16 (21 @ 16:01) (16 - 22)  SpO2: 100% (21 @ 16:01) (92% - 100%)      PHYSICAL EXAM:  Constitutional: No acute distress, thin appearing, elderly, cachectic  Neuro: Awake, nonverbal at the time of my examination, (oriented x1 on baseline per documentation)  HEENT: PERRL, anicteric sclerae,   Neck: supple, no JVD  CV:+S1S2,   Pulm/chest: lung sounds CTA and equal bilaterally, no accessory muscle use noted  Abd: soft, NT, ND, +BS  Ext: No cyanosis, clubbing or edema, muscle wasting throughout  Skin: warm, no jaundice, BLE ecchymosis       LABS:             9.9    7.34  )-----------( 311      (  @ 12:28 )             30.8                11.6   6.64  )-----------( 457      (  @ 11:00 )             36.2       PT/INR - ( 2021 12:28 )   PT: 18.0 sec;   INR: 1.59 ratio         PTT - ( 2021 12:28 )  PTT:44.7 sec      143  |  106  |  83.8<H>  ----------------------------<  75  4.6   |  23.0  |  2.22<H>    Ca    7.8<L>      2021 12:28  Phos  4.2       Mg     1.7         TPro  4.7<L>  /  Alb  2.1<L>  /  TBili  0.9  /  DBili  x   /  AST  1043<H>  /  ALT  571<H>  /  AlkPhos  92      LIVER FUNCTIONS - ( 2021 12:28 )  Alb: 2.1 g/dL / Pro: 4.7 g/dL / ALK PHOS: 92 U/L / ALT: 571 U/L / AST: 1043 U/L / GGT: x             Urinalysis Basic - ( 2021 12:08 )    Color: Yellow / Appearance: Slightly Turbid / S.020 / pH: x  Gluc: x / Ketone: Trace  / Bili: Small / Urobili: 1   Blood: x / Protein: 30 mg/dL / Nitrite: Positive   Leuk Esterase: Trace / RBC: 3-5 /HPF / WBC 6-10   Sq Epi: x / Non Sq Epi: Occasional / Bacteria: Moderate      Culture - Blood (collected 2021 11:20)  Source: .Blood Blood-Peripheral  Gram Stain (2021 09:01):    Growth in aerobic and anaerobic bottles: Gram Negative Rods    Gram Stain performed by:    [facility]    [address] [place] [address]    .    TYPE: (C=Critical, N=Notification, A=Abnormal) C    TESTS:  _ GS    DATE/TIME CALLED: _ 2021 08:55:59    CALLED TO: Bogdan Owens RN    READ BACK (2 Patient Identifiers)(Y/N): _ Y    READ BACK VALUES (Y/N): _ Y    CALLED BY: Bogdan Johnson    Culture - Blood (collected 2021 11:19)  Source: .Blood Blood-Peripheral  Gram Stain (2021 08:54):    Growth in aerobic bottle: Gram Negative Rods    ***Blood Panel PCR results on this specimen are available    approximately 3 hours after the Gram stain result.***    Gram stain, PCR, and/or culture results may not always    correspond due to difference in methodologies.    ************************************************************    This PCR assay was performed using [employer].    The following targets are tested for: Enterococcus,    vancomycin resistant enterococci, Listeria monocytogenes,    coagulase negative staphylococci, S. aureus,    methicillin resistant S. aureus, Streptococcus agalactiae    (Group B), S. pneumoniae, S. pyogenes (Group A),    Acinetobacter baumannii, Enterobacter cloacae, E. coli,    Klebsiella oxytoca, K. pneumoniae, Proteus sp.,    Serratia marcescens, Haemophilus influenzae,    Neisseria meningitidis, Pseudomonas aeruginosa, Candida    albicans, C. glabrata, C krusei, C parapsilosis,    C. tropicalis and the KPC resistance gene.    "Due to technical problems, Pseudomonas aeruginosa    until further notice".    Gram Stain and BCID performed by:    [facility]    [address] [place] [address]    .    TYPE: (C=Critical, N=Notification, A=Abnormal) C    TESTS:  _ PATRICIA    DATE/TIME CALLED: _ 2021 08:53:44    CALLED TO: Bogdan Owens RN    READ BACK (2 Patient Identifiers)(Y/N): _ Y    READ BACK VALUES (Y/N): _ Y    CALLED BY: Bogdan Johnson  Organism: Blood Culture PCR (2021 09:31)  Organism: Blood Culture PCR (2021 09:31)    < from: US Abdomen Upper Quadrant Right (21 @ 17:57) >  FINDINGS:    Liver: Within normal limits.  Bile ducts: Normal caliber. Common bile duct measures 11 mm. Mild intrahepatic biliary duct distention.  Gallbladder: Sludge and stones. No wall thickening or pericholecystic fluid.  Pancreas: Scattered by bowel gas.  Right kidney: 6.9 cm. No hydronephrosis. Atrophic.  Ascites: None.  IVC: Visualized portions are within normal limits.    IMPRESSION:    Gallstones and sludge. No ultrasound evidence of acute cholecystitis. Minimally prominent common duct for patient age.    If acute cholecystitis is still of clinical concern, DISIDA scan could be performed.    < end of copied text >    < from: CT Abdomen and Pelvis No Cont (21 @ 12:45) >  FINDINGS:  CHEST:  LUNGS AND LARGE AIRWAYS: Patent central airways. There are extensive patchy peribronchovascular consolidative opacities throughout the lungs, most prominent in the left lung. There is a 2.1 x 1.2 cm centrally cavitary consolidative opacity in the right middle lobe on image 87. There is scattered mucous plugging in the left lower lobe dependently. These findings are suggestive of aspiration pneumonia. 3 mm calcified granuloma in the left lower lobe on image 84. Trace emphysematous changes, most prominent in the upper lobes.  PLEURA: No pleural effusion.  VESSELS: Within normal limits.  HEART: Mitral annulus calcification. Heart size is normal. No pericardial effusion.  MEDIASTINUM AND OWEN: No lymphadenopathy.  CHEST WALL AND LOWER NECK: Within normal limits.    ABDOMEN AND PELVIS:  LIVER: There is mild homogeneous increased attenuation of the liver parenchyma, which may represent depositional disease including hemachromatosis or amiodarone therapy. The liver is not enlarged.  BILE DUCTS: Normal caliber.  GALLBLADDER: Faceted gallstones in the gallbladder. Mild intra and extrahepatic biliary duct dilatation, which may be related to the patient's stated age. Correlate with LFTs.  SPLEEN: Within normal limits.  PANCREAS: Within normal limits.  ADRENALS: Within normal limits.  KIDNEYS/URETERS: Proteinaceous right renal cyst. Otherwise, within normal limits.    BLADDER: Enriquez catheter in the predominantly collapsed bladder.  REPRODUCTIVE ORGANS: Uterus and adnexa within normal limits.    BOWEL: Sigmoid diverticulosis. No bowel obstruction. Appendix is normal.  PERITONEUM: No ascites.  VESSELS: Extensive atherosclerotic calcification of the nonaneurysmal abdominal aorta and iliac arteries.  RETROPERITONEUM/LYMPH NODES: No lymphadenopathy.  ABDOMINAL WALL: Within normal limits.  BONES: Healed impacted left humeral neck fracture. Status post ORIF of a left intertrochanteric femur fracture. Compression deformities of T12, L1, and L3.    IMPRESSION:  1. Faceted gallstones. Intra and extrahepatic biliary dilatation. Correlate with LFTs. If there is clinical suspicion for acute cholecystitis or choledocholithiasis, consider right upper quadrant ultrasound and/or noncontrast MRCP for further evaluation.  2. Extensive patchy peribronchovascular opacities throughout the lungs, with left lower lobe mucus plugging, concerning for aspiration pneumonia.    < end of copied text >

## 2021-11-03 NOTE — ADVANCED PRACTICE NURSE CONSULT - RECOMMEDATIONS
·	Left Heel - cleanse with normal saline, apply Santyl, 2mm thick layer to ENTIRE MOISTENED wound bed - change daily  ·	Offloading Heel protectors  ·	Turn and Reposition Q2H, offload coccyx with positioner pillow - alternate sides with repositioning  ·	Incontinence care Q2H with repositioning  ·	2 RN skin assessment daily and with transfer to new unit

## 2021-11-03 NOTE — PATIENT PROFILE ADULT - HARM RISK FACTORS
1.  Weight bear as tolerated to the surgical extremity with use of a walker or crutches.  2.  Ice and elevate surgical knee 4 times daily for 30 minutes.  Do not apply ice directly to skin.  3.  Leave Aquacel dressing in place until the 2 week post-operative office appointment.  4.  May shower with Aquacel dressing in place.  5.  Bilateral knee high TEDs on at all times except bathing.  6.  Incentive spirometer use hourly while awake for 48 hours.  7.  PT/INR at [facility] lab on 5/12/17 at 9 am.  8.  Physical therapy at [facility] on 5/12/17 at 9:30 am.  9.  Follow up in office with Dr. Fitzgerald 5/24/17 at 9 am.  10.  Coumadin 5 mg (two 2.5 mg tablets) oral daily at 6 pm.      Physician:    Abbe Fitzgerald MD    Office Information:    Orthopedics & Sports Medicine, [address] [address]  [phone]   yes

## 2021-11-04 NOTE — PHARMACOTHERAPY INTERVENTION NOTE - COMMENTS
Med rec completed as per previous admissions records and information provider by pt daughter Med rec completed as per previous admissions records and information provider by pt daughter. pt prescribed dronabinol outpatient but does not actively take it

## 2021-11-04 NOTE — PROGRESS NOTE ADULT - PROBLEM SELECTOR PLAN 1
Elevated liver enzymes of unknown Etiology, most likely multifactorial, drug induced from Amiodarone, passive congestion secondary ti tricuspid regurgitation,   CT abdomen/pelvis revealed gallstones, intra/extrahepatic biliary dilatation. RUQ US w/ gallstones and sludge with no US evidence of acute cholecystitis.   Liver enzymes slightly trending down , . Alk Phos and total Bili remains normal. INR 1.37.  Will continue to trend liver enzymes, CBC, Renal function, coags.  In view of her advanced age and her altered mental status patient will not tolerate an MRCP.   Patient is not a candidate for EUS/ERCP as patient may not tolerate the procedure due to her advanced age and possible aspiration pneumonia. Elevated liver enzymes of unclear Etiology, most likely multifactorial, drug induced from Amiodarone, passive congestion secondary ti tricuspid regurgitation, sepsis from PNA, possible passage of gallstones   CT abdomen/pelvis revealed gallstones,  mild intra/extrahepatic biliary dilatation. RUQ US w/ gallstones and sludge with no US evidence of acute cholecystitis.   Liver enzymes slightly trending down , . Alk Phos and total Bili remains normal. INR 1.37.  Will continue to trend liver enzymes, CBC, Renal function, coags.  In view of her advanced age and her altered mental status patient will not tolerate an MRCP.   Patient is not a candidate for EUS/ERCP as patient may not tolerate the procedure due to her advanced age and possible aspiration pneumonia.

## 2021-11-04 NOTE — PROGRESS NOTE ADULT - SUBJECTIVE AND OBJECTIVE BOX
House GI Progress Note    Chief Complaint:  Patient is a 89y old  Female with AMS being followed by GI for elevated LFTs.       Interval Events / Subjective: 89 y/o F w/ PMH of HTN and advanced dementia (baseline AOx1) was brought in by family after being found minimally responsive. Patient was admitted for acute metabolic encephalopathy and sepsis. Cat scan of abd/pelvis and chest revealed faceted gallstones. Intra and extrahepatic biliary dilatation. Extensive patchy peribronchovascular opacities throughout the lungs, with left lower lobe mucus plugging, concerning for aspiration pneumonia. US showed gallstones and sludge. No ultrasound evidence of acute cholecystitis. Patient A&O x1 and in no acute distress at this time. Abdomen soft with no abdominal tenderness noted. Physical exam limited as patient constantly pushes hands away and not able to communicate due to mental status. Liver enzymes trending down, , . Alk Phos and total Bili remains normal.      REVIEW OF SYSTEMS: Unable to obtain due to mental status.         MEDICATIONS:   MEDICATIONS  (STANDING):  dextrose 5% + sodium chloride 0.45%. 1000 milliLiter(s) (75 mL/Hr) IV Continuous <Continuous>  heparin   Injectable 5000 Unit(s) SubCutaneous every 8 hours  metoprolol tartrate 25 milliGRAM(s) Oral every 12 hours  piperacillin/tazobactam IVPB.. 3.375 Gram(s) IV Intermittent every 12 hours    MEDICATIONS  (PRN):  acetaminophen     Tablet .. 650 milliGRAM(s) Oral every 6 hours PRN Temp greater or equal to 38C (100.4F), Mild Pain (1 - 3)  aluminum hydroxide/magnesium hydroxide/simethicone Suspension 30 milliLiter(s) Oral every 4 hours PRN Dyspepsia  melatonin 3 milliGRAM(s) Oral at bedtime PRN Insomnia      ALLERGIES:   Allergies    No Known Allergies    Intolerances        VITAL SIGNS:   Vital Signs Last 24 Hrs  T(C): 36.3 (2021 04:00), Max: 36.5 (2021 16:01)  T(F): 97.4 (2021 04:00), Max: 97.7 (2021 16:01)  HR: 78 (2021 08:09) (69 - 78)  BP: 86/38 (2021 08:09) (86/38 - 108/44)  BP(mean): --  RR: 16 (2021 08:09) (16 - 18)  SpO2: 100% (2021 08:09) (98% - 100%)  I&O's Summary    2021 07:01  -  2021 07:00  --------------------------------------------------------  IN: 1150 mL / OUT: 530 mL / NET: 620 mL        PHYSICAL EXAM:   GENERAL:  Appears stated age, well-groomed, well-nourished, no distress  HEENT:  NC/AT,  conjunctivae clear, sclera -anicteric  CHEST:  Full & symmetric excursion, no increased effort, breath sounds clear  HEART:  Regular rhythm, S1, S2, no murmur/rub/S3/S4,  no edema  ABDOMEN:  Soft, non-tender, non-distended, normoactive bowel sounds,  no masses, no hepatosplenomegaly,   EXTREMITIES: No cyanosis, clubbing or edema  SKIN:  No rash/erythema/ecchymoses/petechiae/wounds/abscess/warm/dry  NEURO:  Alert, oriented    LABS:  CBC Full  -  ( 2021 06:16 )  WBC Count : 5.35 K/uL  RBC Count : 3.33 M/uL  Hemoglobin : 9.3 g/dL  Hematocrit : 28.8 %  Platelet Count - Automated : 267 K/uL  Mean Cell Volume : 86.5 fl  Mean Cell Hemoglobin : 27.9 pg  Mean Cell Hemoglobin Concentration : 32.3 gm/dL  Auto Neutrophil # : 4.93 K/uL  Auto Lymphocyte # : 0.28 K/uL  Auto Monocyte # : 0.14 K/uL  Auto Eosinophil # : 0.00 K/uL  Auto Basophil # : 0.00 K/uL  Auto Neutrophil % : 92.2 %  Auto Lymphocyte % : 5.2 %  Auto Monocyte % : 2.6 %  Auto Eosinophil % : 0.0 %  Auto Basophil % : 0.0 %        147<H>  |  108<H>  |  86.1<H>  ----------------------------<  56<L>  4.1   |  23.0  |  2.17<H>    Ca    8.0<L>      2021 06:16  Phos  4.0     11-  Mg     2.2     11-04    TPro  4.8<L>  /  Alb  2.1<L>  /  TBili  0.9  /  DBili  0.9<H>  /  AST  843<H>  /  ALT  564<H>  /  AlkPhos  89  11-04    LIVER FUNCTIONS - ( 2021 06:16 )  Alb: 2.1 g/dL / Pro: 4.8 g/dL / ALK PHOS: 89 U/L / ALT: 564 U/L / AST: 843 U/L / GGT: x           PT/INR - ( 2021 06:16 )   PT: 15.7 sec;   INR: 1.37 ratio         PTT - ( 2021 06:16 )  PTT:45.7 sec  Urinalysis Basic - ( 2021 12:08 )    Color: Yellow / Appearance: Slightly Turbid / S.020 / pH: x  Gluc: x / Ketone: Trace  / Bili: Small / Urobili: 1   Blood: x / Protein: 30 mg/dL / Nitrite: Positive   Leuk Esterase: Trace / RBC: 3-5 /HPF / WBC 6-10   Sq Epi: x / Non Sq Epi: Occasional / Bacteria: Moderate        Culture - Blood (collected 2021 11:20)  Source: .Blood Blood-Peripheral  Gram Stain (2021 09:01):    Growth in aerobic and anaerobic bottles: Gram Negative Rods    Gram Stain performed by:    [facility]    [address] [place] [address]    .    TYPE: (C=Critical, N=Notification, A=Abnormal) C    TESTS:  _ GS    DATE/TIME CALLED: _ 2021 08:55:59    CALLED TO: Bogdan Owens RN    READ BACK (2 Patient Identifiers)(Y/N): _ Y    READ BACK VALUES (Y/N): _ Y    CALLED BY: Bogdan Johnson    Culture - Blood (collected 2021 11:19)  Source: .Blood Blood-Peripheral  Gram Stain (2021 08:54):    Growth in aerobic bottle: Gram Negative Rods    ***Blood Panel PCR results on this specimen are available    approximately 3 hours after the Gram stain result.***    Gram stain, PCR, and/or culture results may not always    correspond due to difference in methodologies.    ************************************************************    This PCR assay was performed using [employer].    The following targets are tested for: Enterococcus,    vancomycin resistant enterococci, Listeria monocytogenes,    coagulase negative staphylococci, S. aureus,    methicillin resistant S. aureus, Streptococcus agalactiae    (Group B), S. pneumoniae, S. pyogenes (Group A),    Acinetobacter baumannii, Enterobacter cloacae, E. coli,    Klebsiella oxytoca, K. pneumoniae, Proteus sp.,    Serratia marcescens, Haemophilus influenzae,    Neisseria meningitidis, Pseudomonas aeruginosa, Candida    albicans, C. glabrata, C krusei, C parapsilosis,    C. tropicalis and the KPC resistance gene.    "Due to technical problems, Pseudomonas aeruginosa    until further notice".    Gram Stain and BCID performed by:    [facility]    [address] [place] [address]    .    TYPE: (C=Critical, N=Notification, A=Abnormal) C    TESTS:  _ GS    DATE/TIME CALLED: _ 2021 08:53:44    CALLED TO: Bogdan Owens RN    READ BACK (2 Patient Identifiers)(Y/N): _ Y    READ BACK VALUES (Y/N): _ Y    CALLED BY: Bogdan Johnson  Organism: Blood Culture PCR (2021 09:31)  Organism: Blood Culture PCR (2021 09:31)        RADIOLOGY & ADDITIONAL STUDIES (The following images were personally reviewed):       EXAM:  CT CHEST                         EXAM:  CT ABDOMEN AND PELVIS                          PROCEDURE DATE:  2021          INTERPRETATION:  CLINICAL INFORMATION: Altered mental status. Vomiting.    COMPARISON: None.    CONTRAST/COMPLICATIONS:  IV Contrast: NONE  Oral Contrast:  Complications: None reported at time of study completion    PROCEDURE:  CT of the Chest, Abdomen and Pelvis was performed.  Sagittal and coronal reformats were performed.    FINDINGS:  CHEST:  LUNGS AND LARGE AIRWAYS: Patent central airways. There are extensive patchy peribronchovascular consolidative opacities throughout the lungs, most prominent in the left lung. There is a 2.1 x 1.2 cm centrally cavitary consolidative opacity in the right middle lobe on image 87. There is scattered mucous plugging in the left lower lobe dependently. These findings are suggestive of aspiration pneumonia. 3 mm calcified granuloma in the left lower lobe on image 84. Trace emphysematous changes, most prominent in the upper lobes.  PLEURA: No pleural effusion.  VESSELS: Within normal limits.  HEART: Mitral annulus calcification. Heart size is normal. No pericardial effusion.  MEDIASTINUM AND OWEN: No lymphadenopathy.  CHEST WALL AND LOWER NECK: Within normal limits.    ABDOMEN AND PELVIS:  LIVER: There is mild homogeneous increased attenuation of the liver parenchyma, which may represent depositional disease including hemachromatosis or amiodarone therapy. The liver is not enlarged.  BILE DUCTS: Normal caliber.  GALLBLADDER: Faceted gallstones in the gallbladder. Mild intra and extrahepatic biliary duct dilatation, which may be related to the patient's stated age. Correlate with LFTs.  SPLEEN: Within normal limits.  PANCREAS: Within normal limits.  ADRENALS: Within normal limits.  KIDNEYS/URETERS: Proteinaceous right renal cyst. Otherwise, within normal limits.    BLADDER: Enriquez catheter in the predominantly collapsed bladder.  REPRODUCTIVE ORGANS: Uterus and adnexa within normal limits.    BOWEL: Sigmoid diverticulosis. No bowel obstruction. Appendix is normal.  PERITONEUM: No ascites.  VESSELS: Extensive atherosclerotic calcification of the nonaneurysmal abdominal aorta and iliac arteries.  RETROPERITONEUM/LYMPH NODES: No lymphadenopathy.  ABDOMINAL WALL: Within normal limits.  BONES: Healed impacted left humeral neck fracture. Status post ORIF of a left intertrochanteric femur fracture. Compression deformities of T12, L1, and L3.    IMPRESSION:  1. Faceted gallstones. Intra and extrahepatic biliary dilatation. Correlate with LFTs. If there is clinical suspicion for acute cholecystitis or choledocholithiasis, consider right upper quadrant ultrasound and/or noncontrast MRCP for further evaluation.  2. Extensive patchy peribronchovascular opacities throughout the lungs, with left lower lobe mucus plugging, concerning for aspiration pneumonia.    --- End of Report ---           EXAM:  US ABDOMEN RT UPR QUADRANT                          PROCEDURE DATE:  2021          INTERPRETATION:  CLINICAL INFORMATION: 89 years  Female with transaminitis.    COMPARISON: CT abdomen and pelvis earlier the same day    TECHNIQUE: Sonography of the right upper quadrant. Difficult study due to patient's inability to cooperate.    FINDINGS:    Liver: Within normal limits.  Bile ducts: Normal caliber. Common bile duct measures 11 mm. Mild intrahepatic biliary duct distention.  Gallbladder: Sludge and stones. No wall thickening or pericholecystic fluid.  Pancreas: Scattered by bowel gas.  Right kidney: 6.9 cm. No hydronephrosis. Atrophic.  Ascites: None.  IVC: Visualized portions are within normal limits.    IMPRESSION:    Gallstones and sludge. No ultrasound evidence of acute cholecystitis. Minimally prominent common duct for patient age.    If acute cholecystitis is still of clinical concern, DISIDA scan could be performed.        --- End of Report ---     House GI Progress Note    Chief Complaint:  Patient is a 89y old  Female with AMS being followed by GI for elevated LFTs.       Interval Events / Subjective: 89 y/o F w/ PMH of HTN and advanced dementia (baseline AOx1) was brought in by family after being found minimally responsive. Patient was admitted for acute metabolic encephalopathy and sepsis. Cat scan of abd/pelvis and chest revealed faceted gallstones. Intra and extrahepatic biliary dilatation. Extensive patchy peribronchovascular opacities throughout the lungs, with left lower lobe mucus plugging, concerning for aspiration pneumonia. US showed gallstones and sludge. No ultrasound evidence of acute cholecystitis. Patient A&O x1 and in no acute distress at this time. Abdomen soft with no abdominal tenderness noted. Physical exam limited as patient constantly pushes hands away and not able to communicate due to mental status. Liver enzymes trending down, , . Alk Phos and total Bili remains normal.      REVIEW OF SYSTEMS: Unable to obtain due to mental status.         MEDICATIONS:   MEDICATIONS  (STANDING):  dextrose 5% + sodium chloride 0.45%. 1000 milliLiter(s) (75 mL/Hr) IV Continuous <Continuous>  heparin   Injectable 5000 Unit(s) SubCutaneous every 8 hours  metoprolol tartrate 25 milliGRAM(s) Oral every 12 hours  piperacillin/tazobactam IVPB.. 3.375 Gram(s) IV Intermittent every 12 hours    MEDICATIONS  (PRN):  acetaminophen     Tablet .. 650 milliGRAM(s) Oral every 6 hours PRN Temp greater or equal to 38C (100.4F), Mild Pain (1 - 3)  aluminum hydroxide/magnesium hydroxide/simethicone Suspension 30 milliLiter(s) Oral every 4 hours PRN Dyspepsia  melatonin 3 milliGRAM(s) Oral at bedtime PRN Insomnia      ALLERGIES:   Allergies    No Known Allergies    Intolerances        VITAL SIGNS:   Vital Signs Last 24 Hrs  T(C): 36.3 (2021 04:00), Max: 36.5 (2021 16:01)  T(F): 97.4 (2021 04:00), Max: 97.7 (2021 16:01)  HR: 78 (2021 08:09) (69 - 78)  BP: 86/38 (2021 08:09) (86/38 - 108/44)  BP(mean): --  RR: 16 (2021 08:09) (16 - 18)  SpO2: 100% (2021 08:09) (98% - 100%)  I&O's Summary    2021 07:01  -  2021 07:00  --------------------------------------------------------  IN: 1150 mL / OUT: 530 mL / NET: 620 mL        PHYSICAL EXAM:   GENERAL:  Appears stated age, frail , no distress  HEENT:  NC/AT,  conjunctivae clear, sclera -anicteric  CHEST:  Full & symmetric excursion, no increased effort, breath sounds clear  HEART:  Regular rhythm, S1, S2, no murmur/rub/S3/S4,  no edema  ABDOMEN:  Soft, non-tender, non-distended, normoactive bowel sounds,  no masses, no hepatosplenomegaly,   EXTREMITIES: No cyanosis, clubbing or edema  SKIN:  No rash/erythema/ecchymoses/petechiae/wounds/abscess/warm/dry  NEURO:  Alert, oriented    LABS:  CBC Full  -  ( 2021 06:16 )  WBC Count : 5.35 K/uL  RBC Count : 3.33 M/uL  Hemoglobin : 9.3 g/dL  Hematocrit : 28.8 %  Platelet Count - Automated : 267 K/uL  Mean Cell Volume : 86.5 fl  Mean Cell Hemoglobin : 27.9 pg  Mean Cell Hemoglobin Concentration : 32.3 gm/dL  Auto Neutrophil # : 4.93 K/uL  Auto Lymphocyte # : 0.28 K/uL  Auto Monocyte # : 0.14 K/uL  Auto Eosinophil # : 0.00 K/uL  Auto Basophil # : 0.00 K/uL  Auto Neutrophil % : 92.2 %  Auto Lymphocyte % : 5.2 %  Auto Monocyte % : 2.6 %  Auto Eosinophil % : 0.0 %  Auto Basophil % : 0.0 %        147<H>  |  108<H>  |  86.1<H>  ----------------------------<  56<L>  4.1   |  23.0  |  2.17<H>    Ca    8.0<L>      2021 06:16  Phos  4.0     11  Mg     2.2     -    TPro  4.8<L>  /  Alb  2.1<L>  /  TBili  0.9  /  DBili  0.9<H>  /  AST  843<H>  /  ALT  564<H>  /  AlkPhos  89  11-04    LIVER FUNCTIONS - ( 2021 06:16 )  Alb: 2.1 g/dL / Pro: 4.8 g/dL / ALK PHOS: 89 U/L / ALT: 564 U/L / AST: 843 U/L / GGT: x           PT/INR - ( 2021 06:16 )   PT: 15.7 sec;   INR: 1.37 ratio         PTT - ( 2021 06:16 )  PTT:45.7 sec  Urinalysis Basic - ( 2021 12:08 )    Color: Yellow / Appearance: Slightly Turbid / S.020 / pH: x  Gluc: x / Ketone: Trace  / Bili: Small / Urobili: 1   Blood: x / Protein: 30 mg/dL / Nitrite: Positive   Leuk Esterase: Trace / RBC: 3-5 /HPF / WBC 6-10   Sq Epi: x / Non Sq Epi: Occasional / Bacteria: Moderate        Culture - Blood (collected 2021 11:20)  Source: .Blood Blood-Peripheral  Gram Stain (2021 09:01):    Growth in aerobic and anaerobic bottles: Gram Negative Rods    Gram Stain performed by:    [facility]    [address] [place] [id number]    .    TYPE: (C=Critical, N=Notification, A=Abnormal) C    TESTS:  _ GS    DATE/TIME CALLED: _ 2021 08:55:59    CALLED TO: Bogdan Owens RN    READ BACK (2 Patient Identifiers)(Y/N): _ Y    READ BACK VALUES (Y/N): _ Y    CALLED BY: Bogdan Johnson    Culture - Blood (collected 2021 11:19)  Source: .Blood Blood-Peripheral  Gram Stain (2021 08:54):    Growth in aerobic bottle: Gram Negative Rods    ***Blood Panel PCR results on this specimen are available    approximately 3 hours after the Gram stain result.***    Gram stain, PCR, and/or culture results may not always    correspond due to difference in methodologies.    ************************************************************    This PCR assay was performed using [employer].    The following targets are tested for: Enterococcus,    vancomycin resistant enterococci, Listeria monocytogenes,    coagulase negative staphylococci, S. aureus,    methicillin resistant S. aureus, Streptococcus agalactiae    (Group B), S. pneumoniae, S. pyogenes (Group A),    Acinetobacter baumannii, Enterobacter cloacae, E. coli,    Klebsiella oxytoca, K. pneumoniae, Proteus sp.,    Serratia marcescens, Haemophilus influenzae,    Neisseria meningitidis, Pseudomonas aeruginosa, Candida    albicans, C. glabrata, C krusei, C parapsilosis,    C. tropicalis and the KPC resistance gene.    "Due to technical problems, Pseudomonas aeruginosa    until further notice".    Gram Stain and BCID performed by:    [facility]    [address] [place] [address]    .    TYPE: (C=Critical, N=Notification, A=Abnormal) C    TESTS:  _ GS    DATE/TIME CALLED: _ 2021 08:53:44    CALLED TO: Bogdan Owens RN    READ BACK (2 Patient Identifiers)(Y/N): _ Y    READ BACK VALUES (Y/N): _ Y    CALLED BY: Bogdan Johnson  Organism: Blood Culture PCR (2021 09:31)  Organism: Blood Culture PCR (2021 09:31)        RADIOLOGY & ADDITIONAL STUDIES (The following images were personally reviewed):       EXAM:  CT CHEST                         EXAM:  CT ABDOMEN AND PELVIS                          PROCEDURE DATE:  2021          INTERPRETATION:  CLINICAL INFORMATION: Altered mental status. Vomiting.    COMPARISON: None.    CONTRAST/COMPLICATIONS:  IV Contrast: NONE  Oral Contrast:  Complications: None reported at time of study completion    PROCEDURE:  CT of the Chest, Abdomen and Pelvis was performed.  Sagittal and coronal reformats were performed.    FINDINGS:  CHEST:  LUNGS AND LARGE AIRWAYS: Patent central airways. There are extensive patchy peribronchovascular consolidative opacities throughout the lungs, most prominent in the left lung. There is a 2.1 x 1.2 cm centrally cavitary consolidative opacity in the right middle lobe on image 87. There is scattered mucous plugging in the left lower lobe dependently. These findings are suggestive of aspiration pneumonia. 3 mm calcified granuloma in the left lower lobe on image 84. Trace emphysematous changes, most prominent in the upper lobes.  PLEURA: No pleural effusion.  VESSELS: Within normal limits.  HEART: Mitral annulus calcification. Heart size is normal. No pericardial effusion.  MEDIASTINUM AND OWEN: No lymphadenopathy.  CHEST WALL AND LOWER NECK: Within normal limits.    ABDOMEN AND PELVIS:  LIVER: There is mild homogeneous increased attenuation of the liver parenchyma, which may represent depositional disease including hemachromatosis or amiodarone therapy. The liver is not enlarged.  BILE DUCTS: Normal caliber.  GALLBLADDER: Faceted gallstones in the gallbladder. Mild intra and extrahepatic biliary duct dilatation, which may be related to the patient's stated age. Correlate with LFTs.  SPLEEN: Within normal limits.  PANCREAS: Within normal limits.  ADRENALS: Within normal limits.  KIDNEYS/URETERS: Proteinaceous right renal cyst. Otherwise, within normal limits.    BLADDER: Enriquez catheter in the predominantly collapsed bladder.  REPRODUCTIVE ORGANS: Uterus and adnexa within normal limits.    BOWEL: Sigmoid diverticulosis. No bowel obstruction. Appendix is normal.  PERITONEUM: No ascites.  VESSELS: Extensive atherosclerotic calcification of the nonaneurysmal abdominal aorta and iliac arteries.  RETROPERITONEUM/LYMPH NODES: No lymphadenopathy.  ABDOMINAL WALL: Within normal limits.  BONES: Healed impacted left humeral neck fracture. Status post ORIF of a left intertrochanteric femur fracture. Compression deformities of T12, L1, and L3.    IMPRESSION:  1. Faceted gallstones. Intra and extrahepatic biliary dilatation. Correlate with LFTs. If there is clinical suspicion for acute cholecystitis or choledocholithiasis, consider right upper quadrant ultrasound and/or noncontrast MRCP for further evaluation.  2. Extensive patchy peribronchovascular opacities throughout the lungs, with left lower lobe mucus plugging, concerning for aspiration pneumonia.    --- End of Report ---           EXAM:  US ABDOMEN RT UPR QUADRANT                          PROCEDURE DATE:  2021          INTERPRETATION:  CLINICAL INFORMATION: 89 years  Female with transaminitis.    COMPARISON: CT abdomen and pelvis earlier the same day    TECHNIQUE: Sonography of the right upper quadrant. Difficult study due to patient's inability to cooperate.    FINDINGS:    Liver: Within normal limits.  Bile ducts: Normal caliber. Common bile duct measures 11 mm. Mild intrahepatic biliary duct distention.  Gallbladder: Sludge and stones. No wall thickening or pericholecystic fluid.  Pancreas: Scattered by bowel gas.  Right kidney: 6.9 cm. No hydronephrosis. Atrophic.  Ascites: None.  IVC: Visualized portions are within normal limits.    IMPRESSION:    Gallstones and sludge. No ultrasound evidence of acute cholecystitis. Minimally prominent common duct for patient age.    If acute cholecystitis is still of clinical concern, DISIDA scan could be performed.        --- End of Report ---

## 2021-11-04 NOTE — PROGRESS NOTE ADULT - ASSESSMENT
89 y/o F w/ PMH of HTN and advanced dementia (baseline AOx1) was brought in by family after being found minimally responsive.  At baseline pt reported to be aaox1 however family noted she was lethargic and brought her in.  Pt is a poor historian and unable to obtain information information from her. Information obtained from chart.  On arrival pt reported to be tachypnic and hypothermic.  Pt was empirically tx w/ 1L IVF and vanco/zosyn.  Admitted for acute metabolic encephalopathy and sepsis.       Acute toxic metabolic encephalopathy / dehydration / Advanced dementia   - Remains encephalopathic but slowly improving   - Baseline mentation AAOx1 and suspect current encephalopathy 2/2 sepsis and dehydration  - CTH negative for acute pathology   - Sepsis management as noted below   - NPO pending speech evaluation for diet recs   - Aspiration precautions   - Monitor closely on telemetry       Sepsis likely 2/2 GNR bacteremia vs UTI vs aspiration PNA w/ impending shock   - Hypothermic and tachypnic on admission, resolved   - Hypotensive, increase rate of IVFs and reassess if BP doesnt respond to trial of IVFs will consult ICU   - CT chest shows extensive patchy peribronchovascular opacities throughout lungs, LLL mucus plugging concerning for aspiration PNA  - CT a/p non-con reviewed and noted above   - UA +LE and nitrates, bacteria but 6-10wbc  - Gram stain x2 reporting GNR however cultures without growth, spoke w/ micro lab and Bcx growing Enterobacteriaceae, final speciation and sensitivities pending     - On Zosyn for emperic coverage pending sensitivities   - LA 5.9 now WNL s/p IV hydration   - Maintain on IVF given ongoing infection and monitor fluid status closely    - Monitor CBC and temperature curve  - Bear hugger if hypothermic again   - Chest PT and aspiration precautions  - NPO pending swallow eval       AGMA likely multifactorial 2/2 lactic acidosis / uremia / ARF  - BUN/Cr remain elevated but trending down   - Will maintain on gentle IV hydration given ongoing infection and dehydration    - AG elevated, corrected AG 20 but serum HCO3 WNL  - ABG ordered however VBG drawn and has mixed acid/base disturbance  - Renal US reviewed and no evidence of hydronephrosis    - Monitor chemistry, lytes and I/O's  - Avoid nephrotoxic medication or renally dose if needed          Hyperkalemia / hypocalcemia   - Potasium now WNL  - Pseudohypocalcemia, corrected calcium 9.5   - Monitor lytes         Transaminitis, etiology unclear   - Could be 2/2 amiodarone therapy vs sepsis   - CT a/p reports gallstones, intra/extrahepatic biliary dilatation  - RUQ US w/ gallstones and sludge but no US evidence of acute cholecystitis    - LFTs and INR trending down    - Trend LFTs/coags and avoid hepatotoxic medications   - GI consulted and recs noted       PAF / prolonged QTc / chronic HFpEF  - Currently rate controlled and hypovolemic   - Hold amio given transaminitis  - On metoprolol 25mg q12h for rate control for now w/ parameters  - EKG on admission shows chronic LBBB,  and QTc 552  - Avoid QTc prolonging medications and repeat EKG to reassess  - Maintain K~4 and Mg~2  - Cardiology consulted and recs noted      Normocytic anemia   - Iron studies reviewed and will start on iron supplementation once able to tolerate po   - H/H stable with no signs of active bleeding   - Monitor CBC and transfuse if Hb <7-8       Severe protein calorie malnutrition   - Nutritional consult  - NPO for now however pending swallow eval      Functional quadriplegia / Decubitus ulcers  - Supportive care  - Wound care consulted   - Frequent off loading         VTE ppx: heparin sq    Dispo: pt remains acute, no plans for d/c and will remain in SDU.

## 2021-11-04 NOTE — PROGRESS NOTE ADULT - SUBJECTIVE AND OBJECTIVE BOX
[facility] - [facility]                                   [address] [place] [address]                                                      PHONE: [phone]                                                         FAX: [phone]  [email or web address]  ---------------------------------------------------------------------------------------------------------------------------------    Overnight events/patient complaints:  NAD     Confusion +     No Known Allergies    MEDICATIONS  (STANDING):  dextrose 5% + sodium chloride 0.45%. 1000 milliLiter(s) (75 mL/Hr) IV Continuous <Continuous>  heparin   Injectable 5000 Unit(s) SubCutaneous every 8 hours  metoprolol tartrate 25 milliGRAM(s) Oral every 12 hours  piperacillin/tazobactam IVPB.. 3.375 Gram(s) IV Intermittent every 12 hours    MEDICATIONS  (PRN):  acetaminophen     Tablet .. 650 milliGRAM(s) Oral every 6 hours PRN Temp greater or equal to 38C (100.4F), Mild Pain (1 - 3)  aluminum hydroxide/magnesium hydroxide/simethicone Suspension 30 milliLiter(s) Oral every 4 hours PRN Dyspepsia  melatonin 3 milliGRAM(s) Oral at bedtime PRN Insomnia      Vital Signs Last 24 Hrs  T(C): 36.3 (2021 04:00), Max: 36.5 (2021 16:01)  T(F): 97.4 (2021 04:00), Max: 97.7 (2021 16:01)  HR: 78 (2021 08:09) (69 - 78)  BP: 86/38 (2021 08:09) (86/38 - 108/44)  BP(mean): --  RR: 16 (2021 08:09) (16 - 18)  SpO2: 100% (2021 08:09) (98% - 100%)  ICU Vital Signs Last 24 Hrs  [facility]  I&O's Detail    2021 07:01  -  2021 07:00  --------------------------------------------------------  IN:    Lactated Ringers: 650 mL    Sodium Chloride 0.9% Bolus: 500 mL  Total IN: 1150 mL    OUT:    Indwelling Catheter - Urethral (mL): 530 mL  Total OUT: 530 mL    Total NET: 620 mL        I&O's Summary    2021 07:01  -  2021 07:00  --------------------------------------------------------  IN: 1150 mL / OUT: 530 mL / NET: 620 mL      Drug Dosing Weight  [facility]      PHYSICAL EXAM:  General: Appears well developed, alert and cooperative.  HEENT: Head; normocephalic, atraumatic.  Eyes: Pupils reactive, cornea wnl.  Neck: Supple, no nodes adenopathy, no NVD or carotid bruit or thyromegaly.  CARDIOVASCULAR: Normal S1 and S2, 1/6 murmur, rub, gallop or lift.   LUNGS: No rales, rhonchi or wheeze. Normal breath sounds bilaterally.  ABDOMEN: Soft, nontender without mass or organomegaly. bowel sounds normoactive.  EXTREMITIES: No clubbing, cyanosis or edema. Distal pulses wnl.   SKIN: warm and dry with normal turgor.  NEURO: Alert/oriented x 0        LABS:                        9.3    5.35  )-----------( 267      ( 2021 06:16 )             28.8     11-04    147<H>  |  108<H>  |  86.1<H>  ----------------------------<  56<L>  4.1   |  23.0  |  2.17<H>    Ca    8.0<L>      2021 06:16  Phos  4.0     11  Mg     2.2         TPro  4.8<L>  /  Alb  2.1<L>  /  TBili  0.9  /  DBili  0.9<H>  /  AST  843<H>  /  ALT  564<H>  /  AlkPhos  89  11-    NEHEMIAS LAUGHLIN  CARDIAC MARKERS ( 2021 20:19 )  x     / 0.05 ng/mL / x     / x     / x      CARDIAC MARKERS ( 2021 11:00 )  x     / 0.05 ng/mL / x     / x     / x          PT/INR - ( 2021 06:16 )   PT: 15.7 sec;   INR: 1.37 ratio         PTT - ( 2021 06:16 )  PTT:45.7 sec  Urinalysis Basic - ( 2021 12:08 )    Color: Yellow / Appearance: Slightly Turbid / S.020 / pH: x  Gluc: x / Ketone: Trace  / Bili: Small / Urobili: 1   Blood: x / Protein: 30 mg/dL / Nitrite: Positive   Leuk Esterase: Trace / RBC: 3-5 /HPF / WBC 6-10   Sq Epi: x / Non Sq Epi: Occasional / Bacteria: Moderate        RADIOLOGY & ADDITIONAL STUDIES:    INTERPRETATION OF TELEMETRY (personally reviewed):      ECHO: 3/2021 normal EF LA/RA severely dilated severe TR severe PHTN moderate AS       < from: CT Chest No Cont (21 @ 14:51) >  IMPRESSION:  1. Faceted gallstones. Intra and extrahepatic biliary dilatation. Correlate with LFTs. If there is clinical suspicion for acute cholecystitis or choledocholithiasis, consider right upper quadrant ultrasound and/or noncontrast MRCP for further evaluation.  2. Extensive patchy peribronchovascular opacities throughout the lungs, with left lower lobe mucus plugging, concerning for    < end of copied text >        Assessment and Plan:  In summary, NEHEMIAS LAUGHLIN is an 89y Female with past medical history significant for HTN, persistent AF not on AC due to fall risk on Amiodarone therapy prior TTE 3/2021 normal EF LA/RA severely dilated severe TR severe PHTN moderate AS mild MS dementia (baseline AOx1) who presents to [facility] due to AMS and minimally responsive.  At baseline, Patient reported to be AAO x 1 however family noted she was lethargic and brought her [facility] for further evaluations.  Patient is a poor historian and unable to obtain history thus history obtained from chart. On arrival patient was found to be tachypneic and hypothermic.  Patient was empirically treated with one liter IVF and ABx therapy.  ENIO/Sepsis UTI and ASP PNA      Agree with Holding Amiodarone due to abnormal LFT's  Agree with gentle IVF   Low dose B-Blockers if SBP > 95     Poor prognosis     Conservative cardiovascular management

## 2021-11-04 NOTE — PROGRESS NOTE ADULT - SUBJECTIVE AND OBJECTIVE BOX
Chief Complaint:  AMS    SUBJECTIVE / OVERNIGHT EVENTS: No acute events reported overnight.  Pt more rousable this morning but a poor historian and unable to obtain ROS.       I&O's Summary    2021 07:01  -  2021 07:00  --------------------------------------------------------  IN: 1150 mL / OUT: 530 mL / NET: 620 mL      PHYSICAL EXAM:  Vital Signs Last 24 Hrs  T(C): 36.3 (2021 04:00), Max: 36.5 (2021 16:01)  T(F): 97.4 (2021 04:00), Max: 97.7 (2021 16:01)  HR: 78 (2021 08:09) (69 - 78)  BP: 86/38 (2021 08:09) (86/38 - 108/44)  BP(mean): --  RR: 16 (2021 08:09) (16 - 18)  SpO2: 100% (2021 08:09) (98% - 100%)      GENERAL: elderly cachectic female, examined bedside, laying comfortably in bed in NAD  HEENT: NC/AT, dry oral mucosa, clear conjunctiva and sclera nonicteric rt eye, left orbital enucleation   RESPIRATORY: poor inspiratory effort, bibasilar rales, no wheezing or rhonchi   CARDIOVASCULAR: RRR, normal S1 and S2  ABDOMEN: soft, NT/ND, normoactive bowel sounds, no rebound/guarding  : +martinez placed in ED  EXTREMITIES: No cynaosis, no clubbing, no lower extremity edema; Peripheral pulses are 2+ bilaterally  PSYCH: intermittently agitated   NEUROLOGY: A+O x0, not following commands, more responsive, moves extremities spontaneously, withdraws to light tactile stim   SKIN: No rashes         LABS:                                 9.3    5.35  )-----------( 267      ( 2021 06:16 )             28.8       11-04    147<H>  |  108<H>  |  86.1<H>  ----------------------------<  56<L>  4.1   |  23.0  |  2.17<H>    Ca    8.0<L>      2021 06:16  Phos  4.0       Mg     2.2         TPro  4.8<L>  /  Alb  2.1<L>  /  TBili  0.9  /  DBili  0.9<H>  /  AST  843<H>  /  ALT  564<H>  /  AlkPhos  89        Urinalysis Basic - ( 2021 12:08 )    Color: Yellow / Appearance: Slightly Turbid / S.020 / pH: x  Gluc: x / Ketone: Trace  / Bili: Small / Urobili: 1   Blood: x / Protein: 30 mg/dL / Nitrite: Positive   Leuk Esterase: Trace / RBC: 3-5 /HPF / WBC 6-10   Sq Epi: x / Non Sq Epi: Occasional / Bacteria: Moderate        Culture - Blood (collected 2021 11:20)  Source: .Blood Blood-Peripheral  Gram Stain (2021 09:01):    Growth in aerobic and anaerobic bottles: Gram Negative Rods    Gram Stain performed by:    [facility]    [address] [place] [address]    .    TYPE: (C=Critical, N=Notification, A=Abnormal) C    TESTS:  _ PATRICIA    DATE/TIME CALLED: _ 2021 08:55:59    CALLED TO: Bogdan Owens RN    READ BACK (2 Patient Identifiers)(Y/N): _ Y    READ BACK VALUES (Y/N): _ Y    CALLED BY: Bogdan Johnson    Culture - Blood (collected 2021 11:19)  Source: .Blood Blood-Peripheral  Gram Stain (2021 08:54):    Growth in aerobic bottle: Gram Negative Rods    ***Blood Panel PCR results on this specimen are available    approximately 3 hours after the Gram stain result.***    Gram stain, PCR, and/or culture results may not always    correspond due to difference in methodologies.    ************************************************************    This PCR assay was performed using [facility].    The following targets are tested for: Enterococcus,    vancomycin resistant enterococci, Listeria monocytogenes,    coagulase negative staphylococci, S. aureus,    methicillin resistant S. aureus, Streptococcus agalactiae    (Group B), S. pneumoniae, S. pyogenes (Group A),    Acinetobacter baumannii, Enterobacter cloacae, E. coli,    Klebsiella oxytoca, K. pneumoniae, Proteus sp.,    Serratia marcescens, Haemophilus influenzae,    Neisseria meningitidis, Pseudomonas aeruginosa, Candida    albicans, C. glabrata, C krusei, C parapsilosis,    C. tropicalis and the KPC resistance gene.    "Due to technical problems, Pseudomonas aeruginosa    until further notice".    Gram Stain and BCID performed by:    [facility]    [address] [place] [id number]    .    TYPE: (C=Critical, N=Notification, A=Abnormal) C    TESTS:  _ GS    DATE/TIME CALLED: _ 2021 08:53:44    CALLED TO: Bogdan Owens RN    READ BACK (2 Patient Identifiers)(Y/N): _ Y    READ BACK VALUES (Y/N): _ Y    CALLED BY: Bogdan Johnson  Organism: Blood Culture PCR (2021 09:31)  Organism: Blood Culture PCR (2021 09:31)      CAPILLARY BLOOD GLUCOSE            RADIOLOGY & ADDITIONAL TESTS:    < from: US Abdomen Upper Quadrant Right (21 @ 17:57) >  FINDINGS:    Liver: Within normal limits.  Bile ducts: Normal caliber. Common bile duct measures 11 mm. Mild intrahepatic biliary duct distention.  Gallbladder: Sludge and stones. No wall thickening or pericholecystic fluid.  Pancreas: Scattered by bowel gas.  Right kidney: 6.9 cm. No hydronephrosis. Atrophic.  Ascites: None.  IVC: Visualized portions are within normal limits.    IMPRESSION:    Gallstones and sludge. No ultrasound evidence of acute cholecystitis. Minimally prominent common duct for patient age.    If acute cholecystitis is still of clinical concern, DISIDA scan could be performed.    < end of copied text >        < from: CT Chest No Cont (21 @ 14:51) >  FINDINGS:  CHEST:  LUNGS AND LARGE AIRWAYS: Patent central airways. There are extensive patchy peribronchovascular consolidative opacities throughout the lungs, most prominent in the left lung. There is a 2.1 x 1.2 cm centrally cavitary consolidative opacity in the right middle lobe on image 87. There is scattered mucous plugging in the left lower lobe dependently. These findings are suggestive of aspiration pneumonia. 3 mm calcified granuloma in the left lower lobe on image 84. Trace emphysematous changes, most prominent in the upper lobes.  PLEURA: No pleural effusion.  VESSELS: Within normal limits.  HEART: Mitral annulus calcification. Heart size is normal. No pericardial effusion.  MEDIASTINUM AND OWEN: No lymphadenopathy.  CHEST WALL AND LOWER NECK: Within normal limits.    ABDOMEN AND PELVIS:  LIVER: There is mild homogeneous increased attenuation of the liver parenchyma, which may represent depositional disease including hemachromatosis or amiodarone therapy. The liver is not enlarged.  BILE DUCTS: Normal caliber.  GALLBLADDER: Faceted gallstones in the gallbladder. Mild intra and extrahepatic biliary duct dilatation, which may be related to the patient's stated age. Correlate with LFTs.  SPLEEN: Within normal limits.  PANCREAS: Within normal limits.  ADRENALS: Within normal limits.  KIDNEYS/URETERS: Proteinaceous right renal cyst. Otherwise, within normal limits.    BLADDER: Martinez catheter in the predominantly collapsed bladder.  REPRODUCTIVE ORGANS: Uterus and adnexa within normal limits.    BOWEL: Sigmoid diverticulosis. No bowel obstruction. Appendix is normal.  PERITONEUM: No ascites.  VESSELS: Extensive atherosclerotic calcification of the nonaneurysmal abdominal aorta and iliac arteries.  RETROPERITONEUM/LYMPH NODES: No lymphadenopathy.  ABDOMINAL WALL: Within normal limits.  BONES: Healed impacted left humeral neck fracture. Status post ORIF of a left intertrochanteric femur fracture. Compression deformities of T12, L1, and L3.    IMPRESSION:  1. Faceted gallstones. Intra and extrahepatic biliary dilatation. Correlate with LFTs. If there is clinical suspicion for acute cholecystitis or choledocholithiasis, consider right upper quadrant ultrasound and/or noncontrast MRCP for further evaluation.  2. Extensive patchy peribronchovascular opacities throughout the lungs, with left lower lobe mucus plugging, concerning for    < end of copied text >          MEDICATIONS  (STANDING):  heparin   Injectable 5000 Unit(s) SubCutaneous every 8 hours  metoprolol tartrate 25 milliGRAM(s) Oral every 12 hours  piperacillin/tazobactam IVPB.. 3.375 Gram(s) IV Intermittent every 12 hours    MEDICATIONS  (PRN):  acetaminophen     Tablet .. 650 milliGRAM(s) Oral every 6 hours PRN Temp greater or equal to 38C (100.4F), Mild Pain (1 - 3)  aluminum hydroxide/magnesium hydroxide/simethicone Suspension 30 milliLiter(s) Oral every 4 hours PRN Dyspepsia  melatonin 3 milliGRAM(s) Oral at bedtime PRN Insomnia

## 2021-11-05 NOTE — DIETITIAN INITIAL EVALUATION ADULT. - PERTINENT LABORATORY DATA
11-05 Na143 mmol/L Glu 104 mg/dL<H> K+ 3.0 mmol/L<L> Cr  1.62 mg/dL<H> BUN 62.3 mg/dL<H> Phos n/a   Alb n/a   PAB n/a

## 2021-11-05 NOTE — PROGRESS NOTE ADULT - ASSESSMENT
89y  Female with h/o HTN and advanced dementia (baseline AOx0). Patient was brought in by family after being found minimally responsive.  At baseline she is reported to be aaox1 however family noted she was lethargic and brought her in. On arrival pt reported to be tachypneic and hypothermic. Admitted for sepsis and started on vancomycin and zosyn. Blood cultures with GNR.      Salmonella septicemia   ENIO   Transaminitis   Advance dementia       - Blood cultures with Salmonella   - Repeat blood cultures ordered and pending   - US Abd reporting gall stones and no acute cholecystitis   - CT Chest/Abd/Pel reporting B/L ductal dilitation   - UA with Pyuria   - Continue Ceftriaxone 2gm IV q 24hours   - Follow up cultures  - Trend Fever  - Trend WBC      Thank you for allowing me to participate in the care of your patient.   Will Follow      d/w Dr Del Castillo and clinical pharmacy

## 2021-11-05 NOTE — PROGRESS NOTE ADULT - ATTENDING COMMENTS
I evaluated this pt. with my NP and agree with the above assessment and management plan. Pt. is too elderly and frail for any type of endoscopic w/u and there are no plans or need for continued GI f/u. Signing off. Reconsult as needed. Thank you.
reviewed labs, U/S images, notes    I saw and examined  pt with NP.   Patient opens eyes ot voice. No fever. DOes not answer questions other than name       A/P  bacteremia   elevated LFT may be due to passage of small stones, medication related , PNA ( reactive ),   treat conservatively with antibiotics   WIll not be able to tolerate invasive procedures such as ERCP

## 2021-11-05 NOTE — DIETITIAN INITIAL EVALUATION ADULT. - ORAL INTAKE PTA/DIET HISTORY
Pt currently sleeping; aware lethargic with advanced dementia.  Pt remains NPO- swallow eval attempted (11/4), but unable to be completed.  Repeat swallow evaluation pending.

## 2021-11-05 NOTE — DIETITIAN INITIAL EVALUATION ADULT. - ETIOLOGY
related to inability to consume sufficient protein energy intake in setting of advanced dementia and now with UTI, lethargy

## 2021-11-05 NOTE — SWALLOW BEDSIDE ASSESSMENT ADULT - SLP GENERAL OBSERVATIONS
Pt received & seen seated upright in bed, initially asleep, however, easily roused given cues, poor cognition, nonverbal, 02 NC, 0/10 pain

## 2021-11-05 NOTE — PROGRESS NOTE ADULT - SUBJECTIVE AND OBJECTIVE BOX
[facility]  INFECTIOUS DISEASES AND INTERNAL MEDICINE at [place]  =======================================================  Mikal Collins MD  Diplomates American Board of Internal Medicine and Infectious Diseases  Tel: [phone]      Fax: [phone]  =======================================================    CRITICAL, [place] [id number]    Follow up: Salmonella sepsis     No fever or chills      Allergies:  No Known Allergies      REVIEW OF SYSTEMS:  Unable to obtain due to medical condition       Physical Exam:  GEN: Lethargic   HEENT: normocephalic and atraumatic.  Anicteric Rt eye with patch   NECK: Supple.   LUNGS: Decreased basal BS B/L   HEART: Regular rate and rhythm   ABDOMEN: Soft, nontender, and nondistended.  Positive bowel sounds.    : No CVA tenderness  EXTREMITIES: Without any edema.  MSK: No joint swelling  NEUROLOGIC: Lethargic, not responsive   PSYCHIATRIC: Unable to assess due to medical condition .  SKIN: No Rash      Vitals:  T(F): 97 (05 Nov 2021 04:22), Max: 97.6 (05 Nov 2021 00:30)  HR: 58 (05 Nov 2021 09:21)  BP: 124/66 (05 Nov 2021 09:21)  RR: 17 (05 Nov 2021 09:21)  SpO2: 99% (05 Nov 2021 09:21) (92% - 99%)  temp max in last 48H T(F): , Max: 97.7 (11-03-21 @ 16:01)      Current Antibiotics:  cefTRIAXone   IVPB 2000 milliGRAM(s) IV Intermittent every 24 hours    Other medications:  heparin   Injectable 5000 Unit(s) SubCutaneous every 8 hours  metoprolol tartrate 25 milliGRAM(s) Oral every 12 hours  potassium chloride  20 mEq/100 mL IVPB 20 milliEquivalent(s) IV Intermittent every 2 hours                 9.1    5.14  )-----------( 194      ( 05 Nov 2021 05:28 )             27.3     11-05    143  |  109<H>  |  67.1<H>  ----------------------------<  164<H>  2.4<LL>   |  23.0  |  1.83<H>    Ca    7.6<L>      05 Nov 2021 07:21  Phos  2.4     11-05  Mg     1.8     11-05    TPro  4.2<L>  /  Alb  1.8<L>  /  TBili  0.6  /  DBili  x   /  AST  470<H>  /  ALT  426<H>  /  AlkPhos  88  11-05      RECENT CULTURES:  11-04 @ 07:08 .Blood Blood     Growth in aerobic bottle: Gram Negative Rods  Gram Stain performed by:  [facility]  [address] [place] [address]    11-04 @ 06:16 .Blood Blood     Growth in aerobic bottle: Gram Negative Rods  Gram Stain performed by:  [facility]  [address] [place] [id number]    11-02 @ 16:38 Catheterized Catheterized     10,000 - 49,000 CFU/mL Escherichia coli    11-02 @ 11:20 .Blood Blood-Peripheral     Growth in aerobic and anaerobic bottles: Salmonella species, not  typhi/paratyphi  See previous culture [id number]  Growth in aerobic and anaerobic bottles: Gram Negative Rods  Gram Stain performed by:  [facility]  [address] [place] [id number]    11-02 @ 11:19 .Blood Blood-Peripheral Blood Culture PCR  Salmonella species, not typhi/paratyphi    Growth in aerobic bottle: Salmonella species, not typhi/paratyphi  Growth in aerobic bottle: Gram Negative Rods  ***Blood Panel PCR results on this specimen are available  approximately 3 hours after the Gram stain result.***  Gram stain, PCR, and/or culture results may not always  correspond due to difference in methodologies.  ************************************************************  This PCR assay was performed using [employer].  The following targets are tested for: Enterococcus,  vancomycin resistant enterococci, Listeria monocytogenes,  coagulase negative staphylococci, S. aureus,  methicillin resistant S. aureus, Streptococcus agalactiae  (Group B), S. pneumoniae, S. pyogenes (Group A),  Acinetobacter baumannii, Enterobacter cloacae, E. coli,  Klebsiella oxytoca, K. pneumoniae, Proteus sp.,  Serratia marcescens, Haemophilus influenzae,  Neisseria meningitidis, Pseudomonas aeruginosa, Candida  albicans, C. glabrata, C krusei, C parapsilosis,  C. tropicalis and the KPC resistance gene.  "Due to technical problems, Pseudomonas aeruginosa  until further notice".  Gram Stain and BCID performed by:  [facility]  [address] [place] [id number]      WBC Count: 5.14 K/uL (11-05-21 @ 05:28)  WBC Count: 5.35 K/uL (11-04-21 @ 06:16)  WBC Count: 7.34 K/uL (11-03-21 @ 12:28)  WBC Count: 6.64 K/uL (11-02-21 @ 11:00)    Creatinine, Serum: 1.83 mg/dL (11-05-21 @ 07:21)  Creatinine, Serum: 1.63 mg/dL (11-05-21 @ 05:28)  Creatinine, Serum: 2.17 mg/dL (11-04-21 @ 06:16)  Creatinine, Serum: 2.22 mg/dL (11-03-21 @ 12:28)  Creatinine, Serum: 2.02 mg/dL (11-02-21 @ 17:44)  Creatinine, Serum: 1.74 mg/dL (11-02-21 @ 11:00)    Ferritin, Serum: 1239 ng/mL (11-04-21 @ 06:16)  Ferritin, Serum: 1233 ng/mL (11-04-21 @ 06:16)

## 2021-11-05 NOTE — PROGRESS NOTE ADULT - SUBJECTIVE AND OBJECTIVE BOX
Chief Complaint:  Patient is a 89y old  Female who presents with a chief complaint of AMS. GI consultation follow up for elevated liver enzymes.       Interval Events / Subjective: 89 y/o F w/ PMH of HTN and advanced dementia (baseline AOx1). Patient resting calm in bed and in no acute distress. A&Ox1. Patient seen and evaluated at bedside, reporting no complaints, no overnight events. Abdomen soft with no signs of pain. Liver enzymes continue to trend down , , Alk Phos and Bili remains normal. Potassium this morning at 2.4 with replacement currently infusing.        REVIEW OF SYSTEMS: Unable to obtain. A&Ox1.        MEDICATIONS:   MEDICATIONS  (STANDING):  cefTRIAXone   IVPB 2000 milliGRAM(s) IV Intermittent every 24 hours  dextrose 5% + sodium chloride 0.45%. 1000 milliLiter(s) (100 mL/Hr) IV Continuous <Continuous>  heparin   Injectable 5000 Unit(s) SubCutaneous every 8 hours  metoprolol tartrate 25 milliGRAM(s) Oral every 12 hours  potassium chloride  20 mEq/100 mL IVPB 20 milliEquivalent(s) IV Intermittent every 2 hours    MEDICATIONS  (PRN):  acetaminophen     Tablet .. 650 milliGRAM(s) Oral every 6 hours PRN Temp greater or equal to 38C (100.4F), Mild Pain (1 - 3)  aluminum hydroxide/magnesium hydroxide/simethicone Suspension 30 milliLiter(s) Oral every 4 hours PRN Dyspepsia  melatonin 3 milliGRAM(s) Oral at bedtime PRN Insomnia      ALLERGIES:   Allergies    No Known Allergies    Intolerances        VITAL SIGNS:   Vital Signs Last 24 Hrs  T(C): 36.1 (05 Nov 2021 04:22), Max: 36.4 (05 Nov 2021 00:30)  T(F): 97 (05 Nov 2021 04:22), Max: 97.6 (05 Nov 2021 00:30)  HR: 58 (05 Nov 2021 09:21) (58 - 69)  BP: 124/66 (05 Nov 2021 09:21) (118/40 - 149/94)  BP(mean): 80 (05 Nov 2021 04:22) (80 - 80)  RR: 17 (05 Nov 2021 09:21) (16 - 18)  SpO2: 99% (05 Nov 2021 09:21) (92% - 99%)  I&O's Summary    04 Nov 2021 07:01  -  05 Nov 2021 07:00  --------------------------------------------------------  IN: 2100 mL / OUT: 900 mL / NET: 1200 mL        PHYSICAL EXAM:   GENERAL:  Appears stated age, well-groomed, well-nourished, no distress  HEENT:  NC/AT,  conjunctivae clear, sclera -anicteric  CHEST:  Full & symmetric excursion, no increased effort, breath sounds clear  HEART:  Regular rhythm, S1, S2, no murmur/rub/S3/S4,  no edema  ABDOMEN:  Soft, non-tender, non-distended, normoactive bowel sounds,  no masses, no hepatosplenomegaly,   EXTREMITIES: No cyanosis, clubbing or edema  SKIN:  No rash/erythema/ecchymoses/petechiae/wounds/abscess/warm/dry  NEURO:  Alert, oriented    LABS:  CBC Full  -  ( 05 Nov 2021 05:28 )  WBC Count : 5.14 K/uL  RBC Count : 3.24 M/uL  Hemoglobin : 9.1 g/dL  Hematocrit : 27.3 %  Platelet Count - Automated : 194 K/uL  Mean Cell Volume : 84.3 fl  Mean Cell Hemoglobin : 28.1 pg  Mean Cell Hemoglobin Concentration : 33.3 gm/dL  Auto Neutrophil # : 4.15 K/uL  Auto Lymphocyte # : 0.66 K/uL  Auto Monocyte # : 0.27 K/uL  Auto Eosinophil # : 0.01 K/uL  Auto Basophil # : 0.01 K/uL  Auto Neutrophil % : 80.7 %  Auto Lymphocyte % : 12.8 %  Auto Monocyte % : 5.3 %  Auto Eosinophil % : 0.2 %  Auto Basophil % : 0.2 %    11-05    143  |  109<H>  |  67.1<H>  ----------------------------<  164<H>  2.4<LL>   |  23.0  |  1.83<H>    Ca    7.6<L>      05 Nov 2021 07:21  Phos  2.4     11-05  Mg     1.8     11-05    TPro  4.2<L>  /  Alb  1.8<L>  /  TBili  0.6  /  DBili  x   /  AST  470<H>  /  ALT  426<H>  /  AlkPhos  88  11-05    LIVER FUNCTIONS - ( 05 Nov 2021 05:28 )  Alb: 1.8 g/dL / Pro: 4.2 g/dL / ALK PHOS: 88 U/L / ALT: 426 U/L / AST: 470 U/L / GGT: x           PT/INR - ( 04 Nov 2021 06:16 )   PT: 15.7 sec;   INR: 1.37 ratio         PTT - ( 04 Nov 2021 06:16 )  PTT:45.7 sec      Culture - Blood (collected 04 Nov 2021 07:08)  Source: .Blood Blood  Gram Stain (05 Nov 2021 09:50):    Growth in aerobic bottle: Gram Negative Rods    Gram Stain performed by:    [facility]    [address] [place] [address]    .    TYPE: (C=Critical, N=Notification, A=Abnormal) C    TESTS:  _     DATE/TIME CALLED: _ 11/05/2021 09:48:59    CALLED TO: Bogdan Sanchez RN    READ BACK (2 Patient Identifiers)(Y/N): _ Y    READ BACK VALUES (Y/N): _ Y    CALLED BY: Bogdan Johnson    Culture - Blood (collected 04 Nov 2021 06:16)  Source: .Blood Blood  Gram Stain (05 Nov 2021 09:46):    Growth in aerobic bottle: Gram Negative Rods    Gram Stain performed by:    [facility]    [address] [place] [address]    .    TYPE: (C=Critical, N=Notification, A=Abnormal) C    TESTS:  _ GS    DATE/TIME CALLED: _ 11/05/2021 09:45:04    CALLED TO: Bogdan Sanchez RN    READ BACK (2 Patient Identifiers)(Y/N): _ Y    READ BACK VALUES (Y/N): _ Y    CALLED BY: Bogdan Johnson    Culture - Urine (collected 02 Nov 2021 16:38)  Source: Catheterized Catheterized  Preliminary Report (04 Nov 2021 23:04):    10,000 - 49,000 CFU/mL Escherichia coli        RADIOLOGY & ADDITIONAL STUDIES (The following images were personally reviewed):       EXAM:  CT CHEST                         EXAM:  CT ABDOMEN AND PELVIS                          PROCEDURE DATE:  11/02/2021          INTERPRETATION:  CLINICAL INFORMATION: Altered mental status. Vomiting.    COMPARISON: None.    CONTRAST/COMPLICATIONS:  IV Contrast: NONE  Oral Contrast:  Complications: None reported at time of study completion    PROCEDURE:  CT of the Chest, Abdomen and Pelvis was performed.  Sagittal and coronal reformats were performed.    FINDINGS:  CHEST:  LUNGS AND LARGE AIRWAYS: Patent central airways. There are extensive patchy peribronchovascular consolidative opacities throughout the lungs, most prominent in the left lung. There is a 2.1 x 1.2 cm centrally cavitary consolidative opacity in the right middle lobe on image 87. There is scattered mucous plugging in the left lower lobe dependently. These findings are suggestive of aspiration pneumonia. 3 mm calcified granuloma in the left lower lobe on image 84. Trace emphysematous changes, most prominent in the upper lobes.  PLEURA: No pleural effusion.  VESSELS: Within normal limits.  HEART: Mitral annulus calcification. Heart size is normal. No pericardial effusion.  MEDIASTINUM AND OWEN: No lymphadenopathy.  CHEST WALL AND LOWER NECK: Within normal limits.    ABDOMEN AND PELVIS:  LIVER: There is mild homogeneous increased attenuation of the liver parenchyma, which may represent depositional disease including hemachromatosis or amiodarone therapy. The liver is not enlarged.  BILE DUCTS: Normal caliber.  GALLBLADDER: Faceted gallstones in the gallbladder. Mild intra and extrahepatic biliary duct dilatation, which may be related to the patient's stated age. Correlate with LFTs.  SPLEEN: Within normal limits.  PANCREAS: Within normal limits.  ADRENALS: Within normal limits.  KIDNEYS/URETERS: Proteinaceous right renal cyst. Otherwise, within normal limits.    BLADDER: Enriquez catheter in the predominantly collapsed bladder.  REPRODUCTIVE ORGANS: Uterus and adnexa within normal limits.    BOWEL: Sigmoid diverticulosis. No bowel obstruction. Appendix is normal.  PERITONEUM: No ascites.  VESSELS: Extensive atherosclerotic calcification of the nonaneurysmal abdominal aorta and iliac arteries.  RETROPERITONEUM/LYMPH NODES: No lymphadenopathy.  ABDOMINAL WALL: Within normal limits.  BONES: Healed impacted left humeral neck fracture. Status post ORIF of a left intertrochanteric femur fracture. Compression deformities of T12, L1, and L3.    IMPRESSION:  1. Faceted gallstones. Intra and extrahepatic biliary dilatation. Correlate with LFTs. If there is clinical suspicion for acute cholecystitis or choledocholithiasis, consider right upper quadrant ultrasound and/or noncontrast MRCP for further evaluation.  2. Extensive patchy peribronchovascular opacities throughout the lungs, with left lower lobe mucus plugging, concerning for aspiration pneumonia.    --- End of Report ---

## 2021-11-05 NOTE — SWALLOW BEDSIDE ASSESSMENT ADULT - SWALLOW EVAL: RECOMMENDED FEEDING/EATING TECHNIQUES
feed ONLY when awake & alert/allow for swallow between intakes/alternate food with liquid/crush medication (when feasible)/maintain upright posture during/after eating for 30 mins/no straws/oral hygiene/position upright (90 degrees)/small sips/bites

## 2021-11-05 NOTE — SWALLOW BEDSIDE ASSESSMENT ADULT - COMMENTS
As per MD note: "89 y/o F w/ PMH of HTN and advanced dementia (baseline AOx1) was brought in by family after being found minimally responsive.  At baseline pt reported to be aaox1 however family noted she was lethargic and brought her in.  Pt is a poor historian and unable to obtain information information from her. Information obtained from chart.  On arrival pt reported to be tachypnic and hypothermic.  Pt was empirically tx w/ 1L IVF and vanco/zosyn.  Admitted for acute metabolic encephalopathy and sepsis."

## 2021-11-05 NOTE — PROGRESS NOTE ADULT - PROBLEM SELECTOR PLAN 1
Elevated liver enzymes of unclear Etiology, most likely multifactorial, drug induced from Amiodarone, passive congestion secondary ti tricuspid regurgitation, sepsis from PNA, possible passage of gallstones.   -CT abdomen/pelvis revealed gallstones,  mild intra/extrahepatic biliary dilatation. RUQ US w/ gallstones and sludge  with no US evidence of acute cholecystitis.   -Liver enzymes continue to trend down [id number], , Alk Phos and Bili normal. Continue to trend.   -Potassium 2.4 this AM. Currently getting replacement.   -No further GI interventions at this time. Elevated liver enzymes of unclear Etiology, most likely multifactorial, drug induced from Amiodarone, passive congestion secondary ti tricuspid regurgitation, sepsis from PNA, possible passage of gallstones.   -CT abdomen/pelvis revealed gallstones,  mild intra/extrahepatic biliary dilatation. RUQ US w/ gallstones and sludge  with no US evidence of acute cholecystitis.   -Liver enzymes continue to trend down [id number], , Alk Phos and Bili normal. Continue to trend.   -Potassium 2.4 this AM. Currently getting replacement.   -No further GI interventions at this time. Signing off. Reconsult as needed. Thank you.

## 2021-11-05 NOTE — PROGRESS NOTE ADULT - SUBJECTIVE AND OBJECTIVE BOX
Chief Complaint:  AMS    SUBJECTIVE / OVERNIGHT EVENTS: No acute events reported overnight.  Pt more interactive this morning.  Poor historian and minimally verbal theirfor unable to obtain ROS.       I&O's Summary    2021 07:01  -  2021 07:00  --------------------------------------------------------  IN: 1150 mL / OUT: 530 mL / NET: 620 mL      PHYSICAL EXAM:  Vital Signs Last 24 Hrs  T(C): 36.1 (2021 04:22), Max: 36.4 (2021 00:30)  T(F): 97 (2021 04:22), Max: 97.6 (2021 00:30)  HR: 58 (2021 09:21) (58 - 63)  BP: 124/66 (2021 09:21) (120/67 - 149/94)  BP(mean): 80 (2021 04:22) (80 - 80)  RR: 17 (2021 09:21) (16 - 18)  SpO2: 99% (2021 09:21) (92% - 99%)    GENERAL: elderly cachectic female, examined bedside, laying comfortably in bed in NAD  HEENT: NC/AT, dry oral mucosa, clear conjunctiva and sclera nonicteric rt eye, left orbital enucleation   RESPIRATORY: poor inspiratory effort, bibasilar rales, no wheezing or rhonchi   CARDIOVASCULAR: RRR, normal S1 and S2  ABDOMEN: soft, NT/ND, normoactive bowel sounds, no rebound/guarding  : +martinez placed in ED  EXTREMITIES: No cynaosis, no clubbing, no lower extremity edema; Peripheral pulses are 2+ bilaterally  PSYCH: intermittently agitated   NEUROLOGY: A+O x1 to self, not following commands, more responsive, moves extremities spontaneously, withdraws to light tactile stim   SKIN: No rashes         LABS:                                                 9.1    5.14  )-----------( 194      ( 2021 05:28 )             27.3     11-05    143  |  110<H>  |  62.3<H>  ----------------------------<  104<H>  3.0<L>   |  23.0  |  1.62<H>    Ca    7.8<L>      2021 12:31  Phos  2.4       Mg     1.8         TPro  4.2<L>  /  Alb  1.8<L>  /  TBili  0.6  /  DBili  x   /  AST  470<H>  /  ALT  426<H>  /  AlkPhos  88        Urinalysis Basic - ( 2021 12:08 )    Color: Yellow / Appearance: Slightly Turbid / S.020 / pH: x  Gluc: x / Ketone: Trace  / Bili: Small / Urobili: 1   Blood: x / Protein: 30 mg/dL / Nitrite: Positive   Leuk Esterase: Trace / RBC: 3-5 /HPF / WBC 6-10   Sq Epi: x / Non Sq Epi: Occasional / Bacteria: Moderate        Culture - Blood (collected 2021 11:20)  Source: .Blood Blood-Peripheral  Gram Stain (2021 09:01):    Growth in aerobic and anaerobic bottles: Gram Negative Rods    Gram Stain performed by:    [facility]    [address] [place] [id number]    .    TYPE: (C=Critical, N=Notification, A=Abnormal) C    TESTS:  _ GS    DATE/TIME CALLED: _ 2021 08:55:59    CALLED TO: Bogdan Owens RN    READ BACK (2 Patient Identifiers)(Y/N): _ Y    READ BACK VALUES (Y/N): _ Y    CALLED BY: Bogdan Johnson    Culture - Blood (collected 2021 11:19)  Source: .Blood Blood-Peripheral  Gram Stain (2021 08:54):    Growth in aerobic bottle: Gram Negative Rods    ***Blood Panel PCR results on this specimen are available    approximately 3 hours after the Gram stain result.***    Gram stain, PCR, and/or culture results may not always    correspond due to difference in methodologies.    ************************************************************    This PCR assay was performed using [employer].    The following targets are tested for: Enterococcus,    vancomycin resistant enterococci, Listeria monocytogenes,    coagulase negative staphylococci, S. aureus,    methicillin resistant S. aureus, Streptococcus agalactiae    (Group B), S. pneumoniae, S. pyogenes (Group A),    Acinetobacter baumannii, Enterobacter cloacae, E. coli,    Klebsiella oxytoca, K. pneumoniae, Proteus sp.,    Serratia marcescens, Haemophilus influenzae,    Neisseria meningitidis, Pseudomonas aeruginosa, Candida    albicans, C. glabrata, C krusei, C parapsilosis,    C. tropicalis and the KPC resistance gene.    "Due to technical problems, Pseudomonas aeruginosa    until further notice".    Gram Stain and BCID performed by:    [facility]    [address] [place] [id number]    .    TYPE: (C=Critical, N=Notification, A=Abnormal) C    TESTS:  _ GS    DATE/TIME CALLED: _ 2021 08:53:44    CALLED TO: Bogdan Owens RN    READ BACK (2 Patient Identifiers)(Y/N): _ Y    READ BACK VALUES (Y/N): _ Y    CALLED BY: Bogdan Johnson  Organism: Blood Culture PCR (2021 09:31)  Organism: Blood Culture PCR (2021 09:31)      CAPILLARY BLOOD GLUCOSE            RADIOLOGY & ADDITIONAL TESTS:    < from: US Abdomen Upper Quadrant Right (21 @ 17:57) >  FINDINGS:    Liver: Within normal limits.  Bile ducts: Normal caliber. Common bile duct measures 11 mm. Mild intrahepatic biliary duct distention.  Gallbladder: Sludge and stones. No wall thickening or pericholecystic fluid.  Pancreas: Scattered by bowel gas.  Right kidney: 6.9 cm. No hydronephrosis. Atrophic.  Ascites: None.  IVC: Visualized portions are within normal limits.    IMPRESSION:    Gallstones and sludge. No ultrasound evidence of acute cholecystitis. Minimally prominent common duct for patient age.    If acute cholecystitis is still of clinical concern, DISIDA scan could be performed.    < end of copied text >        < from: CT Chest No Cont (21 @ 14:51) >  FINDINGS:  CHEST:  LUNGS AND LARGE AIRWAYS: Patent central airways. There are extensive patchy peribronchovascular consolidative opacities throughout the lungs, most prominent in the left lung. There is a 2.1 x 1.2 cm centrally cavitary consolidative opacity in the right middle lobe on image 87. There is scattered mucous plugging in the left lower lobe dependently. These findings are suggestive of aspiration pneumonia. 3 mm calcified granuloma in the left lower lobe on image 84. Trace emphysematous changes, most prominent in the upper lobes.  PLEURA: No pleural effusion.  VESSELS: Within normal limits.  HEART: Mitral annulus calcification. Heart size is normal. No pericardial effusion.  MEDIASTINUM AND OWEN: No lymphadenopathy.  CHEST WALL AND LOWER NECK: Within normal limits.    ABDOMEN AND PELVIS:  LIVER: There is mild homogeneous increased attenuation of the liver parenchyma, which may represent depositional disease including hemachromatosis or amiodarone therapy. The liver is not enlarged.  BILE DUCTS: Normal caliber.  GALLBLADDER: Faceted gallstones in the gallbladder. Mild intra and extrahepatic biliary duct dilatation, which may be related to the patient's stated age. Correlate with LFTs.  SPLEEN: Within normal limits.  PANCREAS: Within normal limits.  ADRENALS: Within normal limits.  KIDNEYS/URETERS: Proteinaceous right renal cyst. Otherwise, within normal limits.    BLADDER: Martinez catheter in the predominantly collapsed bladder.  REPRODUCTIVE ORGANS: Uterus and adnexa within normal limits.    BOWEL: Sigmoid diverticulosis. No bowel obstruction. Appendix is normal.  PERITONEUM: No ascites.  VESSELS: Extensive atherosclerotic calcification of the nonaneurysmal abdominal aorta and iliac arteries.  RETROPERITONEUM/LYMPH NODES: No lymphadenopathy.  ABDOMINAL WALL: Within normal limits.  BONES: Healed impacted left humeral neck fracture. Status post ORIF of a left intertrochanteric femur fracture. Compression deformities of T12, L1, and L3.    IMPRESSION:  1. Faceted gallstones. Intra and extrahepatic biliary dilatation. Correlate with LFTs. If there is clinical suspicion for acute cholecystitis or choledocholithiasis, consider right upper quadrant ultrasound and/or noncontrast MRCP for further evaluation.  2. Extensive patchy peribronchovascular opacities throughout the lungs, with left lower lobe mucus plugging, concerning for    < end of copied text >          MEDICATIONS  (STANDING):  heparin   Injectable 5000 Unit(s) SubCutaneous every 8 hours  metoprolol tartrate 25 milliGRAM(s) Oral every 12 hours  piperacillin/tazobactam IVPB.. 3.375 Gram(s) IV Intermittent every 12 hours    MEDICATIONS  (PRN):  acetaminophen     Tablet .. 650 milliGRAM(s) Oral every 6 hours PRN Temp greater or equal to 38C (100.4F), Mild Pain (1 - 3)  aluminum hydroxide/magnesium hydroxide/simethicone Suspension 30 milliLiter(s) Oral every 4 hours PRN Dyspepsia  melatonin 3 milliGRAM(s) Oral at bedtime PRN Insomnia

## 2021-11-05 NOTE — SWALLOW BEDSIDE ASSESSMENT ADULT - SWALLOW EVAL: DIAGNOSIS
Oral dysphagia for administered trials, impacted by cognition. Pharyngeal stage of swallow clinically unremarkable for puree & thin fluids with no overt s/s aspiration noted post intake

## 2021-11-05 NOTE — PROGRESS NOTE ADULT - ASSESSMENT
89 y/o F w/ PMH of HTN and advanced dementia (baseline AOx1) was brought in by family after being found minimally responsive.  At baseline pt reported to be aaox1 however family noted she was lethargic and brought her in.  Pt is a poor historian and unable to obtain information information from her. Information obtained from chart.  On arrival pt reported to be tachypnic and hypothermic.  Pt was empirically tx w/ 1L IVF and vanco/zosyn.  Admitted for acute metabolic encephalopathy and sepsis.       Acute toxic metabolic encephalopathy / dehydration / Advanced dementia   - Remains encephalopathic but slowly improving   - Baseline mentation AAOx1 and suspect current encephalopathy 2/2 sepsis and dehydration  - CTH negative for acute pathology   - Sepsis management as noted below   - NPO pending speech evaluation for diet recs   - Aspiration precautions   - Monitor closely on telemetry       Sepsis likely 2/2 salmonella bacteremia, vs UTI vs aspiration PNA w/ impending shock   - Hypothermic and tachypnic on admission, resolved   - Hypotension resolved w/ trial of IVFs   - CT chest shows extensive patchy peribronchovascular opacities throughout lungs, LLL mucus plugging concerning for aspiration PNA  - CT a/p non-con reviewed and noted above   - UA +LE and nitrates, bacteria but 6-10wbc and cx growing 10k-49k e.coli   - Blood cxs x 2 growing salmonella, not typhi or paratyphi    - Zosyn switched to ceftriaxone 2g   - LA 5.9 now WNL s/p IV hydration   - Maintain on IVF given ongoing infection and monitor fluid status closely    - Monitor CBC and temperature curve  - Bear hugger if hypothermic again   - Chest PT and aspiration precautions  - NPO pending swallow eval       AGMA likely multifactorial 2/2 lactic acidosis / uremia / ARF   - BUN/Cr remain elevated but trending down   - Will maintain on gentle IV hydration given ongoing infection and dehydration    - AG closing and serum HCO3 WNL  - Renal US reviewed and no evidence of hydronephrosis    - Monitor chemistry, lytes and I/O's  - Avoid nephrotoxic medication or renally dose if needed          Hyperkalemia / hypokalemia / hypocalcemia   - K elevated on admission, was reported at hemolyzed but given significant lactic acidosis and no EKG changes pt was hydrated and K monitored closely w/ resolution   - K now low to 2.4, potassium being aggressively supplemented and monitored on tele   - Will order serial bmps and continue to replete K as needed to goal K~4  - Pseudohypocalcemia, corrected calcium 9.3   - Monitor lytes         Transaminitis, etiology unclear, suspect shock liver vs less likely amiodarone    - CT a/p reports gallstones, intra/extrahepatic biliary dilatation  - RUQ US w/ gallstones and sludge but no US evidence of acute cholecystitis    - LFTs and INR trending down    - Monitor LFTs/coags and avoid hepatotoxic medications   - GI consulted and recs noted       PAF / prolonged QTc / chronic HFpEF  - Currently rate controlled and hypovolemic   - Hold amio given transaminitis  - On metoprolol 25mg q12h for rate control for now w/ parameters  - EKG on admission shows chronic LBBB,  and QTc 552  - Avoid QTc prolonging medications and repeat EKG to reassess  - Maintain K~4 and Mg~2  - Cardiology consulted and recs noted      Normocytic anemia   - Iron studies reviewed and will start on iron supplementation once able to tolerate po   - H/H stable with no signs of active bleeding   - Monitor CBC and transfuse if Hb <7-8       Severe protein calorie malnutrition   - Nutritional consult  - NPO for now however pending swallow eval      Functional quadriplegia / Decubitus ulcers  - Supportive care  - Wound care consulted   - Frequent off loading         VTE ppx: heparin sq    Dispo: Pending clinical course.

## 2021-11-06 NOTE — PROGRESS NOTE ADULT - ASSESSMENT
89 yr old female with hypertension, dementia, persistent atrial fibrillation not on anticoagulation given fall risk, severe tricuspid regurgitation pulmonary hypertension was BIBEMS as she was noted to be lethargic and minimally responsive by family. In ED, noted to have leucocytosis, anion gap metabolic acidosis, hyperkalemia, ENIO and elevated LFTs. CT head with no acute pathology. Cultures were sent, she was empirically started on Vancomycin and Zosyn. She was on Amiodarone for atrial fibrillation, which was held given elevated LFTs. CT abdomen pelvis was done, showed . Faceted gallstones. Intra and extrahepatic biliary dilatation. Correlate with LFTs. If there is clinical suspicion for acute cholecystitis or choledocholithiasis, consider right upper quadrant ultrasound and/or noncontrast MRCP for further evaluation. Extensive patchy peribronchovascular opacities throughout the lungs, with left lower lobe mucus plugging, concerning for aspiration pneumonia. Cardiology, GI and ID consultations were requested. Per GI not a candidate for endoscopy given her advanced age, aspiration pneumonia. Advised to trend LFTs. Blood cultures grew gram negative rods, later identified as Salmonella, antibiotics were changed to Ceftriaxone. Cardiology advised low dose beta blockers. Admitting team had discussed goals of care on admission, family wants patient to be DNR with trial of intubation.  89 yr old female with hypertension, dementia, persistent atrial fibrillation not on anticoagulation given fall risk, severe tricuspid regurgitation pulmonary hypertension was BIBEMS as she was noted to be lethargic and minimally responsive by family. In ED, noted to have leucocytosis, anion gap metabolic acidosis, hyperkalemia, ENIO and elevated LFTs. CT head with no acute pathology. Cultures were sent, she was empirically started on Vancomycin and Zosyn. She was on Amiodarone for atrial fibrillation, which was held given elevated LFTs. CT abdomen pelvis was done, showed . Faceted gallstones. Intra and extrahepatic biliary dilatation. Correlate with LFTs. If there is clinical suspicion for acute cholecystitis or choledocholithiasis, consider right upper quadrant ultrasound and/or noncontrast MRCP for further evaluation. Extensive patchy peribronchovascular opacities throughout the lungs, with left lower lobe mucus plugging, concerning for aspiration pneumonia. Cardiology, GI and ID consultations were requested. Per GI not a candidate for endoscopy given her advanced age, aspiration pneumonia. Advised to trend LFTs. Blood cultures grew gram negative rods, later identified as Salmonella, antibiotics were changed to Ceftriaxone. Cardiology advised low dose beta blockers. Admitting team had discussed goals of care on admission, family wants patient to be DNR with trial of intubation.     1. Salmonella bacteremia: Continue Ceftriaxone, repeat cultures pending. ID following.    2. Toxic and metabolic encephalopathy: Likely secondary to underlying sepsis, CT head ordered given family concerns of change in mental status.     3. Atrial fibrillation: Not on AC given fall risk, off Amiodarone, continue Metoprolol.    4. Elevated LFTs: No intervention as per GI, trend LFTs.    5. Hypertension: Continue Metoprolol.    Discussed with grand daughter Demetra and RN.

## 2021-11-06 NOTE — PROGRESS NOTE ADULT - SUBJECTIVE AND OBJECTIVE BOX
[facility]  INFECTIOUS DISEASES AND INTERNAL MEDICINE at [place]  =======================================================  Mikal Collins MD  Diplomates American Board of Internal Medicine and Infectious Diseases  Tel: [phone]      Fax: [phone]  =======================================================    CRITICAL, [place] [id number]    Follow up: Salmonella sepsis     No fever       Allergies:  No Known Allergies      REVIEW OF SYSTEMS:  Unable to obtain due to medical condition       Physical Exam:  GEN: Lethargic   HEENT: normocephalic and atraumatic.  Anicteric Rt eye with patch   NECK: Supple.   LUNGS: Decreased basal BS B/L   HEART: Regular rate and rhythm   ABDOMEN: Soft, nontender, and nondistended.  Positive bowel sounds.    : No CVA tenderness  EXTREMITIES: Without any edema.  MSK: No joint swelling  NEUROLOGIC: Lethargic, not responsive   PSYCHIATRIC: Unable to assess due to medical condition .  SKIN: No Rash      Vitals:  T(F): 97.8 (06 Nov 2021 04:18), Max: 98 (05 Nov 2021 18:07)  HR: 58 (06 Nov 2021 04:18)  BP: 103/50 (06 Nov 2021 04:18)  RR: 18 (06 Nov 2021 04:18)  SpO2: 97% (06 Nov 2021 04:18) (94% - 99%)  temp max in last 48H T(F): , Max: 98 (11-05-21 @ 18:07)      Current Antibiotics:  cefTRIAXone   IVPB 2000 milliGRAM(s) IV Intermittent every 24 hours    Other medications:  heparin   Injectable 5000 Unit(s) SubCutaneous every 8 hours  metoprolol tartrate 25 milliGRAM(s) Oral every 12 hours                 9.1    5.14  )-----------( 194      ( 05 Nov 2021 05:28 )             27.3     11-05    143  |  109<H>  |  62.7<H>  ----------------------------<  106<H>  3.8   |  19.0<L>  |  1.55<H>    Ca    7.9<L>      05 Nov 2021 18:02  Phos  2.4     11-05  Mg     1.8     11-05    TPro  4.2<L>  /  Alb  1.8<L>  /  TBili  0.6  /  DBili  x   /  AST  470<H>  /  ALT  426<H>  /  AlkPhos  88  11-05    RECENT CULTURES:  11-05 @ 12:33 .Blood Blood     Growth in aerobic bottle: Gram Negative Rods  Gram Stain performed by:  [facility]  [address] [place] [id number]    11-04 @ 06:16 .Blood Blood     Growth in aerobic bottle: Gram Negative Rods  Gram Stain performed by:  [facility]  [address] [place] [id number]    11-02 @ 16:38 Catheterized Catheterized Escherichia coli  Salmonella species, not typhi/paratyphi    10,000 - 49,000 CFU/mL Escherichia coli  10,000 - 49,000 CFU/mL Salmonella species, not typhi/paratyphi    11-02 @ 11:20 .Blood Blood-Peripheral     Growth in aerobic and anaerobic bottles: Salmonella species, not  typhi/paratyphi  See previous culture [id number]  Growth in aerobic and anaerobic bottles: Gram Negative Rods  Gram Stain performed by:  [facility]  [address] [place] [id number]    11-02 @ 11:19 .Blood Blood-Peripheral Blood Culture PCR  Salmonella species, not typhi/paratyphi    Growth in aerobic bottle: Salmonella species, not typhi/paratyphi  Growth in aerobic bottle: Gram Negative Rods  ***Blood Panel PCR results on this specimen are available  approximately 3 hours after the Gram stain result.***  Gram stain, PCR, and/or culture results may not always  correspond due to difference in methodologies.  ************************************************************  This PCR assay was performed using [employer].  The following targets are tested for: Enterococcus,  vancomycin resistant enterococci, Listeria monocytogenes,  coagulase negative staphylococci, S. aureus,  methicillin resistant S. aureus, Streptococcus agalactiae  (Group B), S. pneumoniae, S. pyogenes (Group A),  Acinetobacter baumannii, Enterobacter cloacae, E. coli,  Klebsiella oxytoca, K. pneumoniae, Proteus sp.,  Serratia marcescens, Haemophilus influenzae,  Neisseria meningitidis, Pseudomonas aeruginosa, Candida  albicans, C. glabrata, C krusei, C parapsilosis,  C. tropicalis and the KPC resistance gene.  "Due to technical problems, Pseudomonas aeruginosa  until further notice".  Gram Stain and BCID performed by:  [facility]  [address] [place] [id number]      WBC Count: 5.14 K/uL (11-05-21 @ 05:28)  WBC Count: 5.35 K/uL (11-04-21 @ 06:16)  WBC Count: 7.34 K/uL (11-03-21 @ 12:28)  WBC Count: 6.64 K/uL (11-02-21 @ 11:00)    Creatinine, Serum: 1.55 mg/dL (11-05-21 @ 18:02)  Creatinine, Serum: 1.62 mg/dL (11-05-21 @ 12:31)  Creatinine, Serum: 1.83 mg/dL (11-05-21 @ 07:21)  Creatinine, Serum: 1.63 mg/dL (11-05-21 @ 05:28)  Creatinine, Serum: 2.17 mg/dL (11-04-21 @ 06:16)  Creatinine, Serum: 2.22 mg/dL (11-03-21 @ 12:28)  Creatinine, Serum: 2.02 mg/dL (11-02-21 @ 17:44)  Creatinine, Serum: 1.74 mg/dL (11-02-21 @ 11:00)

## 2021-11-06 NOTE — PROGRESS NOTE ADULT - SUBJECTIVE AND OBJECTIVE BOX
INTERVAL HPI/OVERNIGHT EVENTS:    CC: toxic and metabolic encephalopathy, Salmonella bacteremia, atrial fibrillation, elevated LFTs, ENIO, hypertension    Chart and course reviewed. Grand daughter at bedside reports patient is more lethargic today.  Patient seen earlier, confused, kept repeating 'stop' on exam.    Vital Signs Last 24 Hrs  T(C): 36.4 (06 Nov 2021 11:22), Max: 36.7 (05 Nov 2021 18:07)  T(F): 97.6 (06 Nov 2021 11:22), Max: 98 (05 Nov 2021 18:07)  HR: 67 (06 Nov 2021 11:22) (58 - 67)  BP: 120/50 (06 Nov 2021 11:22) (103/50 - 122/60)  BP(mean): --  RR: 18 (06 Nov 2021 11:22) (18 - 18)  SpO2: 94% (06 Nov 2021 11:22) (94% - 97%)    PHYSICAL EXAM:    GENERAL: thin built, drowsy but easily arousable, not in distress  CHEST/LUNG: b/l air entry  HEART: irregular  ABDOMEN: soft, non tender  EXTREMITIES:  no edema, tenderness    MEDICATIONS  (STANDING):  cefTRIAXone   IVPB 2000 milliGRAM(s) IV Intermittent every 24 hours  heparin   Injectable 5000 Unit(s) SubCutaneous every 8 hours  metoprolol tartrate 25 milliGRAM(s) Oral every 12 hours    MEDICATIONS  (PRN):  acetaminophen     Tablet .. 650 milliGRAM(s) Oral every 6 hours PRN Temp greater or equal to 38C (100.4F), Mild Pain (1 - 3)  aluminum hydroxide/magnesium hydroxide/simethicone Suspension 30 milliLiter(s) Oral every 4 hours PRN Dyspepsia  melatonin 3 milliGRAM(s) Oral at bedtime PRN Insomnia      Allergies    No Known Allergies    Intolerances          LABS:                          9.8    5.49  )-----------( 185      ( 06 Nov 2021 07:37 )             29.8     11-06    143  |  109<H>  |  54.2<H>  ----------------------------<  145<H>  3.8   |  25.0  |  1.29    Ca    7.7<L>      06 Nov 2021 07:37  Phos  2.4     11-05  Mg     2.0     11-06    TPro  4.5<L>  /  Alb  2.0<L>  /  TBili  0.7  /  DBili  x   /  AST  389<H>  /  ALT  391<H>  /  AlkPhos  114  11-06          RADIOLOGY & ADDITIONAL TESTS:

## 2021-11-06 NOTE — PROGRESS NOTE ADULT - ASSESSMENT
89y  Female with h/o HTN and advanced dementia (baseline AOx0). Patient was brought in by family after being found minimally responsive.  At baseline she is reported to be aaox1 however family noted she was lethargic and brought her in. On arrival pt reported to be tachypneic and hypothermic. Admitted for sepsis and started on vancomycin and zosyn. Blood cultures with GNR.      Salmonella septicemia   ENIO   Transaminitis   Advance dementia       - Blood cultures with Salmonella   - Repeat blood cultures ordered and pending   - US Abd reporting gall stones and no acute cholecystitis   - CT Chest/Abd/Pel reporting B/L ductal dilitation   - UA with Pyuria   - Continue Ceftriaxone 2gm IV q 24hours   - Follow up cultures  - Trend Fever  - Trend WBC      Thank you for allowing me to participate in the care of your patient.   Will Follow

## 2021-11-07 NOTE — PROVIDER CONTACT NOTE (CRITICAL VALUE NOTIFICATION) - TEST AND RESULT REPORTED:
potassium 6.1
lactate 5.9
potassium 2.6
Blood cultures positive gram negative rods from 11/5
lact 5.0

## 2021-11-07 NOTE — GOALS OF CARE CONVERSATION - ADVANCED CARE PLANNING - CONVERSATION DETAILS
Discussed with patient's daughter Sylvie about patient's condition. She stated she wants patient to get better and be home and continue with PT at home. She does not wish ti pursue nor hear about hospice at this time.  Discussed regarding advance directives, she wants patient to be DNR/DNI.

## 2021-11-07 NOTE — PROVIDER CONTACT NOTE (CRITICAL VALUE NOTIFICATION) - SITUATION
MD made aware - STAT EKG ordered
Blood cultures positive gram negative rods from 11/5
MD made aware additional orders received and carry out

## 2021-11-07 NOTE — PROGRESS NOTE ADULT - SUBJECTIVE AND OBJECTIVE BOX
INTERVAL HPI/OVERNIGHT EVENTS:    CC: toxic and metabolic encephalopathy, Salmonella bacteremia, atrial fibrillation, elevated LFTs, ENIO, hypertension      No overnight events, more alert, remains confused. No fever.    Vital Signs Last 24 Hrs  T(C): 36.6 (07 Nov 2021 05:24), Max: 36.6 (07 Nov 2021 05:24)  T(F): 97.9 (07 Nov 2021 05:24), Max: 97.9 (07 Nov 2021 05:24)  HR: 67 (07 Nov 2021 05:24) (67 - 67)  BP: 122/75 (07 Nov 2021 05:24) (118/70 - 122/75)  BP(mean): --  RR: 18 (07 Nov 2021 05:24) (18 - 18)  SpO2: 96% (07 Nov 2021 05:24) (92% - 96%)    PHYSICAL EXAM:    GENERAL: alert, not in distress, confused. Left eye socket no discharge noted, mild bleeding.  CHEST/LUNG: b/l air entry  HEART: reg  ABDOMEN: soft, non tender  EXTREMITIES:  no edema, tenderness    MEDICATIONS  (STANDING):  cefTRIAXone   IVPB 2000 milliGRAM(s) IV Intermittent every 24 hours  heparin   Injectable 5000 Unit(s) SubCutaneous every 8 hours  metoprolol tartrate 25 milliGRAM(s) Oral every 12 hours    MEDICATIONS  (PRN):  acetaminophen     Tablet .. 650 milliGRAM(s) Oral every 6 hours PRN Temp greater or equal to 38C (100.4F), Mild Pain (1 - 3)  aluminum hydroxide/magnesium hydroxide/simethicone Suspension 30 milliLiter(s) Oral every 4 hours PRN Dyspepsia  melatonin 3 milliGRAM(s) Oral at bedtime PRN Insomnia      Allergies    No Known Allergies    Intolerances          LABS:                          9.3    5.49  )-----------( 164      ( 07 Nov 2021 07:33 )             27.9     11-07    140  |  108<H>  |  41.2<H>  ----------------------------<  72  3.8   |  22.0  |  0.91    Ca    7.6<L>      07 Nov 2021 07:33  Mg     1.8     11-07    TPro  4.2<L>  /  Alb  2.0<L>  /  TBili  0.7  /  DBili  x   /  AST  247<H>  /  ALT  310<H>  /  AlkPhos  130<H>  11-07          RADIOLOGY & ADDITIONAL TESTS:

## 2021-11-07 NOTE — PROGRESS NOTE ADULT - ASSESSMENT
89 yr old female with hypertension, dementia, persistent atrial fibrillation not on anticoagulation given fall risk, severe tricuspid regurgitation pulmonary hypertension was BIBEMS as she was noted to be lethargic and minimally responsive by family. In ED, noted to have leucocytosis, anion gap metabolic acidosis, hyperkalemia, ENIO and elevated LFTs. CT head with no acute pathology. Cultures were sent, she was empirically started on Vancomycin and Zosyn. She was on Amiodarone for atrial fibrillation, which was held given elevated LFTs. CT abdomen pelvis was done, showed . Faceted gallstones. Intra and extrahepatic biliary dilatation. Correlate with LFTs. If there is clinical suspicion for acute cholecystitis or choledocholithiasis, consider right upper quadrant ultrasound and/or noncontrast MRCP for further evaluation. Extensive patchy peribronchovascular opacities throughout the lungs, with left lower lobe mucus plugging, concerning for aspiration pneumonia. Cardiology, GI and ID consultations were requested. Per GI not a candidate for endoscopy given her advanced age, aspiration pneumonia. Advised to trend LFTs. Blood cultures grew gram negative rods, later identified as Salmonella, antibiotics were changed to Ceftriaxone. Cardiology advised low dose beta blockers. Admitting team had discussed goals of care on admission, family wants patient to be DNR with trial of intubation.     1. Salmonella bacteremia: Continue Ceftriaxone, repeat cultures positive. Repeat cultures ordered, ID on board.    2. Toxic and metabolic encephalopathy: Likely secondary to underlying sepsis, CT head repeated, no stroke.    3. Atrial fibrillation: Not on AC given fall risk, off Amiodarone, continue Metoprolol.    4. Elevated LFTs: No intervention as per GI, trend LFTs.    5. Hypertension: Continue Metoprolol.    Discussed with RN. Palliative care evaluation. 89 yr old female with hypertension, dementia, persistent atrial fibrillation not on anticoagulation given fall risk, severe tricuspid regurgitation pulmonary hypertension was BIBEMS as she was noted to be lethargic and minimally responsive by family. In ED, noted to have leucocytosis, anion gap metabolic acidosis, hyperkalemia, ENIO and elevated LFTs. CT head with no acute pathology. Cultures were sent, she was empirically started on Vancomycin and Zosyn. She was on Amiodarone for atrial fibrillation, which was held given elevated LFTs. CT abdomen pelvis was done, showed . Faceted gallstones. Intra and extrahepatic biliary dilatation. Correlate with LFTs. If there is clinical suspicion for acute cholecystitis or choledocholithiasis, consider right upper quadrant ultrasound and/or noncontrast MRCP for further evaluation. Extensive patchy peribronchovascular opacities throughout the lungs, with left lower lobe mucus plugging, concerning for aspiration pneumonia. Cardiology, GI and ID consultations were requested. Per GI not a candidate for endoscopy given her advanced age, aspiration pneumonia. Advised to trend LFTs. Blood cultures grew gram negative rods, later identified as Salmonella, antibiotics were changed to Ceftriaxone. Cardiology advised low dose beta blockers. Admitting team had discussed goals of care on admission, family wants patient to be DNR with trial of intubation.     1. Salmonella bacteremia: Continue Ceftriaxone, repeat cultures positive. Repeat cultures ordered, ID on board.    2. Toxic and metabolic encephalopathy: Likely secondary to underlying sepsis, CT head repeated, no stroke.    3. Atrial fibrillation: Not on AC given fall risk, off Amiodarone, continue Metoprolol.    4. Elevated LFTs: No intervention as per GI, trend LFTs.    5. Hypertension: Continue Metoprolol.    Discussed with RN. Palliative care evaluation.  Discussed with daughter Sylvie.

## 2021-11-07 NOTE — PROGRESS NOTE ADULT - SUBJECTIVE AND OBJECTIVE BOX
[facility]  INFECTIOUS DISEASES AND INTERNAL MEDICINE at [place]  =======================================================  Mikal Collins MD  Diplomates American Board of Internal Medicine and Infectious Diseases  Tel: [phone]      Fax: [phone]  =======================================================    CRITICAL, [place] [id number]    Follow up: Salmonella sepsis     No fever       Allergies:  No Known Allergies      REVIEW OF SYSTEMS:  Unable to obtain due to medical condition       Physical Exam:  GEN: Lethargic   HEENT: normocephalic and atraumatic.  Anicteric Rt eye with patch   NECK: Supple.   LUNGS: Decreased basal BS B/L   HEART: Regular rate and rhythm   ABDOMEN: Soft, nontender, and nondistended.  Positive bowel sounds.    : No CVA tenderness  EXTREMITIES: Without any edema.  MSK: No joint swelling  NEUROLOGIC: Lethargic, not responsive   PSYCHIATRIC: Unable to assess due to medical condition .  SKIN: No Rash      Vitals:  T(F): 97.9 (07 Nov 2021 05:24), Max: 97.9 (07 Nov 2021 05:24)  HR: 67 (07 Nov 2021 05:24)  BP: 122/75 (07 Nov 2021 05:24)  RR: 18 (07 Nov 2021 05:24)  SpO2: 96% (07 Nov 2021 05:24) (92% - 96%)  temp max in last 48H T(F): , Max: 98 (11-05-21 @ 18:07)    Current Antibiotics:  cefTRIAXone   IVPB 2000 milliGRAM(s) IV Intermittent every 24 hours    Other medications:  heparin   Injectable 5000 Unit(s) SubCutaneous every 8 hours  metoprolol tartrate 25 milliGRAM(s) Oral every 12 hours                            9.8    5.49  )-----------( 185      ( 06 Nov 2021 07:37 )             29.8     11-06    143  |  109<H>  |  54.2<H>  ----------------------------<  145<H>  3.8   |  25.0  |  1.29    Ca    7.7<L>      06 Nov 2021 07:37  Mg     2.0     11-06    TPro  4.5<L>  /  Alb  2.0<L>  /  TBili  0.7  /  DBili  x   /  AST  389<H>  /  ALT  391<H>  /  AlkPhos  114  11-06    RECENT CULTURES:  11-05 @ 12:33 .Blood Blood     Growth in aerobic bottle: Salmonella species  See previous culture [id number]  Growth in aerobic bottle: Gram Negative Rods  Gram Stain performed by:  [facility]  [address] [place] [id number]    11-04 @ 06:16 .Blood Blood     Growth in aerobic bottle: Salmonella species  See previous culture [id number]    Growth in aerobic bottle: Gram Negative Rods  Gram Stain performed by:  [facility]  [address] [place] [id number]    11-02 @ 16:38 Catheterized Catheterized Escherichia coli  Salmonella species, not typhi/paratyphi    10,000 - 49,000 CFU/mL Escherichia coli  10,000 - 49,000 CFU/mL Salmonella species, not typhi/paratyphi    11-02 @ 11:20 .Blood Blood-Peripheral     Growth in aerobic and anaerobic bottles: Salmonella species, not  typhi/paratyphi  See previous culture [id number]  Growth in aerobic and anaerobic bottles: Gram Negative Rods  Gram Stain performed by:  [facility]  [address] [place] [id number]    11-02 @ 11:19 .Blood Blood-Peripheral Blood Culture PCR  Salmonella species, not typhi/paratyphi    Growth in aerobic bottle: Salmonella species, not typhi/paratyphi  Growth in aerobic bottle: Gram Negative Rods  ***Blood Panel PCR results on this specimen are available  approximately 3 hours after the Gram stain result.***  Gram stain, PCR, and/or culture results may not always  correspond due to difference in methodologies.  ************************************************************  This PCR assay was performed using [employer].  The following targets are tested for: Enterococcus,  vancomycin resistant enterococci, Listeria monocytogenes,  coagulase negative staphylococci, S. aureus,  methicillin resistant S. aureus, Streptococcus agalactiae  (Group B), S. pneumoniae, S. pyogenes (Group A),  Acinetobacter baumannii, Enterobacter cloacae, E. coli,  Klebsiella oxytoca, K. pneumoniae, Proteus sp.,  Serratia marcescens, Haemophilus influenzae,  Neisseria meningitidis, Pseudomonas aeruginosa, Candida  albicans, C. glabrata, C krusei, C parapsilosis,  C. tropicalis and the KPC resistance gene.  "Due to technical problems, Pseudomonas aeruginosa  until further notice".  Gram Stain and BCID performed by:  [facility]  [address] [place] [address]      WBC Count: 5.49 K/uL (11-06-21 @ 07:37)  WBC Count: 5.14 K/uL (11-05-21 @ 05:28)  WBC Count: 5.35 K/uL (11-04-21 @ 06:16)  WBC Count: 7.34 K/uL (11-03-21 @ 12:28)  WBC Count: 6.64 K/uL (11-02-21 @ 11:00)    Creatinine, Serum: 1.29 mg/dL (11-06-21 @ 07:37)  Creatinine, Serum: 1.55 mg/dL (11-05-21 @ 18:02)  Creatinine, Serum: 1.62 mg/dL (11-05-21 @ 12:31)  Creatinine, Serum: 1.83 mg/dL (11-05-21 @ 07:21)  Creatinine, Serum: 1.63 mg/dL (11-05-21 @ 05:28)  Creatinine, Serum: 2.17 mg/dL (11-04-21 @ 06:16)  Creatinine, Serum: 2.22 mg/dL (11-03-21 @ 12:28)  Creatinine, Serum: 2.02 mg/dL (11-02-21 @ 17:44)  Creatinine, Serum: 1.74 mg/dL (11-02-21 @ 11:00)

## 2021-11-08 NOTE — PROGRESS NOTE ADULT - ASSESSMENT
89 yr old female with hypertension, dementia, persistent atrial fibrillation not on anticoagulation given fall risk, severe tricuspid regurgitation pulmonary hypertension was BIBEMS as she was noted to be lethargic and minimally responsive by family. In ED, noted to have leucocytosis, anion gap metabolic acidosis, hyperkalemia, ENIO and elevated LFTs. CT head with no acute pathology. Cultures were sent, she was empirically started on Vancomycin and Zosyn. She was on Amiodarone for atrial fibrillation, which was held given elevated LFTs. CT abdomen pelvis was done, showed . Faceted gallstones. Intra and extrahepatic biliary dilatation. Correlate with LFTs. If there is clinical suspicion for acute cholecystitis or choledocholithiasis, consider right upper quadrant ultrasound and/or noncontrast MRCP for further evaluation. Extensive patchy peribronchovascular opacities throughout the lungs, with left lower lobe mucus plugging, concerning for aspiration pneumonia. Cardiology, GI and ID consultations were requested. Per GI not a candidate for endoscopy given her advanced age, aspiration pneumonia. Advised to trend LFTs. Blood cultures grew gram negative rods, later identified as Salmonella, antibiotics were changed to Ceftriaxone. Cardiology advised low dose beta blockers. Goals of care discussed with patient's daughter, patient is DNR/DNI, palliative care evaluation requested. Her repeat blood cultures are negative.     1. Salmonella bacteremia: Continue Ceftriaxone, repeat cultures are negative, set from 11/7 pending. ID on board.    2. Toxic and metabolic encephalopathy: Likely secondary to underlying sepsis, CT head repeated, no stroke.    3. Atrial fibrillation: Not on AC given fall risk, off Amiodarone, continue Metoprolol.    4. Elevated LFTs: No intervention as per GI, trend LFT, mild improvement today.    5. Hypertension: Continue Metoprolol.    Discussed with RN. Palliative care evaluation.  Discharge with home care in 24 hrs if improved. 89 yr old female with hypertension, dementia, persistent atrial fibrillation not on anticoagulation given fall risk, severe tricuspid regurgitation pulmonary hypertension was BIBEMS as she was noted to be lethargic and minimally responsive by family. In ED, noted to have leucocytosis, anion gap metabolic acidosis, hyperkalemia, ENIO and elevated LFTs. CT head with no acute pathology. Cultures were sent, she was empirically started on Vancomycin and Zosyn. She was on Amiodarone for atrial fibrillation, which was held given elevated LFTs. CT abdomen pelvis was done, showed . Faceted gallstones. Intra and extrahepatic biliary dilatation. Correlate with LFTs. If there is clinical suspicion for acute cholecystitis or choledocholithiasis, consider right upper quadrant ultrasound and/or noncontrast MRCP for further evaluation. Extensive patchy peribronchovascular opacities throughout the lungs, with left lower lobe mucus plugging, concerning for aspiration pneumonia. Cardiology, GI and ID consultations were requested. Per GI not a candidate for endoscopy given her advanced age, aspiration pneumonia. Advised to trend LFTs. Blood cultures grew gram negative rods, later identified as Salmonella, antibiotics were changed to Ceftriaxone. Cardiology advised low dose beta blockers. Goals of care discussed with patient's daughter, patient is DNR/DNI, palliative care evaluation requested. Her repeat blood cultures are negative.     1. Salmonella bacteremia: Continue Ceftriaxone, repeat cultures are negative, set from 11/7 pending. ID on board.    2. Toxic and metabolic encephalopathy: Likely secondary to underlying sepsis, CT head repeated, no stroke.    3. Atrial fibrillation: Not on AC given fall risk, off Amiodarone, continue Metoprolol.    4. Elevated LFTs: No intervention as per GI, trend LFT, mild improvement today.    5. Hypertension: Continue Metoprolol.    Discussed with RN. Palliative care evaluation.  Updated daughter Sylvie, explained overall poor prognosis and risk for readmission.

## 2021-11-08 NOTE — CONSULT NOTE ADULT - NSCONSULTADDITIONALINFOA_GEN_ALL_CORE
Total Time Spent__60__ minutes  This includes chart review, patient assessment, discussion and collaboration with interdisciplinary team members, ACP planning    COUNSELING:  Telephone meeting to discuss Advanced Care Planning - Time Spent ___20__Minutes.      Thank you for the opportunity to assist with the care of this patient.   [facility] Palliative Medicine Consult Service [phone].

## 2021-11-08 NOTE — PROGRESS NOTE ADULT - SUBJECTIVE AND OBJECTIVE BOX
INTERVAL HPI/OVERNIGHT EVENTS:    CC: toxic and metabolic encephalopathy, Salmonella bacteremia, atrial fibrillation, elevated LFTs, ENIO, hypertension    No overnight events, drowsy today, easily arousable, remains confused.    Vital Signs Last 24 Hrs  T(C): 36.7 (08 Nov 2021 04:49), Max: 36.7 (07 Nov 2021 21:12)  T(F): 98.1 (08 Nov 2021 04:49), Max: 98.1 (08 Nov 2021 04:49)  HR: 60 (08 Nov 2021 04:49) (59 - 61)  BP: 117/73 (08 Nov 2021 04:49) (111/63 - 117/73)  BP(mean): --  RR: 18 (08 Nov 2021 04:49) (17 - 18)  SpO2: 95% (08 Nov 2021 04:49) (93% - 95%)    PHYSICAL EXAM:    GENERAL: drowsy but arousable, not in distress  CHEST/LUNG: b/l air entry  HEART: reg  ABDOMEN: soft, non tender  EXTREMITIES:  no edema, tenderness    MEDICATIONS  (STANDING):  cefTRIAXone   IVPB 2000 milliGRAM(s) IV Intermittent every 24 hours  heparin   Injectable 5000 Unit(s) SubCutaneous every 8 hours  metoprolol tartrate 25 milliGRAM(s) Oral every 12 hours    MEDICATIONS  (PRN):  acetaminophen     Tablet .. 650 milliGRAM(s) Oral every 6 hours PRN Temp greater or equal to 38C (100.4F), Mild Pain (1 - 3)  aluminum hydroxide/magnesium hydroxide/simethicone Suspension 30 milliLiter(s) Oral every 4 hours PRN Dyspepsia  melatonin 3 milliGRAM(s) Oral at bedtime PRN Insomnia      Allergies    No Known Allergies    Intolerances          LABS:                          9.7    5.44  )-----------( 168      ( 08 Nov 2021 05:46 )             29.5     11-08    142  |  108<H>  |  39.0<H>  ----------------------------<  60<L>  3.9   |  21.0<L>  |  0.88    Ca    7.8<L>      08 Nov 2021 05:46  Mg     1.8     11-07    TPro  4.6<L>  /  Alb  2.1<L>  /  TBili  0.9  /  DBili  x   /  AST  218<H>  /  ALT  264<H>  /  AlkPhos  158<H>  11-08          RADIOLOGY & ADDITIONAL TESTS:

## 2021-11-08 NOTE — GOALS OF CARE CONVERSATION - ADVANCED CARE PLANNING - CONVERSATION DETAILS
Updated patient's condition, confirmed DNR/DNI, MOLST completed,  Discussed hospice and risk for readmission, daughter requesting home with home care. Updated patient's condition, confirmed DNR/DNI, New MOLST completed with Dr. Man and phone consent with daughter.,  Discussed hospice and risk for readmission, daughter requesting home with home care.    Daughter unrealistic, does not recognize her Mother is failing to thrive, advanced age; not eating much or drinking  because that's all she can take in. Patient lethargic and mental status not improving    She is single minded in her plan to continue PT OT, not ready to accept her  mother's debility and failure to thrive.  She is her Mother's caregiver, and feels guilty and frustrated not being able to visit her because of not being Vacinated herself from COVID

## 2021-11-08 NOTE — PHYSICAL THERAPY INITIAL EVALUATION ADULT - PASSIVE RANGE OF MOTION EXAMINATION, REHAB EVAL
right shoulder flex ~90 degrees, elbow flex WFL, hip flex WFL, knee flex/ext WFL, ankle df WFL; left shoulder flex ~60 degrees, elbow flex WFL, hip flex WFL, knee flex/ext WFL, ankle df ~-10 degrees.

## 2021-11-08 NOTE — GOALS OF CARE CONVERSATION - ADVANCED CARE PLANNING - WHAT MATTERS MOST
That she be treated acutely, Maintain hydration and functional status  To return home
That she not lose any strength or mobility during the hospital stay.  Mother needs assertive motivation to comply with fluids

## 2021-11-08 NOTE — PROGRESS NOTE ADULT - SUBJECTIVE AND OBJECTIVE BOX
[facility]                                   [address] [place] [address]                                                      PHONE: [phone]                                                         FAX: [phone]  [email or web address]  ---------------------------------------------------------------------------------------------------------------------------------    Overnight events/patient complaints:  NAD still confused     No Known Allergies    MEDICATIONS  (STANDING):  cefTRIAXone   IVPB 2000 milliGRAM(s) IV Intermittent every 24 hours  heparin   Injectable 5000 Unit(s) SubCutaneous every 8 hours  metoprolol tartrate 25 milliGRAM(s) Oral every 12 hours    MEDICATIONS  (PRN):  acetaminophen     Tablet .. 650 milliGRAM(s) Oral every 6 hours PRN Temp greater or equal to 38C (100.4F), Mild Pain (1 - 3)  aluminum hydroxide/magnesium hydroxide/simethicone Suspension 30 milliLiter(s) Oral every 4 hours PRN Dyspepsia  melatonin 3 milliGRAM(s) Oral at bedtime PRN Insomnia      Vital Signs Last 24 Hrs  T(C): 36.7 (08 Nov 2021 04:49), Max: 36.7 (07 Nov 2021 21:12)  T(F): 98.1 (08 Nov 2021 04:49), Max: 98.1 (08 Nov 2021 04:49)  HR: 60 (08 Nov 2021 04:49) (59 - 61)  BP: 117/73 (08 Nov 2021 04:49) (111/63 - 117/73)  BP(mean): --  RR: 18 (08 Nov 2021 04:49) (17 - 18)  SpO2: 95% (08 Nov 2021 04:49) (93% - 95%)  ICU Vital Signs Last 24 Hrs  [place] [facility]  I&O's Detail    07 Nov 2021 07:01  -  08 Nov 2021 07:00  --------------------------------------------------------  IN:  Total IN: 0 mL    OUT:    Intermittent Catheterization - Urethral (mL): 500 mL  Total OUT: 500 mL    Total NET: -500 mL        I&O's Summary    07 Nov 2021 07:01  -  08 Nov 2021 07:00  --------------------------------------------------------  IN: 0 mL / OUT: 500 mL / NET: -500 mL      Drug Dosing Weight  [facility]      PHYSICAL EXAM:  General: Appears well developed, alert and cooperative.  HEENT: Head; normocephalic, atraumatic.  Eyes: Pupils reactive, cornea wnl.  Neck: Supple, no nodes adenopathy, no NVD or carotid bruit or thyromegaly.  CARDIOVASCULAR: Normal S1 and S2, 1/6 murmur, rub, gallop or lift.   LUNGS: No rales, rhonchi or wheeze. Normal breath sounds bilaterally.  ABDOMEN: Soft, nontender without mass or organomegaly. bowel sounds normoactive.  EXTREMITIES: No clubbing, cyanosis or edema. Distal pulses wnl.   SKIN: warm and dry with normal turgor.  NEURO: Alert/oriented x 0  PSYCH: normal affect.        LABS:                        9.7    5.44  )-----------( 168      ( 08 Nov 2021 05:46 )             29.5     11-08    142  |  108<H>  |  39.0<H>  ----------------------------<  60<L>  3.9   |  21.0<L>  |  0.88    Ca    7.8<L>      08 Nov 2021 05:46  Mg     1.8     11-07    TPro  4.6<L>  /  Alb  2.1<L>  /  TBili  0.9  /  DBili  x   /  AST  218<H>  /  ALT  264<H>  /  AlkPhos  158<H>  11-08    [facility]            RADIOLOGY & ADDITIONAL STUDIES:    INTERPRETATION OF TELEMETRY (personally reviewed):      INTERPRETATION OF TELEMETRY (personally reviewed): NSR     ECHO: 3/2021 normal EF LA/RA severely dilated severe TR severe PHTN moderate AS       < from: CT Chest No Cont (11.02.21 @ 14:51) >  IMPRESSION:  1. Faceted gallstones. Intra and extrahepatic biliary dilatation. Correlate with LFTs. If there is clinical suspicion for acute cholecystitis or choledocholithiasis, consider right upper quadrant ultrasound and/or noncontrast MRCP for further evaluation.  2. Extensive patchy peribronchovascular opacities throughout the lungs, with left lower lobe mucus plugging, concerning for    < end of copied text >        Assessment and Plan:  In summary, [place] QIAN is an 89y Female with past medical history significant for HTN, persistent AF not on AC due to fall risk on Amiodarone therapy prior TTE 3/2021 normal EF LA/RA severely dilated severe TR severe PHTN moderate AS mild MS dementia (baseline AOx1) who presents to [facility] due to AMS and minimally responsive.  At baseline, Patient reported to be AAO x 1 however family noted she was lethargic and brought her [facility] for further evaluations.  Patient is a poor historian and unable to obtain history thus history obtained from chart. On arrival patient was found to be tachypneic and hypothermic.  Patient was empirically treated with one liter IVF and ABx therapy.  ENIO/Sepsis UTI found to have salmonella bacteremia       Agree with Holding Amiodarone due to abnormal LFT's  Monitor renal panel and replete K   Continue B-Blockers   ABx therapy as per primary team         Conservative cardiovascular management

## 2021-11-08 NOTE — PHYSICAL THERAPY INITIAL EVALUATION ADULT - GENERAL OBSERVATIONS, REHAB EVAL
asleep in bed on room air, +telemonitor with , +bilateral complete CAIR PRAFOs, +head wrap with left eye covered.

## 2021-11-08 NOTE — PROGRESS NOTE ADULT - ASSESSMENT
89y  Female with h/o HTN and advanced dementia (baseline AOx0). Patient was brought in by family after being found minimally responsive.  At baseline she is reported to be aaox1 however family noted she was lethargic and brought her in. On arrival pt reported to be tachypneic and hypothermic. Admitted for sepsis and started on vancomycin and zosyn. Blood cultures with GNR.      Salmonella septicemia   ENIO   Transaminitis   Advance dementia       - Blood cultures with Salmonella   - Repeat blood cultures ordered and pending   - US Abd reporting gall stones and no acute cholecystitis   - CT Chest/Abd/Pel reporting B/L ductal dilatation   - UA with Pyuria   - Continue Ceftriaxone 2gm IV q 24hours   - Will eventually need midline  - QTc is prolonged so unable to use Cipro  - Follow up cultures  - Trend Fever  - Trend WBC      Thank you for allowing me to participate in the care of your patient.   Will Follow    d/w Dr Man

## 2021-11-08 NOTE — CONSULT NOTE ADULT - CONSULT REASON
AF   LBBB
Evaluate for Advanced Illness Program  89 yo F with Salmonela Bacteremia  Family declined Hospice
Bacteremia
Elevated Transaminase

## 2021-11-08 NOTE — PHYSICAL THERAPY INITIAL EVALUATION ADULT - GROSSLY INTACT, SENSORY
formal testing limited by cognition. pt responds to tactile stim all limbs./Left UE/Right UE/Left LE/Right LE/Grossly Intact

## 2021-11-08 NOTE — PHYSICAL THERAPY INITIAL EVALUATION ADULT - ADDITIONAL COMMENTS
pt is a poor historian due to cognition. per chart, pt lives with her daughter in a house with 2 steps to enter and stairglide inside. per chart, pt has RW, commode, shower chair, transport chair. daughter assists with mobility.

## 2021-11-08 NOTE — PROGRESS NOTE ADULT - TIME BILLING
Coordination of care with multiple providers, reviewed radiology and laboratory data.
reviewed radiology images, notes. labs

## 2021-11-08 NOTE — GOALS OF CARE CONVERSATION - ADVANCED CARE PLANNING - TREATMENT GUIDELINE COMMENT
Support Patient as she recovers from UTI
ELEANOR reconfirmed  No limitations consenting to midline to continue antibiotics at home when discharged

## 2021-11-08 NOTE — CHART NOTE - NSCHARTNOTEFT_GEN_A_CORE
Source: Patient [ ]  Family [ ]   other [x] EMR, staff and ID rounds     Current Diet:   Diet, Pureed (11-05-21 @ 13:57)    Patient reports [ ] nausea  [ ] vomiting [ ] diarrhea [ ] constipation  [ ]chewing problems [ ] swallowing issues  [ ] other:     PO intake:  < 50% [ ]   50-75%  [x]   %  [ ]  other :    Source for PO intake [ ] Patient [ ] family [ ] chart [ ] staff [ ] other    Current Weight:   (11/5)   91 lbs  (11/4)   89.5 lbs  (11/3)   89 lbs    % Weight Change: Aware possible 2lb weight gain x 2 days, no edema currently documented    Pertinent Medications: MEDICATIONS  (STANDING):  cefTRIAXone   IVPB 2000 milliGRAM(s) IV Intermittent every 24 hours  heparin   Injectable 5000 Unit(s) SubCutaneous every 8 hours  metoprolol tartrate 25 milliGRAM(s) Oral every 12 hours    MEDICATIONS  (PRN):  acetaminophen     Tablet .. 650 milliGRAM(s) Oral every 6 hours PRN Temp greater or equal to 38C (100.4F), Mild Pain (1 - 3)  aluminum hydroxide/magnesium hydroxide/simethicone Suspension 30 milliLiter(s) Oral every 4 hours PRN Dyspepsia  melatonin 3 milliGRAM(s) Oral at bedtime PRN Insomnia    Pertinent Labs: CBC Full  -  ( 08 Nov 2021 05:46 )  WBC Count : 5.44 K/uL  RBC Count : 3.46 M/uL  Hemoglobin : 9.7 g/dL  Hematocrit : 29.5 %  Platelet Count - Automated : 168 K/uL  Mean Cell Volume : 85.3 fl  Mean Cell Hemoglobin : 28.0 pg  Mean Cell Hemoglobin Concentration : 32.9 gm/dL  11-08 Na142 mmol/L Glu 60 mg/dL<L> K+ 3.9 mmol/L Cr  0.88 mg/dL BUN 39.0 mg/dL<H> Phos n/a   Alb 2.1 g/dL<L> PAB n/a       Skin: Unstageable to L. knee, suspected DTI coccyx, suspected DTI R. heel    Nutrition focused physical exam conducted - found signs of malnutrition [ ]absent [x]present    Subcutaneous fat loss: [ ] Orbital fat pads region, [ ]Buccal fat region, [ ]Triceps region,  [ ]Ribs region    Muscle wasting: [ ]Temples region, [ ]Clavicle region, [ ]Shoulder region, [ ]Scapula region, [ ]Interosseous region,  [ ]thigh region, [ ]Calf region    Estimated Needs:   [x] no change since previous assessment  [ ] recalculated:     Current Nutrition Diagnosis: Pt remains at high nutrition risk secondary to malnutrition (severe chronic) related to inability to consume sufficient protein energy intake in setting of advanced dementia and now with UTI, lethargy as evidenced by pt severe muscle wasting/fat loss; likely meeting <75% est energy intake > 1 mo. Pt remains confused, s/p SLP eval 11/5 recommending puree diet. PT tolerating diet with fair PO intake with total assistance. Fecal incontinence noted, last documented BM 11/5.     Recommendations:   1) Add Ensure Pudding TID   2) Rx MVI daily   3) Monitor weights daily for trend/accuracy   4) Provide encouragement/assistance as needed during mealtimes to inc PO     Monitoring and Evaluation:   [x] PO intake [x] Tolerance to diet prescription [X] Weights  [X] Follow up per protocol [X] Labs:

## 2021-11-08 NOTE — GOALS OF CARE CONVERSATION - ADVANCED CARE PLANNING - CONVERSATION/DISCUSSION
Diagnosis/Prognosis
Diagnosis/Prognosis/MOLST Discussed/Treatment Options
Diagnosis/Prognosis/MOLST Discussed/Treatment Options

## 2021-11-08 NOTE — CONSULT NOTE ADULT - SUBJECTIVE AND OBJECTIVE BOX
This is a frail elderly 89yo F [place] with Advanced Dementia, PMH HTN, Recurrent UTI's abd Dementia. Family observed her increasing lethargy and minimal responsiveness. On admission in ED Pt was hypothermic and Hypotensive. Sepsis Protocol followed.  Today Patient is withdrawn, not engaging with staff, nonverbal-refusing to participate in PT-not eating minimal PO intake. She is incontinent of Urine and has recurrent UTI's preceded by Dehydration She has Liver Enzyme transaminase.   She is bedbound resistant to physical interaction. Afebrile no chills. No SOB Palpitations Cachectic  treating for Bacteremia  HPI:  89 y/o F w/ PMH of HTN and advanced dementia (baseline AOx1) was brought in by family after being found minimally responsive.  At baseline pt reported to be aaox1 however family noted she was lethargic and brought her in.  Pt is a poor historian and unable to obtain information information from her. Information obtained from chart.  On arrival pt reported to be tachypneic and hypothermic.  Pt was empirically tx w/ 1L IVF and vanco/zosyn.  Of note, pt has another MRN ([id number]) and her name is Jeri Neves.   (02 Nov 2021 15:58)      PERTINENT PMH REVIEWED: Yes     PAST MEDICAL & SURGICAL HISTORY:  HTN (hypertension)    No significant past surgical history      SOCIAL HISTORY:                      Substance history: none                    Admitted from:  home                       Rel Daughter Sylvie [phone]    FAMILY HISTORY:  No pertinent family history in first degree relatives    No Known Allergies  ADVANCE DIRECTIVES/TREATMENT PREFERENCES:    DNR/DNI - MOLST, continue all other medical treatments    Baseline ADLs (prior to admission  Dependent      Karnofsky/Palliative Performance Status Version   30-40%    Present Symptoms:     Dyspnea:   Nausea/Vomiting:  No  Anxiety:  Yes   Depression: Yes   Fatigue: Yes   Loss of appetite: Yes N  Constipation: ?    Pain: B/L heels wilber prominences            Character-            Duration-            Effect-            Factors-            Frequency-            Location-            Severity-    Pain AD Score:  5/10    Review of Systems: Reviewed                      Unable to obtain due to poor mentation       MEDICATIONS  (STANDING):  cefTRIAXone   IVPB 2000 milliGRAM(s) IV Intermittent every 24 hours  heparin   Injectable 5000 Unit(s) SubCutaneous every 8 hours  metoprolol tartrate 25 milliGRAM(s) Oral every 12 hours    MEDICATIONS  (PRN):  acetaminophen     Tablet .. 650 milliGRAM(s) Oral every 6 hours PRN Temp greater or equal to 38C (100.4F), Mild Pain (1 - 3)  aluminum hydroxide/magnesium hydroxide/simethicone Suspension 30 milliLiter(s) Oral every 4 hours PRN Dyspepsia  melatonin 3 milliGRAM(s) Oral at bedtime PRN Insomnia      PHYSICAL EXAM:    Vital Signs Last 24 Hrs  T(C): 36.6 (08 Nov 2021 11:45), Max: 36.7 (07 Nov 2021 21:12)  T(F): 97.9 (08 Nov 2021 11:45), Max: 98.1 (08 Nov 2021 04:49)  HR: 58 (08 Nov 2021 11:45) (58 - 61)  BP: 117/69 (08 Nov 2021 11:45) (111/63 - 117/73)  BP(mean): --  RR: 19 (08 Nov 2021 11:45) (17 - 19)  SpO2: 91% (08 Nov 2021 11:45) (91% - 95%)    General: __ lethargic not engaging                  cachexia  nonverbal      HEENT:   dry mouth      Lungs: comfortable     CV: normal      GI: normal              constipation  last BM:     :   incontinent      MSK:   weakness              bedbound/wheelchair bound    Neuro:  cognitive impairment dysphagia     Skin: B/L Heels unstageable  no rash    LABS:                        9.7    5.44  )-----------( 168      ( 08 Nov 2021 05:46 )             29.5     11-08    142  |  108<H>  |  39.0<H>  ----------------------------<  60<L>  3.9   |  21.0<L>  |  0.88    Ca    7.8<L>      08 Nov 2021 05:46  Mg     1.8     11-07    TPro  4.6<L>  /  Alb  2.1<L>  /  TBili  0.9  /  DBili  x   /  AST  218<H>  /  ALT  264<H>  /  AlkPhos  158<H>  11-08    I&O's Summary    07 Nov 2021 07:01  -  08 Nov 2021 07:00  --------------------------------------------------------  IN: 0 mL / OUT: 500 mL / NET: -500 mL    RADIOLOGY & ADDITIONAL STUDIES:

## 2021-11-08 NOTE — PROGRESS NOTE ADULT - SUBJECTIVE AND OBJECTIVE BOX
[facility]  INFECTIOUS DISEASES AND INTERNAL MEDICINE at [place]  =======================================================  Mikal Collins MD  Diplomates American Board of Internal Medicine and Infectious Diseases  Tel: [phone]      Fax: [phone]  =======================================================    CRITICAL, [place] [id number]    Follow up: Salmonella sepsis     No fever       Allergies:  No Known Allergies      REVIEW OF SYSTEMS:  Unable to obtain due to medical condition       Physical Exam:  GEN: Lethargic   HEENT: normocephalic and atraumatic.  Anicteric Rt eye with patch   NECK: Supple.   LUNGS: Decreased basal BS B/L   HEART: Regular rate and rhythm   ABDOMEN: Soft, nontender, and nondistended.  Positive bowel sounds.    : No CVA tenderness  EXTREMITIES: Without any edema.  MSK: No joint swelling  NEUROLOGIC: Lethargic, not responsive   PSYCHIATRIC: Unable to assess due to medical condition .  SKIN: No Rash      Vitals:    T(F): 97.9 (08 Nov 2021 11:45), Max: 98.1 (08 Nov 2021 04:49)  HR: 58 (08 Nov 2021 11:45)  BP: 117/69 (08 Nov 2021 11:45)  RR: 19 (08 Nov 2021 11:45)  SpO2: 91% (08 Nov 2021 11:45) (91% - 95%)  temp max in last 48H T(F): , Max: 98.1 (11-08-21 @ 04:49)    Current Antibiotics:  cefTRIAXone   IVPB 2000 milliGRAM(s) IV Intermittent every 24 hours    Other medications:  heparin   Injectable 5000 Unit(s) SubCutaneous every 8 hours  metoprolol tartrate 25 milliGRAM(s) Oral every 12 hours                            9.7    5.44  )-----------( 168      ( 08 Nov 2021 05:46 )             29.5     11-08    142  |  108<H>  |  39.0<H>  ----------------------------<  60<L>  3.9   |  21.0<L>  |  0.88    Ca    7.8<L>      08 Nov 2021 05:46  Mg     1.8     11-07    TPro  4.6<L>  /  Alb  2.1<L>  /  TBili  0.9  /  DBili  x   /  AST  218<H>  /  ALT  264<H>  /  AlkPhos  158<H>  11-08    RECENT CULTURES:  11-06 @ 07:37 .Blood Blood     No growth at 48 hours    11-05 @ 13:50 .Blood Blood-Peripheral     No growth at 48 hours    11-05 @ 13:47 .Blood Blood-Peripheral     No growth at 48 hours    11-05 @ 12:33 .Blood Blood     Growth in aerobic bottle: Salmonella species, not typhi/paratyphi  See previous culture [id number]  Growth in aerobic bottle: Gram Negative Rods  Gram Stain performed by:  [facility]  [address] [place] [id number]    11-04 @ 06:16 .Blood Blood     Growth in aerobic bottle: Salmonella species, not typhi/paratyphi  See previous culture [id number]  Growth in aerobic bottle: Gram Negative Rods  Gram Stain performed by:  [facility]  [address] [place] [id number]    11-02 @ 16:38 Catheterized Catheterized Escherichia coli  Salmonella species, not typhi/paratyphi    10,000 - 49,000 CFU/mL Escherichia coli  10,000 - 49,000 CFU/mL Salmonella species, not typhi/paratyphi    11-02 @ 11:20 .Blood Blood-Peripheral     Growth in aerobic and anaerobic bottles: Salmonella species, not  typhi/paratyphi  See previous culture [id number]  Growth in aerobic and anaerobic bottles: Gram Negative Rods  Gram Stain performed by:  [facility]  [address] [place] [id number]    11-02 @ 11:19 .Blood Blood-Peripheral Blood Culture PCR  Salmonella species, not typhi/paratyphi    Growth in aerobic and anaerobic bottles: Salmonella species, not  typhi/paratyphi  Growth in aerobic and anaerobic bottles: Gram Negative Rods  ***Blood Panel PCR results on this specimen are available  approximately 3 hours after the Gram stain result.***  Gram stain, PCR, and/or culture results may not always  correspond due todifference in methodologies.  ************************************************************  This PCR assay was performed using [employer].  The following targets are tested for: Enterococcus,  vancomycin resistant enterococci, Listeria monocytogenes,  coagulase negative staphylococci, S. aureus,  methicillin resistant S. aureus, Streptococcus agalactiae  (Group B), S. pneumoniae, S. pyogenes (Group A),  Acinetobacter baumannii, Enterobacter cloacae, E. coli,  Klebsiella oxytoca, K. pneumoniae, Proteus sp.,  Serratia marcescens, Haemophilus influenzae,  Neisseria meningitidis, Pseudomonas aeruginosa, Candida  albicans, C. glabrata, C krusei, C parapsilosis,  C. tropicalis and the KPC resistance gene.  "Due to technical problems, Pseudomonas aeruginosa  until further notice".  Gram Stain and BCID performed by:  [facility]  [address] [place] [id number]      WBC Count: 5.44 K/uL (11-08-21 @ 05:46)  WBC Count: 5.49 K/uL (11-07-21 @ 07:33)  WBC Count: 5.49 K/uL (11-06-21 @ 07:37)  WBC Count: 5.14 K/uL (11-05-21 @ 05:28)  WBC Count: 5.35 K/uL (11-04-21 @ 06:16)    Creatinine, Serum: 0.88 mg/dL (11-08-21 @ 05:46)  Creatinine, Serum: 0.91 mg/dL (11-07-21 @ 07:33)  Creatinine, Serum: 1.29 mg/dL (11-06-21 @ 07:37)  Creatinine, Serum: 1.55 mg/dL (11-05-21 @ 18:02)  Creatinine, Serum: 1.62 mg/dL (11-05-21 @ 12:31)  Creatinine, Serum: 1.83 mg/dL (11-05-21 @ 07:21)  Creatinine, Serum: 1.63 mg/dL (11-05-21 @ 05:28)  Creatinine, Serum: 2.17 mg/dL (11-04-21 @ 06:16)    Ferritin, Serum: 1239 ng/mL (11-04-21 @ 06:16)  Ferritin, Serum: 1233 ng/mL (11-04-21 @ 06:16)

## 2021-11-08 NOTE — GOALS OF CARE CONVERSATION - ADVANCED CARE PLANNING - CONVERSATION DETAILS
I called Sylvie on the phone, after missing her daughters Gita visit.  Sylvie has not been vaccinated-therefore not able to visit.    She has been her Mother's caregiver and expects to have her come back home when medically stable.    We talked about recurrent UTI'[s her Mother refusing food, fluid activity...she must be strongly encouraged to cooperate.  I explained the vicious cycle with our older Patient's who are only taking minimal fluids and nutrition.  This is leading to Recurrent UTI's as she is incontinent using diapers at home.    Sylvie said the only pain she has been aware of is her heel which has broken down.    Sylvie wants to continue engaging her Mother in PT/OT supporting the best functional status possible.  That is why she does not want to seek Hospice support, because she knows they do not provide PT.    She is not acknowledging that her mother, is slowly declining-failure to thrive which is expected as we age    Just wants her Mother to be offered fluids on a regular basis, continue to hydrate and finish htreatment for UTI before coming home again

## 2021-11-08 NOTE — CONSULT NOTE ADULT - ASSESSMENT
89y  Female with h/o HTN and advanced dementia (baseline AOx0). Patient was brought in by family after being found minimally responsive.  At baseline she is reported to be aaox1 however family noted she was lethargic and brought her in. On arrival pt reported to be tachypneic and hypothermic. Admitted for sepsis and started on vancomycin and zosyn. Blood cultures with GNR.      Gram negative Sepsis  ENIO   Transaminitis   Advance dementia       - Blood cultures with GNR  - Repeat blood cultures ordered   - US Abd reporting gall stones and no acute cholecystitis   - CT Chest/Abd/Pel reporting B/L ductal dilitation   - May need MRCP  - UA with Pyuria   - Continue Zosyn  - D/C Vancomycin   - Follow up cultures  - Trend Fever  - Trend WBC      Thank you for allowing me to participate in the care of your patient.   Will Follow      d/w Dr Del Castillo  
89 yo F with Recurrent UTI's PMH of Dementia Failure to thrive Dehydration>UTI's Bacteremia    Toxic Metabolic Encephalopathy  No CVA on CT  Likely Sepsis related UTI  Transamiitis  Improving metabolically  Patient still very lethargic-minimal intake    Delirium with underlying Dementia  root cause most like bacteremia  Improve oral intake  Mental status not improving    Salmonella Bacteremia  IV Ceftriaxone x 8 days  Repeat BC's  recurrent UTI's    GOC  MOL DNR in chart signed by daughter  Treat acute infection  encourage fluids and oral intake  Encourage increasing avctivity  EWill return home with [facility]  Will not consider Hospice suport

## 2021-11-09 NOTE — DISCHARGE NOTE PROVIDER - NSDCMRMEDTOKEN_GEN_ALL_CORE_FT
amiodarone 200 mg oral tablet: 1 tab(s) orally once a day  aspirin 81 mg oral tablet:   folic acid 1 mg oral tablet: 1 tab(s) orally once a day  Senna 8.6 mg oral tablet: 2 tab(s) orally once a day (at bedtime)   aspirin 81 mg oral tablet:   folic acid 1 mg oral tablet: 1 tab(s) orally once a day  metoprolol tartrate 25 mg oral tablet: 1 tab(s) orally every 12 hours  Senna 8.6 mg oral tablet: 2 tab(s) orally once a day (at bedtime)   aspirin 81 mg oral tablet:   collagenase 250 units/g topical ointment: 1 application topically once a day  folic acid 1 mg oral tablet: 1 tab(s) orally once a day  metoprolol tartrate 25 mg oral tablet: 1 tab(s) orally every 12 hours  Senna 8.6 mg oral tablet: 2 tab(s) orally once a day (at bedtime)

## 2021-11-09 NOTE — PROGRESS NOTE ADULT - SUBJECTIVE AND OBJECTIVE BOX
OVERNIGHT EVENTS:  No new events overnight  Patient was being washed up by NA Nonverbal  She was more awake and alert, following commands  R buttock sacral decubiti Stage II painful when turning and when touched  She did allow NA to feed her, and drinking thickened apple juice    Present Symptoms:     Dyspnea: Mild   Nausea/Vomiting:  No  Anxiety:  Yes   Depression: Yes   Fatigue: Yes  Loss of appetite: Yes  Constipation:     Pain: Assume Pain is present when she screams out when being touched            Character-            Duration-            Effect-            Factors-            Frequency-            Location-            Severity-moderate    Pain AD Score:  6/10    Review of Systems: Reviewed                                         Unable to obtain due to poor mentation     MEDICATIONS  (STANDING):  cefTRIAXone   IVPB 2000 milliGRAM(s) IV Intermittent every 24 hours  heparin   Injectable 5000 Unit(s) SubCutaneous every 8 hours  lactobacillus acidophilus 1 Tablet(s) Oral daily  metoprolol tartrate 25 milliGRAM(s) Oral every 12 hours    MEDICATIONS  (PRN):  acetaminophen     Tablet .. 650 milliGRAM(s) Oral every 6 hours PRN Temp greater or equal to 38C (100.4F), Mild Pain (1 - 3)  aluminum hydroxide/magnesium hydroxide/simethicone Suspension 30 milliLiter(s) Oral every 4 hours PRN Dyspepsia  melatonin 3 milliGRAM(s) Oral at bedtime PRN Insomnia      PHYSICAL EXAM:    Vital Signs Last 24 Hrs  T(C): 36.4 (09 Nov 2021 10:58), Max: 36.9 (09 Nov 2021 07:19)  T(F): 97.5 (09 Nov 2021 10:58), Max: 98.5 (09 Nov 2021 07:19)  HR: 65 (09 Nov 2021 10:58) (60 - 79)  BP: 100/58 (09 Nov 2021 10:58) (100/58 - 119/72)  BP(mean): --  RR: 20 (09 Nov 2021 10:58) (16 - 20)  SpO2: 98% (09 Nov 2021 07:19) (95% - 98%)    General: alert  oriented x _?__ lethargic awake for longer periods of time                  cachexia  nonverbal      Karnofsky:   15%    HEENT:  dry mouth      Lungs: comfortable     CV: normal      GI: normal                   constipation  last BM:     :   incontinent  primafit    MSK:  weakness            bedbound/wheelchair bound    Skin: pressure ulcers- Stage__II sacral Unstageable B/L heels_  no rash    LABS:                          9.7    5.44  )-----------( 168      ( 08 Nov 2021 05:46 )             29.5     11-08    142  |  108<H>  |  39.0<H>  ----------------------------<  60<L>  3.9   |  21.0<L>  |  0.88    Ca    7.8<L>      08 Nov 2021 05:46    TPro  4.6<L>  /  Alb  2.1<L>  /  TBili  0.9  /  DBili  x   /  AST  218<H>  /  ALT  264<H>  /  AlkPhos  158<H>  11-08      I&O's Summary    08 Nov 2021 07:01  -  09 Nov 2021 07:00  --------------------------------------------------------  IN: 0 mL / OUT: 400 mL / NET: -400 mL      RADIOLOGY & ADDITIONAL STUDIES:    ADVANCE DIRECTIVES/TREATMENT PREFERENCES:  DNR YES  Completed on:                     ELEANOR  YES    Completed on: 11/8/21  Living Will NO    Completed on:

## 2021-11-09 NOTE — DISCHARGE NOTE PROVIDER - HOSPITAL COURSE
89 yr old female with hypertension, dementia, persistent atrial fibrillation not on anticoagulation given fall risk, severe tricuspid regurgitation pulmonary hypertension was BIBEMS as she was noted to be lethargic and minimally responsive by family. In ED, noted to have leucocytosis, anion gap metabolic acidosis, hyperkalemia, ENIO and elevated LFTs. CT head with no acute pathology. Cultures were sent, she was empirically started on Vancomycin and Zosyn. She was on Amiodarone for atrial fibrillation, which was held given elevated LFTs. CT abdomen pelvis was done, showed . Faceted gallstones. Intra and extrahepatic biliary dilatation. Correlate with LFTs. If there is clinical suspicion for acute cholecystitis or choledocholithiasis, consider right upper quadrant ultrasound and/or noncontrast MRCP for further evaluation. Extensive patchy peribronchovascular opacities throughout the lungs, with left lower lobe mucus plugging, concerning for aspiration pneumonia. Cardiology, GI and ID consultations were requested. Per GI not a candidate for endoscopy given her advanced age, aspiration pneumonia. Advised to trend LFTs. Blood cultures grew gram negative rods, later identified as Salmonella, antibiotics were changed to Ceftriaxone. Cardiology advised low dose beta blockers. Goals of care discussed with patient's daughter, patient is DNR/DNI, palliative care evaluation requested. Her repeat blood cultures are negative. Given prolonged QT, unable to treat with Ciprofloxacin at home. Midline was placed and ID advised Ceftriaxone until 11/19. She is medically stable for discharge home.    Spent > 35 mins in discharge plan and documentation.     89 yr old female with hypertension, dementia, persistent atrial fibrillation not on anticoagulation given fall risk, severe tricuspid regurgitation pulmonary hypertension was BIBEMS as she was noted to be lethargic and minimally responsive by family. In ED, noted to have leucocytosis, anion gap metabolic acidosis, hyperkalemia, ENIO and elevated LFTs. CT head with no acute pathology. Cultures were sent, she was empirically started on Vancomycin and Zosyn. She was on Amiodarone for atrial fibrillation, which was held given elevated LFTs. CT abdomen pelvis was done, showed . Faceted gallstones. Intra and extrahepatic biliary dilatation. Correlate with LFTs. If there is clinical suspicion for acute cholecystitis or choledocholithiasis, consider right upper quadrant ultrasound and/or noncontrast MRCP for further evaluation. Extensive patchy peribronchovascular opacities throughout the lungs, with left lower lobe mucus plugging, concerning for aspiration pneumonia. Cardiology, GI and ID consultations were requested. Per GI not a candidate for endoscopy given her advanced age, aspiration pneumonia. Advised to trend LFTs. Blood cultures grew gram negative rods, later identified as Salmonella, antibiotics were changed to Ceftriaxone. Cardiology advised low dose beta blockers. Goals of care discussed with patient's daughter, patient is DNR/DNI, palliative care evaluation requested. Her repeat blood cultures are negative. Midline was placed and ID advised IV Ceftriaxone until 11/19. She is medically stable for discharge home.    Spent > 35 mins in discharge plan and documentation.

## 2021-11-09 NOTE — PROGRESS NOTE ADULT - NSPROGADDITIONALINFOA_GEN_ALL_CORE
Total Time Spent__25__ minutes  This includes chart review, patient assessment, discussion and collaboration with interdisciplinary team members, ACP planning    COUNSELING:  Face to face meeting to discuss Advanced Care Planning - Time Spent ______Minutes.      Thank you for the opportunity to assist with the care of this patient.   [facility] Palliative Medicine Consult Service [phone].     Palliative Team will be signing off her case at this time. Goals are established MOLST completed-going back home in care of her daughter.  Please reconsult if goals or condition changes

## 2021-11-09 NOTE — PROGRESS NOTE ADULT - ASSESSMENT
Assessment and Recommendation:   · Assessment	  89 yo F with Recurrent UTI's PMH of Dementia Failure to thrive Dehydration>UTI's Bacteremia    Toxic Metabolic Encephalopathy  No CVA on CT  Likely Sepsis related UTI  Transamiitis  Improving metabolically  Mental status improving staying awake longer, will allow to be fed-minimal intake    Delirium with underlying Dementia  Root cause most like bacteremia  Oral intake is improving. She allowed NA to feed her and drank thickened apple juice  Mental status improving today    Salmonella Bacteremia  IV Ceftriaxone x 8 days  Repeat BC's  recurrent UTI's    [facility] DNR in chart signed by daughter  Treat acute infection  encourage fluids and oral intake  Encourage increasing activity  Will return home with [facility]  Will not consider Hospice support

## 2021-11-09 NOTE — PROGRESS NOTE ADULT - SUBJECTIVE AND OBJECTIVE BOX
INTERVAL HPI/OVERNIGHT EVENTS:    CC:  toxic and metabolic encephalopathy, Salmonella bacteremia, atrial fibrillation, elevated LFTs, ENIO, hypertension      No overnight events, midline placed. No fever.    Vital Signs Last 24 Hrs  T(C): 36.9 (09 Nov 2021 07:19), Max: 36.9 (09 Nov 2021 07:19)  T(F): 98.5 (09 Nov 2021 07:19), Max: 98.5 (09 Nov 2021 07:19)  HR: 79 (09 Nov 2021 07:19) (58 - 79)  BP: 119/72 (09 Nov 2021 07:19) (117/69 - 119/72)  BP(mean): --  RR: 16 (09 Nov 2021 07:19) (16 - 19)  SpO2: 98% (09 Nov 2021 07:19) (91% - 98%)    PHYSICAL EXAM:    GENERAL: drowsy but easily arousable, not in distress  CHEST/LUNG: b/l air entry  HEART: reg  ABDOMEN: soft, bs+  EXTREMITIES:  no edema, tenderness    MEDICATIONS  (STANDING):  cefTRIAXone   IVPB 2000 milliGRAM(s) IV Intermittent every 24 hours  heparin   Injectable 5000 Unit(s) SubCutaneous every 8 hours  lactobacillus acidophilus 1 Tablet(s) Oral daily  metoprolol tartrate 25 milliGRAM(s) Oral every 12 hours    MEDICATIONS  (PRN):  acetaminophen     Tablet .. 650 milliGRAM(s) Oral every 6 hours PRN Temp greater or equal to 38C (100.4F), Mild Pain (1 - 3)  aluminum hydroxide/magnesium hydroxide/simethicone Suspension 30 milliLiter(s) Oral every 4 hours PRN Dyspepsia  melatonin 3 milliGRAM(s) Oral at bedtime PRN Insomnia      Allergies    No Known Allergies    Intolerances          LABS:                          9.7    5.44  )-----------( 168      ( 08 Nov 2021 05:46 )             29.5     11-08    142  |  108<H>  |  39.0<H>  ----------------------------<  60<L>  3.9   |  21.0<L>  |  0.88    Ca    7.8<L>      08 Nov 2021 05:46    TPro  4.6<L>  /  Alb  2.1<L>  /  TBili  0.9  /  DBili  x   /  AST  218<H>  /  ALT  264<H>  /  AlkPhos  158<H>  11-08          RADIOLOGY & ADDITIONAL TESTS:

## 2021-11-09 NOTE — PROGRESS NOTE ADULT - ASSESSMENT
89y  Female with h/o HTN and advanced dementia (baseline AOx0). Patient was brought in by family after being found minimally responsive.  At baseline she is reported to be aaox1 however family noted she was lethargic and brought her in. On arrival pt reported to be tachypneic and hypothermic. Admitted for sepsis and started on vancomycin and zosyn. Blood cultures with GNR.      Salmonella septicemia   ENIO   Transaminitis   Advance dementia       - Blood cultures with Salmonella   - Repeat blood cultures no growth 11/6   - US Abd reporting gall stones and no acute cholecystitis   - CT Chest/Abd/Pel reporting B/L ductal dilatation   - UA with Pyuria   - Continue Ceftriaxone 2gm IV q 24hours   - midline ordered  - Since patient is not immunosuppressed and there is no evidence of extra-intestinal  infection will need IV Ceftriaxone till 11/19/21 followed by surveillance   - Disposition pending whether patient will be going home or to rehab.   - QTc is prolonged so unable to use Cipro  - Follow up cultures  - Trend Fever  - Trend WBC      Thank you for allowing me to participate in the care of your patient.   Will Follow    d/w Dr Man

## 2021-11-09 NOTE — PROGRESS NOTE ADULT - SUBJECTIVE AND OBJECTIVE BOX
[facility]  INFECTIOUS DISEASES AND INTERNAL MEDICINE at [place]  =======================================================  Mikal Collins MD  Diplomates American Board of Internal Medicine and Infectious Diseases  Tel: [phone]      Fax: [phone]  =======================================================    CRITICAL, [place] [id number]    Follow up: Salmonella sepsis     No fever       Allergies:  No Known Allergies      REVIEW OF SYSTEMS:  Unable to obtain due to medical condition       Physical Exam:  GEN: Lethargic   HEENT: normocephalic and atraumatic.  Anicteric Rt eye with patch   NECK: Supple.   LUNGS: Decreased basal BS B/L   HEART: Regular rate and rhythm   ABDOMEN: Soft, nontender, and nondistended.  Positive bowel sounds.    : No CVA tenderness  EXTREMITIES: Without any edema.  MSK: No joint swelling  NEUROLOGIC: Lethargic, not responsive   PSYCHIATRIC: Unable to assess due to medical condition .  SKIN: No Rash      Vitals:  T(F): 98.5 (09 Nov 2021 07:19), Max: 98.5 (09 Nov 2021 07:19)  HR: 79 (09 Nov 2021 07:19)  BP: 119/72 (09 Nov 2021 07:19)  RR: 16 (09 Nov 2021 07:19)  SpO2: 98% (09 Nov 2021 07:19) (91% - 98%)  temp max in last 48H T(F): , Max: 98.5 (11-09-21 @ 07:19)    Current Antibiotics:  cefTRIAXone   IVPB 2000 milliGRAM(s) IV Intermittent every 24 hours    Other medications:  heparin   Injectable 5000 Unit(s) SubCutaneous every 8 hours  metoprolol tartrate 25 milliGRAM(s) Oral every 12 hours                            9.7    5.44  )-----------( 168      ( 08 Nov 2021 05:46 )             29.5     11-08    142  |  108<H>  |  39.0<H>  ----------------------------<  60<L>  3.9   |  21.0<L>  |  0.88    Ca    7.8<L>      08 Nov 2021 05:46    TPro  4.6<L>  /  Alb  2.1<L>  /  TBili  0.9  /  DBili  x   /  AST  218<H>  /  ALT  264<H>  /  AlkPhos  158<H>  11-08    RECENT CULTURES:  11-06 @ 07:37 .Blood Blood     No growth at 48 hours    11-05 @ 13:50 .Blood Blood-Peripheral     No growth at 48 hours    11-05 @ 13:47 .Blood Blood-Peripheral     No growth at 48 hours    11-05 @ 12:33 .Blood Blood     Growth in aerobic bottle: Salmonella species, not typhi/paratyphi  See previous culture [id number]  Growth in aerobic bottle: Gram Negative Rods  Gram Stain performed by:  [facility]  [address] [place] [id number]    11-04 @ 06:16 .Blood Blood     Growth in aerobic bottle: Salmonella species, not typhi/paratyphi  See previous culture [id number]  Growth in aerobic bottle: Gram Negative Rods  Gram Stain performed by:  [facility]  [address] [place] [id number]    11-02 @ 16:38 Catheterized Catheterized Escherichia coli  Salmonella species, not typhi/paratyphi    10,000 - 49,000 CFU/mL Escherichia coli  10,000 - 49,000 CFU/mL Salmonella species, not typhi/paratyphi    11-02 @ 11:20 .Blood Blood-Peripheral     Growth in aerobic and anaerobic bottles: Salmonella species, not  typhi/paratyphi  See previous culture [id number]  Growth in aerobic and anaerobic bottles: Gram Negative Rods  Gram Stain performed by:  [facility]  [address] [place] [id number]    11-02 @ 11:19 .Blood Blood-Peripheral Blood Culture PCR  Salmonella species, not typhi/paratyphi    Growth in aerobic and anaerobic bottles: Salmonella species, not  typhi/paratyphi  Growth in aerobic and anaerobic bottles: Gram Negative Rods  ***Blood Panel PCR results on this specimen are available  approximately 3 hours after the Gram stain result.***  Gram stain, PCR, and/or culture results may not always  correspond due todifference in methodologies.  ************************************************************  This PCR assay was performed using [employer].  The following targets are tested for: Enterococcus,  vancomycin resistant enterococci, Listeria monocytogenes,  coagulase negative staphylococci, S. aureus,  methicillin resistant S. aureus, Streptococcus agalactiae  (Group B), S. pneumoniae, S. pyogenes (Group A),  Acinetobacter baumannii, Enterobacter cloacae, E. coli,  Klebsiella oxytoca, K. pneumoniae, Proteus sp.,  Serratia marcescens, Haemophilus influenzae,  Neisseria meningitidis, Pseudomonas aeruginosa, Candida  albicans, C. glabrata, C krusei, C parapsilosis,  C. tropicalis and the KPC resistance gene.  "Due to technical problems, Pseudomonas aeruginosa  until further notice".  Gram Stain and BCID performed by:  [facility]  [address] [place] [id number]      WBC Count: 5.44 K/uL (11-08-21 @ 05:46)  WBC Count: 5.49 K/uL (11-07-21 @ 07:33)  WBC Count: 5.49 K/uL (11-06-21 @ 07:37)  WBC Count: 5.14 K/uL (11-05-21 @ 05:28)    Creatinine, Serum: 0.88 mg/dL (11-08-21 @ 05:46)  Creatinine, Serum: 0.91 mg/dL (11-07-21 @ 07:33)  Creatinine, Serum: 1.29 mg/dL (11-06-21 @ 07:37)  Creatinine, Serum: 1.55 mg/dL (11-05-21 @ 18:02)  Creatinine, Serum: 1.62 mg/dL (11-05-21 @ 12:31)  Creatinine, Serum: 1.83 mg/dL (11-05-21 @ 07:21)  Creatinine, Serum: 1.63 mg/dL (11-05-21 @ 05:28)

## 2021-11-09 NOTE — PROGRESS NOTE ADULT - ASSESSMENT
89 yr old female with hypertension, dementia, persistent atrial fibrillation not on anticoagulation given fall risk, severe tricuspid regurgitation pulmonary hypertension was BIBEMS as she was noted to be lethargic and minimally responsive by family. In ED, noted to have leucocytosis, anion gap metabolic acidosis, hyperkalemia, ENIO and elevated LFTs. CT head with no acute pathology. Cultures were sent, she was empirically started on Vancomycin and Zosyn. She was on Amiodarone for atrial fibrillation, which was held given elevated LFTs. CT abdomen pelvis was done, showed . Faceted gallstones. Intra and extrahepatic biliary dilatation. Correlate with LFTs. If there is clinical suspicion for acute cholecystitis or choledocholithiasis, consider right upper quadrant ultrasound and/or noncontrast MRCP for further evaluation. Extensive patchy peribronchovascular opacities throughout the lungs, with left lower lobe mucus plugging, concerning for aspiration pneumonia. Cardiology, GI and ID consultations were requested. Per GI not a candidate for endoscopy given her advanced age, aspiration pneumonia. Advised to trend LFTs. Blood cultures grew gram negative rods, later identified as Salmonella, antibiotics were changed to Ceftriaxone. Cardiology advised low dose beta blockers. Goals of care discussed with patient's daughter, patient is DNR/DNI, palliative care evaluation requested. Her repeat blood cultures are negative. Given prolonged QT, unable to treat with Ciprofloxacin at home. Midline was placed and ID advised Ceftriaxone until 11/19.     1. Salmonella bacteremia: Continue Ceftriaxone, repeat cultures are negative, needs IV antibiotics at home, midline placed today.    2. Toxic and metabolic encephalopathy: Likely secondary to underlying sepsis, CT head repeated, no stroke.    3. Atrial fibrillation: Not on AC given fall risk, off Amiodarone, continue Metoprolol.    4. Elevated LFTs: No intervention as per GI, LFTs improving.    5. Hypertension: Continue Metoprolol.    Discussed with RN.   Patient is DNR/DNI. Discharge planning once home care and home infusion arranged for.  Updated daughter Sylvie, explained overall poor prognosis and risk for readmission. 89 yr old female with hypertension, dementia, persistent atrial fibrillation not on anticoagulation given fall risk, severe tricuspid regurgitation pulmonary hypertension was BIBEMS as she was noted to be lethargic and minimally responsive by family. In ED, noted to have leucocytosis, anion gap metabolic acidosis, hyperkalemia, ENIO and elevated LFTs. CT head with no acute pathology. Cultures were sent, she was empirically started on Vancomycin and Zosyn. She was on Amiodarone for atrial fibrillation, which was held given elevated LFTs. CT abdomen pelvis was done, showed . Faceted gallstones. Intra and extrahepatic biliary dilatation. Correlate with LFTs. If there is clinical suspicion for acute cholecystitis or choledocholithiasis, consider right upper quadrant ultrasound and/or noncontrast MRCP for further evaluation. Extensive patchy peribronchovascular opacities throughout the lungs, with left lower lobe mucus plugging, concerning for aspiration pneumonia. Cardiology, GI and ID consultations were requested. Per GI not a candidate for endoscopy given her advanced age, aspiration pneumonia. Advised to trend LFTs. Blood cultures grew gram negative rods, later identified as Salmonella, antibiotics were changed to Ceftriaxone. Cardiology advised low dose beta blockers. Goals of care discussed with patient's daughter, patient is DNR/DNI, palliative care evaluation requested. Her repeat blood cultures are negative. Given prolonged QT, unable to treat with Ciprofloxacin at home. Midline was placed and ID advised Ceftriaxone until 11/19.     1. Salmonella bacteremia: Continue Ceftriaxone, repeat cultures are negative, needs IV antibiotics at home, midline placed today.    2. Toxic and metabolic encephalopathy: Likely secondary to underlying sepsis, CT head repeated, no stroke.    3. Atrial fibrillation: Not on AC given fall risk, off Amiodarone, continue Metoprolol.    4. Elevated LFTs: No intervention as per GI, LFTs improving.    5. Hypertension: Continue Metoprolol.    Discussed with RN.   Patient is DNR/DNI. Discharge planning once home care and home infusion arranged for.  Updated daughter Sylvie. Overall poor prognosis and at risk for readmission, discussed this with daughter.  Updated daughter Sylvie, explained overall poor prognosis and risk for readmission.

## 2021-11-09 NOTE — DISCHARGE NOTE PROVIDER - NSDCCPCAREPLAN_GEN_ALL_CORE_FT
PRINCIPAL DISCHARGE DIAGNOSIS  Diagnosis: Salmonella bacteremia  Assessment and Plan of Treatment: Complete course of Ceftriaxone until 11/19, follow up with ID in 1 week.      SECONDARY DISCHARGE DIAGNOSES  Diagnosis: Elevated transaminase level  Assessment and Plan of Treatment: Trending down, follow up with PMD in 1 week for repeat labs.  No intervention as per GI.    Diagnosis: Toxic metabolic encephalopathy  Assessment and Plan of Treatment: Secondary to underlying infection, improving.    Diagnosis: Dementia  Assessment and Plan of Treatment: Supportive care    Diagnosis: Atrial fibrillation  Assessment and Plan of Treatment: Discontinue Amiodarone, continue Metoprolol.     PRINCIPAL DISCHARGE DIAGNOSIS  Diagnosis: Salmonella bacteremia  Assessment and Plan of Treatment: Complete course of intravenous antibiotics, Ceftriaxone until 11/19, follow up with ID in 1 week.      SECONDARY DISCHARGE DIAGNOSES  Diagnosis: Elevated transaminase level  Assessment and Plan of Treatment: Trending down, follow up with PMD in 1 week for repeat labs.  No intervention as per GI.    Diagnosis: Toxic metabolic encephalopathy  Assessment and Plan of Treatment: Secondary to underlying infection, improving.    Diagnosis: Dementia  Assessment and Plan of Treatment: Supportive care    Diagnosis: Atrial fibrillation  Assessment and Plan of Treatment: Discontinue Amiodarone, continue Metoprolol.

## 2021-11-09 NOTE — DISCHARGE NOTE PROVIDER - CARE PROVIDER_API CALL
Donell Back)  Infectious Disease; Internal Medicine  [address] [place] [address]  Phone: [phone]  Fax: [phone]  Follow Up Time: 1 week

## 2021-11-10 NOTE — PROGRESS NOTE ADULT - NUTRITIONAL ASSESSMENT
This patient has been assessed with a concern for Malnutrition and has been determined to have a diagnosis/diagnoses of Severe protein-calorie malnutrition.    This patient is being managed with:   Diet Pureed-  Supplement Feeding Modality:  Oral  Ensure Clear Cans or Servings Per Day:  3       Frequency:  Three Times a day  Entered: Nov 8 2021  4:24PM    
This patient has been assessed with a concern for Malnutrition and has been determined to have a diagnosis/diagnoses of Severe protein-calorie malnutrition.    This patient is being managed with:   Diet Pureed-  Entered: Nov 5 2021  1:56PM    
This patient has been assessed with a concern for Malnutrition and has been determined to have a diagnosis/diagnoses of Severe protein-calorie malnutrition.    This patient is being managed with:   Diet Pureed-  Supplement Feeding Modality:  Oral  Ensure Clear Cans or Servings Per Day:  3       Frequency:  Three Times a day  Entered: Nov 8 2021  4:24PM

## 2021-11-10 NOTE — PROGRESS NOTE ADULT - SUBJECTIVE AND OBJECTIVE BOX
INTERVAL HPI/OVERNIGHT EVENTS:    CC: toxic and metabolic encephalopathy, Salmonella bacteremia, atrial fibrillation, elevated LFTs, ENIO, hypertension      No overnight events, more alert, confused. Wants juice, which is able to drink on her own.    Vital Signs Last 24 Hrs  T(C): 36.7 (10 Nov 2021 10:28), Max: 36.7 (10 Nov 2021 10:28)  T(F): 98 (10 Nov 2021 10:28), Max: 98 (10 Nov 2021 10:28)  HR: 59 (10 Nov 2021 10:28) (59 - 78)  BP: 102/46 (10 Nov 2021 10:28) (102/46 - 124/84)  BP(mean): --  RR: 18 (10 Nov 2021 10:28) (18 - 18)  SpO2: 95% (10 Nov 2021 10:28) (95% - 99%)    PHYSICAL EXAM:    GENERAL: alert, not in distress, confused  CHEST/LUNG: b/l air entry  HEART: regular  ABDOMEN: soft, bs+  EXTREMITIES:  no edema, tenderness    MEDICATIONS  (STANDING):  cefTRIAXone   IVPB 2000 milliGRAM(s) IV Intermittent every 24 hours  heparin   Injectable 5000 Unit(s) SubCutaneous every 8 hours  lactobacillus acidophilus 1 Tablet(s) Oral daily  metoprolol tartrate 25 milliGRAM(s) Oral every 12 hours    MEDICATIONS  (PRN):  acetaminophen     Tablet .. 650 milliGRAM(s) Oral every 6 hours PRN Temp greater or equal to 38C (100.4F), Mild Pain (1 - 3)  aluminum hydroxide/magnesium hydroxide/simethicone Suspension 30 milliLiter(s) Oral every 4 hours PRN Dyspepsia  melatonin 3 milliGRAM(s) Oral at bedtime PRN Insomnia      Allergies    No Known Allergies    Intolerances          LABS:                  RADIOLOGY & ADDITIONAL TESTS:

## 2021-11-10 NOTE — PROCEDURE NOTE - ADDITIONAL PROCEDURE DETAILS
18G 10CM 21CIRC Coker POWER GLIDE MIDLINE good heme return ns flush left brachial vein
18g 10cm 24 circumference Gibson powerglide midline good flash with normal saline flush right brachial vein

## 2021-11-10 NOTE — PROCEDURE NOTE - NSPROCDETAILS_GEN_ALL_CORE
location identified, draped/prepped, sterile technique used/sterile dressing applied/sterile technique, catheter placed/supine position/ultrasound guidance
location identified, draped/prepped, sterile technique used/sterile dressing applied/sterile technique, catheter placed/supine position/ultrasound guidance

## 2021-11-10 NOTE — PROCEDURE NOTE - NSSECUREBY_VASC_A_CORE
stabilization device/sterile occulsive dressing/sterile pressure dressing/tape
stabilization device/sterile occulsive dressing/sterile pressure dressing/tape

## 2021-11-10 NOTE — PROGRESS NOTE ADULT - ASSESSMENT
89 yr old female with hypertension, dementia, persistent atrial fibrillation not on anticoagulation given fall risk, severe tricuspid regurgitation pulmonary hypertension was BIBEMS as she was noted to be lethargic and minimally responsive by family. In ED, noted to have leucocytosis, anion gap metabolic acidosis, hyperkalemia, ENIO and elevated LFTs. CT head with no acute pathology. Cultures were sent, she was empirically started on Vancomycin and Zosyn. She was on Amiodarone for atrial fibrillation, which was held given elevated LFTs. CT abdomen pelvis was done, showed . Faceted gallstones. Intra and extrahepatic biliary dilatation. Correlate with LFTs. If there is clinical suspicion for acute cholecystitis or choledocholithiasis, consider right upper quadrant ultrasound and/or noncontrast MRCP for further evaluation. Extensive patchy peribronchovascular opacities throughout the lungs, with left lower lobe mucus plugging, concerning for aspiration pneumonia. Cardiology, GI and ID consultations were requested. Per GI not a candidate for endoscopy given her advanced age, aspiration pneumonia. Advised to trend LFTs. Blood cultures grew gram negative rods, later identified as Salmonella, antibiotics were changed to Ceftriaxone. Cardiology advised low dose beta blockers. Goals of care discussed with patient's daughter, patient is DNR/DNI, palliative care evaluation requested. Her repeat blood cultures are negative. Given prolonged QT, unable to treat with Ciprofloxacin at home. Midline was placed and ID advised Ceftriaxone until 11/19.     1. Salmonella bacteremia: Continue Ceftriaxone, repeat cultures are negative, needs IV antibiotics at home, midline placed. Ceftriaxone until 11/19.    2. Toxic and metabolic encephalopathy: Improved. Likely secondary to underlying sepsis, CT head repeated, no stroke.    3. Atrial fibrillation: Not on AC given fall risk, off Amiodarone, continue Metoprolol.    4. Elevated LFTs: No intervention as per GI, LFTs improving.    5. Hypertension: Continue Metoprolol.    Discussed with RN.   Patient is DNR/DNI.   Discharge planning once home care arranged.

## 2021-11-11 NOTE — PROGRESS NOTE ADULT - PROVIDER SPECIALTY LIST ADULT
Cardiology
Hospitalist
Cardiology
Cardiology
Hospitalist
Hospitalist
Infectious Disease
Gastroenterology
Hospitalist
Hospitalist
Infectious Disease
Hospitalist
Palliative Care
Gastroenterology

## 2021-11-11 NOTE — DISCHARGE NOTE NURSING/CASE MANAGEMENT/SOCIAL WORK - PATIENT PORTAL LINK FT
You can access the FollowMyHealth Patient Portal offered by [facility] by registering at the following website: [email or web address]. By joining [employer]’s FollowMyHealth portal, you will also be able to view your health information using other applications (apps) compatible with our system.

## 2021-11-11 NOTE — PROGRESS NOTE ADULT - SUBJECTIVE AND OBJECTIVE BOX
[facility]  INFECTIOUS DISEASES AND INTERNAL MEDICINE at [place]  =======================================================  Mikal Collins MD  Diplomates American Board of Internal Medicine and Infectious Diseases  Tel: [phone]      Fax: [phone]  =======================================================    CRITICAL, [place] [id number]    Follow up: Salmonella sepsis     No fever       Allergies:  No Known Allergies      REVIEW OF SYSTEMS:  Unable to obtain due to medical condition       Physical Exam:  GEN: Lethargic   HEENT: normocephalic and atraumatic.  Anicteric Rt eye with patch   NECK: Supple.   LUNGS: Decreased basal BS B/L   HEART: Regular rate and rhythm   ABDOMEN: Soft, nontender, and nondistended.  Positive bowel sounds.    : No CVA tenderness  EXTREMITIES: Without any edema.  MSK: No joint swelling  NEUROLOGIC: Lethargic, not responsive   PSYCHIATRIC: Unable to assess due to medical condition .  SKIN: No Rash      Vitals:  T(F): 97.2 (11 Nov 2021 11:47), Max: 97.8 (11 Nov 2021 04:23)  HR: 52 (11 Nov 2021 11:47)  BP: 100/55 (11 Nov 2021 11:47)  RR: 18 (11 Nov 2021 11:47)  SpO2: 95% (11 Nov 2021 11:47) (95% - 98%)  temp max in last 48H T(F): , Max: 98 (11-10-21 @ 10:28)      Current Antibiotics:  cefTRIAXone   IVPB 2000 milliGRAM(s) IV Intermittent every 24 hours    Other medications:  collagenase Ointment 1 Application(s) Topical daily  heparin   Injectable 5000 Unit(s) SubCutaneous every 8 hours  lactobacillus acidophilus 1 Tablet(s) Oral daily  metoprolol tartrate 25 milliGRAM(s) Oral every 12 hours      RECENT CULTURES:  11-07 @ 07:35 .Blood Blood-Peripheral     No growth at 48 hours    11-07 @ 07:34 .Blood Blood-Peripheral     No growth at 48 hours    11-06 @ 07:37 .Blood Blood     No growth at 5 days.    11-05 @ 13:50 .Blood Blood-Peripheral     No growth at 5 days.    11-05 @ 13:47 .Blood Blood-Peripheral     No growth at 5 days.    11-05 @ 12:33 .Blood Blood     Growth in aerobic bottle: Salmonella species, not typhi/paratyphi  See previous culture [id number]  Growth in aerobic bottle: Gram Negative Rods  Gram Stain performed by:  [facility]  [address] [place] [id number]    11-04 @ 06:16 .Blood Blood     Growth in aerobic bottle: Salmonella species, not typhi/paratyphi  See previous culture [id number]  Growth in aerobic bottle: Gram Negative Rods  Gram Stain performed by:  [facility]  [address] [place] [id number]    11-02 @ 16:38 Catheterized Catheterized Escherichia coli  Salmonella species, not typhi/paratyphi    10,000 - 49,000 CFU/mL Escherichia coli  10,000 - 49,000 CFU/mL Salmonella species, not typhi/paratyphi    11-02 @ 11:20 .Blood Blood-Peripheral     Growth in aerobic and anaerobic bottles: Salmonella species, not  typhi/paratyphi  See previous culture [id number]  Growth in aerobic and anaerobic bottles: Gram Negative Rods  Gram Stain performed by:  [facility]  [address] [place] [id number]    11-02 @ 11:19 .Blood Blood-Peripheral Blood Culture PCR  Salmonella species, not typhi/paratyphi    Growth in aerobic and anaerobic bottles: Salmonella species, not  typhi/paratyphi  Growth in aerobic and anaerobic bottles: Gram Negative Rods  ***Blood Panel PCR results on this specimen are available  approximately 3 hours after the Gram stain result.***  Gram stain, PCR, and/or culture results may not always  correspond due todifference in methodologies.  ************************************************************  This PCR assay was performed using [employer].  The following targets are tested for: Enterococcus,  vancomycin resistant enterococci, Listeria monocytogenes,  coagulase negative staphylococci, S. aureus,  methicillin resistant S. aureus, Streptococcus agalactiae  (Group B), S. pneumoniae, S. pyogenes (Group A),  Acinetobacter baumannii, Enterobacter cloacae, E. coli,  Klebsiella oxytoca, K. pneumoniae, Proteus sp.,  Serratia marcescens, Haemophilus influenzae,  Neisseria meningitidis, Pseudomonas aeruginosa, Candida  albicans, C. glabrata, C krusei, C parapsilosis,  C. tropicalis and the KPC resistance gene.  "Due to technical problems, Pseudomonas aeruginosa  until further notice".  Gram Stain and BCID performed by:  [facility]  [address] [place] [id number]      WBC Count: 5.44 K/uL (11-08-21 @ 05:46)  WBC Count: 5.49 K/uL (11-07-21 @ 07:33)    Creatinine, Serum: 0.88 mg/dL (11-08-21 @ 05:46)  Creatinine, Serum: 0.91 mg/dL (11-07-21 @ 07:33)    Ferritin, Serum: 1239 ng/mL (11-04-21 @ 06:16)  Ferritin, Serum: 1233 ng/mL (11-04-21 @ 06:16)    COVID-19 PCR: NotDetec (11-09-21 @ 05:16)

## 2021-11-11 NOTE — DISCHARGE NOTE NURSING/CASE MANAGEMENT/SOCIAL WORK - NSDCFUADDAPPT_GEN_ALL_CORE_FT
Follow Up Time: 1 week  Donell Valente)  Infectious Disease; Internal Medicine  [address] [place] [address]  Phone: [phone]  Fax: [phone]

## 2021-11-11 NOTE — PROGRESS NOTE ADULT - ASSESSMENT
89y  Female with h/o HTN and advanced dementia (baseline AOx0). Patient was brought in by family after being found minimally responsive.  At baseline she is reported to be aaox1 however family noted she was lethargic and brought her in. On arrival pt reported to be tachypneic and hypothermic. Admitted for sepsis and started on vancomycin and zosyn.       Salmonella septicemia   ENIO   Transaminitis   Advance dementia       - Blood cultures with Salmonella   - Repeat blood cultures no growth 11/6   - US Abd reporting gall stones and no acute cholecystitis   - CT Chest/Abd/Pel reporting B/L ductal dilatation   - UA with Pyuria   - Continue Ceftriaxone 2gm IV q 24hours   - midline ordered  - Since patient is not immunosuppressed and there is no evidence of extra-intestinal  infection will need IV Ceftriaxone till 11/19/21 followed by surveillance   - Disposition pending whether patient will be going home or to rehab.   - QTc is prolonged so unable to use Cipro  - End dtate for antibiotics will 11/19  - Will need weekly CBC and CMP faxed to [phone]  - Appointment with us in 7 to 10 days - [phone]  - Follow up cultures  - Trend Fever  - Trend WBC      Thank you for allowing me to participate in the care of your patient.   Will Follow    d/w Dr Man    89y  Female with h/o HTN and advanced dementia (baseline AOx0). Patient was brought in by family after being found minimally responsive.  At baseline she is reported to be aaox1 however family noted she was lethargic and brought her in. On arrival pt reported to be tachypneic and hypothermic. Admitted for sepsis and started on vancomycin and zosyn.       Salmonella septicemia   ENIO   Transaminitis   Advance dementia       - Blood cultures with Salmonella   - Repeat blood cultures no growth 11/6   - US Abd reporting gall stones and no acute cholecystitis   - CT Chest/Abd/Pel reporting B/L ductal dilatation   - UA with Pyuria   - Continue Ceftriaxone 2gm IV q 24hours   - midline ordered  - Since patient is not immunosuppressed and there is no evidence of extra-intestinal  infection will need IV Ceftriaxone till 11/19/21 followed by surveillance   - Disposition pending whether patient will be going home or to rehab.   - QTc is prolonged so unable to use Cipro  - End dtate for antibiotics will 11/19  - Will need weekly CBC and CMP faxed to [phone]  - Appointment with us in 7 to 10 days - [phone]  - Follow up cultures  - Trend Fever  - Trend WBC      Thank you for allowing me to participate in the care of your patient.   Will Follow    d/w Dr New

## 2021-11-18 NOTE — ED ADULT TRIAGE NOTE - CHIEF COMPLAINT QUOTE
89yof presents to ed with stage 4 decubitis wound on sacrum acquired during last hospital stay. wad discharged on thursday to home. patient baseline mental status gcs 14. ems reports patient sent by md for low h/h

## 2021-11-18 NOTE — ED ADULT NURSE NOTE - OBJECTIVE STATEMENT
Patient here at ED for low hemoglobin, as per daughter. Patient's daughter stated provider informed patient to visit the ED for abnormal labs. Patient unable to verbalize current state of health. Patient is currently on treatment for salmonella bacteremia, which started on the 12th of November. Patient here at ED for low hemoglobin, as per daughter. Patient's daughter stated provider informed patient to visit the ED for abnormal labs. Patient unable to verbalize current state of health. Patient is currently on treatment for salmonella bacteremia, which started on the 12th of November. Patient has a 22 g of the right arm for intravenous antibiotics.

## 2021-11-18 NOTE — ED PROVIDER NOTE - PROGRESS NOTE DETAILS
no anemia on blood work. ENIO noted, likely pre-renal, will give fluids and admit for further resuscitation and pt's daughter feels she will not be able to hydrate her adequately at home.

## 2021-11-18 NOTE — ED PROVIDER NOTE - OBJECTIVE STATEMENT
89yo woman PMH HTN, AF not on a/c, dementia was sent to ED by PCP for low h/h. Pt's daughter states that pt was recently admitted for bacteremia and sent home with IV line for IV abx at home. She has been having poor PO intake. No noted blood in stool or black stool. No blood in urine. No vomiting. Pt has been lethargic since discharge.

## 2021-11-18 NOTE — ED ADULT NURSE REASSESSMENT NOTE - GENERAL PATIENT STATE
Patient is lying on stretcher, resting, breathing freely via room air with no sign of acute distress noted. Patient is admitted for ENIO. Pending bed assignment and EKG to be done. Fall precaution measures in progress. Pressure ulcer to the sacrum, skin breakdown noted to the left lateral to the antecubital area. Ecchymosis noted to the bilateral arms./comfortable appearance

## 2021-11-18 NOTE — ED PROVIDER NOTE - NSICDXPASTMEDICALHX_GEN_ALL_CORE_FT
PAST MEDICAL HISTORY:  HTN (hypertension)     HTN (hypertension)     MI (myocardial infarction) @ age 89

## 2021-11-18 NOTE — ED PROVIDER NOTE - NSICDXFAMILYHX_GEN_ALL_CORE_FT
FAMILY HISTORY:  No pertinent family history in first degree relatives    Mother  Still living? No  FHx: colon cancer, Age at diagnosis: Age Unknown

## 2021-11-18 NOTE — ED PROVIDER NOTE - PHYSICAL EXAMINATION
General: frail elderly woman in no acute distress  Head: normocephalic, atraumatic  Eyes: right conjunctiva pink, left eye bandaged due to chronic issue  Mouth: dry mucous membranes  Neck: supple neck  CV: normal rate and rhythm, no LE edema, peripheral pulses 2+ bilateral UE and LE  Respiratory: clear to auscultation bilaterally  Abdomen: soft, nontender, nondistended, no gross blood in stool  : no CVAT  MSK: no obvious joint deformities  Neuro: awake and arousable but oriented x0, speech clear, moving all extremities spontaneously without difficulty  Skin: sacral decubitus ulcer bandaged

## 2021-11-18 NOTE — ED PROVIDER NOTE - CLINICAL SUMMARY MEDICAL DECISION MAKING FREE TEXT BOX
89yo woman was sent to ED for evaluation of anemia in outpt blood work possibly need for transfusion. Will check blood work and transfuse if needed.

## 2021-11-18 NOTE — ED PROVIDER NOTE - IV ALTEPLASE EXCL ABS HIDDEN
Gestational Diabetes Follow-up  Patient verbally consented to the telephone visit service today: yes    Subjective/Objective:    Ella Palacios was called for a scheduled BG review. Last date of communication was: 12/29/20.    Gestational diabetes is being managed with diet, activity and medications    Taking diabetes medications:   yes:     Diabetes Medication(s)     Insulin       insulin aspart (NOVOLOG FLEXPEN) 100 UNIT/ML pen    Take 2 units with breakfast and up to 6 units at dinner.  Max TDD= 12 units with prime          Estimated Date of Delivery: 2/19/21 c/s scheduled    Blood Glucose/Ketone Log:    All after meal readings are 2 hours.    Assessment:  Patient is typically taking Novolog 3-4-8-0.      1/4- 2 on corn tortilla with dinner.    Ketones: not discussed.   Fasting blood glucoses: 100% in target.  After breakfast: 50% in target.  After lunch: 83% in target.  After dinner: 11% in target.    Plan/Response:  Recommend increase to insulin - Novolog 3-4-8-0 --> 4-4-10-0, if 10 units is not enough ok to go up to 11 units.     Add in a before dinner test.     E-mail in blood sugars on fridays.  CDE scheduled weekly visits on Tuesdays for the remainder of pregnancy.    Flory Gutierrez MS, RD, LD, CDE  Time Spent: 8 minutes    Any diabetes medication dose changes were made via the CDE Protocol and Collaborative Practice Agreement with the patient's OB/GYN provider. A copy of this encounter was shared with the provider.      
show

## 2021-11-19 PROBLEM — I10 ESSENTIAL (PRIMARY) HYPERTENSION: Chronic | Status: ACTIVE | Noted: 2021-01-01

## 2021-11-19 NOTE — CHART NOTE - NSCHARTNOTEFT_GEN_A_CORE
Called by RN, patient with rectal temp 93.9. Patient admitted with known Salmonella Bacteremia, s/p workup last admission. Repeat BCX from 11/7 negative. Patient received Ceftriaxone 2g IV q24 until 11/19/21 as per hospitalist note will monitor off antibiotics for now. No WBC count on most recent CBC.  Vital Signs   T(F): 93.9   HR: 92  BP: 101/64   RR: 20  SpO2: 94%   Warming blanket ordered  RN to notify with any acute changes Called by RN, patient with rectal temp 93.9. Patient admitted with known Salmonella Bacteremia, s/p workup last admission. Repeat BCX from 11/7 negative. Patient received Ceftriaxone 2g IV q24 until 11/19/21 as per hospitalist note will monitor off antibiotics for now. No WBC count on most recent CBC.  Vital Signs   T(F): 93.9   HR: 89  BP: 101/64   RR: 20  SpO2: 94%   Warming blanket ordered  RN to notify with any acute changes

## 2021-11-19 NOTE — H&P ADULT - ASSESSMENT
ASSESSMENT:  89F with PMHX Dementia (AAOx0-1 BL), HTN, CAD/MI, AFIB (no AC), Severe TR, Pulm HTN recently discharged 1 week ago for GNR Septicemia and Salmonella Bacteremia on IV ABX Ceftraixone via Midline sent back to hospital for evaluation of "Low H&H" by PCP admitted for FTT, Hypernatremia, ARF.    PLAN:  Hypernatremia, ARF  -admit to medicine  -1L IVFB LR in ED  -IVF LR 75cc/hr  -FWD 1.5L  -Trend BMP/NA  -ARF similar to prior admit  -Check UA  -Avoid further workup as almost certainly pre-renal azotemia from FTT  -VTE PPX SQH  -Advance Diet    Salmonella Bacteremia  -s/p workup last admission. Repeat BCX clear.   -Ceftriaxone 2g IV q24 until 11/19/21 via Midline  -Trend CBC/Diff  -Outpt ID F/u    AFIB, Bradycardia  -Telemetry HR 40s   -no AC for AFIB 2/2 fall risk/HASBLED  -Amiodarone dc'd previously  -Hold BB with bradycardia and slight hypotension in triage    Stage 4 Sacral Decub  -Bandage in place. Collagenase daily. WoundCare Consult.    Dementia, Goals of Care  -DNR/DNI per recent admission. See Prior MOLST.   -Palliative Care Consult given rehospitalization within 1 week and overt FTT   -likely end of life and realistically hospice appropriate given current clinical status

## 2021-11-19 NOTE — H&P ADULT - NSICDXPASTMEDICALHX_GEN_ALL_CORE_FT
PAST MEDICAL HISTORY:  Chronic atrial fibrillation     HTN (hypertension)     HTN (hypertension)     MI (myocardial infarction) @ age 89

## 2021-11-19 NOTE — CONSULT NOTE ADULT - ASSESSMENT
#hypernatremia  -receiving IVF    #decreased creatinine clearance  -avoid nephrotoxins  -renally dose medications    #dementia:  -Fast 7c minimum: meets criteria for hospice.    #debility:  -supportive care    #palliative care encounter  -spoke to daughter, Sylvie Serrano.   -Introduced role of palliative care in symptom management, care planning, support, and transitions in care to those with serious illness.  Emotional support offered.  -Daughter reports that she the health care proxy.  No documentation in hand.  -Sylvie, per description, appears to be the surrogate decision maker.  She shares that the patient is , that she had three children: Sylvie, and two sons: one who  of COVID in , and another who is estranged, living in [place].  No contact information available.  -DNR/I orders affirmed.  States that she does not want to see the patient suffer.  -Recent clinical course reviewed, including recent hospitalization.  -Sylvie notes that patient was progressing at home well with PT, who had the patient up walking with a walker, with assist.  -She states that patient does better outside of the hospital.  -In the setting of daughter's reflection that the patient does better at home, reviewed that hospice support may be a means to care for her in the place that she thrives.  -She indicates that "my mom is not a hospice patient, she is not ready for hospice," offers that she has ample room to improve: to be at home, up, and independent  -Discussed with daughter that there are limitation of functional improvement that we can expect to see in the setting of her advanced dementia  -Reviewed that patient's hospice eligibility for hospice is not changing (advanced dementia), and that in this setting, another option that could be explored on discharge is PT support with a transition to hospice.  At this time, she does not wish to further discuss.  -In the setting of the expressed desire to ensure that the patient does not suffer, indicated that the palliative team could follow patient and revisit care plans, should care have the potential to provoke suffering.         89 yo female with a history of dementia, CAD/MI, afib (not on a/c), severe  TR, pulmonary hypertension, with a recent admission (discharged one week ago), for GNR septicemia, and salmonella bacteremia, being treated with IV ceftriaxone, sent to [facility] for evaluation of low h and h by PCP.  Admitted with FTT, hypernatremia, ARF    #hypernatremia  -receiving IVF    #decreased creatinine clearance  -avoid nephrotoxins  -renally dose medications    #dementia:  -Fast 7c minimum: meets criteria for hospice.    #debility:  -supportive care    #palliative care encounter  -spoke to daughter, Sylvie Serrano.   -Introduced role of palliative care in symptom management, care planning, support, and transitions in care to those with serious illness.  Emotional support offered.  -Daughter reports that she the health care proxy.  No documentation in hand.  -Sylvie, per description, appears to be the surrogate decision maker.  She shares that the patient is , that she had three children: Sylvie, and two sons: one who  of COVID in , and another who is estranged, living in [place].  No contact information available.  -DNR/I orders affirmed.  States that she does not want to see the patient suffer.  -Recent clinical course reviewed, including recent hospitalization.  -Sylvie notes that patient was progressing at home well with PT, who had the patient up walking with a walker, with assist.  -She states that patient does better outside of the hospital.  -In the setting of daughter's reflection that the patient does better at home, reviewed that hospice support may be a means to care for her in the place that she thrives.  -She indicates that "my mom is not a hospice patient, she is not ready for hospice," offers that she has ample room to improve: to be at home, up, and independent  -Discussed with daughter that there are limitation of functional improvement that we can expect to see in the setting of her advanced dementia  -Reviewed that patient's hospice eligibility for hospice is not changing (advanced dementia), and that in this setting, another option that could be explored on discharge is PT support with a transition to hospice.  At this time, she does not wish to further discuss.  -In the setting of the expressed desire to ensure that the patient does not suffer, indicated that the palliative team could follow patient and revisit care plans, should care have the potential to provoke suffering.         89 yo female with a history of dementia ( a x o x 0-1 at baseline), CAD/MI, afib (not on a/c), severe  TR, pulmonary hypertension, with a recent admission (discharged one week ago), for GNR septicemia, and salmonella bacteremia, being treated with IV ceftriaxone, sent to [facility] for evaluation of low h and h.  Known stage IV sacral decub.  Admitted with FTT, hypernatremia, ARF.    #anemia  -patient with normocytic anemia: 8-9 hemoglobin range; anemia of chronic disease: confirmed on labs  -+ FOBT  -given noted + FOBT, would avoid IN administration of medications at this time  -continue to trend h/h    #Salmonella Bacteremia  -repeated BC negative ()  -complete ceftriaxone, to be completed .  -Continue to trend WBC, temp, monitor for s/s of infection.    #sacral decubitus  -turn and position frequently  -local care-collagenase  -wound care consult    #hypernatremia  -receiving IVF: LR    #decreased creatinine clearance  -receiving IVF  -avoid nephrotoxins  -renally dose medications    #dementia:  -Fast 7c minimum: meets criteria for hospice.  Per daughter, patient unable to walk independently, requires assistance     #debility:  -supportive care    #palliative care encounter  -spoke to daughter, Sylvie Serrano.   -Introduced role of palliative care in symptom management, care planning, support, and transitions in care to those with serious illness.  Emotional support offered.  -Daughter reports that she  is the health care proxy.  No documentation in hand.  -Sylvie, per description, appears to be the surrogate decision maker.  She shares that the patient is , that she had three children: Sylvie, and two sons: one who  of COVID in , and another who is estranged, living in [place].  No contact information available.  -DNR/I orders affirmed.  States that she does not want to see the patient suffer.  -Recent clinical course reviewed, including recent hospitalization.  -Sylvie notes that patient was progressing at home well with PT, who had the patient up walking with a walker, with assist.  -She states that patient does better outside of the hospital.  -In the setting of daughter's reflection that the patient does better at home, reviewed that hospice support may be a means to care for her in the place that she thrives.  -She indicates that "my mom is not a hospice patient, she is not ready for hospice," offers that she has ample room to improve: to be at home, up, and independent  -Discussed with daughter that there are limitations in functional improvement that we can expect to see in the setting of her advanced dementia  -Reviewed that patient's hospice eligibility for hospice is not changing (advanced dementia), and that in this setting, another option that could be explored on discharge is PT support with a transition to hospice.  At this time, she does not wish to further discuss.  -In the setting of the expressed desire to ensure that the patient does not suffer, indicated that the palliative team could follow patient and revisit care plans, should care have the potential to provoke suffering.    Total time spent in advanced care plannin minutes    -Will monitor for course  -Patient discussed with Dr. Brambila

## 2021-11-19 NOTE — H&P ADULT - NSHPPOAPULMEMBOLUS_GEN_A_CORE
Chief Complaints and History of Present Illnesses   Patient presents with     Macular Degeneration Follow Up     DFE/OCT     HPI    Affected eye(s):  Both   Symptoms:     Blurred vision   Difficulty with reading   No floaters   No flashes   No Dryness         Do you have eye pain now?:  No      Comments:  Harder time reading, feels like reading glasses change more than distance. Did a study last week with a phycologist who is studying different fonts for people with AMD.   Vivienne TAYLOR May 16, 2017 9:14 AM               
no

## 2021-11-19 NOTE — H&P ADULT - HISTORY OF PRESENT ILLNESS
89F with PMHX Dementia (AAOx0-1 BL), HTN, CAD/MI, AFIB (no AC), Severe TR, Pulm HTN recently discharged 1 week ago for GNR Septicemia and Salmonella Bacteremia on IV ABX Ceftriaxone via Midline sent back to hospital for evaluation of "Low H&H" by PCP. Patient HGB similar to previous hospitalization currently at baseline chronic anemia. Patient AAOx0-1 baseline 2/2 dementia with reported FFT and worsening AMS per family evidenced by recurrent ARF and Hypernatremia for which she was admitted to hospitalist service. Pt seen/examined. Unable to provide further HPI/ROS. Sacral Decub Stage IV s/p bandage noted. Eyepatch in place. Poor functional status and guarded clinical prognosis. Please see recent discharge summary for further clinical information.

## 2021-11-19 NOTE — H&P ADULT - NSHPPHYSICALEXAM_GEN_ALL_CORE
Vital Signs Last 24 Hrs  T(C): 36.4 (18 Nov 2021 16:45), Max: 36.7 (18 Nov 2021 16:27)  T(F): 97.5 (18 Nov 2021 16:45), Max: 98 (18 Nov 2021 16:27)  HR: 47 (18 Nov 2021 22:46) (47 - 103)  BP: 100/46 (18 Nov 2021 22:46) (99/55 - 107/41)  BP(mean): 64 (18 Nov 2021 22:46) (64 - 64)  RR: 16 (18 Nov 2021 22:46) (16 - 20)  SpO2: 98% (18 Nov 2021 22:46) (98% - 99%)    Constitutional: Frail, Chronic Ill-appearing  Head: NC/AT  Eyes: +Eyepatch in place  ENT: Normal Pharynx, Dry Oral Mucosa  Neck: Supple, Non-tender  Chest: Non-tender, no rashes  Cardio: +Bradycardia, Irregular   Resp: BS CTA bilaterally, no wheezing/rhonchi/rales  Abd: Soft, Non-tender, Non-distended, no rebound/guarding/rigidity  : not examined  Rectal: not examined  MSK: moving all extremities, no motor weakness, full ROM x4  Ext: palpable distal pulses, good capillary refill   Psych: Confused, Demented  Neuro: Unable to fully assess, Arousable/Alert, AAOx0, moving all ext  Skin: Warm/Dry. +Stage 4 Decub Ulcer s/p bandage in place

## 2021-11-19 NOTE — CONSULT NOTE ADULT - SUBJECTIVE AND OBJECTIVE BOX
HPI:  89F with PMHX Dementia (AAOx0-1 BL), HTN, CAD/MI, AFIB (no AC), Severe TR, Pulm HTN recently discharged 1 week ago for GNR Septicemia and Salmonella Bacteremia on IV ABX Ceftriaxone via Midline sent back to hospital for evaluation of "Low H&H" by PCP. Patient HGB similar to previous hospitalization currently at baseline chronic anemia. Patient AAOx0-1 baseline 2/2 dementia with reported FFT and worsening AMS per family evidenced by recurrent ARF and Hypernatremia for which she was admitted to hospitalist service. Pt seen/examined. Unable to provide further HPI/ROS. Sacral Decub Stage IV s/p bandage noted. Eyepatch in place. Poor functional status and guarded clinical prognosis. Please see recent discharge summary for further clinical information.  (19 Nov 2021 02:14)      HPI:  89 yo female with a history of HTN, dementia (a x o 0-1), CAD/MI, afib (not on  CAD/MI, AFIB (no AC), Severe TR, Pulm HTN recently discharged 1 week ago for GNR Septicemia and Salmonella Bacteremia on IV ABX Ceftriaxone via Midline sent back to hospital for evaluation of "Low H&H" by PCP.     PERTINENT PMH REVIEWED: Yes No    PAST MEDICAL & SURGICAL HISTORY:  HTN (hypertension)    MI (myocardial infarction)  @ age 89    HTN (hypertension)    Chronic atrial fibrillation    No significant past surgical history    No significant past surgical history        SOCIAL HISTORY:                                     Admitted from:  home  SNF  GABI     Surrogate/HCP/Guardian: Phone#:    FAMILY HISTORY:  FHx: colon cancer (Mother)  diaed at age 56        Baseline ADLs (prior to admission):  Independent/ Dependent      Allergies    No Known Allergies    Intolerances        Present Symptoms:     Dyspnea: 0 1 2 3   Nausea/Vomiting: Yes No  Anxiety:  Yes No  Depression: Yes No  Fatigue: Yes No  Loss of appetite: Yes No    Pain:             Character-            Duration-            Effect-            Factors-            Frequency-            Location-            Severity-    Review of Systems: Reviewed                     Negative:                     Positive:  Unable to obtain due to poor mentation   All others negative    MEDICATIONS  (STANDING):  aspirin enteric coated 81 milliGRAM(s) Oral daily  cefTRIAXone   IVPB 2000 milliGRAM(s) IV Intermittent every 24 hours  collagenase Ointment 1 Application(s) Topical daily  folic acid 1 milliGRAM(s) Oral daily  heparin   Injectable 5000 Unit(s) SubCutaneous every 8 hours  lactated ringers. 1000 milliLiter(s) (75 mL/Hr) IV Continuous <Continuous>    MEDICATIONS  (PRN):  acetaminophen     Tablet .. 650 milliGRAM(s) Oral every 6 hours PRN Temp greater or equal to 38C (100.4F), Mild Pain (1 - 3)  aluminum hydroxide/magnesium hydroxide/simethicone Suspension 30 milliLiter(s) Oral every 4 hours PRN Dyspepsia  melatonin 3 milliGRAM(s) Oral at bedtime PRN Insomnia  ondansetron Injectable 4 milliGRAM(s) IV Push every 8 hours PRN Nausea and/or Vomiting      PHYSICAL EXAM:    Vital Signs Last 24 Hrs  T(C): 36.4 (19 Nov 2021 07:33), Max: 36.7 (18 Nov 2021 16:27)  T(F): 97.5 (19 Nov 2021 07:33), Max: 98 (18 Nov 2021 16:27)  HR: 86 (19 Nov 2021 07:33) (47 - 103)  BP: 110/48 (19 Nov 2021 07:33) (99/55 - 123/58)  BP(mean): 64 (18 Nov 2021 22:46) (64 - 64)  RR: 18 (19 Nov 2021 07:33) (16 - 20)  SpO2: 95% (19 Nov 2021 07:33) (95% - 99%)    General: alert  oriented x ____ lethargic agitated                  cachexia  nonverbal  coma    Karnofsky:  %    HEENT: normal  dry mouth  ET tube/trach    Lungs: comfortable tachypnea/labored breathing  excessive secretions    CV: normal  tachycardia    GI: normal  distended  tender  no BS               PEG/NG/OG tube  constipation  last BM:     : normal  incontinent  oliguria/anuria  martinez    MSK: normal  weakness  edema             ambulatory  bedbound/wheelchair bound    Skin: normal  pressure ulcers- Stage_____  no rash    LABS:                        8.6    4.58  )-----------( 380      ( 18 Nov 2021 20:30 )             27.2     11-19    145  |  112<H>  |  51.1<H>  ----------------------------<  81  4.4   |  16.0<L>  |  1.36<H>    Ca    7.8<L>      19 Nov 2021 07:49  Phos  4.3     11-19  Mg     2.0     11-19    TPro  5.3<L>  /  Alb  1.7<L>  /  TBili  0.4  /  DBili  x   /  AST  79<H>  /  ALT  52<H>  /  AlkPhos  140<H>  11-18        I&O's Summary      RADIOLOGY & ADDITIONAL STUDIES:    ADVANCE DIRECTIVES:   DNR YES NO  Completed on:                     MOLST  YES NO   Completed on:  Living Will  YES NO   Completed on:     HPI:  89F with PMHX Dementia (AAOx0-1 BL), HTN, CAD/MI, AFIB (no AC), Severe TR, Pulm HTN recently discharged 1 week ago for GNR Septicemia and Salmonella Bacteremia on IV ABX Ceftriaxone via Midline sent back to hospital for evaluation of "Low H&H" by PCP. Patient HGB similar to previous hospitalization currently at baseline chronic anemia. Patient AAOx0-1 baseline 2/2 dementia with reported FFT and worsening AMS per family evidenced by recurrent ARF and Hypernatremia for which she was admitted to hospitalist service. Pt seen/examined. Unable to provide further HPI/ROS. Sacral Decub Stage IV s/p bandage noted. Eyepatch in place. Poor functional status and guarded clinical prognosis. Please see recent discharge summary for further clinical information.  (19 Nov 2021 02:14)      HPI:  89 yo female with a history of HTN, dementia (a x o 0-1), CAD/MI, afib (not on a/c),  severe  TR, pulmonary hypertension, with a recent admission (discharged one week ago), for GNR septicemia, and salmonella bacteremia, being treated with IV ceftriaxone, sent to [facility] for evaluation of low h and h by PCP.  Per admission notes, patient's hemoglobin is at its baseline chronic anemia.  Patient being admitted for failure to thrive, hypernatremia, and ARF.    Patient started on IVF, ceftriaxone maintained.  In the setting of recurrent hospitalizations, failure to thrive, palliative care consulted to address goals of care.      PERTINENT PMH REVIEWED: Yes    PAST MEDICAL & SURGICAL HISTORY:  HTN (hypertension)    MI (myocardial infarction)  @ age 89    HTN (hypertension)    Chronic atrial fibrillation    No significant past surgical history    No significant past surgical history        SOCIAL HISTORY:                                     Admitted from:  home  SNF  GABI     Surrogate/HCP/Guardian: Phone#: Russel Mercer [phone]    FAMILY HISTORY:  FHx: colon cancer (Mother)  diaed at age 56        Baseline ADLs (prior to admission):  Independent/ Dependent      Allergies    No Known Allergies    Intolerances        Present Symptoms:     Dyspnea: 0 1 2 3   Nausea/Vomiting: Yes No  Anxiety:  Yes No  Depression: Yes No  Fatigue: Yes No  Loss of appetite: Yes No    Pain:             Character-            Duration-            Effect-            Factors-            Frequency-            Location-            Severity-    Review of Systems: Reviewed                     Negative:                     Positive:  Unable to obtain due to poor mentation   All others negative    MEDICATIONS  (STANDING):  aspirin enteric coated 81 milliGRAM(s) Oral daily  cefTRIAXone   IVPB 2000 milliGRAM(s) IV Intermittent every 24 hours  collagenase Ointment 1 Application(s) Topical daily  folic acid 1 milliGRAM(s) Oral daily  heparin   Injectable 5000 Unit(s) SubCutaneous every 8 hours  lactated ringers. 1000 milliLiter(s) (75 mL/Hr) IV Continuous <Continuous>    MEDICATIONS  (PRN):  acetaminophen     Tablet .. 650 milliGRAM(s) Oral every 6 hours PRN Temp greater or equal to 38C (100.4F), Mild Pain (1 - 3)  aluminum hydroxide/magnesium hydroxide/simethicone Suspension 30 milliLiter(s) Oral every 4 hours PRN Dyspepsia  melatonin 3 milliGRAM(s) Oral at bedtime PRN Insomnia  ondansetron Injectable 4 milliGRAM(s) IV Push every 8 hours PRN Nausea and/or Vomiting      PHYSICAL EXAM:    Vital Signs Last 24 Hrs  T(C): 36.4 (19 Nov 2021 07:33), Max: 36.7 (18 Nov 2021 16:27)  T(F): 97.5 (19 Nov 2021 07:33), Max: 98 (18 Nov 2021 16:27)  HR: 86 (19 Nov 2021 07:33) (47 - 103)  BP: 110/48 (19 Nov 2021 07:33) (99/55 - 123/58)  BP(mean): 64 (18 Nov 2021 22:46) (64 - 64)  RR: 18 (19 Nov 2021 07:33) (16 - 20)  SpO2: 95% (19 Nov 2021 07:33) (95% - 99%)    General: alert  oriented x ____ lethargic agitated                  cachexia  nonverbal  coma    Karnofsky:  %    HEENT: normal  dry mouth  ET tube/trach    Lungs: comfortable tachypnea/labored breathing  excessive secretions    CV: normal  tachycardia    GI: normal  distended  tender  no BS               PEG/NG/OG tube  constipation  last BM:     : normal  incontinent  oliguria/anuria  martinez    MSK: normal  weakness  edema             ambulatory  bedbound/wheelchair bound    Skin: normal  pressure ulcers- Stage_____  no rash    LABS:                        8.6    4.58  )-----------( 380      ( 18 Nov 2021 20:30 )             27.2     11-19    145  |  112<H>  |  51.1<H>  ----------------------------<  81  4.4   |  16.0<L>  |  1.36<H>    Ca    7.8<L>      19 Nov 2021 07:49  Phos  4.3     11-19  Mg     2.0     11-19    TPro  5.3<L>  /  Alb  1.7<L>  /  TBili  0.4  /  DBili  x   /  AST  79<H>  /  ALT  52<H>  /  AlkPhos  140<H>  11-18        I&O's Summary      RADIOLOGY & ADDITIONAL STUDIES:    ADVANCE DIRECTIVES:   DNR YES NO  Completed on:                     MOLST  YES NO   Completed on:  Living Will  YES NO   Completed on:     HPI:  89F with PMHX Dementia (AAOx0-1 BL), HTN, CAD/MI, AFIB (no AC), Severe TR, Pulm HTN recently discharged 1 week ago for GNR Septicemia and Salmonella Bacteremia on IV ABX Ceftriaxone via Midline sent back to hospital for evaluation of "Low H&H" by PCP. Patient HGB similar to previous hospitalization currently at baseline chronic anemia. Patient AAOx0-1 baseline 2/2 dementia with reported FFT and worsening AMS per family evidenced by recurrent ARF and Hypernatremia for which she was admitted to hospitalist service. Pt seen/examined. Unable to provide further HPI/ROS. Sacral Decub Stage IV s/p bandage noted. Eyepatch in place. Poor functional status and guarded clinical prognosis. Please see recent discharge summary for further clinical information.  (2021 02:14)    HPI:  89 yo female with a history of HTN, dementia (a x o 0-1), CAD/MI, afib (not on a/c),  severe  TR, pulmonary hypertension, with a recent admission (discharged one week ago), for GNR septicemia, and salmonella bacteremia, being treated with IV ceftriaxone, sent to [facility] for evaluation of low h and h by PCP.  Per admission notes, patient's hemoglobin is at its baseline chronic anemia.  Patient being admitted for failure to thrive, hypernatremia, and ARF.    Patient started on IVF, ceftriaxone maintained.  In the setting of recurrent hospitalizations, failure to thrive, palliative care consulted to address goals of care.    Unable to obtain HPI, ROS, 2/2 mental status.  Patient opens eyes briefly, states, "I'm cold,"     Per daughter, patient was sent home from the hospital on antibiotics, and had labs drawn at home.  A physician called her with results, saying that her hemoglobin was low, stating that she needed to return to the hospital.    PERTINENT PMH REVIEWED: Yes    PAST MEDICAL & SURGICAL HISTORY:  HTN (hypertension)    MI (myocardial infarction)  @ age 89    HTN (hypertension)    Chronic atrial fibrillation    No significant past surgical history    No significant past surgical history        SOCIAL HISTORY:  from home, , three children, one , one estranged, lives with Sylvie.                                   Admitted from:  home       Surrogate/HCP/Guardian: Phone#: Russel Mercer [phone]    FAMILY HISTORY:  FHx: colon cancer (Mother)  diaed at age 56        Baseline ADLs (prior to admission): dependence for ADLs, IADLs.  Independent/ Dependent      Allergies    No Known Allergies    Intolerances        Present Symptoms: unable to obtain 2/2 mental status.  Response to tactile stimuli as noted above.  Does not answer ROS, questions of orientation.    Dyspnea: unable to obtain  Nausea/Vomiting: Yes No  Anxiety:  Yes No  Depression: Yes No  Fatigue: Yes No  Loss of appetite: Yes No    Pain:             Character-            Duration-            Effect-            Factors-            Frequency-            Location-            Severity-    Review of Systems: Reviewed                     Negative:                     Positive:  Unable to obtain due to poor mentation   All others negative    MEDICATIONS  (STANDING):  aspirin enteric coated 81 milliGRAM(s) Oral daily  cefTRIAXone   IVPB 2000 milliGRAM(s) IV Intermittent every 24 hours  collagenase Ointment 1 Application(s) Topical daily  folic acid 1 milliGRAM(s) Oral daily  heparin   Injectable 5000 Unit(s) SubCutaneous every 8 hours  lactated ringers. 1000 milliLiter(s) (75 mL/Hr) IV Continuous <Continuous>    MEDICATIONS  (PRN):  acetaminophen     Tablet .. 650 milliGRAM(s) Oral every 6 hours PRN Temp greater or equal to 38C (100.4F), Mild Pain (1 - 3)  aluminum hydroxide/magnesium hydroxide/simethicone Suspension 30 milliLiter(s) Oral every 4 hours PRN Dyspepsia  melatonin 3 milliGRAM(s) Oral at bedtime PRN Insomnia  ondansetron Injectable 4 milliGRAM(s) IV Push every 8 hours PRN Nausea and/or Vomiting      PHYSICAL EXAM:    Vital Signs Last 24 Hrs  T(C): 36.4 (2021 07:33), Max: 36.7 (2021 16:27)  T(F): 97.5 (2021 07:33), Max: 98 (2021 16:27)  HR: 86 (2021 07:33) (47 - 103)  BP: 110/48 (2021 07:33) (99/55 - 123/58)  BP(mean): 64 (2021 22:46) (64 - 64)  RR: 18 (2021 07:33) (16 - 20)  SpO2: 95% (2021 07:33) (95% - 99%)    General: alert  oriented x ____ lethargic agitated                  cachexia  nonverbal  coma    Karnofsky:  %    HEENT: normal  dry mouth  ET tube/trach    Lungs: comfortable tachypnea/labored breathing  excessive secretions    CV: normal  tachycardia    GI: normal  distended  tender  no BS               PEG/NG/OG tube  constipation  last BM:     : normal  incontinent  oliguria/anuria  martinez    MSK: normal  weakness  edema             ambulatory  bedbound/wheelchair bound    Skin: normal  pressure ulcers- Stage_____  no rash    LABS:                        8.6    4.58  )-----------( 380      ( 2021 20:30 )             27.2     11-    145  |  112<H>  |  51.1<H>  ----------------------------<  81  4.4   |  16.0<L>  |  1.36<H>    Ca    7.8<L>      2021 07:49  Phos  4.3       Mg     2.0         TPro  5.3<L>  /  Alb  1.7<L>  /  TBili  0.4  /  DBili  x   /  AST  79<H>  /  ALT  52<H>  /  AlkPhos  140<H>          I&O's Summary      RADIOLOGY & ADDITIONAL STUDIES:    ADVANCE DIRECTIVES:   DNR YES NO  Completed on:                     MOLST  YES NO   Completed on:  Living Will  YES NO   Completed on:     HPI:  89F with PMHX Dementia (AAOx0-1 BL), HTN, CAD/MI, AFIB (no AC), Severe TR, Pulm HTN recently discharged 1 week ago for GNR Septicemia and Salmonella Bacteremia on IV ABX Ceftriaxone via Midline sent back to hospital for evaluation of "Low H&H" by PCP. Patient HGB similar to previous hospitalization currently at baseline chronic anemia. Patient AAOx0-1 baseline 2/2 dementia with reported FFT and worsening AMS per family evidenced by recurrent ARF and Hypernatremia for which she was admitted to hospitalist service. Pt seen/examined. Unable to provide further HPI/ROS. Sacral Decub Stage IV s/p bandage noted. Eyepatch in place. Poor functional status and guarded clinical prognosis. Please see recent discharge summary for further clinical information.  (2021 02:14)    HPI:  89 yo female with a history of HTN, dementia (a x o 0-1), CAD/MI, afib (not on a/c),  severe  TR, pulmonary hypertension, with a recent admission (discharged one week ago), for GNR septicemia, and salmonella bacteremia, being treated with IV ceftriaxone, sent to [facility] for evaluation of low h and h by PCP.  Per admission notes, patient's hemoglobin is at its baseline chronic anemia.  Patient being admitted for failure to thrive, hypernatremia, and ARF.    Patient started on IVF, ceftriaxone maintained.  In the setting of recurrent hospitalizations, failure to thrive, palliative care consulted to address goals of care.    Unable to obtain HPI, ROS, 2/2 mental status.  Patient opens eyes briefly, states, "I'm cold,"     Per daughter, patient was sent home from the hospital on antibiotics, and had labs drawn at home.  A physician called her with results, saying that her hemoglobin was low, stating that she needed to return to the hospital.    PERTINENT PMH REVIEWED: Yes    PAST MEDICAL & SURGICAL HISTORY:  HTN (hypertension)    MI (myocardial infarction)  @ age 89    HTN (hypertension)    Chronic atrial fibrillation    No significant past surgical history    No significant past surgical history        SOCIAL HISTORY:  from home, , three children, one , one estranged, lives with Sylvie.                                   Admitted from:  home       Surrogate/HCP/Guardian: Phone#: Russel Mercer [phone]    FAMILY HISTORY:  FHx: colon cancer (Mother)  diaed at age 56      Baseline ADLs (prior to admission): dependence for ADLs, IADLs.      Allergies    No Known Allergies    Intolerances    Present Symptoms: unable to obtain 2/2 mental status.  Response to tactile stimuli as noted above.  Does not answer ROS, questions of orientation.    Dyspnea: unable to obtain  Nausea/Vomiting: unable to obtain  Anxiety: unable to obtain  Depression: unable to obtain  Fatigue: unable to obtain  Loss of appetite: unable to obtain    Pain: unable to obtain    Review of Systems: Unable to obtain due to poor mentation     MEDICATIONS  (STANDING):  aspirin enteric coated 81 milliGRAM(s) Oral daily  cefTRIAXone   IVPB 2000 milliGRAM(s) IV Intermittent every 24 hours  collagenase Ointment 1 Application(s) Topical daily  folic acid 1 milliGRAM(s) Oral daily  heparin   Injectable 5000 Unit(s) SubCutaneous every 8 hours  lactated ringers. 1000 milliLiter(s) (75 mL/Hr) IV Continuous <Continuous>    MEDICATIONS  (PRN):  acetaminophen     Tablet .. 650 milliGRAM(s) Oral every 6 hours PRN Temp greater or equal to 38C (100.4F), Mild Pain (1 - 3)  aluminum hydroxide/magnesium hydroxide/simethicone Suspension 30 milliLiter(s) Oral every 4 hours PRN Dyspepsia  melatonin 3 milliGRAM(s) Oral at bedtime PRN Insomnia  ondansetron Injectable 4 milliGRAM(s) IV Push every 8 hours PRN Nausea and/or Vomiting      PHYSICAL EXAM:    Vital Signs Last 24 Hrs  T(C): 36.4 (2021 07:33), Max: 36.7 (2021 16:27)  T(F): 97.5 (2021 07:33), Max: 98 (2021 16:27)  HR: 86 (2021 07:33) (47 - 103)  BP: 110/48 (2021 07:33) (99/55 - 123/58)  BP(mean): 64 (2021 22:46) (64 - 64)  RR: 18 (2021 07:33) (16 - 20)  SpO2: 95% (2021 07:33) (95% - 99%)    General: lethargic    Gen: Cachetic.  In NAD.  No pain behaviors or work of breathing noted  Neuro: Lethargic, rouses briefly to tactile stim and returns to sleep  Head: NC/AT  Eyes: left eye patch in place, visible sclera non-icteric  ENT: dry oral mucosa  Resp: unlabored  CV: S1, S2  Abd: soft, non-tender  Peripheral Vasc: warm  Int: warm and dry  Psych: no psychomotor agitation    LABS:                        8.6    4.58  )-----------( 380      ( 2021 20:30 )             27.2         145  |  112<H>  |  51.1<H>  ----------------------------<  81  4.4   |  16.0<L>  |  1.36<H>    Ca    7.8<L>      2021 07:49  Phos  4.3       Mg     2.0         TPro  5.3<L>  /  Alb  1.7<L>  /  TBili  0.4  /  DBili  x   /  AST  79<H>  /  ALT  52<H>  /  AlkPhos  140<H>          I&O's Summary      RADIOLOGY & ADDITIONAL STUDIES: reviewed    ADVANCE DIRECTIVES:   DNR YES Completed on a previous admission                     MOLST: from previous admission  Living Will NO     HPI:  89F with PMHX Dementia (AAOx0-1 BL), HTN, CAD/MI, AFIB (no AC), Severe TR, Pulm HTN recently discharged 1 week ago for GNR Septicemia and Salmonella Bacteremia on IV ABX Ceftriaxone via Midline sent back to hospital for evaluation of "Low H&H" by PCP. Patient HGB similar to previous hospitalization currently at baseline chronic anemia. Patient AAOx0-1 baseline 2/2 dementia with reported FFT and worsening AMS per family evidenced by recurrent ARF and Hypernatremia for which she was admitted to hospitalist service. Pt seen/examined. Unable to provide further HPI/ROS. Sacral Decub Stage IV s/p bandage noted. Eyepatch in place. Poor functional status and guarded clinical prognosis. Please see recent discharge summary for further clinical information.  (2021 02:14)    HPI:  89 yo female with a history of HTN, dementia (a x o 0-1), CAD/MI, afib (not on a/c),  severe  TR, pulmonary hypertension, with a recent admission (discharged one week ago), for GNR septicemia, and salmonella bacteremia, being treated with IV ceftriaxone, sent to [facility] for evaluation of low h and h by PCP.  Noted with a stage IV sacral decubitus, bandage in place.  Per admission notes, patient's hemoglobin is at its baseline chronic anemia.  Patient being admitted for failure to thrive, hypernatremia, and ARF.    Patient started on IVF, ceftriaxone maintained.  Wound consult planned.  In the setting of recurrent hospitalizations, failure to thrive, palliative care consulted to address goals of care.    Unable to obtain HPI, ROS, 2/2 mental status.  Patient opens eyes briefly, states, "I'm cold," and returns to sleep.  Information obtained from chart, daughter.    Per daughter, patient was sent home from the hospital on antibiotics, and had labs ordered to monitor infection, drawn at home.  A physician called her with results, saying that her hemoglobin was low, stating that she needed to return to the hospital.    PERTINENT PMH REVIEWED: Yes    PAST MEDICAL & SURGICAL HISTORY:  HTN (hypertension)    MI (myocardial infarction)  @ age 89    HTN (hypertension)    Chronic atrial fibrillation    No significant past surgical history    No significant past surgical history        SOCIAL HISTORY:  from home, , three children, one , one estranged, lives with Sylvie.                                   Admitted from:  home       Surrogate/HCP/Guardian: Phone#: Russel Mercer [phone]    FAMILY HISTORY:  FHx: colon cancer (Mother)  diaed at age 56      Baseline ADLs (prior to admission): dependence for ADLs, IADLs.      Allergies    No Known Allergies    Intolerances    Present Symptoms: unable to obtain 2/2 mental status.  Response to tactile stimuli as noted above.  Does not answer ROS, questions of orientation.    Dyspnea: unable to obtain  Nausea/Vomiting: unable to obtain  Anxiety: unable to obtain  Depression: unable to obtain  Fatigue: unable to obtain  Loss of appetite: unable to obtain    Pain: unable to obtain    Review of Systems: Unable to obtain due to poor mentation     MEDICATIONS  (STANDING):  aspirin enteric coated 81 milliGRAM(s) Oral daily  cefTRIAXone   IVPB 2000 milliGRAM(s) IV Intermittent every 24 hours  collagenase Ointment 1 Application(s) Topical daily  folic acid 1 milliGRAM(s) Oral daily  heparin   Injectable 5000 Unit(s) SubCutaneous every 8 hours  lactated ringers. 1000 milliLiter(s) (75 mL/Hr) IV Continuous <Continuous>    MEDICATIONS  (PRN):  acetaminophen     Tablet .. 650 milliGRAM(s) Oral every 6 hours PRN Temp greater or equal to 38C (100.4F), Mild Pain (1 - 3)  aluminum hydroxide/magnesium hydroxide/simethicone Suspension 30 milliLiter(s) Oral every 4 hours PRN Dyspepsia  melatonin 3 milliGRAM(s) Oral at bedtime PRN Insomnia  ondansetron Injectable 4 milliGRAM(s) IV Push every 8 hours PRN Nausea and/or Vomiting      PHYSICAL EXAM:    Vital Signs Last 24 Hrs  T(C): 36.4 (2021 07:33), Max: 36.7 (2021 16:27)  T(F): 97.5 (2021 07:33), Max: 98 (2021 16:27)  HR: 86 (2021 07:33) (47 - 103)  BP: 110/48 (2021 07:33) (99/55 - 123/58)  BP(mean): 64 (2021 22:46) (64 - 64)  RR: 18 (2021 07:33) (16 - 20)  SpO2: 95% (2021 07:33) (95% - 99%)    Karnofsky: 30%    Gen: Cachetic.  In NAD.  No pain behaviors or work of breathing noted  Neuro: Lethargic, rouses briefly to tactile stim and returns to sleep  Head: NC/AT  Eyes: left eye patch in place, visible sclera non-icteric  ENT: dry oral mucosa  Resp: unlabored  CV: S1, S2  Abd: soft, non-tender  Peripheral Vasc: warm  Int: warm and dry  Psych: no psychomotor agitation    LABS:                        8.6    4.58  )-----------( 380      ( 2021 20:30 )             27.2         145  |  112<H>  |  51.1<H>  ----------------------------<  81  4.4   |  16.0<L>  |  1.36<H>    Ca    7.8<L>      2021 07:49  Phos  4.3       Mg     2.0         TPro  5.3<L>  /  Alb  1.7<L>  /  TBili  0.4  /  DBili  x   /  AST  79<H>  /  ALT  52<H>  /  AlkPhos  140<H>          I&O's Summary      RADIOLOGY & ADDITIONAL STUDIES: reviewed    ADVANCE DIRECTIVES:   DNR YES Completed on a previous admission                     MOLST: from previous admission  Living Will NO     HPI:  89F with PMHX Dementia (AAOx0-1 BL), HTN, CAD/MI, AFIB (no AC), Severe TR, Pulm HTN recently discharged 1 week ago for GNR Septicemia and Salmonella Bacteremia on IV ABX Ceftriaxone via Midline sent back to hospital for evaluation of "Low H&H" by PCP. Patient HGB similar to previous hospitalization currently at baseline chronic anemia. Patient AAOx0-1 baseline 2/2 dementia with reported FFT and worsening AMS per family evidenced by recurrent ARF and Hypernatremia for which she was admitted to hospitalist service. Pt seen/examined. Unable to provide further HPI/ROS. Sacral Decub Stage IV s/p bandage noted. Eyepatch in place. Poor functional status and guarded clinical prognosis. Please see recent discharge summary for further clinical information.  (2021 02:14)    HPI:  89 yo female with a history of HTN, dementia (a x o 0-1) at baseline, CAD/MI, afib (not on a/c),  severe  TR, pulmonary hypertension, with a recent admission (discharged one week ago), for GNR septicemia, and salmonella bacteremia, being treated with IV ceftriaxone, sent to [facility] for evaluation of low h and h by PCP.  Noted with a stage IV sacral decubitus, bandage in place.  Per admission notes, patient's hemoglobin is at its baseline chronic anemia.  Patient being admitted for failure to thrive, hypernatremia, and ARF.    Patient started on IVF, ceftriaxone maintained.  Wound consult planned.  In the setting of recurrent hospitalizations, failure to thrive, palliative care consulted to address goals of care.    Unable to obtain HPI, ROS, 2/2 mental status.  Patient opens eyes briefly, states, "I'm cold," and returns to sleep.  Information obtained from chart, daughter.    Per daughter, patient was sent home from the hospital on antibiotics, and had labs ordered to monitor infection, drawn at home.  A physician called her with results, saying that her hemoglobin was low, stating that she needed to return to the hospital.    PERTINENT PMH REVIEWED: Yes    PAST MEDICAL & SURGICAL HISTORY:  HTN (hypertension)    MI (myocardial infarction)  @ age 89    HTN (hypertension)    Chronic atrial fibrillation    No significant past surgical history    No significant past surgical history        SOCIAL HISTORY:  from home, , three children, one , one estranged, lives with Sylvie.                                   Admitted from:  home       Surrogate/HCP/Guardian: Phone#: Russel Mercer [phone]    FAMILY HISTORY:  FHx: colon cancer (Mother)  diaed at age 56      Baseline ADLs (prior to admission): dependence for ADLs, IADLs.      Allergies    No Known Allergies    Intolerances    Present Symptoms: unable to obtain 2/2 mental status.  Response to tactile stimuli as noted above.  Does not answer ROS, questions of orientation.    Dyspnea: unable to obtain  Nausea/Vomiting: unable to obtain  Anxiety: unable to obtain  Depression: unable to obtain  Fatigue: unable to obtain  Loss of appetite: unable to obtain    Pain: unable to obtain    Review of Systems: Unable to obtain due to poor mentation     MEDICATIONS  (STANDING):  aspirin enteric coated 81 milliGRAM(s) Oral daily  cefTRIAXone   IVPB 2000 milliGRAM(s) IV Intermittent every 24 hours  collagenase Ointment 1 Application(s) Topical daily  folic acid 1 milliGRAM(s) Oral daily  heparin   Injectable 5000 Unit(s) SubCutaneous every 8 hours  lactated ringers. 1000 milliLiter(s) (75 mL/Hr) IV Continuous <Continuous>    MEDICATIONS  (PRN):  acetaminophen     Tablet .. 650 milliGRAM(s) Oral every 6 hours PRN Temp greater or equal to 38C (100.4F), Mild Pain (1 - 3)  aluminum hydroxide/magnesium hydroxide/simethicone Suspension 30 milliLiter(s) Oral every 4 hours PRN Dyspepsia  melatonin 3 milliGRAM(s) Oral at bedtime PRN Insomnia  ondansetron Injectable 4 milliGRAM(s) IV Push every 8 hours PRN Nausea and/or Vomiting      PHYSICAL EXAM:    Vital Signs Last 24 Hrs  T(C): 36.4 (2021 07:33), Max: 36.7 (2021 16:27)  T(F): 97.5 (2021 07:33), Max: 98 (2021 16:27)  HR: 86 (2021 07:33) (47 - 103)  BP: 110/48 (2021 07:33) (99/55 - 123/58)  BP(mean): 64 (2021 22:46) (64 - 64)  RR: 18 (2021 07:33) (16 - 20)  SpO2: 95% (2021 07:33) (95% - 99%)    Karnofsky: 30%    Gen: Cachetic.  In NAD.  No pain behaviors or work of breathing noted  Neuro: Lethargic, rouses briefly to tactile stim and returns to sleep  Head: NC/AT  Eyes: left eye patch in place, visible sclera non-icteric  ENT: dry oral mucosa  Resp: unlabored  CV: S1, S2  Abd: soft, non-tender  Peripheral Vasc: warm  Int: warm and dry  Psych: no psychomotor agitation    LABS:                        8.6    4.58  )-----------( 380      ( 2021 20:30 )             27.2         145  |  112<H>  |  51.1<H>  ----------------------------<  81  4.4   |  16.0<L>  |  1.36<H>    Ca    7.8<L>      2021 07:49  Phos  4.3       Mg     2.0         TPro  5.3<L>  /  Alb  1.7<L>  /  TBili  0.4  /  DBili  x   /  AST  79<H>  /  ALT  52<H>  /  AlkPhos  140<H>          I&O's Summary      RADIOLOGY & ADDITIONAL STUDIES: reviewed    ADVANCE DIRECTIVES:   DNR YES Completed on a previous admission                     MOLST: from previous admission  Living Will NO

## 2021-11-19 NOTE — PROGRESS NOTE ADULT - SUBJECTIVE AND OBJECTIVE BOX
HEALTH ISSUES - PROBLEM Dx:  PMHX Dementia (AAOx0-1 BL), HTN, CAD/MI, AFIB (no AC), Severe TR, Pulm HTN recently discharged 1 week ago for GNR Septicemia and Salmonella Bacteremia on IV ABX Ceftriaxone via Midline sent back to hospital for evaluation of "Low H&H" by PCP    Hypernatremia, ARF, FTT    INTERVAL HPI/ OVERNIGHT EVENTS:    lying in bed  confused  appears comfortable    REVIEW OF SYSTEMS:    as above    Vital Signs Last 24 Hrs  T(C): 36.7 (19 Nov 2021 11:16), Max: 36.7 (18 Nov 2021 16:27)  T(F): 98 (19 Nov 2021 11:16), Max: 98 (18 Nov 2021 16:27)  HR: 65 (19 Nov 2021 11:16) (47 - 103)  BP: 109/44 (19 Nov 2021 11:16) (99/55 - 123/58)  BP(mean): 64 (18 Nov 2021 22:46) (64 - 64)  RR: 17 (19 Nov 2021 11:16) (16 - 20)  SpO2: 95% (19 Nov 2021 11:16) (95% - 99%)    PHYSICAL EXAM-  Constitutional: Frail, Chronic Ill-appearing  Head: NC/AT  Eyes: +Eyepatch in place  ENT: Normal Pharynx, Dry Oral Mucosa  Neck: Supple, Non-tender  Chest: Non-tender, no rashes  Cardio: +Bradycardia, Irregular   Resp: BS CTA bilaterally, no wheezing/rhonchi/rales  Abd: Soft, Non-tender, Non-distended, no rebound/guarding/rigidity  : not examined  Rectal: not examined  MSK: moving all extremities, no motor weakness, full ROM x4  Ext: palpable distal pulses, good capillary refill   Psych: Confused, Demented  Neuro: Unable to fully assess, Arousable/ Alert, AAOx0, moving all ext  Skin: Warm/Dry. +Stage 4 Decub Ulcer s/p bandage in place      MEDICATIONS  (STANDING):  aspirin enteric coated 81 milliGRAM(s) Oral daily  cefTRIAXone   IVPB 2000 milliGRAM(s) IV Intermittent every 24 hours  collagenase Ointment 1 Application(s) Topical daily  folic acid 1 milliGRAM(s) Oral daily  heparin   Injectable 5000 Unit(s) SubCutaneous every 8 hours  lactated ringers. 1000 milliLiter(s) (75 mL/Hr) IV Continuous <Continuous>    MEDICATIONS  (PRN):  acetaminophen     Tablet .. 650 milliGRAM(s) Oral every 6 hours PRN Temp greater or equal to 38C (100.4F), Mild Pain (1 - 3)  aluminum hydroxide/magnesium hydroxide/simethicone Suspension 30 milliLiter(s) Oral every 4 hours PRN Dyspepsia  melatonin 3 milliGRAM(s) Oral at bedtime PRN Insomnia  ondansetron Injectable 4 milliGRAM(s) IV Push every 8 hours PRN Nausea and/or Vomiting      LABS:                        8.6    4.58  )-----------( 380      ( 18 Nov 2021 20:30 )             27.2     11-19    145  |  112<H>  |  51.1<H>  ----------------------------<  81  4.4   |  16.0<L>  |  1.36<H>    Ca    7.8<L>      19 Nov 2021 07:49  Phos  4.3     11-19  Mg     2.0     11-19    TPro  5.3<L>  /  Alb  1.7<L>  /  TBili  0.4  /  DBili  x   /  AST  79<H>  /  ALT  52<H>  /  AlkPhos  140<H>  11-18

## 2021-11-20 NOTE — CONSULT NOTE ADULT - SUBJECTIVE AND OBJECTIVE BOX
ENMANUEL Neves    Patient is a 89y old  Female who presents with a chief complaint of Hypernatremia, ARF (20 Nov 2021 10:55)      BRIEF HOSPITAL COURSE: *89F with PMHX Dementia (AAOx0-1 BL), HTN, CAD/MI, AFIB (no AC), Severe TR, Pulm HTN recently discharged 1 week ago for GNR Septicemia and Salmonella Bacteremia on IV ABX Ceftriaxone via Midline sent back to hospital for evaluation of "Low H&H" by PCP. Patient HGB similar to previous hospitalization currently at baseline chronic anemia. Patient AAOx0-1 baseline 2/2 dementia with reported FFT and worsening AMS per family evidenced by recurrent ARF and Hypernatremia for which she was admitted to hospitalist service. Pt seen/examined. Unable to provide further HPI/ROS. Sacral Decub Stage IV s/p bandage noted. Eyepatch in place. Poor functional status and guarded clinical prognosis. Please see recent discharge summary for further clinical information.  (19 Nov 2021 02:14)    patient was admitted for  FTT  IVF ceftriaxone started   then  patient  hb drop -  came to ED  8.6  repeated after IVF  hb 7.7   with guaiac + stools  -blood pressure   low 80's   repeat   in  90's          ROS  cannot obtain     PAST MEDICAL & SURGICAL HISTORY:  HTN (hypertension)    MI (myocardial infarction)  @ age 89    HTN (hypertension)    Chronic atrial fibrillation    No significant past surgical history    No significant past surgical history          Medications:        acetaminophen     Tablet .. 650 milliGRAM(s) Oral every 6 hours PRN  acetaminophen   IVPB .. 750 milliGRAM(s) IV Intermittent once  melatonin 3 milliGRAM(s) Oral at bedtime PRN  ondansetron Injectable 4 milliGRAM(s) IV Push every 8 hours PRN        aluminum hydroxide/magnesium hydroxide/simethicone Suspension 30 milliLiter(s) Oral every 4 hours PRN  pantoprazole  Injectable 40 milliGRAM(s) IV Push two times a day      methylPREDNISolone sodium succinate Injectable 40 milliGRAM(s) IV Push every 8 hours    dextrose 5%. 1000 milliLiter(s) IV Continuous <Continuous>  folic acid 1 milliGRAM(s) Oral daily  phytonadione  IVPB 5 milliGRAM(s) IV Intermittent once      collagenase Ointment 1 Application(s) Topical daily            ICU Vital Signs Last 24 Hrs  T(C): 36.4 (20 Nov 2021 11:53), Max: 38.3 (20 Nov 2021 07:12)  T(F): 97.5 (20 Nov 2021 11:53), Max: 101 (20 Nov 2021 07:12)  HR: 67 (20 Nov 2021 11:53) (67 - 92)  BP: 91/37 (20 Nov 2021 11:53) (80/60 - 101/64)  BP(mean): --  ABP: --  ABP(mean): --  RR: 18 (20 Nov 2021 11:53) (16 - 20)  SpO2: 94% (20 Nov 2021 11:53) (94% - 95%)          I&O's Detail        LABS:                        7.7    x     )-----------( x        ( 20 Nov 2021 09:24 )             24.4     11-20    151<H>  |  116<H>  |  51.2<H>  ----------------------------<  52<LL>  3.6   |  19.0<L>  |  1.45<H>    Ca    7.3<L>      20 Nov 2021 06:19  Phos  4.3     11-19  Mg     1.7     11-20    TPro  5.3<L>  /  Alb  1.7<L>  /  TBili  0.4  /  DBili  x   /  AST  79<H>  /  ALT  52<H>  /  AlkPhos  140<H>  11-18          CAPILLARY BLOOD GLUCOSE      POCT Blood Glucose.: 91 mg/dL (20 Nov 2021 12:02)        CULTURES:      Physical Examination:    General: No acute distress.   frail   f   in bed      HEENT: Pupils equal, reactive to light.  Symmetric.    PULM:  bilateral  air entry   decreased bases     NECK: Supple, no lymphadenopathy, trachea midline    CVS: Regular rate and rhythm, no murmurs, rubs, or gallops    ABD: Soft, nondistended, nontender, normoactive bowel sounds, no masses    EXT: No edema, nontender  multiple skin  breaks     SKIN: Warm and well perfused, no rashes noted.    NEURO:  eyes   opened     DEVICES:     RADIOLOGY: **IMPRESSION:  1. Faceted gallstones. Intra and extrahepatic biliary dilatation. Correlate with LFTs. If there is clinical suspicion for acute cholecystitis or choledocholithiasis, consider right upper quadrant ultrasound and/or noncontrast MRCP for further evaluation.  2. Extensive patchy peribronchovascular opacities throughout the lungs, with left lower lobe mucus plugging, concerning for aspiration pneumonia.    --- End of Report ---            RADHA DEWEY MD; Attending Radiologist  This document has been electronically signed. Nov 2 2021  1:23PM  *    CRITICAL CARE TIME SPENT: ***

## 2021-11-20 NOTE — PROGRESS NOTE ADULT - SUBJECTIVE AND OBJECTIVE BOX
[facility] Division of Hospital Medicine    Chief Complaint:  anemia    SUBJECTIVE: pt doesn't present any complains due to her condition    OVERNIGHT EVENTS: h/h dropped to 7.6, passed large melena appearing BM    MEDICATIONS  (STANDING):  collagenase Ointment 1 Application(s) Topical daily  folic acid 1 milliGRAM(s) Oral daily  lactated ringers. 1000 milliLiter(s) (75 mL/Hr) IV Continuous <Continuous>  pantoprazole  Injectable 40 milliGRAM(s) IV Push two times a day    MEDICATIONS  (PRN):  acetaminophen     Tablet .. 650 milliGRAM(s) Oral every 6 hours PRN Temp greater or equal to 38C (100.4F), Mild Pain (1 - 3)  aluminum hydroxide/magnesium hydroxide/simethicone Suspension 30 milliLiter(s) Oral every 4 hours PRN Dyspepsia  melatonin 3 milliGRAM(s) Oral at bedtime PRN Insomnia  ondansetron Injectable 4 milliGRAM(s) IV Push every 8 hours PRN Nausea and/or Vomiting        I&O's Summary      PHYSICAL EXAM:  Vital Signs Last 24 Hrs  T(C): 38.3 (20 Nov 2021 07:12), Max: 38.3 (20 Nov 2021 07:12)  T(F): 101 (20 Nov 2021 07:12), Max: 101 (20 Nov 2021 07:12)  HR: 92 (19 Nov 2021 20:07) (65 - 92)  BP: 101/64 (19 Nov 2021 20:07) (80/60 - 109/44)  BP(mean): --  RR: 20 (19 Nov 2021 20:07) (16 - 20)  SpO2: 94% (19 Nov 2021 20:07) (94% - 95%)        CONSTITUTIONAL: NAD, pail  ENMT: Moist oral mucosa, no pharyngeal injection or exudates; normal dentition; No JVD  RESPIRATORY: Normal respiratory effort; lungs are clear to auscultation bilaterally  CARDIOVASCULAR: Regular rate and rhythm, normal S1 and S2, no murmur/rub/gallop; No lower extremity edema; Peripheral pulses are 2+ bilaterally  ABDOMEN: tender in upper quadrants on  palpation,  no rebound/guarding; melena appearing stool  MUSCLOSKELETAL:  no clubbing or cyanosis of digits; no joint swelling or tenderness to palpation  PSYCH: can't assess at the moment   NEUROLOGY:  no apparent facial symmetric; no gross sensory deficits; was observed moving all 4 ext against gravity cooperating with exam.   SKIN: un stageable sacrum area 4x5 cm PUD with surrounding erythema     LABS:                        7.7    x     )-----------( x        ( 20 Nov 2021 09:24 )             24.4     11-20    151<H>  |  116<H>  |  51.2<H>  ----------------------------<  52<LL>  3.6   |  19.0<L>  |  1.45<H>    Ca    7.3<L>      20 Nov 2021 06:19  Phos  4.3     11-19  Mg     1.7     11-20    TPro  5.3<L>  /  Alb  1.7<L>  /  TBili  0.4  /  DBili  x   /  AST  79<H>  /  ALT  52<H>  /  AlkPhos  140<H>  11-18              CAPILLARY BLOOD GLUCOSE      POCT Blood Glucose.: 152 mg/dL (20 Nov 2021 08:01)        RADIOLOGY & ADDITIONAL TESTS:  Results Reviewed:   Imaging Personally Reviewed:  Electrocardiogram Personally Reviewed:

## 2021-11-20 NOTE — PROGRESS NOTE ADULT - CONVERSATION DETAILS
Discussed with pt and family ( Sylvie over phone ). We went over pt's current condition and tx plan and prognosis including ongoing GI bleeding, need for transfusion and hypotention and potential need for central line and icu;  Sylvie at this time wanted to pursue above if it comes to it.

## 2021-11-20 NOTE — CONSULT NOTE ADULT - ASSESSMENT
89y/o  female    1-  hypotension resolved after IV fluid  2- gib    3- salmonella bacteremia  4-hypercapnea    -stress dose steroids  -d5w  -abx  PPI  inr high in past vit k sq  - repeat  cbc   inr        thank you kindly     not ICU CANDIDATE      will reconsult as  needed  - DNR respected    Please  feel free to reach out with any questions or suggestions    SINDI BURGOS    PULMONARY and CRITICAL CARE   [facility] Physician [facility]     [phone]

## 2021-11-21 NOTE — PROGRESS NOTE ADULT - SUBJECTIVE AND OBJECTIVE BOX
[facility] Division of Hospital Medicine    Chief Complaint:  Anemia    SUBJECTIVE: not verbal, appears comfortable     OVERNIGHT EVENTS: none reported     ROS is not obtained due to pt current comfort care status     MEDICATIONS  (STANDING):  dextrose 5%. 1000 milliLiter(s) (75 mL/Hr) IV Continuous <Continuous>  glycopyrrolate Injectable 0.1 milliGRAM(s) IV Push two times a day    MEDICATIONS  (PRN):  LORazepam   Injectable 0.5 milliGRAM(s) IV Push every 8 hours PRN Anxiety  morphine  - Injectable 0.5 milliGRAM(s) IV Push every 3 hours PRN Severe Pain (7 - 10)        I&O's Summary      PHYSICAL EXAM:  Vital Signs Last 24 Hrs  T(C): 36.4 (20 Nov 2021 14:44), Max: 36.4 (20 Nov 2021 14:44)  T(F): 97.5 (20 Nov 2021 14:44), Max: 97.5 (20 Nov 2021 14:44)  HR: 67 (20 Nov 2021 14:44) (67 - 67)  BP: 107/47 (20 Nov 2021 14:44) (107/47 - 107/47)  BP(mean): --  RR: 20 (20 Nov 2021 14:44) (20 - 20)  SpO2: 91% (20 Nov 2021 14:44) (91% - 91%)        CONSTITUTIONAL: NAD, well-developed, well-groomed  ENMT: Moist oral mucosa, no pharyngeal injection or exudates; normal dentition; No JVD  RESPIRATORY: Normal respiratory effort; lungs are clear to auscultation bilaterally  CARDIOVASCULAR: Regular rate and rhythm, normal S1 and S2, no murmur/rub/gallop; No lower extremity edema; Peripheral pulses are 2+ bilaterally  ABDOMEN: Nontender to palpation, normoactive bowel sounds, no rebound/guarding; No hepatosplenomegaly  MUSCLOSKELETAL:  no clubbing or cyanosis of digits; no joint swelling or tenderness to palpation  PSYCH: A+O to person, place, and time; affect appropriate  NEUROLOGY: CN 2-12 are intact and symmetric; no gross sensory deficits; was observed moving all 4 ext against gravity cooperating with exam.   SKIN: No rashes; no palpable lesions    LABS:                        7.7    x     )-----------( x        ( 20 Nov 2021 09:24 )             24.4     11-20    151<H>  |  116<H>  |  51.2<H>  ----------------------------<  52<LL>  3.6   |  19.0<L>  |  1.45<H>    Ca    7.3<L>      20 Nov 2021 06:19  Mg     1.7     11-20                CAPILLARY BLOOD GLUCOSE            RADIOLOGY & ADDITIONAL TESTS:  Results Reviewed:   Imaging Personally Reviewed:  Electrocardiogram Personally Reviewed: [facility] Division of Hospital Medicine    Chief Complaint:  Anemia    SUBJECTIVE: not verbal, appears comfortable     OVERNIGHT EVENTS: none reported     ROS is not obtained due to pt current comfort care status     MEDICATIONS  (STANDING):  dextrose 5%. 1000 milliLiter(s) (75 mL/Hr) IV Continuous <Continuous>  glycopyrrolate Injectable 0.1 milliGRAM(s) IV Push two times a day    MEDICATIONS  (PRN):  LORazepam   Injectable 0.5 milliGRAM(s) IV Push every 8 hours PRN Anxiety  morphine  - Injectable 0.5 milliGRAM(s) IV Push every 3 hours PRN Severe Pain (7 - 10)        I&O's Summary      PHYSICAL EXAM:  Vital Signs Last 24 Hrs  T(C): 36.4 (20 Nov 2021 14:44), Max: 36.4 (20 Nov 2021 14:44)  T(F): 97.5 (20 Nov 2021 14:44), Max: 97.5 (20 Nov 2021 14:44)  HR: 67 (20 Nov 2021 14:44) (67 - 67)  BP: 107/47 (20 Nov 2021 14:44) (107/47 - 107/47)  BP(mean): --  RR: 20 (20 Nov 2021 14:44) (20 - 20)  SpO2: 91% (20 Nov 2021 14:44) (91% - 91%)      exam limited due to comfort messures  CONSTITUTIONAL: NAD  RESPIRATORY: shallow respiratory effort;   CARDIOVASCULAR: Regular rate and rhythm,  No lower extremity edema; Peripheral pulses are 2+ bilaterally  ABDOMEN: no apparent distention  PSYCH/neuro: altered   SKIN: No rashes; no palpable lesions    LABS:                        7.7    x     )-----------( x        ( 20 Nov 2021 09:24 )             24.4     11-20    151<H>  |  116<H>  |  51.2<H>  ----------------------------<  52<LL>  3.6   |  19.0<L>  |  1.45<H>    Ca    7.3<L>      20 Nov 2021 06:19  Mg     1.7     11-20                CAPILLARY BLOOD GLUCOSE            RADIOLOGY & ADDITIONAL TESTS:  Results Reviewed:   Imaging Personally Reviewed:  Electrocardiogram Personally Reviewed:

## 2021-11-22 NOTE — PROGRESS NOTE ADULT - SUBJECTIVE AND OBJECTIVE BOX
Patient is a 89y old  Female who presents with a chief complaint of Hypernatremia, ARF (21 Nov 2021 13:43)    Patient seen and examined at bedside.     ALLERGIES:  No Known Allergies    MEDICATIONS  (STANDING):  glycopyrrolate Injectable 0.1 milliGRAM(s) IV Push two times a day    MEDICATIONS  (PRN):  LORazepam   Injectable 0.5 milliGRAM(s) IV Push every 8 hours PRN Anxiety  morphine  - Injectable 0.5 milliGRAM(s) IV Push every 2 hours PRN Severe Pain (7 - 10)    Vital Signs Last 24 Hrs  T(F): --  HR: 58 (21 Nov 2021 17:00) (58 - 58)  BP: 104/70 (21 Nov 2021 17:00) (104/70 - 104/70)  RR: --  SpO2: --  I&O's Summary    PHYSICAL EXAM:  General: NAD, lethargic  ENT: MMM, no thrush  Neck: Supple, No JVD  Lungs: good air entry, non-labored breathing  Cardio: +S1/S2  Abdomen: Soft, Nontender, Nondistended; Bowel sounds present  Extremities: No calf tenderness    LABS:                        7.7    x     )-----------( x        ( 20 Nov 2021 09:24 )             24.4     11-20    151  |  116  |  51.2  ----------------------------<  52  3.6   |  19.0  |  1.45    Ca    7.3      20 Nov 2021 06:19  Mg     1.7     11-20    eGFR if Non African American: 30 mL/min/1.73M2 (11-20-21 @ 06:19)  eGFR if African American: 34 mL/min/1.73M2 (11-20-21 @ 06:19)    COVID  COVID-19 PCR: NotDetec (11-18-21 @ 22:07)  COVID-19 PCR: NotDetec (11-09-21 @ 05:16)    RADIOLOGY & ADDITIONAL TESTS:  - no new tests    Care Discussed with Consultants/Other Providers:   Palliative Care

## 2021-11-22 NOTE — DIETITIAN INITIAL EVALUATION ADULT. - OTHER INFO
89F with PMH as listed admitted 11/18 with "low H & H" from PCP office. patient found to have worsening lethargy, and failure to thrive, kidney failure, and hypernatremia. COurse further complicated by hypotension, possible GIB. Comfort measures only.

## 2021-11-22 NOTE — CONSULT NOTE ADULT - ASSESSMENT
89F with end stage dementia, pulmonary hypertension, sacral decub, recent admission for GNR septicemia and salmonella bacteremia discharged on IV Abx via midline, now here with end of life.     #1 Acute blood loss anemia - comfort measures, no further blood work  #2 Fever - no further antibiotics given change to comfort measures only  #3 Dyspnea - change morphine to ATC  #4 Encounter for palliative care - met with granddaughter at bedside with Dr. Malone and we provided alot of emotional support. Prognosis grave. Recommended inpatient hospice transition but family refusing at this time per complex care  Rosina.

## 2021-11-22 NOTE — CONSULT NOTE ADULT - SUBJECTIVE AND OBJECTIVE BOX
HPI:  89F with PMHX Dementia (AAOx0-1 BL), HTN, CAD/MI, AFIB (no AC), Severe TR, Pulm HTN recently discharged 1 week ago for GNR Septicemia and Salmonella Bacteremia on IV ABX Ceftriaxone via Midline sent back to hospital for evaluation of "Low H&H" by PCP. Patient HGB similar to previous hospitalization currently at baseline chronic anemia. Patient AAOx0-1 baseline 2/2 dementia with reported FFT and worsening AMS per family evidenced by recurrent ARF and Hypernatremia for which she was admitted to hospitalist service. Pt seen/examined. Unable to provide further HPI/ROS. Sacral Decub Stage IV s/p bandage noted. Eyepatch in place. Poor functional status and guarded clinical prognosis. Please see recent discharge summary for further clinical information.  (19 Nov 2021 02:14)      PERTINENT PMH REVIEWED: Yes No    PAST MEDICAL & SURGICAL HISTORY:  HTN (hypertension)    MI (myocardial infarction)  @ age 89    HTN (hypertension)    Chronic atrial fibrillation    No significant past surgical history    No significant past surgical history        SOCIAL HISTORY:                      Substance history:                    Admitted from:  home  SNF  [facility]                     Moravian/spirituality:                    Cultural concerns:                      Surrogate/HCP/Guardian: Phone#:    FAMILY HISTORY:  FHx: colon cancer (Mother)  diaed at age 56        Allergies    No Known Allergies    Intolerances        ADVANCE DIRECTIVES/TREATMENT PREFERENCES:  Full code, all aggressive measures desired   DNR/DNI - MOLST, continue all other medical treatments  DNR/DNI - MOLST, comfort measures only     Baseline ADLs (prior to admission):  Independent/ Dependent      Karnofsky/Palliative Performance Status Version 2:  %  http://npcrc.org/files/news/palliative_performance_scale_ppsv2.pdf    Present Symptoms:     Dyspnea: Mild Moderate Severe  Nausea/Vomiting: Yes No  Anxiety:  Yes No  Depression: Yes No  Fatigue: Yes No  Loss of appetite: Yes No  Constipation:     Pain:             Character-            Duration-            Effect-            Factors-            Frequency-            Location-            Severity-    Pain AD Score:  [email or web address].[email or web address]/painad.pdf (press ctrl + left click to view)    Review of Systems: Reviewed                     Negative:                     Positive:  Unable to obtain due to poor mentation   All others negative    MEDICATIONS  (STANDING):  glycopyrrolate Injectable 0.1 milliGRAM(s) IV Push two times a day    MEDICATIONS  (PRN):  LORazepam   Injectable 0.5 milliGRAM(s) IV Push every 8 hours PRN Anxiety  morphine  - Injectable 0.5 milliGRAM(s) IV Push every 2 hours PRN Severe Pain (7 - 10)      PHYSICAL EXAM:    Vital Signs Last 24 Hrs  T(C): --  T(F): --  HR: 58 (21 Nov 2021 17:00) (58 - 58)  BP: 104/70 (21 Nov 2021 17:00) (104/70 - 104/70)  BP(mean): --  RR: --  SpO2: --    General: alert  oriented x ____ lethargic agitated delirious                  cachexia  nonverbal  coma    HEENT: normal  dry mouth  ET tube/trach    Lungs: comfortable tachypnea/labored breathing  excessive secretions    CV: normal  tachycardia    GI: normal  distended  tender  no BS               PEG/NG/OG tube  constipation  last BM:     : normal  incontinent  oliguria/anuria  martinez    MSK: normal  weakness  edema             ambulatory  bedbound/wheelchair bound    Neuro: no focal deficits cognitive impairment dysphagia dysarthria hemiplegia     Skin: normal  pressure ulcers- Stage_____  no rash    LABS:              I&O's Summary      RADIOLOGY & ADDITIONAL STUDIES:       HPI: 89F with PMH as listed admitted 11/18 with "low H & H" from PCP office. patient found to have worsening lethargy, and failure to thrive, kidney failure, and hypernatremia. COurse further complicated by hypotension, possible gi bleeding, MICU consulted but not a MICU candidate, then transitioned to comfort measures only by Hospitalist team.     Recently admitted with GNR septicemia and salmonella bacteremia on IV antibiotics via midline. During that admission, family was not ready for hospice care.     PERTINENT PMH REVIEWED: Yes     PAST MEDICAL & SURGICAL HISTORY:  HTN (hypertension)  MI (myocardial infarction)  @ age 89  HTN (hypertension)  Chronic atrial fibrillation  No significant past surgical history  No significant past surgical history    SOCIAL HISTORY:                      Substance history: no EtOH                     Admitted from:  home                     Holiness/spirituality:                    Cultural concerns: none                       Surrogate - daughter Sylvie     FAMILY HISTORY:  FHx: colon cancer (Mother)  diaed at age 56    Allergies  No Known Allergies    ADVANCE DIRECTIVES/TREATMENT PREFERENCES:  DNR/DNI - MOLST, comfort measures only     Baseline ADLs (prior to admission):  Dependent      Karnofsky/Palliative Performance Status Version 2: 10 %  http://npcrc.org/files/news/palliative_performance_scale_ppsv2.pdf    Present Symptoms:     Dyspnea: none   Nausea/Vomiting: No  Anxiety:  No  Depression: unable   Fatigue: unable   Loss of appetite: unable   Constipation: none     Pain: none             Character-            Duration-            Effect-            Factors-            Frequency-            Location-            Severity-    Pain AD Score:  [email or web address].[email or web address]/cog/painad.pdf (press ctrl + left click to view)    Review of Systems: Reviewed                Unable to obtain due to poor mentation   All others negative    MEDICATIONS  (STANDING):  glycopyrrolate Injectable 0.1 milliGRAM(s) IV Push two times a day    MEDICATIONS  (PRN):  LORazepam   Injectable 0.5 milliGRAM(s) IV Push every 8 hours PRN Anxiety  morphine  - Injectable 0.5 milliGRAM(s) IV Push every 2 hours PRN Severe Pain (7 - 10)    PHYSICAL EXAM:    Vital Signs Last 24 Hrs  T(C): --  T(F): --  HR: 58 (21 Nov 2021 17:00) (58 - 58)  BP: 104/70 (21 Nov 2021 17:00) (104/70 - 104/70)  BP(mean): --  RR: --  SpO2: --    General: unresponsive eye patch in place left eye. severe cachexia      HEENT: normal    Lungs: comfortable    CV: normal      GI: normal     : martinez     MSK: weakness     Neuro: no focal deficits     Skin: no rash    LABS:    I&O's Summary    RADIOLOGY & ADDITIONAL STUDIES:  < from: CT Head No Cont (11.06.21 @ 18:12) >     EXAM:  CT BRAIN                          PROCEDURE DATE:  11/06/2021      INTERPRETATION:  CT head without IV contrast    CLINICAL INFORMATION: Altered mental status    TECHNIQUE: Contiguous axial 5 mm sections were obtained through the head. Sagittal and coronal 2-D reformatted images were also obtained.   This scan was performed using automatic exposure control (radiation dose reduction software) to obtain a diagnostic image quality scan with patient dose as low as reasonably achievable.    FINDINGS:   CT dated 11/02/2021 available for review.    The brain demonstrates moderate periventricular and deep white matter ischemia.   No acute cerebral cortical infarct is seen.  No intracranial hemorrhage is found.  No mass effect is found in the brain.    The ventricles, sulci and basal cisterns show global atrophy.    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  The paranasal sinuses are clear.  The nasal cavity appears intact.  The nasopharynx is symmetric.  The central skullbase, petrous temporal bones and calvarium remain intact.      IMPRESSION:   Moderate periventricular and deep white matter ischemia. Global atrophy.    --- End of Report ---    < end of copied text >    < from:  Renal (11.03.21 @ 17:19) >  INTERPRETATION:  CLINICAL INFORMATION: Sepsis and worsening renal failure    COMPARISON: Right upper quadrant ultrasound of 11/2/2021    TECHNIQUE: Sonography of thekidneys and bladder.    FINDINGS: The kidneys are echogenic suggestive of medical renal disease.    Right kidney: 9.1 cm. No renal mass, hydronephrosis or calculi.    Left kidney: 8.8 cm. No renal mass, hydronephrosis or calculi.    Urinary bladder: Decompressed in the presence of a Martinez catheter.    IMPRESSION:    Echogenic kidneys suggestive of medical renal disease    --- End of Report ---    < end of copied text >

## 2021-11-22 NOTE — CONSULT NOTE ADULT - TIME BILLING
chart review, lab review, imaging review, notes review. Discussion with  and coordination of care with all relevant providers and consultants caring for patient. Discussed with Dr. Malone and complex care  Rosina.

## 2021-11-22 NOTE — CHART NOTE - NSCHARTNOTEFT_GEN_A_CORE
palliative care social work note.    Support offered to patients granddaughter at bedside in coping with end of life issues.

## 2021-11-22 NOTE — DIETITIAN INITIAL EVALUATION ADULT. - ORAL INTAKE PTA/DIET HISTORY
Per RN Pt not taking any PO. Pt with recent admission earlier this month with GNR septicemia and salmonella bacteremia. Pt with poor PO intake at that admission. Aware consult for PI > Stage 2, comfort measures in place.

## 2021-11-23 NOTE — DISCHARGE NOTE PROVIDER - DETAILS OF MALNUTRITION DIAGNOSIS/DIAGNOSES
This patient has been assessed with a concern for Malnutrition and was treated during this hospitalization for the following Nutrition diagnosis/diagnoses:     -  11/22/2021: Severe protein-calorie malnutrition   -  11/22/2021: Underweight (BMI < 19)

## 2021-11-23 NOTE — PROGRESS NOTE ADULT - ASSESSMENT
89F with PMHX Dementia (AAOx0-1 BL), HTN, CAD/MI, AFIB (no AC), Severe TR, Pulm HTN recently discharged 1 week ago for GNR Septicemia and Salmonella Bacteremia on IV ABX Ceftraixone via Midline sent back to hospital for evaluation of "Low H&H" by PCP admitted for FTT, Hypernatremia, ARF. She was started on iv hydration and resumed her IV abx. However on 11/20 she dopped Hgb, had transient hypotension and had melena appearing BM, she was started on PPI and given PRBC as well as ICU team consulted. GOC with daughter and grand daughter at bedside ( documented on 11/20) was held and after discussion of pt's current condition and all in all prognosis pt was transferred to comfort care. Hospice team consulted.     #Hypotension   #Acute on chromic anemia due to acute blood loss most likely upper GI bleeding  #Fever in pt with Salmonella Bacteremia   #Infected sacral area PUD  #Hypernatremia  #ATN, most likely due to hypotension as above  #severe protein calorie malnutrition  #AFIB, no AC for AFIB 2/2 fall risk  - patient on comfort care    spoke to patient daughter on phone Sylvie [phone]-> advised patient not imminently dying at this time and stable for transfer to inpatient hospice setting. she is stating would not like discharge to in patient hospice and would like patient to pass here. advised is unpredictable when patient to pass. as patient stable advised will be pursuing and inpatient hospice vs rob with hospice discharge today. d/w social work. if patient accepted to inpatient hospice would need to appeal discharge
89F with end stage dementia, pulmonary hypertension, sacral decub, recent admission for GNR septicemia and salmonella bacteremia discharged on IV Abx via midline, now here with end of life.     #1 Acute blood loss anemia - comfort measures, no further blood work  #2 Fever - no further antibiotics given change to comfort measures only  #3 Dyspnea - increase standing morphine to 4mg Q 4 hours   #4 Encounter for palliative care - Recommended inpatient hospice transition but family refusing at this time per complex care  Rosina. 
ASSESSMENT:  89F with PMHX Dementia (AAOx0-1 BL), HTN, CAD/MI, AFIB (no AC), Severe TR, Pulm HTN recently discharged 1 week ago for GNR Septicemia and Salmonella Bacteremia on IV ABX Ceftraixone via Midline sent back to hospital for evaluation of "Low H&H" by PCP admitted for FTT, Hypernatremia, ARF. She was started on iv hydration and resumed her IV abx. However on 11/20 she dopped Hgb, had transient hypotension and had melena appearing BM, she was started on PPI and given PRBC as well as ICU team consulted. GOC with daughter and grand daughter at bedside ( documented on 11/20) was held and after discussion of pt's current condtion and all in all prognosis pt was transferred to comfort care. Hospice team consulted.     Comfort care  - dnr/dni, comfort care only   - no labs, no fs, no vital checks  - morphine 0.5 q2h iv prn for pain and ativan 0.5 mg iv prn for agitation and Robinul 0.1 mg bid prn for secrtion  - hospice consult placed.        #Hypotension   #Acute on chroinc anemia due to acute blood loss most likely upper GI bleeding  #Fever in pt with Salmonella Bacteremia   #Infected sacral area PUD  #Hypernatremia  # ATN, most likely due to hypotension as above  #AFIB, no AC for AFIB 2/2 fall risk      Dispo - may pass away w/in next 24 hr
89F with PMHX Dementia (AAOx0-1 BL), HTN, CAD/MI, AFIB (no AC), Severe TR, Pulm HTN recently discharged 1 week ago for GNR Septicemia and Salmonella Bacteremia on IV ABX Ceftraixone via Midline sent back to hospital for evaluation of "Low H&H" by PCP admitted for FTT, Hypernatremia, ARF. She was started on iv hydration and resumed her IV abx. However on 11/20 she dopped Hgb, had transient hypotension and had melena appearing BM, she was started on PPI and given PRBC as well as ICU team consulted. GOC with daughter and grand daughter at bedside ( documented on 11/20) was held and after discussion of pt's current condition and all in all prognosis pt was transferred to comfort care. Hospice team consulted.     #Hypotension   #Acute on chroinc anemia due to acute blood loss most likely upper GI bleeding  #Fever in pt with Salmonella Bacteremia   #Infected sacral area PUD  #Hypernatremia  #ATN, most likely due to hypotension as above  #AFIB, no AC for AFIB 2/2 fall risk  - patient on comfort care    spoke to patient granddaughter bedside w/ palliative care emotional support provided    spoke to patient daughter on phone Sylvie [phone]
89F with PMHX Dementia (AAOx0-1 BL), HTN, CAD/MI, AFIB (no AC), Severe TR, Pulm HTN recently discharged 1 week ago for GNR Septicemia and Salmonella Bacteremia on IV ABX Ceftraixone via Midline sent back to hospital for evaluation of "Low H&H" by PCP admitted for FTT, Hypernatremia, ARF.    Was sent in for evaluation of Anemia by PMD  -Normocytic Anemia in the 8-9 range of Hgb  -confirmed AOCD by labs  -FOBT +  -No e/o active bleeding  cannot appreciate any cause for further w/u now    Hypernatremia, ARF  -IVF LR 75cc/hr  -Check UA  -Advance Diet    ARF  -sec to dehydration, FTT  -as above    NAGMA  -sec to dehydration, FTT  -as above    Failure to thrive  - was offered Home Hospice prior admission, which dgtr declined  - seen by Palliative now, still declining offer of Home Hospice  - Bradycardia + ENIO + dehydration + hypernatremia in the setting of advanced Dementia    Known Salmonella Bacteremia  -s/p workup last admission. Repeat BCX from 11/7 negative  -Ceftriaxone 2g IV q24 until 11/19/21 via Midline  -Once completed monitor off antibiotics  -Trend CBC/Diff  -Outpt ID F/u    AFIB, and Bradycardia since improved  -Telemetry HR 40s   -no AC for AFIB 2/2 fall risk/ HASBLED  -Amiodarone dc'd previously  -Hold BB x 24 more hrs and then restart    Stage 4 Sacral Decub  -Bandage in place. Collagenase daily. Wound Care Consult.    Dementia, Goals of Care  -DNR/ DNI per recent admission. See Prior MOLST.   -Palliative Care Consult noted  -likely end of life and realistically hospice appropriate given current clinical status     Heparin    Plan- rehydrate. monitor off antibx  Dispo- Home  LOS- 2-3 days
ASSESSMENT:  89F with PMHX Dementia (AAOx0-1 BL), HTN, CAD/MI, AFIB (no AC), Severe TR, Pulm HTN recently discharged 1 week ago for GNR Septicemia and Salmonella Bacteremia on IV ABX Ceftraixone via Midline sent back to hospital for evaluation of "Low H&H" by PCP admitted for FTT, Hypernatremia, ARF. She was started on iv hydration and resumed her IV abx. However on 11/20 she dopped Hgb, had transient hypotension and had melena appearing BM.     #Hypotension  #Acute on chroinc anemia due to acute blood loss most likely upper GI bleeding  - started stat PPI iv bid, ordered 2 units PRBC, consulted GI team and called ICU team after discussion of GOC with daughter on the phone      #Fever in pt with Salmonella Bacteremia   #Infected sacral area PUD  - c/w ceftriaxon, added Vancomycin  - consulted ID   - will obtain set of blood cx, UA, portable CXR  - wound care consult, may need debridement when is more stable from GI bleeding stand point      #Hypernatremia  - due to poor PO intake, switched to d5w @ 75 cc/hr    # ATN, most likely due to hypotension as above  - management of GI bleeding as above, and ivf as above  - UA ordered  - Enriquez cath.    #AFIB, no AC for AFIB 2/2 fall risk  -rate controlled, holding BB for now given bradycardia    DVT ppx - SCD for now  Code - DNR/DNI, see GOC above  Dispo - grim prognosis

## 2021-11-23 NOTE — PROGRESS NOTE ADULT - SUBJECTIVE AND OBJECTIVE BOX
OVERNIGHT EVENTS: more dyspneic     Present Symptoms:     Dyspnea: mild - moderate   Nausea/Vomiting: No  Anxiety:  No  Depression: unable   Fatigue: Yes   Loss of appetite: No  Constipation: unable     Pain: none             Character-            Duration-            Effect-            Factors-            Frequency-            Location-            Severity-    Pain AD Score:  [email or web address]/[email or web address].pdf (press ctrl + left click to view)    Review of Systems: Reviewed                 Unable to obtain due to poor mentation   All others negative    MEDICATIONS  (STANDING):  glycopyrrolate Injectable 0.2 milliGRAM(s) IV Push every 8 hours  morphine  - Injectable 4 milliGRAM(s) IV Push every 4 hours    MEDICATIONS  (PRN):  LORazepam   Injectable 1 milliGRAM(s) IV Push every 4 hours PRN agtiation, restlessness  morphine  - Injectable 2 milliGRAM(s) IV Push every 2 hours PRN dyspnea    PHYSICAL EXAM:    Vital Signs Last 24 Hrs  T(C): 37.5 (23 Nov 2021 10:48), Max: 37.5 (23 Nov 2021 10:48)  T(F): 99.5 (23 Nov 2021 10:48), Max: 99.5 (23 Nov 2021 10:48)  HR: 92 (23 Nov 2021 10:48) (92 - 92)  BP: 74/34 (23 Nov 2021 10:48) (74/34 - 74/34)  BP(mean): --  RR: --  SpO2: --    General: unresponsive eye patch in place left eye. severe cachexia      HEENT: normal    Lungs: comfortable    CV: normal      GI: normal     : martinez     MSK: weakness     Neuro: no focal deficits     Skin: no rash    LABS:    I&O's Summary    RADIOLOGY & ADDITIONAL STUDIES:    ADVANCE DIRECTIVES/TREATMENT PREFERENCES:  DNR/DNI, comfort measures only

## 2021-11-23 NOTE — DISCHARGE NOTE FOR THE EXPIRED PATIENT - OTHER SIGNIFICANT FINDINGS
Unresponsive to sternal verbal/painful stimuli. Unresponsive to sternal rub.  No spontaneous respirations. No breath sounds or heart tones auscultated.   No peripheral or central pulses palpated.   Pupils nonreactive to light. No corneal reflex. Unresponsive to sternal verbal/painful stimuli. Unresponsive to sternal rub.  No spontaneous respirations. No breath sounds or heart tones auscultated.   No peripheral or central pulses palpated.   Pupils nonreactive to light. No corneal reflex.

## 2021-11-23 NOTE — DISCHARGE NOTE FOR THE EXPIRED PATIENT - HOSPITAL COURSE
89F with PMHX Dementia (AAOx0-1 BL), HTN, CAD/MI, AFIB (no AC), Severe TR, Pulm HTN recently discharged 1 week ago for GNR Septicemia and Salmonella Bacteremia on IV ABX Ceftraixone via Midline sent back to hospital for evaluation of "Low H&H" by PCP admitted for FTT, Hypernatremia, ARF. She was started on iv hydration and resumed her IV abx. However on 11/20 she dropped Hgb, had transient hypotension and had melena appearing BM, she was started on PPI and given PRBC as well as ICU team consulted. GOC with daughter and grand daughter at bedside ( documented on 11/20) was held and after discussion of pt's current condition and all in all prognosis pt was transferred to comfort care.

## 2021-11-23 NOTE — DISCHARGE NOTE PROVIDER - HOSPITAL COURSE
89F with PMHX Dementia (AAOx0-1 BL), HTN, CAD/MI, AFIB (no AC), Severe TR, Pulm HTN recently discharged 1 week ago for GNR Septicemia and Salmonella Bacteremia on IV ABX Ceftriaxone via Midline sent back to hospital for evaluation of "Low H&H" by PCP admitted for FTT, Hypernatremia, ARF. She was started on iv hydration and resumed her IV abx. However on 11/20 she dropped Hgb, had transient hypotension and had melena appearing BM, she was started on PPI and given PRBC as well as ICU team consulted. GOC with daughter and grand daughter at bedside ( documented on 11/20) was held and after discussion of pt's current condition and all in all prognosis pt was transferred to comfort care. now stable for inpatient hospice transfer     Time spent on patients discharge 32 minutes

## 2021-11-23 NOTE — PROGRESS NOTE ADULT - TIME BILLING
chart review, lab review, imaging review, notes review. Discussion with  and coordination of care with all relevant providers and consultants caring for patient.

## 2021-11-23 NOTE — PROGRESS NOTE ADULT - SUBJECTIVE AND OBJECTIVE BOX
Patient is a 89y old  Female who presents with a chief complaint of Hypernatremia, ARF (23 Nov 2021 09:39)    Patient seen and examined at bedside.     ALLERGIES:  No Known Allergies    MEDICATIONS  (STANDING):  glycopyrrolate Injectable 0.2 milliGRAM(s) IV Push every 8 hours  morphine  - Injectable 2 milliGRAM(s) IV Push every 6 hours    MEDICATIONS  (PRN):  LORazepam   Injectable 1 milliGRAM(s) IV Push every 4 hours PRN agtiation, restlessness  morphine  - Injectable 2 milliGRAM(s) IV Push every 2 hours PRN dyspnea    Vital Signs Last 24 Hrs  T(F): --  HR: --  BP: --  RR: --  SpO2: --  I&O's Summary    PHYSICAL EXAM:  General: NAD, lethargic  ENT: MMM, no thrush  Neck: Supple, No JVD  Lungs: good air entry, non-labored breathing  Cardio: +S1/S2  Abdomen: Soft, Nontender, Nondistended; Bowel sounds present  Extremities: No calf tenderness      LABS:    Cultures  Culture - Blood (collected 20 Nov 2021 09:24)  Source: .Blood Blood-Venous  Preliminary Report (22 Nov 2021 11:00):    No growth at 48 hours      COVID-19 PCR: NotDetec (11-18-21 @ 22:07)  COVID-19 PCR: NotDetec (11-09-21 @ 05:16)    RADIOLOGY & ADDITIONAL TESTS:  - no new tests

## 2021-11-23 NOTE — HOSPICE CARE NOTE - CONVESATION DETAILS
Referral received, approval received for Hospice INN, call to granddaughter Gita along with daughter Sylvie, both declined transfer stating they do not want patient transferred anywhere but home. In agreement with home hospice in stable for discharge. Approval received for home hospice. [facility] RN will remain available.     TITO Lorenzo RN

## 2021-11-23 NOTE — DISCHARGE NOTE PROVIDER - NSDCMRMEDTOKEN_GEN_ALL_CORE_FT
glycopyrrolate 0.2 mg/mL injectable solution: 0.2 milligram(s) injectable every 8 hours  LORazepam: 1 milligram(s) intravenous every 4 hours, As Needed  morphine: 2 milligram(s) intravenous every 6 hours  morphine: 2 milligram(s) intravenous every 2 hours, As Needed   glycopyrrolate 0.2 mg/mL injectable solution: 0.2 milligram(s) injectable every 8 hours  LORazepam: 1 milligram(s) intravenous every 4 hours, As Needed  morphine: 4 milligram(s) injectable every 4 hours, As Needed  morphine: 4 milligram(s) intravenous every 4 hours

## 2021-11-25 LAB
CULTURE RESULTS: SIGNIFICANT CHANGE UP
SPECIMEN SOURCE: SIGNIFICANT CHANGE UP

## 2021-12-08 NOTE — CHART NOTE - NSCHARTNOTEFT_GEN_A_CORE
Palliative care social work note.    Bereavement call made to patients granddaughter. Support and resources offered.

## 2022-03-18 NOTE — DIETITIAN INITIAL EVALUATION ADULT. - OTHER INFO
I sent you pt chart for FU from ER already.  Please schedule when able Pt with h/o HTN and advanced dementia (baseline AOx1) was brought in by family after being found minimally responsive.  At baseline pt reported to be aaox1 however family noted she was lethargic and brought her in.  Pt is a poor historian and unable to obtain information from her. Information obtained from chart.  On arrival pt reported to be tachypneic and hypothermic.  Pt was empirically tx w/ 1L IVF and vanco/zosyn.  Admitted for acute metabolic encephalopathy and sepsis.

## 2022-09-21 NOTE — ED ADULT NURSE NOTE - NS ED NURSE RECORD ANOTHER VITAL SIGN
Weakness of right hand  Improving  Continue OT to improve strength and function of the Rt hand  I have spent 30 minutes with Patient and family today in which greater than 50% of this time was spent in counseling/coordination of care regarding Diagnostic results, Prognosis, Risks and benefits of tx options, Intructions for management, Patient and family education, Importance of tx compliance, Risk factor reductions and Impressions  She will follow-up as needed  History of stroke  Pt denies any symptoms to suggest new stroke  Pt should continue Aspirin and atorvastatin for secondary stroke prevention  Pt was encouraged to get regular exercise and follow a Mediterranean diet  If pt has any new stroke-like symptoms including sudden painless loss of vision or double vision, difficulty speaking or swallowing, vertigo / room spinning that does not quickly resolve, or weakness / numbness affecting 1 side of the face or body he should proceed by ambulance to the nearest emergency room immediately  Yes

## 2022-12-22 NOTE — ED CDU PROVIDER DISPOSITION NOTE - PATIENT PORTAL LINK FT
You can access the FollowMyHealth Patient Portal offered by [facility] by registering at the following website: [email or web address]. By joining [facility]’s FollowMyHealth portal, you will also be able to view your health information using other applications (apps) compatible with our system.
PAST SURGICAL HISTORY:  No significant past surgical history

## 2023-02-16 NOTE — CDI QUERY NOTE - NSCDINOTECODERS_GEN_A_CORE
CDI Specialist Azathioprine Pregnancy And Lactation Text: This medication is Pregnancy Category D and isn't considered safe during pregnancy. It is unknown if this medication is excreted in breast milk.

## 2023-05-03 NOTE — PATIENT PROFILE ADULT - LOCATION #1
Diabetes Instructions:     Regarding your insulin:     Diabetes Instructions:     Regarding Basal Insulin (Basaglar)  Increase to 18 units at bedtime     How to adjust basal insulin:  Fasting BS >140: Increase by 2 units every week  Fasting BS: 110-140: Stay on the same dose  Fasting BS: Less than 110: Decrease by 2 units every week     Regarding Meal time insulin (Humalog)  Increase to 20 units before you eat.      How to take meal time insulin:  If your premeal -200: Take 10 min before you eat  If your premeal -300: Take 20 min before you eat  If your premeal  or above: Take 30 min before you eat.    We will see you back in 3 months with repeat labs, please get labs roughly 1 week before follow up.     Dr. Lori Guo DO Endocrinology  [facility]   Nicole Chatman/[facility]   [address] [place], [place] [address]  Office: [phone]  Fax: [phone]  
Coccyx

## 2023-08-15 NOTE — PHYSICAL THERAPY INITIAL EVALUATION ADULT - PERTINENT HX OF CURRENT PROBLEM, REHAB EVAL
08/15/23 11:32 AM    The patient was called and has declined completing a Cologuard test, please discuss with the patient at next visit. Patient states she will discuss at her next PCP appt    Thank you.   PAXTON FIGUEREDO  PG VALUE BASED VIR
12-Dec-2018
89 yr old female with hypertension, dementia, persistent atrial fibrillation not on anticoagulation given fall risk, severe tricuspid regurgitation pulmonary hypertension was BIBEMS as she was noted to be lethargic and minimally responsive by family. In ED, noted to have leucocytosis, anion gap metabolic acidosis, hyperkalemia, ENIO and elevated LFTs. CT head with no acute pathology. pt admitted with encephalopathy

## 2023-08-15 NOTE — DIETITIAN INITIAL EVALUATION ADULT. - SIGNS/SYMPTOMS
Airway    Performed by: Taylor Schwab MD  Authorized by: Taylor Schwab MD    Final Airway Type:  Endotracheal airway  Final Endotracheal Airway*:  ETT  ETT Size (mm)*:  7.5  Cuff*:  Regular  Technique Used for Successful ETT Placement:  Video laryngoscopy  Devices/Methods Used in Placement*:  Mask, Stylet and Oral ETT  Intubation Procedure*:  ETCO2, Preoxygenation, Atraumatic, Dentition Unchanged and Pharynx Clear  Insertion Site:  Oral  Blade Type*:  Video Laryngoscope  Blade Size*:  3  Secured at (cm)*:  24  Placement Verified by: auscultation, capnometry and palpation of cuff    Glottic View*:  1 - full view of glottis  Attempts*:  1  Location:  OR  Urgency:  Elective  Difficult Airway: No    Indications for Airway Management:  Anesthesia  Sedation Level:  Deep  Mask Difficulty Assessment:  1 - vent by mask  Start Time: 8/15/2023 8:26 AM      
as evidenced by pt severe muscle wasting/fat loss; likely meeting <75% est energy intake > 1 mo

## 2024-01-19 NOTE — PATIENT PROFILE ADULT - NSPROIMPLANTSMEDDEV_GEN_A_NUR
Patient has refills on file of Losartan denied refill.   
Refill requested by: Pharmacy E-Request    Cardiology Provider: Dr Keanu Bowers  Med: Losartan  Dosage: 50 mg  Sig: daily  Dose/Sig verified by LOV Note:Yes  Allergies:   ALLERGIES:   Allergen Reactions    Percocet [Oxycodone-Acetaminophen]      Urticaria     Adhesive   (Environmental) RASH    Tegaderm [Gelpad Dressing]      Red skin     Latex   (Environmental)      Urticaria     Topamax      Neuropathy        Quantity requestin day supply    LOV: 22  NOV: Not scheduled  - If follow up is PRN please send future refills to PCP.    Required Labs needed for protocol:CMP     Lab Results   Component Value Date    SODIUM 141 10/03/2023    POTASSIUM 3.7 10/03/2023    CO2 26 10/03/2023    BUN 18 10/03/2023    CREATININE 1.07 (H) 10/03/2023     No results found for: \"NTPROB\"  Lab Results   Component Value Date    WBC 5.8 10/03/2023    HCT 38.3 10/03/2023    HGB 13.0 10/03/2023     10/03/2023     Lab Results   Component Value Date    FSTS1 0 2023    FSTS1 0 2023    FSTS1 0 2023    CHOLESTEROL 208 (H) 10/03/2023    TRIGLYCERIDE 371 (H) 10/03/2023    HDL 27 (L) 10/03/2023    CALCLDL 107 10/03/2023    NONHDL 181 10/03/2023    CHOHDL 7.7 (H) 10/03/2023     Lab Results   Component Value Date    TSH 1.479 10/03/2023     Lab Results   Component Value Date    MG 1.7 06/15/2021     Lab Results   Component Value Date    AST 24 10/03/2023    GPT 51 10/03/2023        EKG NEEDED: No    CHEST XRAY NEEDED: No    Comments:   Needs a follow up visit scheduled.  I phoned the patient and she would like Dr. Chester to fill it. Message forwarded to Dr. Chester's pool    
None

## 2024-05-17 NOTE — GOALS OF CARE CONVERSATION - ADVANCED CARE PLANNING - MOLST COMPLETED
pt with hives the past 8 days, started with one hive PMD prescribed clobetasol cream, benadryl and cetirizine and hydrocortisone cream lungs clear BL, no increased WOB, no vomiting. mom here with no improvement. mom unsure of any food hes allergic too.   Denies PMH, NKDA, IUTD at this time
02-Nov-2021
pt with hives the past 8 days, started with one hive PMD prescribed clobetasol cream, benadryl and cetirizine and hydrocortisone cream lungs clear BL, no increased WOB, no vomiting. no fevers mom here with no improvement. mom unsure of any food hes allergic too.   Denies PMH, NKDA, IUTD at this time

## 2024-07-12 NOTE — DIETITIAN NUTRITION RISK NOTIFICATION - LOSS OF SUBCUTANEOUS FAT
Orbital.../Buccal... Continue Allopurinol  Uric acid level mildly elevated 9.4 in ED  repeat UA normal Continue Allopurinol  Uric acid level mildly elevated 9.4 in ED  repeat UA normal

## 2024-11-12 NOTE — PROGRESS NOTE ADULT - SUBJECTIVE AND OBJECTIVE BOX
[facility]                                   [address] [place] [address]                                                      PHONE: [phone]                                                         FAX: [phone]  [email or web address]  ---------------------------------------------------------------------------------------------------------------------------------    Overnight events/patient complaints:  NAD     No Known Allergies    MEDICATIONS  (STANDING):  cefTRIAXone   IVPB 2000 milliGRAM(s) IV Intermittent every 24 hours  dextrose 5% + sodium chloride 0.45%. 1000 milliLiter(s) (100 mL/Hr) IV Continuous <Continuous>  heparin   Injectable 5000 Unit(s) SubCutaneous every 8 hours  metoprolol tartrate 25 milliGRAM(s) Oral every 12 hours  potassium chloride  20 mEq/100 mL IVPB 20 milliEquivalent(s) IV Intermittent every 2 hours    MEDICATIONS  (PRN):  acetaminophen     Tablet .. 650 milliGRAM(s) Oral every 6 hours PRN Temp greater or equal to 38C (100.4F), Mild Pain (1 - 3)  aluminum hydroxide/magnesium hydroxide/simethicone Suspension 30 milliLiter(s) Oral every 4 hours PRN Dyspepsia  melatonin 3 milliGRAM(s) Oral at bedtime PRN Insomnia      Vital Signs Last 24 Hrs  T(C): 36.1 (05 Nov 2021 04:22), Max: 36.4 (05 Nov 2021 00:30)  T(F): 97 (05 Nov 2021 04:22), Max: 97.6 (05 Nov 2021 00:30)  HR: 58 (05 Nov 2021 09:21) (58 - 69)  BP: 124/66 (05 Nov 2021 09:21) (118/40 - 149/94)  BP(mean): 80 (05 Nov 2021 04:22) (80 - 80)  RR: 17 (05 Nov 2021 09:21) (16 - 18)  SpO2: 99% (05 Nov 2021 09:21) (92% - 99%)  ICU Vital Signs Last 24 Hrs  [place] [facility]  I&O's Detail    04 Nov 2021 07:01  -  05 Nov 2021 07:00  --------------------------------------------------------  IN:    dextrose 5% + sodium chloride 0.45%: 300 mL    dextrose 5% + sodium chloride 0.45%: 1700 mL    IV PiggyBack: 100 mL  Total IN: 2100 mL    OUT:    Indwelling Catheter - Urethral (mL): 300 mL    Voided (mL): 600 mL  Total OUT: 900 mL    Total NET: 1200 mL        I&O's Summary    04 Nov 2021 07:01  -  05 Nov 2021 07:00  --------------------------------------------------------  IN: 2100 mL / OUT: 900 mL / NET: 1200 mL      Drug Dosing [facility]      PHYSICAL EXAM:  General: Appears well developed, alert and cooperative.  HEENT: Head; normocephalic, atraumatic.  Eyes: Pupils reactive, cornea wnl.  Neck: Supple, no nodes adenopathy, no NVD or carotid bruit or thyromegaly.  CARDIOVASCULAR: Normal S1 and S2, 1/6 murmur, rub, gallop or lift.   LUNGS: No rales, rhonchi or wheeze. Normal breath sounds bilaterally.  ABDOMEN: Soft, nontender without mass or organomegaly. bowel sounds normoactive.  EXTREMITIES: No clubbing, cyanosis or edema. Distal pulses wnl.   SKIN: warm and dry with normal turgor.  NEURO: Alert/oriented x 0  PSYCH: normal affect.        LABS:                        9.1    5.14  )-----------( 194      ( 05 Nov 2021 05:28 )             27.3     11-05    143  |  109<H>  |  67.1<H>  ----------------------------<  164<H>  2.4<LL>   |  23.0  |  1.83<H>    Ca    7.6<L>      05 Nov 2021 07:21  Phos  2.4     11-05  Mg     1.8     11-05    TPro  4.2<L>  /  Alb  1.8<L>  /  TBili  0.6  /  DBili  x   /  AST  470<H>  /  ALT  426<H>  /  AlkPhos  88  11-05    NEHEMIAS LAUGHLIN      PT/INR - ( 04 Nov 2021 06:16 )   PT: 15.7 sec;   INR: 1.37 ratio         PTT - ( 04 Nov 2021 06:16 )  PTT:45.7 sec      RADIOLOGY & ADDITIONAL STUDIES:    INTERPRETATION OF TELEMETRY (personally reviewed): NSR     ECHO: 3/2021 normal EF LA/RA severely dilated severe TR severe PHTN moderate AS       < from: CT Chest No Cont (11.02.21 @ 14:51) >  IMPRESSION:  1. Faceted gallstones. Intra and extrahepatic biliary dilatation. Correlate with LFTs. If there is clinical suspicion for acute cholecystitis or choledocholithiasis, consider right upper quadrant ultrasound and/or noncontrast MRCP for further evaluation.  2. Extensive patchy peribronchovascular opacities throughout the lungs, with left lower lobe mucus plugging, concerning for    < end of copied text >        Assessment and Plan:  In summary, NEHEMIAS LAUGHLIN is an 89y Female with past medical history significant for HTN, persistent AF not on AC due to fall risk on Amiodarone therapy prior TTE 3/2021 normal EF LA/RA severely dilated severe TR severe PHTN moderate AS mild MS dementia (baseline AOx1) who presents to [facility] due to AMS and minimally responsive.  At baseline, Patient reported to be AAO x 1 however family noted she was lethargic and brought her [facility] for further evaluations.  Patient is a poor historian and unable to obtain history thus history obtained from chart. On arrival patient was found to be tachypneic and hypothermic.  Patient was empirically treated with one liter IVF and ABx therapy.  ENIO/Sepsis UTI found to have salmonella       Agree with Holding Amiodarone due to abnormal LFT's  Monitor renal panel and replete K   Low dose B-Blockers if SBP > 95   ABx therapy as per primary team     Poor prognosis     Conservative cardiovascular management      No Vaccines Administered.

## 2025-04-30 NOTE — H&P ADULT - HISTORY OF PRESENT ILLNESS
89 y/o female with h/o htn, MI was brought in after trip and fall in the bathroom, no LOc, no cp, no sob, no abd. pain, no n/v/d as per history. spoke to ER physician and pt's daughter has helped with history. pt. likely has dementia and does not give any history. pt. reports no pain and sitting up in the bed . pt's ct head , neck and pelvis did not show any acute findings. pt. could not ambulate in the ER so admission requested for PT eval and then plan discharge as per recommendations.  89 y/o female with h/o htn, MI was brought in after trip and fall in the bathroom, no LOc, no cp, no sob, no abd. pain, no n/v/d as per history. spoke to ER physician and pt's daughter has helped with history. pt. likely has dementia and does not give any history. pt. reports no pain and sitting up in the bed . 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 pt. could not ambulate in the ER so admission requested for PT eval and then plan discharge as per recommendations.  No

## 2025-07-08 NOTE — CDI QUERY NOTE - NSCDINOTEPOA_GEN_ALL_CORE_FT
When Should The Patient Follow-Up For Their Next Full-Body Skin Exam?: 3 Months
Detail Level: Detailed
Quality 137: Melanoma: Continuity Of Care - Recall System: Patient information entered into a recall system that includes: target date for the next exam specified AND a process to follow up with patients regarding missed or unscheduled appointments
Was the condition present on admission? If so, please document in the chart that “(the condition) was present on admission.”

## 2025-08-01 NOTE — PATIENT PROFILE ADULT - OVER THE PAST TWO WEEKS, HAVE YOU FELT LITTLE INTEREST OR PLEASURE IN DOING THINGS?
PCP: Edis Garcia MD    Refill Request - Patient needs his prescription sent to his new pharmacy    LAST OFFICE VISIT: 07/16/25      Medication:   amLODIPine (NORVASC) 5 MG tablet     metFORMIN (GLUCOPHAGE) 1000 MG tablet         Dispense:       [facility] [address] [address] - P [phone] - F [phone] [[id number]]       Future Appointments   Date Time Provider Department Center   9/4/2025  3:40 PM Maylin Obrien DO [facility] BS [facility]   1/15/2026  3:00 PM [facility]         
no